# Patient Record
Sex: MALE | Race: WHITE | NOT HISPANIC OR LATINO | Employment: UNEMPLOYED | URBAN - METROPOLITAN AREA
[De-identification: names, ages, dates, MRNs, and addresses within clinical notes are randomized per-mention and may not be internally consistent; named-entity substitution may affect disease eponyms.]

---

## 2017-07-03 ENCOUNTER — OCCUPATIONAL MEDICINE (OUTPATIENT)
Dept: URGENT CARE | Facility: CLINIC | Age: 47
End: 2017-07-03
Payer: COMMERCIAL

## 2017-07-03 ENCOUNTER — APPOINTMENT (OUTPATIENT)
Dept: RADIOLOGY | Facility: IMAGING CENTER | Age: 47
End: 2017-07-03
Attending: NURSE PRACTITIONER
Payer: COMMERCIAL

## 2017-07-03 VITALS
SYSTOLIC BLOOD PRESSURE: 160 MMHG | OXYGEN SATURATION: 96 % | BODY MASS INDEX: 34.19 KG/M2 | HEIGHT: 73 IN | DIASTOLIC BLOOD PRESSURE: 100 MMHG | HEART RATE: 72 BPM | TEMPERATURE: 97.7 F | WEIGHT: 258 LBS | RESPIRATION RATE: 20 BRPM

## 2017-07-03 DIAGNOSIS — S63.602A SPRAIN OF LEFT THUMB, UNSPECIFIED SITE OF FINGER, INITIAL ENCOUNTER: ICD-10-CM

## 2017-07-03 PROCEDURE — 73140 X-RAY EXAM OF FINGER(S): CPT | Mod: TC,LT,29 | Performed by: NURSE PRACTITIONER

## 2017-07-03 PROCEDURE — 99202 OFFICE O/P NEW SF 15 MIN: CPT | Mod: 29 | Performed by: NURSE PRACTITIONER

## 2017-07-03 RX ORDER — LEVOTHYROXINE SODIUM 0.03 MG/1
25 TABLET ORAL
COMMUNITY
End: 2023-07-03

## 2017-07-03 RX ORDER — INSULIN GLARGINE 100 [IU]/ML
INJECTION, SOLUTION SUBCUTANEOUS NIGHTLY
Status: ON HOLD | COMMUNITY
End: 2023-03-29

## 2017-07-03 ASSESSMENT — ENCOUNTER SYMPTOMS
FEVER: 0
CHILLS: 0

## 2017-07-03 NOTE — MR AVS SNAPSHOT
"        Peña Guerrero   7/3/2017 12:00 PM   Occupational Medicine   MRN: [id number]    Department:  [facility]   Dept Phone:  [phone]    Description:  Male : 1970   Provider:  Cathey J Hamman, A.P.N.           Reason for Visit     Finger Injury 7 month ago whlie working hurt left thumb.      Allergies as of 7/3/2017     Allergen Noted Reactions    Pcn [Penicillins] 2017         You were diagnosed with     Sprain of left thumb, unspecified site of finger, initial encounter   [[id number]]         Vital Signs     Blood Pressure Pulse Temperature Respirations Height Weight    160/100 mmHg 72 36.5 °C (97.7 °F) 20 1.842 m (6' 0.5\") 117.028 kg (258 lb)    Body Mass Index Oxygen Saturation                34.49 kg/m2 96%          Basic Information     Date Of Birth Sex Race Ethnicity Preferred Language    1970 Male White Non- English      Your appointments     2017  8:00 AM   Workers Compensation with [facility]    [address] [place] [address] [address]  [place] [address]   [phone]              Problem List              ICD-10-CM Priority Class Noted - Resolved    Pain of left thumb M79.645   3/16/2015 - Present    Thumb laceration S61.019A   3/16/2015 - Present      Health Maintenance     Patient has no pending health maintenance at this time      Current Immunizations     No immunizations on file.      Below and/or attached are the medications your provider expects you to take. Review all of your home medications and newly ordered medications with your provider and/or pharmacist. Follow medication instructions as directed by your provider and/or pharmacist. Please keep your medication list with you and share with your provider. Update the information when medications are discontinued, doses are changed, or new medications (including over-the-counter products) are added; and carry medication information at all times in the event of " emergency situations     Allergies:  PCN - (reactions not documented)               Medications  Valid as of: July 03, 2017 -  1:09 PM    Generic Name Brand Name Tablet Size Instructions for use    Esomeprazole Magnesium (CAPSULE DELAYED RELEASE) NEXIUM 20 MG Take 20 mg by mouth every morning before breakfast.        Insulin Glargine (Solution) LANTUS 100 UNIT/ML Inject  as instructed every evening.        Levothyroxine Sodium (Tab) SYNTHROID 25 MCG Take 25 mcg by mouth Every morning on an empty stomach.        .                 Medicines prescribed today were sent to:     [facility] #[id number] - [place], [place] - [address] [address] [address] [facility] [address]    Phone: [phone] Fax: [phone]    Open 24 Hours?: No      Medication refill instructions:       If your prescription bottle indicates you have medication refills left, it is not necessary to call your provider’s office. Please contact your pharmacy and they will refill your medication.    If your prescription bottle indicates you do not have any refills left, you may request refills at any time through one of the following ways: The online [employer]idget[employer] system (except Urgent Care), by calling your provider’s office, or by asking your pharmacy to contact your provider’s office with a refill request. Medication refills are processed only during regular business hours and may not be available until the next business day. Your provider may request additional information or to have a follow-up visit with you prior to refilling your medication.   *Please Note: Medication refills are assigned a new Rx number when refilled electronically. Your pharmacy may indicate that no refills were authorized even though a new prescription for the same medication is available at the pharmacy. Please request the medicine by name with the pharmacy before contacting your provider for a refill.        Your To Do List     Future Labs/Procedures Complete By Expires     DX-FINGER(S) 2+ LEFT  As directed 7/3/2018      Referral     A referral request has been sent to our patient care coordination department. Please allow 3-5 business days for us to process this request and contact you either by phone or mail. If you do not hear from us by the 5th business day, please call us at [phone].           Yuyuto Access Code: [email or web address]  Expires: 8/2/2017  1:09 PM    Your email address is not on file at [employer].  Email Addresses are required for you to sign up for [employer], please contact [phone] to verify your personal information and to provide your email address prior to attempting to register for Yuyuto.    Peña Guerrero  [address] [place] [id number]    Yuyuto  A secure, online tool to manage your health information     [employer]’s Yuyuto® is a secure, online tool that connects you to your personalized health information from the privacy of your home -- day or night - making it very easy for you to manage your healthcare. Once the activation process is completed, you can even access your medical information using the Yuyuto fox, which is available for free in the Apple Fox store or Google Play store.     To learn more about Yuyuto, visit [email or web address]    There are two levels of access available (as shown below):   My Chart Features  [facility] Primary Care Doctor [facility]  Specialists [facility]  Urgent  Care Non-[facility] Primary Care Doctor   Email your healthcare team securely and privately 24/7 X X X    Manage appointments: schedule your next appointment; view details of past/upcoming appointments X      Request prescription refills. X      View recent personal medical records, including lab and immunizations X X X X   View health record, including health history, allergies, medications X X X X   Read reports about your outpatient visits, procedures, consult and ER notes X X X X   See your discharge summary, which is a recap of your  hospital and/or ER visit that includes your diagnosis, lab results, and care plan X X  X     How to register for [employer]:  Once your e-mail address has been verified, follow the following steps to sign up for [employer].     1. Go to  [email or web address]  2. Click on the Sign Up Now box, which takes you to the New Member Sign Up page. You will need to provide the following information:  a. Enter your [employer] Access Code exactly as it appears at the top of this page. (You will not need to use this code after you’ve completed the sign-up process. If you do not sign up before the expiration date, you must request a new code.)   b. Enter your date of birth.   c. Enter your home email address.   d. Click Submit, and follow the next screen’s instructions.  3. Create a [employer] ID. This will be your [employer] login ID and cannot be changed, so think of one that is secure and easy to remember.  4. Create a [employer] password. You can change your password at any time.  5. Enter your Password Reset Question and Answer. This can be used at a later time if you forget your password.   6. Enter your e-mail address. This allows you to receive e-mail notifications when new information is available in [employer].  7. Click Sign Up. You can now view your health information.    For assistance activating your [employer] account, call [phone]

## 2017-07-03 NOTE — PROGRESS NOTES
"Subjective:      Peña Guerrero is a 46 y.o. male who presents with Finger Injury      DOI: 1/12/17  patient states he injured his left thumb 6 months ago. Date of injury was January 12 of this year. He was helping someone else to move a snowblower out of the back of the pickup truck. He believes that the other person securely was holding the other and snowblower in the front end of the snowblower fell down while the patient was holding the handle area. He states that his left thumb became caught between 2 large bolts as the machine was falling and pulled his thumb forward and down to ground level. He has been having pain in the proximal joint of the thumb ever since. He states he was initially seen for this injury in [place] and that was found was a Workmen's Compensation at that visit. Patient has not had any follow-up since. Patient states lack of transportation and also coordination efforts with his third-party  have been barriers to further treatment.      Other  This is a chronic problem. Episode onset: 1/12/17. The problem occurs constantly. The problem has been unchanged. Pertinent negatives include no chills or fever. Nothing aggravates the symptoms. Treatments tried: ice, immobilization, rest. The treatment provided no relief.       Review of Systems   Constitutional: Negative for fever and chills.   Musculoskeletal:        Pain at the base of the left thumb, loss of strength in the left thumb.           Objective:     /100 mmHg  Pulse 72  Temp(Src) 36.5 °C (97.7 °F)  Resp 20  Ht 1.842 m (6' 0.5\")  Wt 117.028 kg (258 lb)  BMI 34.49 kg/m2  SpO2 96%     Physical Exam   Constitutional: He is oriented to person, place, and time. He appears well-developed and well-nourished. No distress.   Musculoskeletal:        Left hand: Decreased strength noted. He exhibits finger abduction and thumb/finger opposition.        Hands:  Point tenderness over the left thumb proximal joint. No " obvious STS.    Neurological: He is alert and oriented to person, place, and time.   Skin: Skin is warm and dry. He is not diaphoretic.   Psychiatric: He has a normal mood and affect. His behavior is normal.   Vitals reviewed.      There is slight soft tissue swelling noted to the proximal joint of the left thumb. Full ROM in the joint. Strength is 5/5 in the right thumb, 2/5 in the left. Unable to perform pincer grasp or fine motor tasks with the thumb of the left hand.       XR thumb:       7/3/2017 12:28 PM    HISTORY/REASON FOR EXAM:  Pain/Deformity Following Trauma.  LEFT thumb sprain 6 months ago, not improving.    TECHNIQUE/EXAM DESCRIPTION AND NUMBER OF VIEWS:  3 views of the LEFT fingers.    COMPARISON: None    FINDINGS:  No focal soft tissue swelling.  No radiopaque foreign body.  No fracture or dislocation.  Slight deformity of the distal aspect 1st metacarpal.         Impression        1.  Slight deformity of the distal aspect LEFT 1st metacarpal suggesting healed fracture.  2.  No acute fracture or dislocation.            Assessment/Plan:     1. Sprain of left thumb, unspecified site of finger, initial encounter    - DX-FINGER(S) 2+ LEFT; Future  -Thumb spica splint  -referral given to orthopedics  -See D39 for restrictions  -REturn in 2 weeks for follow up.

## 2017-07-03 NOTE — Clinical Note
[facility]   [address] - [place] [address]  Phone: [phone] - Fax: [phone]        [facility] Progress Report and Disability Certification  Date of Service: 7/3/2017   No Show:  No  Date / Time of Next Visit: 7/17/2017   Claim Information   Patient Name: Peña Guerrero  Claim Number:     Employer: [employer]  Date of Injury: 1/12/2017     Insurer / TPA: [employer]  ID / SSN:     Occupation: LAYBOR   Diagnosis: The encounter diagnosis was Sprain of left thumb, unspecified site of finger, initial encounter.    Medical Information   Related to Industrial Injury? Yes    Subjective Complaints:  DOI: 1/12/17  patient states he injured his left thumb 6 months ago. Date of injury was January 12 of this year. He was helping someone else to move a snowblower out of the back of the pickup truck. He believes that the other person securely was holding the other and snowblower in the front end of the snowblower fell down while the patient was holding the handle area. He states that his left thumb became caught between 2 large bolts as the machine was falling and pulled his thumb forward and down to ground level. He has been having pain in the proximal joint of the thumb ever since. He states he was initially seen for this injury in [place] and that was found was a Workmen's Compensation at that visit. Patient has not had any follow-up since. Patient states lack of transportation and also coordination efforts with his third-party  have been barriers to further treatment.    Objective Findings: There is slight soft tissue swelling noted to the proximal joint of the left thumb. Full ROM in the joint. Strength is 5/5 in the right thumb, 2/5 in the left. Unable to perform pincer grasp or fine motor tasks with the thumb of the left hand.    Pre-Existing Condition(s):     Assessment:   Condition Same       Status: Additional Care Required  Permanent Disability:No    Plan: ConsultationTransfer Care  Comments:patient referred to orthopedics in light of the  chronic nature of the problem    Diagnostics: X-ray    Comments:  1.  Slight deformity of the distal aspect LEFT 1st metacarpal suggesting healed fracture.  2.  No acute fracture or dislocation.    Disability Information   Status: Released to Restricted Duty    From:  7/3/2017  Through: 7/17/2017 Restrictions are: Temporary   Physical Restrictions   Sitting:    Standing:    Stooping:    Bending:      Squatting:    Walking:    Climbing:    Pushing:      Pulling:    Other:    Reaching Above Shoulder (L):   Reaching Above Shoulder (R):       Reaching Below Shoulder (L):    Reaching Below Shoulder (R):      Not to exceed Weight Limits   Carrying(hrs):   Weight Limit(lb): < or = to 10 pounds Lifting(hrs):   Weight  Limit(lb): < or = to 10 pounds   Comments: Restrictions specific to the left upper extremity. REferral given to orthopedics given the chronic nature of the problem.     Repetitive Actions   Hands: i.e. Fine Manipulations from Grasping: < or = to 1 hr/day   Feet: i.e. Operating Foot Controls:     Driving / Operate Machinery:     Physician Name: Cathey J Hamman, A.P.N. Physician Signature: e-SignHAMMAN, CATHEY J A.P.N. e-Signature: Dr. Chiki Lakhani, Medical Director   Clinic Name / Location: [address] [place] [address] [place] [address] Clinic Phone Number: Dept: [phone]   Appointment Time: 12:00 Pm Visit Start Time: 12:07 PM   Check-In Time:  12:01 Pm Visit Discharge Time:  1:06PM   Original-Treating Physician or Chiropractor    Page 2-Insurer/TPA    Page 3-Employer    Page 4-Employee

## 2017-07-03 NOTE — Clinical Note
"EMPLOYEE’S CLAIM FOR COMPENSATION/ REPORT OF INITIAL TREATMENT  FORM C-4    EMPLOYEE’S CLAIM - PROVIDE ALL INFORMATION REQUESTED   First Name  Peña Last Name  Cesar Birthdate                    1970                Sex  male Claim Number   Home Address  [address] Age  46 y.o. 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   What were you doing at the time of accident? (if applicable)  unloading a snowblower    How did this injury or occupational disease occur? (Be specific an answer in detail. Use additional sheet if necessary)  thumb got stuck in snowblower when unloading the snowblower   If you believe that you have an occupational disease, when did you first have knowledge of the disability and it relationship to your employment?  NA Witnesses to the Accident  One[place]       Nature of Injury or Occupational Disease  Sprain  Part(s) of Body Injured or Affected  Thumb (L), ,     I certify that the above is true and correct to the best of my knowledge and that I have provided this information in order to obtain the benefits of [place]’s " [place]  I hereby authorize any physician, chiropractor, surgeon, practitioner, or other person, any hospital, including [facility] or [facility], any medical service organization, any insurance company, or other institution or organization to release to each other, any medical or other information, including benefits paid or payable, pertinent to this injury or disease, except information relative to diagnosis, treatment and/or counseling for AIDS, psychological conditions, alcohol or controlled substances, for which I must give specific authorization.  A Photostat of this authorization shall be as valid as the original.     Date 7/3/17   [facility]    Employee’s Signature   THIS REPORT MUST BE COMPLETED AND MAILED WITHIN 3 WORKING DAYS OF TREATMENT   Place  [facility]  Name of Facility  [facility]   Date  7/3/2017 Diagnosis  (S63.602A) Sprain of left thumb, unspecified site of finger, initial encounter Is there evidence the injured employee was under the influence of alcohol and/or another controlled substance at the time of accident?   Hour  12:07 PM Description of Injury or Disease  The encounter diagnosis was Sprain of left thumb, unspecified site of finger, initial encounter. No   Treatment  Thumb spica splint, ibuprofen, referral to orthopedics.  Have you advised the patient to remain off work five days or more? No   X-Ray Findings    Comments:no acute fracture, possible old fracture of the distal 1st metacarpal   If Yes   From Date  To Date      From information given by the employee, together with medical evidence, can you directly connect this injury or occupational disease as job incurred?  Yes If No Full Duty  No Modified Duty  Yes   Is additional medical care by a physician indicated?  Yes If Modified Duty, Specify any Limitations / Restrictions  Limited use of the  "right hand.   Do you know of any previous injury or disease contributing to this condition or occupational disease?                            Yes  Comments:prior injury to the left thumb involving laceration.    Date  7/3/2017 Print Doctor’s Name Cathey J Hamman, A.P.N. I certify the employer’s copy of  this form was mailed on:   Address  [address] Insurer’s Use Only     [facility] Zip  [address]    Provider’s Tax ID Number  [id number]  Telephone  Dept: [phone]        steffi-SignHAMMAN, CATHEY J A.P.N.   e-Signature: Dr. Chiki Lakhani, Medical Director Degree  APN        ORIGINAL-TREATING PHYSICIAN OR CHIROPRACTOR    PAGE 2-INSURER/TPA    PAGE 3-EMPLOYER    PAGE 4-EMPLOYEE             Form C-4 (rev10/07)              BRIEF DESCRIPTION OF RIGHTS AND BENEFITS  (Pursuant to [place]RS 616C.050)    Notice of Injury or Occupational Disease (Incident Report Form C-1): If an injury or occupational disease (OD) arises out of and in the  course of employment, you must provide written notice to your employer as soon as practicable, but no later than 7 days after the accident or  OD. Your employer shall maintain a sufficient supply of the required forms.    Claim for Compensation (Form C-4): If medical treatment is sought, the form C-4 is available at the place of initial treatment. A completed  \"Claim for Compensation\" (Form C-4) must be filed within 90 days after an accident or OD. The treating physician or chiropractor must,  within 3 working days after treatment, complete and mail to the employer, the employer's insurer and third-party , the Claim for  Compensation.    Medical Treatment: If you require medical treatment for your on-the-job injury or OD, you may be required to select a physician or  chiropractor from a list provided by your workers’ compensation insurer, if it has contracted with an Organization for Managed Care (MCO) or  Preferred Provider Organization (PPO) or " providers of health care. If your employer has not entered into a contract with an MCO or PPO, you  may select a physician or chiropractor from the Panel of Physicians and Chiropractors. Any medical costs related to your industrial injury or  OD will be paid by your insurer.    Temporary Total Disability (TTD): If your doctor has certified that you are unable to work for a period of at least 5 consecutive days, or 5  cumulative days in a 20-day period, or places restrictions on you that your employer does not accommodate, you may be entitled to TTD  compensation.    Temporary Partial Disability (TPD): If the wage you receive upon reemployment is less than the compensation for TTD to which you are  entitled, the insurer may be required to pay you TPD compensation to make up the difference. TPD can only be paid for a maximum of 24  months.    Permanent Partial Disability (PPD): When your medical condition is stable and there is an indication of a PPD as a result of your injury or  OD, within 30 days, your insurer must arrange for an evaluation by a rating physician or chiropractor to determine the degree of your PPD. The  amount of your PPD award depends on the date of injury, the results of the PPD evaluation and your age and wage.    Permanent Total Disability (PTD): If you are medically certified by a treating physician or chiropractor as permanently and totally disabled  and have been granted a PTD status by your insurer, you are entitled to receive monthly benefits not to exceed 66 2/3% of your average  monthly wage. The amount of your PTD payments is subject to reduction if you previously received a PPD award.    Vocational Rehabilitation Services: You may be eligible for vocational rehabilitation services if you are unable to return to the job due to a  permanent physical impairment or permanent restrictions as a result of your injury or occupational disease.    Transportation and Per Mackenzie Reimbursement: You  may be eligible for travel expenses and per shakeel associated with medical treatment.    Reopening: You may be able to reopen your claim if your condition worsens after claim closure.    Appeal Process: If you disagree with a written determination issued by the insurer or the insurer does not respond to your request, you may  appeal to the Department of Administration, , by following the instructions contained in your determination letter. You must  appeal the determination within 70 days from the date of the determination letter at [address], [address], [place], [place]  [address], or [address], [place], [place] [address]. If you disagree with the  decision, you may appeal to the  Department of Administration, . You must file your appeal within 30 days from the date of the  decision  letter at [address], [address], [place], [place] [address], or [address], [place], [place] [address]. If you  disagree with a decision of an , you may file a petition for judicial review with the District Court. You must do so within 30  days of the Appeal Officer’s decision. You may be represented by an  at your own expense or you may contact the [facility] for possible  representation.    [place]  for Injured Workers (NAIW): If you disagree with a  decision, you may request that NAIW represent you  without charge at an  Hearing. For information regarding denial of benefits, you may contact the [facility] at: [address], [address], [place] [address], [phone], or [address], [place] [address], [phone]    To File a Complaint with the Division: If you wish to file a complaint with the  of the Division of Industrial Relations (DIR),  please contact the Workers’ Compensation Section, [address],  [address], [place], [place] [address], telephone [phone], or  [address], [address], [place], [place] [address], telephone [phone].    For assistance with Workers’ Compensation Issues: you may contact the Office of the Governor Consumer Health Assistance, [address] [place] [address], [address], [place], [place] [address], Toll Free 1-977.407.5105, Web site: http://govcha.[facility].[email or web address]., E-mail  [email or web address].                                                                                                                                                                                                                                   __________________________________________________________________                                                                   _________________                Employee Name / Signature                                                                                                                                                       Date                                                                                                                                                                                                     D-2 (rev. 10/07)

## 2020-02-17 ENCOUNTER — APPOINTMENT (OUTPATIENT)
Dept: RADIOLOGY | Facility: MEDICAL CENTER | Age: 50
DRG: 184 | End: 2020-02-17
Attending: EMERGENCY MEDICINE
Payer: MEDICAID

## 2020-02-17 ENCOUNTER — HOSPITAL ENCOUNTER (INPATIENT)
Facility: MEDICAL CENTER | Age: 50
LOS: 5 days | DRG: 184 | End: 2020-02-22
Attending: EMERGENCY MEDICINE | Admitting: SURGERY
Payer: MEDICAID

## 2020-02-17 DIAGNOSIS — S22.5XXA FLAIL CHEST, INITIAL ENCOUNTER FOR CLOSED FRACTURE: ICD-10-CM
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
## 2020-02-17 DIAGNOSIS — S43.101A AC SEPARATION, RIGHT, INITIAL ENCOUNTER: ICD-10-CM

## 2020-02-17 PROBLEM — F10.929 ACUTE ALCOHOL INTOXICATION (HCC): Status: ACTIVE | Noted: 2020-02-17

## 2020-02-17 PROBLEM — T14.90XA TRAUMA: Status: ACTIVE | Noted: 2020-02-17

## 2020-02-17 PROBLEM — S82.61XA CLOSED FRACTURE OF DISTAL LATERAL MALLEOLUS OF RIGHT FIBULA: Status: ACTIVE | Noted: 2020-02-17

## 2020-02-17 PROBLEM — S22.41XA: Status: ACTIVE | Noted: 2020-02-17

## 2020-02-17 PROBLEM — Z78.9 NO CONTRAINDICATION TO ANTICOAGULATION THERAPY: Status: ACTIVE | Noted: 2020-02-17

## 2020-02-17 PROBLEM — S82.91XA: Status: ACTIVE | Noted: 2020-02-17

## 2020-02-17 PROBLEM — S22.010A COMPRESSION FRACTURE OF T1 VERTEBRA (HCC): Status: ACTIVE | Noted: 2020-02-17

## 2020-02-17 LAB
ABO + RH BLD: NORMAL
ABO GROUP BLD: NORMAL
ALBUMIN SERPL BCP-MCNC: 4.1 G/DL (ref 3.2–4.9)
ALBUMIN/GLOB SERPL: 1.4 G/DL
ALP SERPL-CCNC: 91 U/L (ref 30–99)
ALT SERPL-CCNC: 44 U/L (ref 2–50)
ANION GAP SERPL CALC-SCNC: 14 MMOL/L (ref 0–11.9)
APTT PPP: 23.7 SEC (ref 24.7–36)
AST SERPL-CCNC: 113 U/L (ref 12–45)
BASOPHILS # BLD AUTO: 0.5 % (ref 0–1.8)
BASOPHILS # BLD: 0.05 K/UL (ref 0–0.12)
BILIRUB SERPL-MCNC: 0.5 MG/DL (ref 0.1–1.5)
BLD GP AB SCN SERPL QL: NORMAL
BUN SERPL-MCNC: 16 MG/DL (ref 8–22)
CALCIUM SERPL-MCNC: 9.4 MG/DL (ref 8.5–10.5)
CFT BLD TEG: 4.8 MIN (ref 5–10)
CHLORIDE SERPL-SCNC: 103 MMOL/L (ref 96–112)
CLOT ANGLE BLD TEG: 59 DEGREES (ref 53–72)
CLOT LYSIS 30M P MA LENFR BLD TEG: 0 % (ref 0–8)
CO2 SERPL-SCNC: 23 MMOL/L (ref 20–33)
CORTIS SERPL-MCNC: 21.9 UG/DL (ref 0–23)
CREAT SERPL-MCNC: 1.16 MG/DL (ref 0.5–1.4)
CT.EXTRINSIC BLD ROTEM: 2.6 MIN (ref 1–3)
EOSINOPHIL # BLD AUTO: 0.26 K/UL (ref 0–0.51)
EOSINOPHIL NFR BLD: 2.7 % (ref 0–6.9)
ERYTHROCYTE [DISTWIDTH] IN BLOOD BY AUTOMATED COUNT: 43.9 FL (ref 35.9–50)
ETHANOL BLD-MCNC: 0.1 G/DL
FIBRINOGEN PPP-MCNC: 251 MG/DL (ref 215–460)
GLOBULIN SER CALC-MCNC: 2.9 G/DL (ref 1.9–3.5)
GLUCOSE BLD-MCNC: 238 MG/DL (ref 65–99)
GLUCOSE SERPL-MCNC: 366 MG/DL (ref 65–99)
HCT VFR BLD AUTO: 46 % (ref 42–52)
HGB BLD-MCNC: 16.3 G/DL (ref 14–18)
IMM GRANULOCYTES # BLD AUTO: 0.14 K/UL (ref 0–0.11)
IMM GRANULOCYTES NFR BLD AUTO: 1.4 % (ref 0–0.9)
INR PPP: 0.96 (ref 0.87–1.13)
LACTATE BLD-SCNC: 4.8 MMOL/L (ref 0.5–2)
LYMPHOCYTES # BLD AUTO: 2.22 K/UL (ref 1–4.8)
LYMPHOCYTES NFR BLD: 22.7 % (ref 22–41)
MAGNESIUM SERPL-MCNC: 1.8 MG/DL (ref 1.5–2.5)
MCF BLD TEG: 57 MM (ref 50–70)
MCH RBC QN AUTO: 33.1 PG (ref 27–33)
MCHC RBC AUTO-ENTMCNC: 35.4 G/DL (ref 33.7–35.3)
MCV RBC AUTO: 93.5 FL (ref 81.4–97.8)
MONOCYTES # BLD AUTO: 0.54 K/UL (ref 0–0.85)
MONOCYTES NFR BLD AUTO: 5.5 % (ref 0–13.4)
NEUTROPHILS # BLD AUTO: 6.56 K/UL (ref 1.82–7.42)
NEUTROPHILS NFR BLD: 67.2 % (ref 44–72)
NRBC # BLD AUTO: 0 K/UL
NRBC BLD-RTO: 0 /100 WBC
PA AA BLD-ACNC: 0 %
PA ADP BLD-ACNC: 5.9 %
PHOSPHATE SERPL-MCNC: 2.3 MG/DL (ref 2.5–4.5)
PLATELET # BLD AUTO: 228 K/UL (ref 164–446)
PMV BLD AUTO: 9.1 FL (ref 9–12.9)
POTASSIUM SERPL-SCNC: 3.7 MMOL/L (ref 3.6–5.5)
PROT SERPL-MCNC: 7 G/DL (ref 6–8.2)
PROTHROMBIN TIME: 12.9 SEC (ref 12–14.6)
RBC # BLD AUTO: 4.92 M/UL (ref 4.7–6.1)
RH BLD: NORMAL
SODIUM SERPL-SCNC: 140 MMOL/L (ref 135–145)
T4 FREE SERPL-MCNC: 0.88 NG/DL (ref 0.53–1.43)
TEG ALGORITHM TGALG: ABNORMAL
TROPONIN T SERPL-MCNC: 15 NG/L (ref 6–19)
TSH SERPL DL<=0.005 MIU/L-ACNC: 5.38 UIU/ML (ref 0.38–5.33)
WBC # BLD AUTO: 9.8 K/UL (ref 4.8–10.8)

## 2020-02-17 PROCEDURE — 82962 GLUCOSE BLOOD TEST: CPT

## 2020-02-17 PROCEDURE — 84100 ASSAY OF PHOSPHORUS: CPT

## 2020-02-17 PROCEDURE — 80307 DRUG TEST PRSMV CHEM ANLYZR: CPT

## 2020-02-17 PROCEDURE — 86900 BLOOD TYPING SEROLOGIC ABO: CPT

## 2020-02-17 PROCEDURE — 85730 THROMBOPLASTIN TIME PARTIAL: CPT

## 2020-02-17 PROCEDURE — 700111 HCHG RX REV CODE 636 W/ 250 OVERRIDE (IP): Performed by: EMERGENCY MEDICINE

## 2020-02-17 PROCEDURE — 85576 BLOOD PLATELET AGGREGATION: CPT

## 2020-02-17 PROCEDURE — 74177 CT ABD & PELVIS W/CONTRAST: CPT

## 2020-02-17 PROCEDURE — A9270 NON-COVERED ITEM OR SERVICE: HCPCS | Performed by: SURGERY

## 2020-02-17 PROCEDURE — 700102 HCHG RX REV CODE 250 W/ 637 OVERRIDE(OP): Performed by: SURGERY

## 2020-02-17 PROCEDURE — 84443 ASSAY THYROID STIM HORMONE: CPT

## 2020-02-17 PROCEDURE — 770022 HCHG ROOM/CARE - ICU (200)

## 2020-02-17 PROCEDURE — 99291 CRITICAL CARE FIRST HOUR: CPT

## 2020-02-17 PROCEDURE — 700101 HCHG RX REV CODE 250: Performed by: SURGERY

## 2020-02-17 PROCEDURE — 73600 X-RAY EXAM OF ANKLE: CPT | Mod: RT

## 2020-02-17 PROCEDURE — 96374 THER/PROPH/DIAG INJ IV PUSH: CPT

## 2020-02-17 PROCEDURE — 80053 COMPREHEN METABOLIC PANEL: CPT

## 2020-02-17 PROCEDURE — 83735 ASSAY OF MAGNESIUM: CPT

## 2020-02-17 PROCEDURE — 84439 ASSAY OF FREE THYROXINE: CPT

## 2020-02-17 PROCEDURE — 71045 X-RAY EXAM CHEST 1 VIEW: CPT

## 2020-02-17 PROCEDURE — 73030 X-RAY EXAM OF SHOULDER: CPT | Mod: RT

## 2020-02-17 PROCEDURE — 700111 HCHG RX REV CODE 636 W/ 250 OVERRIDE (IP): Performed by: SURGERY

## 2020-02-17 PROCEDURE — 72128 CT CHEST SPINE W/O DYE: CPT

## 2020-02-17 PROCEDURE — 85384 FIBRINOGEN ACTIVITY: CPT

## 2020-02-17 PROCEDURE — 85347 COAGULATION TIME ACTIVATED: CPT

## 2020-02-17 PROCEDURE — 70450 CT HEAD/BRAIN W/O DYE: CPT

## 2020-02-17 PROCEDURE — 82533 TOTAL CORTISOL: CPT

## 2020-02-17 PROCEDURE — 84484 ASSAY OF TROPONIN QUANT: CPT

## 2020-02-17 PROCEDURE — 96375 TX/PRO/DX INJ NEW DRUG ADDON: CPT

## 2020-02-17 PROCEDURE — 73060 X-RAY EXAM OF HUMERUS: CPT | Mod: RT

## 2020-02-17 PROCEDURE — 700101 HCHG RX REV CODE 250: Performed by: EMERGENCY MEDICINE

## 2020-02-17 PROCEDURE — 72131 CT LUMBAR SPINE W/O DYE: CPT

## 2020-02-17 PROCEDURE — 700117 HCHG RX CONTRAST REV CODE 255: Performed by: EMERGENCY MEDICINE

## 2020-02-17 PROCEDURE — 85025 COMPLETE CBC W/AUTO DIFF WBC: CPT

## 2020-02-17 PROCEDURE — G0390 TRAUMA RESPONS W/HOSP CRITI: HCPCS

## 2020-02-17 PROCEDURE — 72125 CT NECK SPINE W/O DYE: CPT

## 2020-02-17 PROCEDURE — 86850 RBC ANTIBODY SCREEN: CPT

## 2020-02-17 PROCEDURE — 83605 ASSAY OF LACTIC ACID: CPT

## 2020-02-17 PROCEDURE — 700111 HCHG RX REV CODE 636 W/ 250 OVERRIDE (IP)

## 2020-02-17 PROCEDURE — 700112 HCHG RX REV CODE 229: Performed by: SURGERY

## 2020-02-17 PROCEDURE — 700105 HCHG RX REV CODE 258: Performed by: SURGERY

## 2020-02-17 PROCEDURE — 86901 BLOOD TYPING SEROLOGIC RH(D): CPT

## 2020-02-17 PROCEDURE — 72170 X-RAY EXAM OF PELVIS: CPT

## 2020-02-17 PROCEDURE — 700105 HCHG RX REV CODE 258: Performed by: EMERGENCY MEDICINE

## 2020-02-17 PROCEDURE — 85610 PROTHROMBIN TIME: CPT

## 2020-02-17 RX ORDER — SODIUM CHLORIDE 9 MG/ML
1000 INJECTION, SOLUTION INTRAVENOUS ONCE
Status: COMPLETED | OUTPATIENT
Start: 2020-02-17 | End: 2020-02-17

## 2020-02-17 RX ORDER — ENEMA 19; 7 G/133ML; G/133ML
1 ENEMA RECTAL
Status: DISCONTINUED | OUTPATIENT
Start: 2020-02-17 | End: 2020-02-22 | Stop reason: HOSPADM

## 2020-02-17 RX ORDER — OXYCODONE HYDROCHLORIDE 10 MG/1
10 TABLET ORAL
Status: DISCONTINUED | OUTPATIENT
Start: 2020-02-17 | End: 2020-02-19

## 2020-02-17 RX ORDER — LIDOCAINE 50 MG/G
1 PATCH TOPICAL EVERY 24 HOURS
Status: DISCONTINUED | OUTPATIENT
Start: 2020-02-17 | End: 2020-02-19

## 2020-02-17 RX ORDER — SODIUM CHLORIDE, SODIUM LACTATE, POTASSIUM CHLORIDE, CALCIUM CHLORIDE 600; 310; 30; 20 MG/100ML; MG/100ML; MG/100ML; MG/100ML
INJECTION, SOLUTION INTRAVENOUS CONTINUOUS
Status: DISCONTINUED | OUTPATIENT
Start: 2020-02-17 | End: 2020-02-22 | Stop reason: HOSPADM

## 2020-02-17 RX ORDER — AMOXICILLIN 250 MG
1 CAPSULE ORAL NIGHTLY
Status: DISCONTINUED | OUTPATIENT
Start: 2020-02-17 | End: 2020-02-22 | Stop reason: HOSPADM

## 2020-02-17 RX ORDER — MORPHINE SULFATE 4 MG/ML
4 INJECTION, SOLUTION INTRAMUSCULAR; INTRAVENOUS
Status: DISCONTINUED | OUTPATIENT
Start: 2020-02-17 | End: 2020-02-21

## 2020-02-17 RX ORDER — ONDANSETRON 2 MG/ML
4 INJECTION INTRAMUSCULAR; INTRAVENOUS EVERY 4 HOURS PRN
Status: DISCONTINUED | OUTPATIENT
Start: 2020-02-17 | End: 2020-02-22 | Stop reason: HOSPADM

## 2020-02-17 RX ORDER — ACETAMINOPHEN 500 MG
1000 TABLET ORAL EVERY 6 HOURS
Status: COMPLETED | OUTPATIENT
Start: 2020-02-17 | End: 2020-02-22

## 2020-02-17 RX ORDER — POLYETHYLENE GLYCOL 3350 17 G/17G
1 POWDER, FOR SOLUTION ORAL 2 TIMES DAILY
Status: DISCONTINUED | OUTPATIENT
Start: 2020-02-17 | End: 2020-02-22 | Stop reason: HOSPADM

## 2020-02-17 RX ORDER — AMOXICILLIN 250 MG
1 CAPSULE ORAL
Status: DISCONTINUED | OUTPATIENT
Start: 2020-02-17 | End: 2020-02-22 | Stop reason: HOSPADM

## 2020-02-17 RX ORDER — OXYCODONE HYDROCHLORIDE 5 MG/1
5 TABLET ORAL
Status: DISCONTINUED | OUTPATIENT
Start: 2020-02-17 | End: 2020-02-19

## 2020-02-17 RX ORDER — INSULIN GLARGINE 100 [IU]/ML
40 INJECTION, SOLUTION SUBCUTANEOUS EVERY MORNING
Status: ON HOLD | COMMUNITY
End: 2023-03-29

## 2020-02-17 RX ORDER — IBUPROFEN 800 MG/1
800 TABLET ORAL 3 TIMES DAILY
Status: DISCONTINUED | OUTPATIENT
Start: 2020-02-17 | End: 2020-02-21

## 2020-02-17 RX ORDER — FAMOTIDINE 20 MG/1
20 TABLET, FILM COATED ORAL 2 TIMES DAILY
Status: DISCONTINUED | OUTPATIENT
Start: 2020-02-17 | End: 2020-02-22 | Stop reason: HOSPADM

## 2020-02-17 RX ORDER — DOCUSATE SODIUM 100 MG/1
100 CAPSULE, LIQUID FILLED ORAL 2 TIMES DAILY
Status: DISCONTINUED | OUTPATIENT
Start: 2020-02-17 | End: 2020-02-22 | Stop reason: HOSPADM

## 2020-02-17 RX ORDER — BISACODYL 10 MG
10 SUPPOSITORY, RECTAL RECTAL
Status: DISCONTINUED | OUTPATIENT
Start: 2020-02-17 | End: 2020-02-22 | Stop reason: HOSPADM

## 2020-02-17 RX ADMIN — KETAMINE HYDROCHLORIDE 25 MG: 10 INJECTION, SOLUTION INTRAMUSCULAR; INTRAVENOUS at 18:15

## 2020-02-17 RX ADMIN — INSULIN HUMAN 2 UNITS: 100 INJECTION, SOLUTION PARENTERAL at 20:20

## 2020-02-17 RX ADMIN — ACETAMINOPHEN 1000 MG: 500 TABLET, FILM COATED ORAL at 20:11

## 2020-02-17 RX ADMIN — SODIUM CHLORIDE, POTASSIUM CHLORIDE, SODIUM LACTATE AND CALCIUM CHLORIDE: 600; 310; 30; 20 INJECTION, SOLUTION INTRAVENOUS at 20:06

## 2020-02-17 RX ADMIN — ACETAMINOPHEN 1000 MG: 500 TABLET, FILM COATED ORAL at 23:41

## 2020-02-17 RX ADMIN — IOHEXOL 80 ML: 350 INJECTION, SOLUTION INTRAVENOUS at 18:18

## 2020-02-17 RX ADMIN — INSULIN HUMAN 1 UNITS: 100 INJECTION, SOLUTION PARENTERAL at 23:43

## 2020-02-17 RX ADMIN — SODIUM CHLORIDE 1000 ML: 9 INJECTION, SOLUTION INTRAVENOUS at 18:57

## 2020-02-17 RX ADMIN — FAMOTIDINE 20 MG: 20 TABLET ORAL at 20:11

## 2020-02-17 RX ADMIN — IBUPROFEN 800 MG: 800 TABLET, FILM COATED ORAL at 20:11

## 2020-02-17 RX ADMIN — DOCUSATE SODIUM 100 MG: 100 CAPSULE, LIQUID FILLED ORAL at 20:03

## 2020-02-17 RX ADMIN — MORPHINE SULFATE 4 MG: 4 INJECTION INTRAVENOUS at 20:10

## 2020-02-17 RX ADMIN — LIDOCAINE 1 PATCH: 50 PATCH TOPICAL at 20:20

## 2020-02-17 RX ADMIN — MORPHINE SULFATE 4 MG: 4 INJECTION INTRAVENOUS at 23:41

## 2020-02-17 RX ADMIN — FENTANYL CITRATE 100 MCG: 50 INJECTION, SOLUTION INTRAMUSCULAR; INTRAVENOUS at 18:15

## 2020-02-17 RX ADMIN — ONDANSETRON 4 MG: 2 INJECTION INTRAMUSCULAR; INTRAVENOUS at 21:02

## 2020-02-17 ASSESSMENT — COGNITIVE AND FUNCTIONAL STATUS - GENERAL
STANDING UP FROM CHAIR USING ARMS: A LOT
SUGGESTED CMS G CODE MODIFIER MOBILITY: CJ
WALKING IN HOSPITAL ROOM: A LOT
HELP NEEDED FOR BATHING: A LITTLE
DAILY ACTIVITIY SCORE: 16
CLIMB 3 TO 5 STEPS WITH RAILING: A LITTLE
EATING MEALS: A LITTLE
MOBILITY SCORE: 14
SUGGESTED CMS G CODE MODIFIER MOBILITY: CL
HELP NEEDED FOR BATHING: A LITTLE
TURNING FROM BACK TO SIDE WHILE IN FLAT BAD: A LOT
DRESSING REGULAR LOWER BODY CLOTHING: A LITTLE
MOVING FROM LYING ON BACK TO SITTING ON SIDE OF FLAT BED: A LITTLE
SUGGESTED CMS G CODE MODIFIER DAILY ACTIVITY: CJ
DRESSING REGULAR UPPER BODY CLOTHING: A LITTLE
DRESSING REGULAR LOWER BODY CLOTHING: A LITTLE
DRESSING REGULAR UPPER BODY CLOTHING: A LOT
DAILY ACTIVITIY SCORE: 20
TOILETING: A LITTLE
MOVING TO AND FROM BED TO CHAIR: A LOT
TOILETING: A LITTLE
SUGGESTED CMS G CODE MODIFIER DAILY ACTIVITY: CK
CLIMB 3 TO 5 STEPS WITH RAILING: A LITTLE
PERSONAL GROOMING: A LOT
WALKING IN HOSPITAL ROOM: A LITTLE
MOBILITY SCORE: 22

## 2020-02-17 ASSESSMENT — LIFESTYLE VARIABLES
CONSUMPTION TOTAL: POSITIVE
EVER HAD A DRINK FIRST THING IN THE MORNING TO STEADY YOUR NERVES TO GET RID OF A HANGOVER: YES
AVERAGE NUMBER OF DAYS PER WEEK YOU HAVE A DRINK CONTAINING ALCOHOL: 7
ON A TYPICAL DAY WHEN YOU DRINK ALCOHOL HOW MANY DRINKS DO YOU HAVE: 5
ALCOHOL_USE: YES
TOTAL SCORE: 4
HAVE PEOPLE ANNOYED YOU BY CRITICIZING YOUR DRINKING: YES
HAVE YOU EVER FELT YOU SHOULD CUT DOWN ON YOUR DRINKING: YES
EVER_SMOKED: NEVER
TOTAL SCORE: 4
TOTAL SCORE: 4
DOES PATIENT WANT TO TALK TO SOMEONE ABOUT QUITTING: YES
EVER FELT BAD OR GUILTY ABOUT YOUR DRINKING: YES
HOW MANY TIMES IN THE PAST YEAR HAVE YOU HAD 5 OR MORE DRINKS IN A DAY: 150
DOES PATIENT WANT TO STOP DRINKING: YES

## 2020-02-17 ASSESSMENT — PATIENT HEALTH QUESTIONNAIRE - PHQ9
SUM OF ALL RESPONSES TO PHQ9 QUESTIONS 1 AND 2: 0
1. LITTLE INTEREST OR PLEASURE IN DOING THINGS: NOT AT ALL
2. FEELING DOWN, DEPRESSED, IRRITABLE, OR HOPELESS: NOT AT ALL

## 2020-02-18 ENCOUNTER — APPOINTMENT (OUTPATIENT)
Dept: RADIOLOGY | Facility: MEDICAL CENTER | Age: 50
DRG: 184 | End: 2020-02-18
Attending: SURGERY
Payer: MEDICAID

## 2020-02-18 LAB
ALBUMIN SERPL BCP-MCNC: 3.7 G/DL (ref 3.2–4.9)
ALBUMIN/GLOB SERPL: 1.7 G/DL
ALP SERPL-CCNC: 71 U/L (ref 30–99)
ALT SERPL-CCNC: 31 U/L (ref 2–50)
ANION GAP SERPL CALC-SCNC: 10 MMOL/L (ref 0–11.9)
AST SERPL-CCNC: 47 U/L (ref 12–45)
BASOPHILS # BLD AUTO: 0.1 % (ref 0–1.8)
BASOPHILS # BLD: 0.01 K/UL (ref 0–0.12)
BILIRUB SERPL-MCNC: 0.7 MG/DL (ref 0.1–1.5)
BUN SERPL-MCNC: 14 MG/DL (ref 8–22)
CALCIUM SERPL-MCNC: 8.3 MG/DL (ref 8.5–10.5)
CHLORIDE SERPL-SCNC: 107 MMOL/L (ref 96–112)
CO2 SERPL-SCNC: 23 MMOL/L (ref 20–33)
CREAT SERPL-MCNC: 0.86 MG/DL (ref 0.5–1.4)
EOSINOPHIL # BLD AUTO: 0.04 K/UL (ref 0–0.51)
EOSINOPHIL NFR BLD: 0.4 % (ref 0–6.9)
ERYTHROCYTE [DISTWIDTH] IN BLOOD BY AUTOMATED COUNT: 45.1 FL (ref 35.9–50)
EST. AVERAGE GLUCOSE BLD GHB EST-MCNC: 180 MG/DL
GLOBULIN SER CALC-MCNC: 2.2 G/DL (ref 1.9–3.5)
GLUCOSE BLD-MCNC: 111 MG/DL (ref 65–99)
GLUCOSE BLD-MCNC: 139 MG/DL (ref 65–99)
GLUCOSE BLD-MCNC: 177 MG/DL (ref 65–99)
GLUCOSE BLD-MCNC: 191 MG/DL (ref 65–99)
GLUCOSE BLD-MCNC: 211 MG/DL (ref 65–99)
GLUCOSE SERPL-MCNC: 242 MG/DL (ref 65–99)
HBA1C MFR BLD: 7.9 % (ref 0–5.6)
HCT VFR BLD AUTO: 41.6 % (ref 42–52)
HGB BLD-MCNC: 15.6 G/DL (ref 14–18)
IMM GRANULOCYTES # BLD AUTO: 0.05 K/UL (ref 0–0.11)
IMM GRANULOCYTES NFR BLD AUTO: 0.5 % (ref 0–0.9)
LYMPHOCYTES # BLD AUTO: 1.22 K/UL (ref 1–4.8)
LYMPHOCYTES NFR BLD: 12.1 % (ref 22–41)
MCH RBC QN AUTO: 35.3 PG (ref 27–33)
MCHC RBC AUTO-ENTMCNC: 37.5 G/DL (ref 33.7–35.3)
MCV RBC AUTO: 94.1 FL (ref 81.4–97.8)
MONOCYTES # BLD AUTO: 0.87 K/UL (ref 0–0.85)
MONOCYTES NFR BLD AUTO: 8.6 % (ref 0–13.4)
NEUTROPHILS # BLD AUTO: 7.88 K/UL (ref 1.82–7.42)
NEUTROPHILS NFR BLD: 78.3 % (ref 44–72)
NRBC # BLD AUTO: 0 K/UL
NRBC BLD-RTO: 0 /100 WBC
PLATELET # BLD AUTO: 171 K/UL (ref 164–446)
PMV BLD AUTO: 9.6 FL (ref 9–12.9)
POTASSIUM SERPL-SCNC: 4 MMOL/L (ref 3.6–5.5)
PROT SERPL-MCNC: 5.9 G/DL (ref 6–8.2)
RBC # BLD AUTO: 4.42 M/UL (ref 4.7–6.1)
SODIUM SERPL-SCNC: 140 MMOL/L (ref 135–145)
WBC # BLD AUTO: 10.1 K/UL (ref 4.8–10.8)

## 2020-02-18 PROCEDURE — A9270 NON-COVERED ITEM OR SERVICE: HCPCS | Performed by: SURGERY

## 2020-02-18 PROCEDURE — 97162 PT EVAL MOD COMPLEX 30 MIN: CPT

## 2020-02-18 PROCEDURE — 700112 HCHG RX REV CODE 229: Performed by: SURGERY

## 2020-02-18 PROCEDURE — 700102 HCHG RX REV CODE 250 W/ 637 OVERRIDE(OP): Performed by: SURGERY

## 2020-02-18 PROCEDURE — 83036 HEMOGLOBIN GLYCOSYLATED A1C: CPT

## 2020-02-18 PROCEDURE — 71045 X-RAY EXAM CHEST 1 VIEW: CPT

## 2020-02-18 PROCEDURE — 700105 HCHG RX REV CODE 258: Performed by: SURGERY

## 2020-02-18 PROCEDURE — 99291 CRITICAL CARE FIRST HOUR: CPT | Performed by: SURGERY

## 2020-02-18 PROCEDURE — 82962 GLUCOSE BLOOD TEST: CPT | Mod: 91

## 2020-02-18 PROCEDURE — 85025 COMPLETE CBC W/AUTO DIFF WBC: CPT

## 2020-02-18 PROCEDURE — 80053 COMPREHEN METABOLIC PANEL: CPT

## 2020-02-18 PROCEDURE — 700111 HCHG RX REV CODE 636 W/ 250 OVERRIDE (IP): Performed by: SURGERY

## 2020-02-18 PROCEDURE — 770022 HCHG ROOM/CARE - ICU (200)

## 2020-02-18 PROCEDURE — 97166 OT EVAL MOD COMPLEX 45 MIN: CPT

## 2020-02-18 PROCEDURE — 700101 HCHG RX REV CODE 250: Performed by: SURGERY

## 2020-02-18 RX ORDER — GABAPENTIN 100 MG/1
100 CAPSULE ORAL EVERY 8 HOURS
Status: DISCONTINUED | OUTPATIENT
Start: 2020-02-18 | End: 2020-02-22

## 2020-02-18 RX ORDER — BACITRACIN ZINC AND POLYMYXIN B SULFATE 500; 1000 [USP'U]/G; [USP'U]/G
OINTMENT TOPICAL 2 TIMES DAILY
Status: DISCONTINUED | OUTPATIENT
Start: 2020-02-18 | End: 2020-02-22 | Stop reason: HOSPADM

## 2020-02-18 RX ADMIN — DOCUSATE SODIUM 100 MG: 100 CAPSULE, LIQUID FILLED ORAL at 04:47

## 2020-02-18 RX ADMIN — OXYCODONE HYDROCHLORIDE 10 MG: 10 TABLET ORAL at 06:08

## 2020-02-18 RX ADMIN — SODIUM CHLORIDE, POTASSIUM CHLORIDE, SODIUM LACTATE AND CALCIUM CHLORIDE: 600; 310; 30; 20 INJECTION, SOLUTION INTRAVENOUS at 12:27

## 2020-02-18 RX ADMIN — FAMOTIDINE 20 MG: 20 TABLET ORAL at 18:11

## 2020-02-18 RX ADMIN — ACETAMINOPHEN 1000 MG: 500 TABLET, FILM COATED ORAL at 18:11

## 2020-02-18 RX ADMIN — ONDANSETRON 4 MG: 2 INJECTION INTRAMUSCULAR; INTRAVENOUS at 18:24

## 2020-02-18 RX ADMIN — ENOXAPARIN SODIUM 30 MG: 100 INJECTION SUBCUTANEOUS at 04:46

## 2020-02-18 RX ADMIN — GABAPENTIN 100 MG: 100 CAPSULE ORAL at 13:35

## 2020-02-18 RX ADMIN — IBUPROFEN 800 MG: 800 TABLET, FILM COATED ORAL at 04:47

## 2020-02-18 RX ADMIN — IBUPROFEN 800 MG: 800 TABLET, FILM COATED ORAL at 18:11

## 2020-02-18 RX ADMIN — INSULIN HUMAN 2 UNITS: 100 INJECTION, SOLUTION PARENTERAL at 04:52

## 2020-02-18 RX ADMIN — OXYCODONE HYDROCHLORIDE 10 MG: 10 TABLET ORAL at 18:12

## 2020-02-18 RX ADMIN — ACETAMINOPHEN 1000 MG: 500 TABLET, FILM COATED ORAL at 11:49

## 2020-02-18 RX ADMIN — MORPHINE SULFATE 4 MG: 4 INJECTION INTRAVENOUS at 14:55

## 2020-02-18 RX ADMIN — FAMOTIDINE 20 MG: 20 TABLET ORAL at 04:46

## 2020-02-18 RX ADMIN — ENOXAPARIN SODIUM 30 MG: 100 INJECTION SUBCUTANEOUS at 18:11

## 2020-02-18 RX ADMIN — OXYCODONE HYDROCHLORIDE 10 MG: 10 TABLET ORAL at 03:06

## 2020-02-18 RX ADMIN — ACETAMINOPHEN 1000 MG: 500 TABLET, FILM COATED ORAL at 22:55

## 2020-02-18 RX ADMIN — GABAPENTIN 100 MG: 100 CAPSULE ORAL at 22:55

## 2020-02-18 RX ADMIN — OXYCODONE HYDROCHLORIDE 10 MG: 10 TABLET ORAL at 12:28

## 2020-02-18 RX ADMIN — SENNOSIDES AND DOCUSATE SODIUM 1 TABLET: 8.6; 5 TABLET ORAL at 19:40

## 2020-02-18 RX ADMIN — DOCUSATE SODIUM 100 MG: 100 CAPSULE, LIQUID FILLED ORAL at 18:11

## 2020-02-18 RX ADMIN — OXYCODONE HYDROCHLORIDE 10 MG: 10 TABLET ORAL at 22:54

## 2020-02-18 RX ADMIN — ACETAMINOPHEN 1000 MG: 500 TABLET, FILM COATED ORAL at 04:46

## 2020-02-18 RX ADMIN — MORPHINE SULFATE 4 MG: 4 INJECTION INTRAVENOUS at 19:40

## 2020-02-18 RX ADMIN — LIDOCAINE 1 PATCH: 50 PATCH TOPICAL at 20:31

## 2020-02-18 RX ADMIN — MORPHINE SULFATE 4 MG: 4 INJECTION INTRAVENOUS at 08:31

## 2020-02-18 RX ADMIN — OXYCODONE HYDROCHLORIDE 10 MG: 10 TABLET ORAL at 09:24

## 2020-02-18 RX ADMIN — IBUPROFEN 800 MG: 800 TABLET, FILM COATED ORAL at 11:49

## 2020-02-18 RX ADMIN — POLYETHYLENE GLYCOL 3350 1 PACKET: 17 POWDER, FOR SOLUTION ORAL at 18:11

## 2020-02-18 ASSESSMENT — GAIT ASSESSMENTS
DEVIATION: ANTALGIC;STEP TO;DECREASED BASE OF SUPPORT
DISTANCE (FEET): 60
ASSISTIVE DEVICE: HAND HELD ASSIST
GAIT LEVEL OF ASSIST: MINIMAL ASSIST

## 2020-02-18 ASSESSMENT — ACTIVITIES OF DAILY LIVING (ADL): TOILETING: INDEPENDENT

## 2020-02-18 ASSESSMENT — COGNITIVE AND FUNCTIONAL STATUS - GENERAL
TOILETING: A LOT
MOBILITY SCORE: 18
DRESSING REGULAR UPPER BODY CLOTHING: A LOT
SUGGESTED CMS G CODE MODIFIER MOBILITY: CK
SUGGESTED CMS G CODE MODIFIER DAILY ACTIVITY: CK
STANDING UP FROM CHAIR USING ARMS: A LITTLE
DAILY ACTIVITIY SCORE: 14
WALKING IN HOSPITAL ROOM: A LITTLE
DRESSING REGULAR LOWER BODY CLOTHING: A LOT
TURNING FROM BACK TO SIDE WHILE IN FLAT BAD: A LITTLE
HELP NEEDED FOR BATHING: A LOT
EATING MEALS: A LITTLE
MOVING TO AND FROM BED TO CHAIR: A LITTLE
PERSONAL GROOMING: A LITTLE
MOVING FROM LYING ON BACK TO SITTING ON SIDE OF FLAT BED: A LITTLE
CLIMB 3 TO 5 STEPS WITH RAILING: A LITTLE

## 2020-02-18 NOTE — PROGRESS NOTES
"TRAUMA TERTIARY SURVEY     Mental status adequate for full examination?: Yes    Spine cleared (radiologically and/or clinically): Yes    PHYSICAL EXAMINATION:  Vitals: /83   Pulse 71   Temp 37.1 °C (98.7 °F) (Temporal)   Resp 18   Ht 1.829 m (6' 0.01\")   Wt 98.6 kg (217 lb 6 oz)   SpO2 95%   BMI 29.47 kg/m²   Constitutional:     General Appearance: appears stated age.  HEENT:    scalp lacerations present. . The pupils are equal, round, and reactive to light bilaterally. The extraocular muscles are intact bilaterally. The nares and oropharynx are clear. The midface and jaw are stable. No malocclusion is evident.  Neck:    Normal range of motion.  Respiratory:   Inspection: Unlabored respirations, no intercostal retractions, paradoxical motion, or accessory muscle use.   Palpation:  The chest is tender - right third, fourth, fifth, sixth, seventh rib.lleft old healed posterior rib fractures    Auscultation: clear to auscultation.  Cardiovascular:   Auscultation: normal.   Peripheral Pulses: Normal.   Abdomen:   Abdomen is soft, nontender, without organomegaly or masses.  Genitourinary:   (MALE):   Musculoskeletal:   The pelvis is stable.  No significant angulation, deformity, or soft tissue injury involving the upper and lower extremities. Normal range of motion.   Back:   The thoracolumbar spine was examined. Examination is remarkable for no significant tenderness, swelling, or deformity in the thoracolumbar region.  Skin:   The skin is warm and dry. Right sided abrasions noted on right arm, right flank and hip  Neurologic:    Hamilton Coma Scale (GCS) 15 E4V5M6. Neurologic examination revealed no focal deficits noted.  Psychiatric:   The patient does not appear depressed or anxious.    IMAGING:  DX-CHEST-PORTABLE (1 VIEW)   Final Result         1.  Pulmonary edema and/or infiltrates are identified, which are stable since the prior exam.   2.  Cardiomegaly      DX-ANKLE 2- VIEWS RIGHT   Final Result    "   Small calcific densities adjacent to the anterior distal tibia and lateral malleolus are of indeterminate age.      Lucency through the tip of the lateral malleolus may represent a nondisplaced fracture.      Small ankle joint effusion.         DX-SHOULDER 2+ RIGHT   Final Result      Widened right acromioclavicular joint can be seen in the setting of AC separation injury.      Multiple right-sided rib fractures.         DX-HUMERUS 2+ RIGHT   Final Result      Widened acromioclavicular joint can be seen in the setting of AC separation injury.      Right-sided rib fractures.         DX-CHEST-LIMITED (1 VIEW)   Final Result      No acute cardiopulmonary process is identified.      Mildly widened right acromioclavicular joint can be seen in the setting of separation injury.      Right-sided rib fracture as seen on prior CT.      DX-PELVIS-1 OR 2 VIEWS   Final Result      No fracture or dislocation is seen.         CT-CHEST,ABDOMEN,PELVIS WITH   Final Result      1.  Multiple RIGHT anterior and posterior rib fractures.   2.  No pneumothorax or significant pleural fluid.   3.  Bilateral dependent atelectasis.  Superimposed pulmonary contusion is difficult to exclude.   4.  Solid organs of the abdomen are intact.   5.  No pelvic fracture.      CT-LSPINE W/O PLUS RECONS   Final Result      No fracture or subluxation is seen.      Degenerative changes in the lower thoracic spine.         CT-TSPINE W/O PLUS RECONS   Final Result      Mild loss of height of the superior endplate of T1 may be subacute to chronic.      Multiple posterior right-sided rib fractures.         CT-CSPINE WITHOUT PLUS RECONS   Final Result      Mild loss of height of the superior endplate of T1 may be subacute to chronic.      Fractures of the posterior third and fourth ribs on the right.         CT-HEAD W/O   Final Result      1.  No acute intracranial abnormality.   2.  Possible debris within the RIGHT parietal scalp.        All current laboratory  studies/radiology exams reviewed: Yes    Completed Consultations:       Pending Consultations:  Dr. Doug Best    Newly Identified Diagnoses and Injuries:  No further findings    TOTAL RAP SCORE:  RAP Score Total: 9      ETOH Screening  CAGE Score: 4  Assessment complete date: 2/18/2020  Intervention: yes. Patient response to intervention: drinks daily.   Patient demonstrates understanding of intervention. Patient agrees to follow-up.   has been contacted. Follow up with: Clinic  Total ETOH intervention time: greater than 30 minutes

## 2020-02-18 NOTE — ED NOTES
Pt log rolled off of back board, no step offs per Dr. Augustin. Pt has pelvic binder and rain randolph.

## 2020-02-18 NOTE — ED NOTES
Lab called with critical result of lactic acid of 4.8 at 1816. Critical lab result read back to lab.   Dr. Augustin notified of critical lab result at 1818.  Critical lab result read back by Dr. Augustin.

## 2020-02-18 NOTE — ASSESSMENT & PLAN NOTE
Mild loss of height of the superior endplate of T1 may be subacute to chronic.  Additional work-up and imaging for focal symptoms.
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
## 2020-02-18 NOTE — PROGRESS NOTES
Large arm sling applied to pt's R UE. Any further questions or assistance please [place] [employer] [place] [phone].

## 2020-02-18 NOTE — ASSESSMENT & PLAN NOTE
Segmental fractures of the right posterior 3rd through 8th ribs and the right anterior 3rd through 7th ribs (5 segments).  Old healed left posterior rib fractures.  Aggressive multimodal pain management and pulmonary hygiene. Serial chest radiographs.  2/21:  Pain control improving, nausea a problem

## 2020-02-18 NOTE — PROGRESS NOTES
1205: Dr. Huitron at bedside, no plan for surgery at this time. Okay to remove sling when resting in bed.   Per Dr. Lucas, okay to advance to diabetic diet.

## 2020-02-18 NOTE — ASSESSMENT & PLAN NOTE
Admission blood alcohol level of 0.10.  Trauma alcohol withdrawal protocol initiated.  Alcohol withdrawal surveillance.

## 2020-02-18 NOTE — ED NOTES
Assumed care of pt. Pt c/o diffuse pain all over body. Pt reporting severe pain in R upper chest, r hip and r foot. Pt freely moving all extremeties. C-collar and abdominal binder remain in place. Pt alert and oriented to situation and event. Pt given mouth swabs for comfort, repositioned. Monitoring continues.
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
## 2020-02-18 NOTE — PROGRESS NOTES
0930: Rounds with with MDT- discussed pain management plan, gabapentin started. Waiting for ortho to see patient.
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
## 2020-02-18 NOTE — DISCHARGE PLANNING
Patient is eligible for Medicaid Meds to Beds at discharge if they have coverage with [place] [employer], Medicaid FFS, Medicaid HMO ([facility]), or [place]. This service is provided through the [facility] if orders are received by the pharmacy prior to 4pm Monday through Friday excluding holidays. Preferred pharmacy has been changed to [facility]. Please call [phone] prior to discharge.

## 2020-02-18 NOTE — ASSESSMENT & PLAN NOTE
Chronic condition treated with long acting insulin.  Hemoglobin A1c pending.  medication reconciliation.  2/21 Lantus increased.  Insulin sliding scale coverage.

## 2020-02-18 NOTE — PROGRESS NOTES
Pt arrived from ER via gurney on monitor w/ ACLS RN x2. VSS during transport. Pt awake, drowsy, AO3, disoriented to event. Pt complaining of pain 10/10 in R shoulder, R hip, and R foot. Pt moved over to ICU bed via slideboard and placed on ICU monitor.     VS on arrival:   HR: 70s  BP: 157/91  SpO2: 96% on 3L nasal cannula  RR: 18  Temp: 96.7F temporal, warm blankets applied  Wgt: 98.6Kg      2 RN skin check performed w/ Naren, RN  - abrasion noted to R outer elbow  - abrasion noted to R lateral/posterior head   - abrasions to R lateral flank  - pelvic binder remains in place, site LAYNE     Pictures taken and uploaded
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
## 2020-02-18 NOTE — CARE PLAN
Problem: Pain Management  Goal: Pain level will decrease to patient's comfort goal  Outcome: PROGRESSING SLOWER THAN EXPECTED  Added Gabapentin to help manage pain.      Problem: Mobility  Goal: Risk for activity intolerance will decrease  Outcome: PROGRESSING SLOWER THAN EXPECTED   Working to manage pain in order to increase mobility.
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
## 2020-02-18 NOTE — CARE PLAN
Problem: Pain Management  Goal: Pain level will decrease to patient's comfort goal  Outcome: PROGRESSING AS EXPECTED   Pt's pain will decrease to comfort goal through rest, repositioning, a quiet environment, and PRN pharmacologic analgesia.    Problem: Skin Integrity  Goal: Risk for impaired skin integrity will decrease  Outcome: PROGRESSING AS EXPECTED  Skin will be assessed frequently for breakdown and pt will remain clean, dry, and intact. All listed wounds will be assessed. Pressure ulcer prevention will be utilized when appropriate & based on Pt stability

## 2020-02-18 NOTE — ASSESSMENT & PLAN NOTE
Lucency through the tip of the lateral malleolus may represent a nondisplaced fracture. Small ankle joint effusion.  Non-operative management.  Weight bearing status - Weightbearing as tolerated RLE. Short cam boot for walking.   Aaron Huitron MD. Orthopedic Surgery.

## 2020-02-18 NOTE — ASSESSMENT & PLAN NOTE
1.2 cm widening of the right acromioclavicular joint.  Non-operative management. Sling for comfort.  Weight bearing status - Weightbearing as tolerated JAMILA Huitron MD. Orthopedic Surgery.

## 2020-02-18 NOTE — ED NOTES
Pt to CT with RN on cardiac monitor.  Pt to blue 20, on cardiac monitor, call light within reach, report to RN
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
## 2020-02-19 ENCOUNTER — APPOINTMENT (OUTPATIENT)
Dept: RADIOLOGY | Facility: MEDICAL CENTER | Age: 50
DRG: 184 | End: 2020-02-19
Attending: SURGERY
Payer: MEDICAID

## 2020-02-19 LAB
ALBUMIN SERPL BCP-MCNC: 3.4 G/DL (ref 3.2–4.9)
ALBUMIN/GLOB SERPL: 1.5 G/DL
ALP SERPL-CCNC: 69 U/L (ref 30–99)
ALT SERPL-CCNC: 22 U/L (ref 2–50)
ANION GAP SERPL CALC-SCNC: 11 MMOL/L (ref 0–11.9)
AST SERPL-CCNC: 22 U/L (ref 12–45)
BASOPHILS # BLD AUTO: 0.2 % (ref 0–1.8)
BASOPHILS # BLD: 0.02 K/UL (ref 0–0.12)
BILIRUB SERPL-MCNC: 1.1 MG/DL (ref 0.1–1.5)
BUN SERPL-MCNC: 16 MG/DL (ref 8–22)
CALCIUM SERPL-MCNC: 8.2 MG/DL (ref 8.5–10.5)
CHLORIDE SERPL-SCNC: 100 MMOL/L (ref 96–112)
CO2 SERPL-SCNC: 24 MMOL/L (ref 20–33)
CREAT SERPL-MCNC: 0.99 MG/DL (ref 0.5–1.4)
EOSINOPHIL # BLD AUTO: 0.12 K/UL (ref 0–0.51)
EOSINOPHIL NFR BLD: 1.4 % (ref 0–6.9)
ERYTHROCYTE [DISTWIDTH] IN BLOOD BY AUTOMATED COUNT: 44.5 FL (ref 35.9–50)
GLOBULIN SER CALC-MCNC: 2.2 G/DL (ref 1.9–3.5)
GLUCOSE BLD-MCNC: 229 MG/DL (ref 65–99)
GLUCOSE BLD-MCNC: 246 MG/DL (ref 65–99)
GLUCOSE BLD-MCNC: 282 MG/DL (ref 65–99)
GLUCOSE SERPL-MCNC: 275 MG/DL (ref 65–99)
HCT VFR BLD AUTO: 40.8 % (ref 42–52)
HGB BLD-MCNC: 14.5 G/DL (ref 14–18)
IMM GRANULOCYTES # BLD AUTO: 0.03 K/UL (ref 0–0.11)
IMM GRANULOCYTES NFR BLD AUTO: 0.4 % (ref 0–0.9)
LYMPHOCYTES # BLD AUTO: 1.58 K/UL (ref 1–4.8)
LYMPHOCYTES NFR BLD: 18.8 % (ref 22–41)
MCH RBC QN AUTO: 33.7 PG (ref 27–33)
MCHC RBC AUTO-ENTMCNC: 35.5 G/DL (ref 33.7–35.3)
MCV RBC AUTO: 94.9 FL (ref 81.4–97.8)
MONOCYTES # BLD AUTO: 0.62 K/UL (ref 0–0.85)
MONOCYTES NFR BLD AUTO: 7.4 % (ref 0–13.4)
NEUTROPHILS # BLD AUTO: 6.02 K/UL (ref 1.82–7.42)
NEUTROPHILS NFR BLD: 71.8 % (ref 44–72)
NRBC # BLD AUTO: 0 K/UL
NRBC BLD-RTO: 0 /100 WBC
PLATELET # BLD AUTO: 127 K/UL (ref 164–446)
PMV BLD AUTO: 9.5 FL (ref 9–12.9)
POTASSIUM SERPL-SCNC: 3.6 MMOL/L (ref 3.6–5.5)
PROT SERPL-MCNC: 5.6 G/DL (ref 6–8.2)
RBC # BLD AUTO: 4.3 M/UL (ref 4.7–6.1)
SODIUM SERPL-SCNC: 135 MMOL/L (ref 135–145)
WBC # BLD AUTO: 8.4 K/UL (ref 4.8–10.8)

## 2020-02-19 PROCEDURE — 700101 HCHG RX REV CODE 250: Performed by: SURGERY

## 2020-02-19 PROCEDURE — 700112 HCHG RX REV CODE 229: Performed by: SURGERY

## 2020-02-19 PROCEDURE — 82962 GLUCOSE BLOOD TEST: CPT

## 2020-02-19 PROCEDURE — 770022 HCHG ROOM/CARE - ICU (200)

## 2020-02-19 PROCEDURE — 80053 COMPREHEN METABOLIC PANEL: CPT

## 2020-02-19 PROCEDURE — 700102 HCHG RX REV CODE 250 W/ 637 OVERRIDE(OP): Performed by: SURGERY

## 2020-02-19 PROCEDURE — A9270 NON-COVERED ITEM OR SERVICE: HCPCS | Performed by: SURGERY

## 2020-02-19 PROCEDURE — 99291 CRITICAL CARE FIRST HOUR: CPT | Performed by: SURGERY

## 2020-02-19 PROCEDURE — 700111 HCHG RX REV CODE 636 W/ 250 OVERRIDE (IP): Performed by: SURGERY

## 2020-02-19 PROCEDURE — 85025 COMPLETE CBC W/AUTO DIFF WBC: CPT

## 2020-02-19 PROCEDURE — 700101 HCHG RX REV CODE 250: Performed by: NURSE PRACTITIONER

## 2020-02-19 PROCEDURE — 71045 X-RAY EXAM CHEST 1 VIEW: CPT

## 2020-02-19 RX ORDER — LIDOCAINE 50 MG/G
2 PATCH TOPICAL EVERY 24 HOURS
Status: DISCONTINUED | OUTPATIENT
Start: 2020-02-19 | End: 2020-02-22 | Stop reason: HOSPADM

## 2020-02-19 RX ORDER — OXYCODONE HYDROCHLORIDE 10 MG/1
10-20 TABLET ORAL
Status: DISCONTINUED | OUTPATIENT
Start: 2020-02-19 | End: 2020-02-20

## 2020-02-19 RX ADMIN — OXYCODONE HYDROCHLORIDE 10 MG: 10 TABLET ORAL at 07:59

## 2020-02-19 RX ADMIN — ENOXAPARIN SODIUM 30 MG: 100 INJECTION SUBCUTANEOUS at 05:01

## 2020-02-19 RX ADMIN — ACETAMINOPHEN 1000 MG: 500 TABLET, FILM COATED ORAL at 18:10

## 2020-02-19 RX ADMIN — ONDANSETRON 4 MG: 2 INJECTION INTRAMUSCULAR; INTRAVENOUS at 15:52

## 2020-02-19 RX ADMIN — Medication 1 EACH: at 18:11

## 2020-02-19 RX ADMIN — OXYCODONE HYDROCHLORIDE 10 MG: 10 TABLET ORAL at 02:16

## 2020-02-19 RX ADMIN — DOCUSATE SODIUM 100 MG: 100 CAPSULE, LIQUID FILLED ORAL at 18:09

## 2020-02-19 RX ADMIN — INSULIN HUMAN 4 UNITS: 100 INJECTION, SOLUTION PARENTERAL at 18:20

## 2020-02-19 RX ADMIN — MORPHINE SULFATE 4 MG: 4 INJECTION INTRAVENOUS at 05:02

## 2020-02-19 RX ADMIN — IBUPROFEN 800 MG: 800 TABLET, FILM COATED ORAL at 11:49

## 2020-02-19 RX ADMIN — OXYCODONE HYDROCHLORIDE 20 MG: 10 TABLET ORAL at 21:35

## 2020-02-19 RX ADMIN — INSULIN HUMAN 4 UNITS: 100 INJECTION, SOLUTION PARENTERAL at 19:54

## 2020-02-19 RX ADMIN — IBUPROFEN 800 MG: 800 TABLET, FILM COATED ORAL at 05:02

## 2020-02-19 RX ADMIN — ACETAMINOPHEN 1000 MG: 500 TABLET, FILM COATED ORAL at 11:49

## 2020-02-19 RX ADMIN — DOCUSATE SODIUM 100 MG: 100 CAPSULE, LIQUID FILLED ORAL at 05:02

## 2020-02-19 RX ADMIN — IBUPROFEN 800 MG: 800 TABLET, FILM COATED ORAL at 18:09

## 2020-02-19 RX ADMIN — Medication 1 EACH: at 05:02

## 2020-02-19 RX ADMIN — INSULIN HUMAN 7 UNITS: 100 INJECTION, SOLUTION PARENTERAL at 11:58

## 2020-02-19 RX ADMIN — LIDOCAINE 2 PATCH: 50 PATCH TOPICAL at 19:46

## 2020-02-19 RX ADMIN — OXYCODONE HYDROCHLORIDE 20 MG: 10 TABLET ORAL at 11:49

## 2020-02-19 RX ADMIN — POLYETHYLENE GLYCOL 3350 1 PACKET: 17 POWDER, FOR SOLUTION ORAL at 18:08

## 2020-02-19 RX ADMIN — ONDANSETRON 4 MG: 2 INJECTION INTRAMUSCULAR; INTRAVENOUS at 19:46

## 2020-02-19 RX ADMIN — GABAPENTIN 100 MG: 100 CAPSULE ORAL at 05:02

## 2020-02-19 RX ADMIN — POLYETHYLENE GLYCOL 3350 1 PACKET: 17 POWDER, FOR SOLUTION ORAL at 05:02

## 2020-02-19 RX ADMIN — GABAPENTIN 100 MG: 100 CAPSULE ORAL at 13:45

## 2020-02-19 RX ADMIN — FAMOTIDINE 20 MG: 20 TABLET ORAL at 18:10

## 2020-02-19 RX ADMIN — OXYCODONE HYDROCHLORIDE 20 MG: 10 TABLET ORAL at 18:11

## 2020-02-19 RX ADMIN — ENOXAPARIN SODIUM 30 MG: 100 INJECTION SUBCUTANEOUS at 18:11

## 2020-02-19 RX ADMIN — FAMOTIDINE 20 MG: 20 TABLET ORAL at 05:02

## 2020-02-19 RX ADMIN — OXYCODONE HYDROCHLORIDE 20 MG: 10 TABLET ORAL at 14:54

## 2020-02-19 RX ADMIN — LIDOCAINE 1 PATCH: 50 PATCH TOPICAL at 13:46

## 2020-02-19 RX ADMIN — MAGNESIUM HYDROXIDE 30 ML: 400 SUSPENSION ORAL at 05:01

## 2020-02-19 RX ADMIN — ACETAMINOPHEN 1000 MG: 500 TABLET, FILM COATED ORAL at 05:02

## 2020-02-19 RX ADMIN — GABAPENTIN 100 MG: 100 CAPSULE ORAL at 21:35

## 2020-02-19 RX ADMIN — ONDANSETRON 4 MG: 2 INJECTION INTRAMUSCULAR; INTRAVENOUS at 09:25

## 2020-02-19 RX ADMIN — MORPHINE SULFATE 4 MG: 4 INJECTION INTRAVENOUS at 10:05
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
## 2020-02-19 NOTE — CARE PLAN
Problem: Knowledge Deficit  Goal: Knowledge of disease process/condition, treatment plan, diagnostic tests, and medications will improve  Outcome: PROGRESSING AS EXPECTED  Note: Discussed continued plan of care with pt and family at bedside. Answered all questions.
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
## 2020-02-20 ENCOUNTER — APPOINTMENT (OUTPATIENT)
Dept: RADIOLOGY | Facility: MEDICAL CENTER | Age: 50
DRG: 184 | End: 2020-02-20
Attending: SURGERY
Payer: MEDICAID

## 2020-02-20 LAB
ALBUMIN SERPL BCP-MCNC: 3.8 G/DL (ref 3.2–4.9)
ALBUMIN/GLOB SERPL: 1.5 G/DL
ALP SERPL-CCNC: 78 U/L (ref 30–99)
ALT SERPL-CCNC: 19 U/L (ref 2–50)
ANION GAP SERPL CALC-SCNC: 15 MMOL/L (ref 0–11.9)
AST SERPL-CCNC: 22 U/L (ref 12–45)
BASOPHILS # BLD AUTO: 0.3 % (ref 0–1.8)
BASOPHILS # BLD: 0.02 K/UL (ref 0–0.12)
BILIRUB SERPL-MCNC: 1 MG/DL (ref 0.1–1.5)
BUN SERPL-MCNC: 13 MG/DL (ref 8–22)
CALCIUM SERPL-MCNC: 8.2 MG/DL (ref 8.5–10.5)
CHLORIDE SERPL-SCNC: 100 MMOL/L (ref 96–112)
CO2 SERPL-SCNC: 20 MMOL/L (ref 20–33)
CREAT SERPL-MCNC: 1.11 MG/DL (ref 0.5–1.4)
EOSINOPHIL # BLD AUTO: 0.14 K/UL (ref 0–0.51)
EOSINOPHIL NFR BLD: 1.8 % (ref 0–6.9)
ERYTHROCYTE [DISTWIDTH] IN BLOOD BY AUTOMATED COUNT: 44.3 FL (ref 35.9–50)
GLOBULIN SER CALC-MCNC: 2.5 G/DL (ref 1.9–3.5)
GLUCOSE BLD-MCNC: 151 MG/DL (ref 65–99)
GLUCOSE BLD-MCNC: 197 MG/DL (ref 65–99)
GLUCOSE BLD-MCNC: 243 MG/DL (ref 65–99)
GLUCOSE BLD-MCNC: 264 MG/DL (ref 65–99)
GLUCOSE BLD-MCNC: 266 MG/DL (ref 65–99)
GLUCOSE SERPL-MCNC: 269 MG/DL (ref 65–99)
HCT VFR BLD AUTO: 44 % (ref 42–52)
HGB BLD-MCNC: 15.4 G/DL (ref 14–18)
IMM GRANULOCYTES # BLD AUTO: 0.03 K/UL (ref 0–0.11)
IMM GRANULOCYTES NFR BLD AUTO: 0.4 % (ref 0–0.9)
LYMPHOCYTES # BLD AUTO: 1.29 K/UL (ref 1–4.8)
LYMPHOCYTES NFR BLD: 16.9 % (ref 22–41)
MAGNESIUM SERPL-MCNC: 1.8 MG/DL (ref 1.5–2.5)
MCH RBC QN AUTO: 33.1 PG (ref 27–33)
MCHC RBC AUTO-ENTMCNC: 35 G/DL (ref 33.7–35.3)
MCV RBC AUTO: 94.6 FL (ref 81.4–97.8)
MONOCYTES # BLD AUTO: 0.72 K/UL (ref 0–0.85)
MONOCYTES NFR BLD AUTO: 9.4 % (ref 0–13.4)
NEUTROPHILS # BLD AUTO: 5.43 K/UL (ref 1.82–7.42)
NEUTROPHILS NFR BLD: 71.2 % (ref 44–72)
NRBC # BLD AUTO: 0 K/UL
NRBC BLD-RTO: 0 /100 WBC
PHOSPHATE SERPL-MCNC: 2.7 MG/DL (ref 2.5–4.5)
PLATELET # BLD AUTO: 148 K/UL (ref 164–446)
PMV BLD AUTO: 9.3 FL (ref 9–12.9)
POTASSIUM SERPL-SCNC: 4 MMOL/L (ref 3.6–5.5)
PROT SERPL-MCNC: 6.3 G/DL (ref 6–8.2)
RBC # BLD AUTO: 4.65 M/UL (ref 4.7–6.1)
SODIUM SERPL-SCNC: 135 MMOL/L (ref 135–145)
TRIGL SERPL-MCNC: 209 MG/DL (ref 0–149)
WBC # BLD AUTO: 7.6 K/UL (ref 4.8–10.8)

## 2020-02-20 PROCEDURE — 700101 HCHG RX REV CODE 250: Performed by: SURGERY

## 2020-02-20 PROCEDURE — 84478 ASSAY OF TRIGLYCERIDES: CPT

## 2020-02-20 PROCEDURE — 770022 HCHG ROOM/CARE - ICU (200)

## 2020-02-20 PROCEDURE — 700102 HCHG RX REV CODE 250 W/ 637 OVERRIDE(OP): Performed by: SURGERY

## 2020-02-20 PROCEDURE — 71045 X-RAY EXAM CHEST 1 VIEW: CPT

## 2020-02-20 PROCEDURE — 80053 COMPREHEN METABOLIC PANEL: CPT

## 2020-02-20 PROCEDURE — A9270 NON-COVERED ITEM OR SERVICE: HCPCS | Performed by: SURGERY

## 2020-02-20 PROCEDURE — 82962 GLUCOSE BLOOD TEST: CPT | Mod: 91

## 2020-02-20 PROCEDURE — 700111 HCHG RX REV CODE 636 W/ 250 OVERRIDE (IP): Performed by: SURGERY

## 2020-02-20 PROCEDURE — 84100 ASSAY OF PHOSPHORUS: CPT

## 2020-02-20 PROCEDURE — 85025 COMPLETE CBC W/AUTO DIFF WBC: CPT

## 2020-02-20 PROCEDURE — 700112 HCHG RX REV CODE 229: Performed by: SURGERY

## 2020-02-20 PROCEDURE — 99233 SBSQ HOSP IP/OBS HIGH 50: CPT | Performed by: SURGERY

## 2020-02-20 PROCEDURE — 83735 ASSAY OF MAGNESIUM: CPT

## 2020-02-20 PROCEDURE — 700101 HCHG RX REV CODE 250: Performed by: NURSE PRACTITIONER

## 2020-02-20 RX ORDER — HYDROMORPHONE HYDROCHLORIDE 2 MG/1
1 TABLET ORAL
Status: DISCONTINUED | OUTPATIENT
Start: 2020-02-20 | End: 2020-02-20

## 2020-02-20 RX ORDER — HYDROMORPHONE HYDROCHLORIDE 2 MG/1
2 TABLET ORAL
Status: DISCONTINUED | OUTPATIENT
Start: 2020-02-20 | End: 2020-02-22 | Stop reason: HOSPADM

## 2020-02-20 RX ORDER — INSULIN GLARGINE 100 [IU]/ML
10 INJECTION, SOLUTION SUBCUTANEOUS
Status: DISCONTINUED | OUTPATIENT
Start: 2020-02-20 | End: 2020-02-21

## 2020-02-20 RX ORDER — HYDROMORPHONE HYDROCHLORIDE 2 MG/1
4 TABLET ORAL
Status: DISCONTINUED | OUTPATIENT
Start: 2020-02-20 | End: 2020-02-22 | Stop reason: HOSPADM

## 2020-02-20 RX ORDER — SCOLOPAMINE TRANSDERMAL SYSTEM 1 MG/1
1 PATCH, EXTENDED RELEASE TRANSDERMAL
Status: DISCONTINUED | OUTPATIENT
Start: 2020-02-20 | End: 2020-02-22 | Stop reason: HOSPADM

## 2020-02-20 RX ORDER — HYDROMORPHONE HYDROCHLORIDE 2 MG/1
2 TABLET ORAL
Status: DISCONTINUED | OUTPATIENT
Start: 2020-02-20 | End: 2020-02-20

## 2020-02-20 RX ADMIN — FAMOTIDINE 20 MG: 20 TABLET ORAL at 05:17

## 2020-02-20 RX ADMIN — IBUPROFEN 800 MG: 800 TABLET, FILM COATED ORAL at 05:17

## 2020-02-20 RX ADMIN — OXYCODONE HYDROCHLORIDE 20 MG: 10 TABLET ORAL at 00:35

## 2020-02-20 RX ADMIN — INSULIN HUMAN 3 UNITS: 100 INJECTION, SOLUTION PARENTERAL at 18:30

## 2020-02-20 RX ADMIN — ENOXAPARIN SODIUM 30 MG: 100 INJECTION SUBCUTANEOUS at 17:31

## 2020-02-20 RX ADMIN — ACETAMINOPHEN 1000 MG: 500 TABLET, FILM COATED ORAL at 17:31

## 2020-02-20 RX ADMIN — FAMOTIDINE 20 MG: 20 TABLET ORAL at 17:31

## 2020-02-20 RX ADMIN — POLYETHYLENE GLYCOL 3350 1 PACKET: 17 POWDER, FOR SOLUTION ORAL at 05:17

## 2020-02-20 RX ADMIN — INSULIN HUMAN 3 UNITS: 100 INJECTION, SOLUTION PARENTERAL at 21:00

## 2020-02-20 RX ADMIN — GABAPENTIN 100 MG: 100 CAPSULE ORAL at 05:17

## 2020-02-20 RX ADMIN — MORPHINE SULFATE 4 MG: 4 INJECTION INTRAVENOUS at 16:18

## 2020-02-20 RX ADMIN — HYDROMORPHONE HYDROCHLORIDE 2 MG: 2 TABLET ORAL at 14:12

## 2020-02-20 RX ADMIN — LIDOCAINE 2 PATCH: 50 PATCH TOPICAL at 19:48

## 2020-02-20 RX ADMIN — GABAPENTIN 100 MG: 100 CAPSULE ORAL at 20:53

## 2020-02-20 RX ADMIN — HYDROMORPHONE HYDROCHLORIDE 4 MG: 2 TABLET ORAL at 17:30

## 2020-02-20 RX ADMIN — INSULIN HUMAN 7 UNITS: 100 INJECTION, SOLUTION PARENTERAL at 12:22

## 2020-02-20 RX ADMIN — IBUPROFEN 800 MG: 800 TABLET, FILM COATED ORAL at 17:31

## 2020-02-20 RX ADMIN — ACETAMINOPHEN 1000 MG: 500 TABLET, FILM COATED ORAL at 12:21

## 2020-02-20 RX ADMIN — OXYCODONE HYDROCHLORIDE 20 MG: 10 TABLET ORAL at 04:02

## 2020-02-20 RX ADMIN — DOCUSATE SODIUM 100 MG: 100 CAPSULE, LIQUID FILLED ORAL at 17:31

## 2020-02-20 RX ADMIN — DOCUSATE SODIUM 100 MG: 100 CAPSULE, LIQUID FILLED ORAL at 05:17

## 2020-02-20 RX ADMIN — ONDANSETRON 4 MG: 2 INJECTION INTRAMUSCULAR; INTRAVENOUS at 05:41

## 2020-02-20 RX ADMIN — MORPHINE SULFATE 4 MG: 4 INJECTION INTRAVENOUS at 07:45

## 2020-02-20 RX ADMIN — IBUPROFEN 800 MG: 800 TABLET, FILM COATED ORAL at 12:21

## 2020-02-20 RX ADMIN — ACETAMINOPHEN 1000 MG: 500 TABLET, FILM COATED ORAL at 23:53

## 2020-02-20 RX ADMIN — HYDROMORPHONE HYDROCHLORIDE 4 MG: 2 TABLET ORAL at 20:53

## 2020-02-20 RX ADMIN — HYDROMORPHONE HYDROCHLORIDE 4 MG: 2 TABLET ORAL at 23:53

## 2020-02-20 RX ADMIN — INSULIN GLARGINE 10 UNITS: 100 INJECTION, SOLUTION SUBCUTANEOUS at 12:22

## 2020-02-20 RX ADMIN — MORPHINE SULFATE 4 MG: 4 INJECTION INTRAVENOUS at 22:52

## 2020-02-20 RX ADMIN — MORPHINE SULFATE 4 MG: 4 INJECTION INTRAVENOUS at 19:48

## 2020-02-20 RX ADMIN — ACETAMINOPHEN 1000 MG: 500 TABLET, FILM COATED ORAL at 05:17

## 2020-02-20 RX ADMIN — MAGNESIUM HYDROXIDE 30 ML: 400 SUSPENSION ORAL at 05:16

## 2020-02-20 RX ADMIN — INSULIN HUMAN 7 UNITS: 100 INJECTION, SOLUTION PARENTERAL at 05:23

## 2020-02-20 RX ADMIN — MORPHINE SULFATE 4 MG: 4 INJECTION INTRAVENOUS at 10:47

## 2020-02-20 RX ADMIN — SCOPALAMINE 1 PATCH: 1 PATCH, EXTENDED RELEASE TRANSDERMAL at 20:54

## 2020-02-20 RX ADMIN — ENOXAPARIN SODIUM 30 MG: 100 INJECTION SUBCUTANEOUS at 05:16

## 2020-02-20 RX ADMIN — SENNOSIDES AND DOCUSATE SODIUM 1 TABLET: 8.6; 5 TABLET ORAL at 21:10

## 2020-02-20 RX ADMIN — Medication 1 EACH: at 17:31

## 2020-02-20 RX ADMIN — BISACODYL 10 MG: 10 SUPPOSITORY RECTAL at 12:21

## 2020-02-20 RX ADMIN — Medication 1 EACH: at 05:16

## 2020-02-20 RX ADMIN — GABAPENTIN 100 MG: 100 CAPSULE ORAL at 14:12

## 2020-02-20 RX ADMIN — POLYETHYLENE GLYCOL 3350 1 PACKET: 17 POWDER, FOR SOLUTION ORAL at 17:31

## 2020-02-20 RX ADMIN — ACETAMINOPHEN 1000 MG: 500 TABLET, FILM COATED ORAL at 00:35
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
## 2020-02-20 NOTE — CARE PLAN
Problem: Safety  Goal: Will remain free from injury  Outcome: PROGRESSING AS EXPECTED  Note: Bed locked and in low position. Safety alarms on. Call light and belongings within reach. Treaded socks on and safety education provided to the patient and family.      Problem: Pain Management  Goal: Pain level will decrease to patient's comfort goal  Outcome: PROGRESSING AS EXPECTED  Note: Pain assessed q2h and PRN. Pt medicated per MAR. Non-pharmacologic measures utilized.
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
## 2020-02-20 NOTE — CARE PLAN
Problem: Hyperinflation:  Goal: Prevent or improve atelectasis  Outcome: PROGRESSING SLOWER THAN EXPECTED   IS QID

## 2020-02-20 NOTE — CARE PLAN
Problem: Communication  Goal: The ability to communicate needs accurately and effectively will improve  Outcome: PROGRESSING AS EXPECTED  Note: Pt able to communicate needs effectively with RN. Pt educated on plan of care, treatment goals for todays shift, medication, pain regimen, etc. All questions answered and needs met at this time.      Problem: Pain Management  Goal: Pain level will decrease to patient's comfort goal  Outcome: PROGRESSING SLOWER THAN EXPECTED  Note: Pain assessed using appropriate pain scale, non pharmacologic measures implemented, medicated PRN per MAR. Pt complains of pain in his ribs/R shoulder/arm. States pain ranges from 6.5-10/10. Pt vomits anytime receiving oxy. Pt states that the morphine works better to ease pain. Discussed in rounds with MD and pharmacists transitioning to another pain medication. All questions answered and needs met at this time.

## 2020-02-21 ENCOUNTER — APPOINTMENT (OUTPATIENT)
Dept: RADIOLOGY | Facility: MEDICAL CENTER | Age: 50
DRG: 184 | End: 2020-02-21
Attending: SURGERY
Payer: MEDICAID

## 2020-02-21 PROBLEM — K56.7 ILEUS (HCC): Status: ACTIVE | Noted: 2020-02-21

## 2020-02-21 LAB
ANION GAP SERPL CALC-SCNC: 15 MMOL/L (ref 0–11.9)
BASOPHILS # BLD AUTO: 0.3 % (ref 0–1.8)
BASOPHILS # BLD: 0.02 K/UL (ref 0–0.12)
BUN SERPL-MCNC: 10 MG/DL (ref 8–22)
CALCIUM SERPL-MCNC: 8.8 MG/DL (ref 8.5–10.5)
CHLORIDE SERPL-SCNC: 99 MMOL/L (ref 96–112)
CO2 SERPL-SCNC: 19 MMOL/L (ref 20–33)
CREAT SERPL-MCNC: 0.88 MG/DL (ref 0.5–1.4)
EOSINOPHIL # BLD AUTO: 0.08 K/UL (ref 0–0.51)
EOSINOPHIL NFR BLD: 1.3 % (ref 0–6.9)
ERYTHROCYTE [DISTWIDTH] IN BLOOD BY AUTOMATED COUNT: 43.5 FL (ref 35.9–50)
GLUCOSE BLD-MCNC: 181 MG/DL (ref 65–99)
GLUCOSE BLD-MCNC: 186 MG/DL (ref 65–99)
GLUCOSE BLD-MCNC: 225 MG/DL (ref 65–99)
GLUCOSE BLD-MCNC: 227 MG/DL (ref 65–99)
GLUCOSE BLD-MCNC: 282 MG/DL (ref 65–99)
GLUCOSE SERPL-MCNC: 264 MG/DL (ref 65–99)
HCT VFR BLD AUTO: 40.4 % (ref 42–52)
HGB BLD-MCNC: 14.4 G/DL (ref 14–18)
IMM GRANULOCYTES # BLD AUTO: 0.02 K/UL (ref 0–0.11)
IMM GRANULOCYTES NFR BLD AUTO: 0.3 % (ref 0–0.9)
LYMPHOCYTES # BLD AUTO: 0.8 K/UL (ref 1–4.8)
LYMPHOCYTES NFR BLD: 12.9 % (ref 22–41)
MCH RBC QN AUTO: 33.5 PG (ref 27–33)
MCHC RBC AUTO-ENTMCNC: 35.6 G/DL (ref 33.7–35.3)
MCV RBC AUTO: 94 FL (ref 81.4–97.8)
MONOCYTES # BLD AUTO: 0.69 K/UL (ref 0–0.85)
MONOCYTES NFR BLD AUTO: 11.1 % (ref 0–13.4)
NEUTROPHILS # BLD AUTO: 4.6 K/UL (ref 1.82–7.42)
NEUTROPHILS NFR BLD: 74.1 % (ref 44–72)
NRBC # BLD AUTO: 0 K/UL
NRBC BLD-RTO: 0 /100 WBC
PLATELET # BLD AUTO: 181 K/UL (ref 164–446)
PMV BLD AUTO: 9 FL (ref 9–12.9)
POTASSIUM SERPL-SCNC: 3.6 MMOL/L (ref 3.6–5.5)
RBC # BLD AUTO: 4.3 M/UL (ref 4.7–6.1)
SODIUM SERPL-SCNC: 133 MMOL/L (ref 135–145)
WBC # BLD AUTO: 6.2 K/UL (ref 4.8–10.8)

## 2020-02-21 PROCEDURE — A9270 NON-COVERED ITEM OR SERVICE: HCPCS | Performed by: SURGERY

## 2020-02-21 PROCEDURE — 700101 HCHG RX REV CODE 250: Performed by: SURGERY

## 2020-02-21 PROCEDURE — 700102 HCHG RX REV CODE 250 W/ 637 OVERRIDE(OP): Performed by: SURGERY

## 2020-02-21 PROCEDURE — 94760 N-INVAS EAR/PLS OXIMETRY 1: CPT

## 2020-02-21 PROCEDURE — 700101 HCHG RX REV CODE 250: Performed by: NURSE PRACTITIONER

## 2020-02-21 PROCEDURE — 700111 HCHG RX REV CODE 636 W/ 250 OVERRIDE (IP): Performed by: SURGERY

## 2020-02-21 PROCEDURE — 71045 X-RAY EXAM CHEST 1 VIEW: CPT

## 2020-02-21 PROCEDURE — 85025 COMPLETE CBC W/AUTO DIFF WBC: CPT

## 2020-02-21 PROCEDURE — 82962 GLUCOSE BLOOD TEST: CPT | Mod: 91

## 2020-02-21 PROCEDURE — 97530 THERAPEUTIC ACTIVITIES: CPT

## 2020-02-21 PROCEDURE — 99233 SBSQ HOSP IP/OBS HIGH 50: CPT | Performed by: SURGERY

## 2020-02-21 PROCEDURE — 74018 RADEX ABDOMEN 1 VIEW: CPT

## 2020-02-21 PROCEDURE — 770022 HCHG ROOM/CARE - ICU (200)

## 2020-02-21 PROCEDURE — 700112 HCHG RX REV CODE 229: Performed by: SURGERY

## 2020-02-21 PROCEDURE — 80048 BASIC METABOLIC PNL TOTAL CA: CPT

## 2020-02-21 RX ORDER — HYDRALAZINE HYDROCHLORIDE 20 MG/ML
10 INJECTION INTRAMUSCULAR; INTRAVENOUS EVERY 4 HOURS PRN
Status: DISCONTINUED | OUTPATIENT
Start: 2020-02-21 | End: 2020-02-22 | Stop reason: HOSPADM

## 2020-02-21 RX ORDER — MORPHINE SULFATE 4 MG/ML
4 INJECTION, SOLUTION INTRAMUSCULAR; INTRAVENOUS
Status: DISCONTINUED | OUTPATIENT
Start: 2020-02-21 | End: 2020-02-22 | Stop reason: HOSPADM

## 2020-02-21 RX ORDER — DEXAMETHASONE SODIUM PHOSPHATE 4 MG/ML
4 INJECTION, SOLUTION INTRA-ARTICULAR; INTRALESIONAL; INTRAMUSCULAR; INTRAVENOUS; SOFT TISSUE
Status: DISCONTINUED | OUTPATIENT
Start: 2020-02-21 | End: 2020-02-22 | Stop reason: HOSPADM

## 2020-02-21 RX ORDER — CELECOXIB 200 MG/1
400 CAPSULE ORAL 2 TIMES DAILY
Status: DISCONTINUED | OUTPATIENT
Start: 2020-02-21 | End: 2020-02-22

## 2020-02-21 RX ORDER — INSULIN GLARGINE 100 [IU]/ML
10 INJECTION, SOLUTION SUBCUTANEOUS ONCE
Status: COMPLETED | OUTPATIENT
Start: 2020-02-21 | End: 2020-02-21

## 2020-02-21 RX ORDER — INSULIN GLARGINE 100 [IU]/ML
20 INJECTION, SOLUTION SUBCUTANEOUS
Status: DISCONTINUED | OUTPATIENT
Start: 2020-02-22 | End: 2020-02-22 | Stop reason: HOSPADM

## 2020-02-21 RX ORDER — ENEMA 19; 7 G/133ML; G/133ML
1 ENEMA RECTAL ONCE
Status: COMPLETED | OUTPATIENT
Start: 2020-02-21 | End: 2020-02-21

## 2020-02-21 RX ADMIN — MORPHINE SULFATE 4 MG: 4 INJECTION INTRAVENOUS at 09:30

## 2020-02-21 RX ADMIN — FAMOTIDINE 20 MG: 20 TABLET ORAL at 06:12

## 2020-02-21 RX ADMIN — INSULIN GLARGINE 10 UNITS: 100 INJECTION, SOLUTION SUBCUTANEOUS at 11:51

## 2020-02-21 RX ADMIN — GABAPENTIN 100 MG: 100 CAPSULE ORAL at 06:12

## 2020-02-21 RX ADMIN — HYDROMORPHONE HYDROCHLORIDE 4 MG: 2 TABLET ORAL at 14:00

## 2020-02-21 RX ADMIN — ENOXAPARIN SODIUM 30 MG: 100 INJECTION SUBCUTANEOUS at 06:12

## 2020-02-21 RX ADMIN — MORPHINE SULFATE 4 MG: 4 INJECTION INTRAVENOUS at 11:50

## 2020-02-21 RX ADMIN — HYDROMORPHONE HYDROCHLORIDE 4 MG: 2 TABLET ORAL at 03:36

## 2020-02-21 RX ADMIN — MAGNESIUM HYDROXIDE 30 ML: 400 SUSPENSION ORAL at 06:12

## 2020-02-21 RX ADMIN — MORPHINE SULFATE 4 MG: 4 INJECTION INTRAVENOUS at 23:12

## 2020-02-21 RX ADMIN — INSULIN HUMAN 7 UNITS: 100 INJECTION, SOLUTION PARENTERAL at 06:28

## 2020-02-21 RX ADMIN — INSULIN HUMAN 3 UNITS: 100 INJECTION, SOLUTION PARENTERAL at 18:47

## 2020-02-21 RX ADMIN — GABAPENTIN 100 MG: 100 CAPSULE ORAL at 20:24

## 2020-02-21 RX ADMIN — DOCUSATE SODIUM 100 MG: 100 CAPSULE, LIQUID FILLED ORAL at 17:43

## 2020-02-21 RX ADMIN — ACETAMINOPHEN 1000 MG: 500 TABLET, FILM COATED ORAL at 23:12

## 2020-02-21 RX ADMIN — MORPHINE SULFATE 4 MG: 4 INJECTION INTRAVENOUS at 02:04

## 2020-02-21 RX ADMIN — INSULIN GLARGINE 10 UNITS: 100 INJECTION, SOLUTION SUBCUTANEOUS at 06:30

## 2020-02-21 RX ADMIN — SENNOSIDES AND DOCUSATE SODIUM 1 TABLET: 8.6; 5 TABLET ORAL at 20:24

## 2020-02-21 RX ADMIN — Medication 1 EACH: at 17:44

## 2020-02-21 RX ADMIN — FAMOTIDINE 20 MG: 20 TABLET ORAL at 17:43

## 2020-02-21 RX ADMIN — ACETAMINOPHEN 1000 MG: 500 TABLET, FILM COATED ORAL at 11:49

## 2020-02-21 RX ADMIN — IBUPROFEN 800 MG: 800 TABLET, FILM COATED ORAL at 06:12

## 2020-02-21 RX ADMIN — SODIUM PHOSPHATE 133 ML: 7; 19 ENEMA RECTAL at 18:00

## 2020-02-21 RX ADMIN — CELECOXIB 400 MG: 200 CAPSULE ORAL at 17:43

## 2020-02-21 RX ADMIN — CELECOXIB 400 MG: 200 CAPSULE ORAL at 11:49

## 2020-02-21 RX ADMIN — HYDROMORPHONE HYDROCHLORIDE 4 MG: 2 TABLET ORAL at 07:30

## 2020-02-21 RX ADMIN — GABAPENTIN 100 MG: 100 CAPSULE ORAL at 14:51

## 2020-02-21 RX ADMIN — DOCUSATE SODIUM 100 MG: 100 CAPSULE, LIQUID FILLED ORAL at 06:12

## 2020-02-21 RX ADMIN — LIDOCAINE 2 PATCH: 50 PATCH TOPICAL at 20:24

## 2020-02-21 RX ADMIN — ACETAMINOPHEN 1000 MG: 500 TABLET, FILM COATED ORAL at 17:43

## 2020-02-21 RX ADMIN — Medication 1 EACH: at 06:12

## 2020-02-21 RX ADMIN — ENOXAPARIN SODIUM 30 MG: 100 INJECTION SUBCUTANEOUS at 17:43

## 2020-02-21 RX ADMIN — MORPHINE SULFATE 4 MG: 4 INJECTION INTRAVENOUS at 14:00

## 2020-02-21 RX ADMIN — INSULIN HUMAN 4 UNITS: 100 INJECTION, SOLUTION PARENTERAL at 14:50

## 2020-02-21 RX ADMIN — MORPHINE SULFATE 4 MG: 4 INJECTION INTRAVENOUS at 18:00

## 2020-02-21 RX ADMIN — MORPHINE SULFATE 4 MG: 4 INJECTION INTRAVENOUS at 06:12

## 2020-02-21 RX ADMIN — INSULIN HUMAN 4 UNITS: 100 INJECTION, SOLUTION PARENTERAL at 20:35

## 2020-02-21 RX ADMIN — MORPHINE SULFATE 4 MG: 4 INJECTION INTRAVENOUS at 20:23

## 2020-02-21 RX ADMIN — POLYETHYLENE GLYCOL 3350 1 PACKET: 17 POWDER, FOR SOLUTION ORAL at 17:43

## 2020-02-21 RX ADMIN — ACETAMINOPHEN 1000 MG: 500 TABLET, FILM COATED ORAL at 06:12

## 2020-02-21 ASSESSMENT — COGNITIVE AND FUNCTIONAL STATUS - GENERAL
WALKING IN HOSPITAL ROOM: A LITTLE
SUGGESTED CMS G CODE MODIFIER MOBILITY: CK
STANDING UP FROM CHAIR USING ARMS: A LITTLE
MOBILITY SCORE: 18
CLIMB 3 TO 5 STEPS WITH RAILING: A LITTLE
MOVING FROM LYING ON BACK TO SITTING ON SIDE OF FLAT BED: A LITTLE
TURNING FROM BACK TO SIDE WHILE IN FLAT BAD: A LITTLE
MOVING TO AND FROM BED TO CHAIR: A LITTLE

## 2020-02-21 ASSESSMENT — GAIT ASSESSMENTS
DISTANCE (FEET): 150
DEVIATION: ANTALGIC
GAIT LEVEL OF ASSIST: SUPERVISED

## 2020-02-21 NOTE — CARE PLAN
Problem: Safety  Goal: Will remain free from injury  Outcome: PROGRESSING AS EXPECTED  Note: Pt is able to move around limitedly in the room while remaining connected to monitor, no assistance required. Pt understands and educated on the need to place non skid sock on prior to getting out of bed. X2 upper bed rails up, non skid socks replaced and put on patient, call light within reach, all appropriate safety interventions in place.      Problem: Pain Management  Goal: Pain level will decrease to patient's comfort goal  Outcome: PROGRESSING SLOWER THAN EXPECTED  Note: Pain assessed using appropriate pain scale, non pharmacologic measures implemented, medicated PRN per MAR. Discussed in rounds pain regimen and made adjustments. Patient educated and understands plan of care in attempt to control pain. Patient states that pain does not get below a 6.5/10 since this admit. Medications adjusted and will attempt today to control pain/vomiting.

## 2020-02-21 NOTE — CARE PLAN
Problem: Safety  Goal: Will remain free from injury  Note: Bed in low locked position, room near nurses station, call light and personal belongings within reach .      Problem: Pain Management  Goal: Pain level will decrease to patient's comfort goal  Note: Pt medicated per MAR and active MD order
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
## 2020-02-21 NOTE — PROGRESS NOTES
2 RN Skin assessment completed    Abrasions and bruising to right flank site cleansed and ointment applied  Abrasion noted to right scalp  Abrasion to R elbow.

## 2020-02-22 ENCOUNTER — APPOINTMENT (OUTPATIENT)
Dept: RADIOLOGY | Facility: MEDICAL CENTER | Age: 50
DRG: 184 | End: 2020-02-22
Attending: SURGERY
Payer: MEDICAID

## 2020-02-22 VITALS
OXYGEN SATURATION: 95 % | HEART RATE: 73 BPM | SYSTOLIC BLOOD PRESSURE: 173 MMHG | RESPIRATION RATE: 18 BRPM | HEIGHT: 72 IN | TEMPERATURE: 98.4 F | BODY MASS INDEX: 28.67 KG/M2 | WEIGHT: 211.64 LBS | DIASTOLIC BLOOD PRESSURE: 80 MMHG

## 2020-02-22 PROBLEM — Z78.9 NO CONTRAINDICATION TO ANTICOAGULATION THERAPY: Status: RESOLVED | Noted: 2020-02-17 | Resolved: 2020-02-22

## 2020-02-22 LAB
ANION GAP SERPL CALC-SCNC: 11 MMOL/L (ref 0–11.9)
BASOPHILS # BLD AUTO: 0.5 % (ref 0–1.8)
BASOPHILS # BLD: 0.03 K/UL (ref 0–0.12)
BUN SERPL-MCNC: 7 MG/DL (ref 8–22)
CALCIUM SERPL-MCNC: 8.4 MG/DL (ref 8.5–10.5)
CHLORIDE SERPL-SCNC: 100 MMOL/L (ref 96–112)
CO2 SERPL-SCNC: 25 MMOL/L (ref 20–33)
CREAT SERPL-MCNC: 0.72 MG/DL (ref 0.5–1.4)
EOSINOPHIL # BLD AUTO: 0.2 K/UL (ref 0–0.51)
EOSINOPHIL NFR BLD: 3.4 % (ref 0–6.9)
ERYTHROCYTE [DISTWIDTH] IN BLOOD BY AUTOMATED COUNT: 41.7 FL (ref 35.9–50)
GLUCOSE BLD-MCNC: 132 MG/DL (ref 65–99)
GLUCOSE BLD-MCNC: 136 MG/DL (ref 65–99)
GLUCOSE BLD-MCNC: 147 MG/DL (ref 65–99)
GLUCOSE SERPL-MCNC: 141 MG/DL (ref 65–99)
HCT VFR BLD AUTO: 43.2 % (ref 42–52)
HGB BLD-MCNC: 15.5 G/DL (ref 14–18)
IMM GRANULOCYTES # BLD AUTO: 0.04 K/UL (ref 0–0.11)
IMM GRANULOCYTES NFR BLD AUTO: 0.7 % (ref 0–0.9)
LYMPHOCYTES # BLD AUTO: 1.1 K/UL (ref 1–4.8)
LYMPHOCYTES NFR BLD: 18.9 % (ref 22–41)
MCH RBC QN AUTO: 33.3 PG (ref 27–33)
MCHC RBC AUTO-ENTMCNC: 35.9 G/DL (ref 33.7–35.3)
MCV RBC AUTO: 92.9 FL (ref 81.4–97.8)
MONOCYTES # BLD AUTO: 0.71 K/UL (ref 0–0.85)
MONOCYTES NFR BLD AUTO: 12.2 % (ref 0–13.4)
NEUTROPHILS # BLD AUTO: 3.73 K/UL (ref 1.82–7.42)
NEUTROPHILS NFR BLD: 64.3 % (ref 44–72)
NRBC # BLD AUTO: 0 K/UL
NRBC BLD-RTO: 0 /100 WBC
PLATELET # BLD AUTO: 165 K/UL (ref 164–446)
PMV BLD AUTO: 8.6 FL (ref 9–12.9)
POTASSIUM SERPL-SCNC: 3.2 MMOL/L (ref 3.6–5.5)
RBC # BLD AUTO: 4.65 M/UL (ref 4.7–6.1)
SODIUM SERPL-SCNC: 136 MMOL/L (ref 135–145)
WBC # BLD AUTO: 5.8 K/UL (ref 4.8–10.8)

## 2020-02-22 PROCEDURE — 700101 HCHG RX REV CODE 250: Performed by: NURSE PRACTITIONER

## 2020-02-22 PROCEDURE — 80048 BASIC METABOLIC PNL TOTAL CA: CPT

## 2020-02-22 PROCEDURE — A9270 NON-COVERED ITEM OR SERVICE: HCPCS | Performed by: SURGERY

## 2020-02-22 PROCEDURE — 700112 HCHG RX REV CODE 229: Performed by: SURGERY

## 2020-02-22 PROCEDURE — 85025 COMPLETE CBC W/AUTO DIFF WBC: CPT

## 2020-02-22 PROCEDURE — 700111 HCHG RX REV CODE 636 W/ 250 OVERRIDE (IP): Performed by: SURGERY

## 2020-02-22 PROCEDURE — 700102 HCHG RX REV CODE 250 W/ 637 OVERRIDE(OP): Performed by: SURGERY

## 2020-02-22 PROCEDURE — 71045 X-RAY EXAM CHEST 1 VIEW: CPT

## 2020-02-22 PROCEDURE — 99232 SBSQ HOSP IP/OBS MODERATE 35: CPT | Performed by: SURGERY

## 2020-02-22 PROCEDURE — 700102 HCHG RX REV CODE 250 W/ 637 OVERRIDE(OP): Performed by: NURSE PRACTITIONER

## 2020-02-22 PROCEDURE — A9270 NON-COVERED ITEM OR SERVICE: HCPCS | Performed by: NURSE PRACTITIONER

## 2020-02-22 PROCEDURE — 82962 GLUCOSE BLOOD TEST: CPT | Mod: 91

## 2020-02-22 RX ORDER — POTASSIUM CHLORIDE 20 MEQ/1
40 TABLET, EXTENDED RELEASE ORAL ONCE
Status: COMPLETED | OUTPATIENT
Start: 2020-02-22 | End: 2020-02-22

## 2020-02-22 RX ORDER — METAXALONE 800 MG/1
800 TABLET ORAL 3 TIMES DAILY
Status: DISCONTINUED | OUTPATIENT
Start: 2020-02-22 | End: 2020-02-22 | Stop reason: HOSPADM

## 2020-02-22 RX ORDER — GABAPENTIN 300 MG/1
300 CAPSULE ORAL 3 TIMES DAILY
Qty: 63 CAP | Refills: 0 | Status: SHIPPED | OUTPATIENT
Start: 2020-02-22 | End: 2020-03-14

## 2020-02-22 RX ORDER — LIDOCAINE 50 MG/G
2 PATCH TOPICAL EVERY 24 HOURS
Qty: 10 PATCH | Refills: 0 | Status: SHIPPED | OUTPATIENT
Start: 2020-02-22 | End: 2023-07-24

## 2020-02-22 RX ORDER — CELECOXIB 200 MG/1
200 CAPSULE ORAL 2 TIMES DAILY
Status: DISCONTINUED | OUTPATIENT
Start: 2020-02-22 | End: 2020-02-22 | Stop reason: HOSPADM

## 2020-02-22 RX ORDER — CELECOXIB 200 MG/1
200 CAPSULE ORAL 2 TIMES DAILY
Qty: 60 CAP | Refills: 0 | Status: SHIPPED | OUTPATIENT
Start: 2020-02-22 | End: 2020-03-23

## 2020-02-22 RX ORDER — METAXALONE 800 MG/1
800 TABLET ORAL 3 TIMES DAILY
Qty: 90 TAB | Refills: 0 | Status: SHIPPED | OUTPATIENT
Start: 2020-02-22 | End: 2020-03-14

## 2020-02-22 RX ORDER — GABAPENTIN 300 MG/1
300 CAPSULE ORAL EVERY 8 HOURS
Status: DISCONTINUED | OUTPATIENT
Start: 2020-02-22 | End: 2020-02-22 | Stop reason: HOSPADM

## 2020-02-22 RX ORDER — HYDROMORPHONE HYDROCHLORIDE 2 MG/1
2 TABLET ORAL
Qty: 30 TAB | Refills: 0 | Status: SHIPPED | OUTPATIENT
Start: 2020-02-22 | End: 2020-02-29

## 2020-02-22 RX ADMIN — POLYETHYLENE GLYCOL 3350 1 PACKET: 17 POWDER, FOR SOLUTION ORAL at 05:15

## 2020-02-22 RX ADMIN — METAXALONE 800 MG: 800 TABLET ORAL at 12:19

## 2020-02-22 RX ADMIN — CELECOXIB 400 MG: 200 CAPSULE ORAL at 05:15

## 2020-02-22 RX ADMIN — INSULIN GLARGINE 20 UNITS: 100 INJECTION, SOLUTION SUBCUTANEOUS at 05:30

## 2020-02-22 RX ADMIN — ENOXAPARIN SODIUM 30 MG: 100 INJECTION SUBCUTANEOUS at 05:19

## 2020-02-22 RX ADMIN — DOCUSATE SODIUM 100 MG: 100 CAPSULE, LIQUID FILLED ORAL at 05:15

## 2020-02-22 RX ADMIN — FAMOTIDINE 20 MG: 20 TABLET ORAL at 05:15

## 2020-02-22 RX ADMIN — Medication 1 EACH: at 05:15

## 2020-02-22 RX ADMIN — GABAPENTIN 100 MG: 100 CAPSULE ORAL at 05:15

## 2020-02-22 RX ADMIN — HYDROMORPHONE HYDROCHLORIDE 2 MG: 2 TABLET ORAL at 11:35

## 2020-02-22 RX ADMIN — MORPHINE SULFATE 4 MG: 4 INJECTION INTRAVENOUS at 08:37

## 2020-02-22 RX ADMIN — POTASSIUM CHLORIDE 40 MEQ: 1500 TABLET, EXTENDED RELEASE ORAL at 10:39

## 2020-02-22 RX ADMIN — MORPHINE SULFATE 4 MG: 4 INJECTION INTRAVENOUS at 05:15

## 2020-02-22 RX ADMIN — MAGNESIUM HYDROXIDE 30 ML: 400 SUSPENSION ORAL at 05:17

## 2020-02-22 RX ADMIN — ACETAMINOPHEN 1000 MG: 500 TABLET, FILM COATED ORAL at 11:34

## 2020-02-22 RX ADMIN — MORPHINE SULFATE 4 MG: 4 INJECTION INTRAVENOUS at 02:06

## 2020-02-22 RX ADMIN — HYDROMORPHONE HYDROCHLORIDE 2 MG: 2 TABLET ORAL at 10:40

## 2020-02-22 RX ADMIN — ACETAMINOPHEN 1000 MG: 500 TABLET, FILM COATED ORAL at 05:17

## 2020-02-22 ASSESSMENT — ENCOUNTER SYMPTOMS
HEADACHES: 0
FEVER: 0
DIZZINESS: 0
SHORTNESS OF BREATH: 0
COUGH: 0
MYALGIAS: 1
BACK PAIN: 1
ROS GI COMMENTS: LAST BOWEL MOVEMENT 2/22
CARDIOVASCULAR NEGATIVE: 1

## 2020-02-22 NOTE — CARE PLAN
Problem: Hyperinflation:  Goal: Prevent or improve atelectasis  Outcome: PROGRESSING AS EXPECTED  Note:     Respiratory Update    Treatment modality: IS-1500  Frequency:BID    Pt tolerating current treatments well with no adverse reactions.
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
## 2020-02-22 NOTE — CARE PLAN
Problem: Infection  Goal: Will remain free from infection  Intervention: Implement standard precautions and perform hand washing before and after patient contact  Note: Standard precautions in place to prevent infection. Hand hygiene performed before and after patient contact.      Problem: Pain Management  Goal: Pain level will decrease to patient's comfort goal  Intervention: Follow pain managment plan developed in collaboration with patient and Interdisciplinary Team  Note: Pain assessed q2h using 0-10 pain scale. Following pain management plan developed by interdisciplinary care team. Patient medicated per MAR.
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
## 2020-02-22 NOTE — CARE PLAN
Problem: Safety  Goal: Will remain free from falls  Outcome: PROGRESSING AS EXPECTED  Intervention: Implement fall precautions  Note: Patient and family educated on saftey precautions. Bed in low and locked position. Call light within reach. Room near nursing station. Patient encouraged to call staff for help before ambulating.      Problem: Infection  Goal: Will remain free from infection  Outcome: PROGRESSING AS EXPECTED  Intervention: Implement standard precautions and perform hand washing before and after patient contact  Note: Hand hygiene performed before and after assessing patient. Daily CHG bath completed. Scrub the hub prior to accessing IVs. Linens changed daily and PRN when soiled.

## 2020-02-22 NOTE — THERAPY
"Pt seen for PT tx session. Reports nausea with oral meds throughout the day but overall feeling better. Pt ambulated 1 lap around unit w/o AD or need for physical assist. Demonstrated antalgic gait due to leg length discrepency with CAM boot but overall safe. Pt reports no steps to enter home. Pt has met most acute PT goals, no further acute PT needs. Continue to ambulate with nursing staff. Recommend home with outpatient PT follow up when medically cleared by Ortho MD.    Physical Therapy Treatment completed.   Bed Mobility:  Supine to Sit: Supervised  Transfers: Sit to Stand: Supervised  Gait: Level Of Assist: Supervised with No Equipment Needed  x150 ft     Plan of Care: Patient with no further skilled PT needs in the acute care setting at this time and Patient demonstrates safety with mobility in this environment at this time.   Discharge Recommendations: Equipment: No Equipment Needed. Post-acute therapy: Recommend outpatient physical therapy services to address higher level deficits.       See \"Rehab Therapy-Acute\" Patient Summary Report for complete documentation.       "

## 2020-02-22 NOTE — PROGRESS NOTES
Dr. Shaffer notified of patient's -180s. Orders received for PRN hydralazine if SBP remains greater than 180 after pain meds administered.
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
## 2020-02-22 NOTE — PROGRESS NOTES
Discharge instructions reviewed with patient and wife. Patient sent home with prescriptions and all personal belongings.

## 2020-02-22 NOTE — DISCHARGE INSTRUCTIONS
Discharge Instructions    - Call or seek medical attention for questions or concerns  - Follow up with Dr. Huitron  in 1 weeks time. Call his office to schedule  - Follow up with primary care provider within one weeks time.  - Resume regular diet  - May take over the counter acetaminophen or ibuprofen as needed for pain if you are not taking Celebrex or are unable to fill your Celebrex.   - Continue daily over the counter stool softener while on narcotics  - No operation of machinery or motorized vehicles while under the influence of narcotics  - Any medication refill will need to be directed toward your primary care doctor or at your follow up appointments as we do not authorize refills once you are discharged.   - No alcohol, marijuana or illicit drug use while under the influence of narcotics  - No swimming, hot tubs, baths or wound submersion until cleared by outpatient provider. May shower  - No contact sports, strenuous activities, or heavy lifting until cleared by outpatient provider  - If respiratory decompensation, fever, or signs or symptoms of infection occur seek emergency medical attention    Discharged to home by car with relative. Discharged via walking, hospital escort: Refused.  Special equipment needed: Walking boot    Be sure to schedule a follow-up appointment with your primary care doctor or any specialists as instructed.     Discharge Plan:   Diet Plan: Discussed  Activity Level: Discussed  Confirmed Follow up Appointment: Patient to Call and Schedule Appointment  Confirmed Symptoms Management: Discussed  Medication Reconciliation Updated: Yes  Influenza Vaccine Indication: Patient Refuses    I understand that a diet low in cholesterol, fat, and sodium is recommended for good health. Unless I have been given specific instructions below for another diet, I accept this instruction as my diet prescription.   Other diet: Regular Diabetic diet    Special Instructions: None    · Is patient discharged  on Warfarin / Coumadin?   No     Depression / Suicide Risk    As you are discharged from this [facility] facility, it is important to learn how to keep safe from harming yourself.    Recognize the warning signs:  · Abrupt changes in personality, positive or negative- including increase in energy   · Giving away possessions  · Change in eating patterns- significant weight changes-  positive or negative  · Change in sleeping patterns- unable to sleep or sleeping all the time   · Unwillingness or inability to communicate  · Depression  · Unusual sadness, discouragement and loneliness  · Talk of wanting to die  · Neglect of personal appearance   · Rebelliousness- reckless behavior  · Withdrawal from people/activities they love  · Confusion- inability to concentrate     If you or a loved one observes any of these behaviors or has concerns about self-harm, here's what you can do:  · Talk about it- your feelings and reasons for harming yourself  · Remove any means that you might use to hurt yourself (examples: pills, rope, extension cords, firearm)  · Get professional help from the community (Mental Health, Substance Abuse, psychological counseling)  · Do not be alone:Call your Safe Contact- someone whom you trust who will be there for you.  · Call your local CRISIS HOTLINE 661-0897 or 688-102-5314  · Call your local Children's Mobile Crisis Response Team [place] (319) 802-1442 or www.Artwardly  · Call the toll free National Suicide Prevention Hotlines   · National Suicide Prevention Lifeline 317-962-JMLV (4424)  · National Hope Line Network 800-SUICIDE (587-4492)

## 2020-09-03 ENCOUNTER — APPOINTMENT (OUTPATIENT)
Dept: RADIOLOGY | Facility: MEDICAL CENTER | Age: 50
DRG: 481 | End: 2020-09-03
Attending: ORTHOPAEDIC SURGERY
Payer: MEDICAID

## 2020-09-03 ENCOUNTER — ANESTHESIA (OUTPATIENT)
Dept: SURGERY | Facility: MEDICAL CENTER | Age: 50
DRG: 481 | End: 2020-09-03
Payer: MEDICAID

## 2020-09-03 ENCOUNTER — APPOINTMENT (OUTPATIENT)
Dept: RADIOLOGY | Facility: MEDICAL CENTER | Age: 50
DRG: 481 | End: 2020-09-03
Attending: EMERGENCY MEDICINE
Payer: MEDICAID

## 2020-09-03 ENCOUNTER — APPOINTMENT (OUTPATIENT)
Dept: RADIOLOGY | Facility: MEDICAL CENTER | Age: 50
DRG: 481 | End: 2020-09-03
Payer: MEDICAID

## 2020-09-03 ENCOUNTER — HOSPITAL ENCOUNTER (INPATIENT)
Facility: MEDICAL CENTER | Age: 50
LOS: 6 days | DRG: 481 | End: 2020-09-09
Attending: EMERGENCY MEDICINE | Admitting: SURGERY
Payer: MEDICAID

## 2020-09-03 ENCOUNTER — ANESTHESIA EVENT (OUTPATIENT)
Dept: SURGERY | Facility: MEDICAL CENTER | Age: 50
DRG: 481 | End: 2020-09-03
Payer: MEDICAID

## 2020-09-03 DIAGNOSIS — Z86.39 HISTORY OF DIABETES MELLITUS: ICD-10-CM

## 2020-09-03 DIAGNOSIS — S22.43XA CLOSED FRACTURE OF MULTIPLE RIBS OF BOTH SIDES, INITIAL ENCOUNTER: ICD-10-CM

## 2020-09-03 DIAGNOSIS — S72.001A CLOSED FRACTURE OF RIGHT HIP, INITIAL ENCOUNTER (HCC): ICD-10-CM
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
## 2020-09-03 DIAGNOSIS — S72.141A INTERTROCHANTERIC FRACTURE OF RIGHT FEMUR, CLOSED, INITIAL ENCOUNTER (HCC): ICD-10-CM

## 2020-09-03 DIAGNOSIS — I10 ESSENTIAL HYPERTENSION: Chronic | ICD-10-CM

## 2020-09-03 PROBLEM — S72.142A INTERTROCHANTERIC FRACTURE OF LEFT FEMUR, CLOSED, INITIAL ENCOUNTER (HCC): Status: ACTIVE | Noted: 2020-09-03

## 2020-09-03 PROBLEM — Z53.09 CONTRAINDICATION TO DEEP VEIN THROMBOSIS (DVT) PROPHYLAXIS: Status: ACTIVE | Noted: 2020-09-03

## 2020-09-03 LAB
ABO GROUP BLD: NORMAL
ALBUMIN SERPL BCP-MCNC: 3.8 G/DL (ref 3.2–4.9)
ALBUMIN/GLOB SERPL: 1.5 G/DL
ALP SERPL-CCNC: 117 U/L (ref 30–99)
ALT SERPL-CCNC: 201 U/L (ref 2–50)
ANION GAP SERPL CALC-SCNC: 12 MMOL/L (ref 7–16)
APTT PPP: 25.7 SEC (ref 24.7–36)
AST SERPL-CCNC: 279 U/L (ref 12–45)
BILIRUB SERPL-MCNC: 0.3 MG/DL (ref 0.1–1.5)
BLD GP AB SCN SERPL QL: NORMAL
BUN SERPL-MCNC: 22 MG/DL (ref 8–22)
CALCIUM SERPL-MCNC: 9.1 MG/DL (ref 8.5–10.5)
CHLORIDE SERPL-SCNC: 103 MMOL/L (ref 96–112)
CO2 SERPL-SCNC: 22 MMOL/L (ref 20–33)
COVID ORDER STATUS COVID19: NORMAL
CREAT SERPL-MCNC: 0.83 MG/DL (ref 0.5–1.4)
ERYTHROCYTE [DISTWIDTH] IN BLOOD BY AUTOMATED COUNT: 39.7 FL (ref 35.9–50)
ETHANOL BLD-MCNC: <10.1 MG/DL (ref 0–10.1)
GLOBULIN SER CALC-MCNC: 2.6 G/DL (ref 1.9–3.5)
GLUCOSE BLD-MCNC: 227 MG/DL (ref 65–99)
GLUCOSE SERPL-MCNC: 322 MG/DL (ref 65–99)
HCT VFR BLD AUTO: 39.9 % (ref 42–52)
HGB BLD-MCNC: 13.8 G/DL (ref 14–18)
INR PPP: 0.91 (ref 0.87–1.13)
MCH RBC QN AUTO: 31.6 PG (ref 27–33)
MCHC RBC AUTO-ENTMCNC: 34.6 G/DL (ref 33.7–35.3)
MCV RBC AUTO: 91.3 FL (ref 81.4–97.8)
PLATELET # BLD AUTO: 295 K/UL (ref 164–446)
PMV BLD AUTO: 8.8 FL (ref 9–12.9)
POTASSIUM SERPL-SCNC: 4.3 MMOL/L (ref 3.6–5.5)
PROT SERPL-MCNC: 6.4 G/DL (ref 6–8.2)
PROTHROMBIN TIME: 12.6 SEC (ref 12–14.6)
RBC # BLD AUTO: 4.37 M/UL (ref 4.7–6.1)
RH BLD: NORMAL
SARS-COV-2 RDRP RESP QL NAA+PROBE: NOTDETECTED
SODIUM SERPL-SCNC: 137 MMOL/L (ref 135–145)
SPECIMEN SOURCE: NORMAL
WBC # BLD AUTO: 10.3 K/UL (ref 4.8–10.8)

## 2020-09-03 PROCEDURE — 86901 BLOOD TYPING SEROLOGIC RH(D): CPT

## 2020-09-03 PROCEDURE — 770022 HCHG ROOM/CARE - ICU (200)

## 2020-09-03 PROCEDURE — 82962 GLUCOSE BLOOD TEST: CPT

## 2020-09-03 PROCEDURE — 96365 THER/PROPH/DIAG IV INF INIT: CPT

## 2020-09-03 PROCEDURE — 160036 HCHG PACU - EA ADDL 30 MINS PHASE I: Performed by: ORTHOPAEDIC SURGERY

## 2020-09-03 PROCEDURE — C9803 HOPD COVID-19 SPEC COLLECT: HCPCS | Performed by: PHYSICIAN ASSISTANT

## 2020-09-03 PROCEDURE — 160041 HCHG SURGERY MINUTES - EA ADDL 1 MIN LEVEL 4: Performed by: ORTHOPAEDIC SURGERY

## 2020-09-03 PROCEDURE — 80307 DRUG TEST PRSMV CHEM ANLYZR: CPT

## 2020-09-03 PROCEDURE — 86900 BLOOD TYPING SEROLOGIC ABO: CPT

## 2020-09-03 PROCEDURE — 72125 CT NECK SPINE W/O DYE: CPT

## 2020-09-03 PROCEDURE — 71260 CT THORAX DX C+: CPT

## 2020-09-03 PROCEDURE — 80053 COMPREHEN METABOLIC PANEL: CPT

## 2020-09-03 PROCEDURE — 502240 HCHG MISC OR SUPPLY RC 0272: Performed by: ORTHOPAEDIC SURGERY

## 2020-09-03 PROCEDURE — 160029 HCHG SURGERY MINUTES - 1ST 30 MINS LEVEL 4: Performed by: ORTHOPAEDIC SURGERY

## 2020-09-03 PROCEDURE — 71045 X-RAY EXAM CHEST 1 VIEW: CPT

## 2020-09-03 PROCEDURE — 700105 HCHG RX REV CODE 258: Performed by: ANESTHESIOLOGY

## 2020-09-03 PROCEDURE — 85730 THROMBOPLASTIN TIME PARTIAL: CPT

## 2020-09-03 PROCEDURE — U0004 COV-19 TEST NON-CDC HGH THRU: HCPCS

## 2020-09-03 PROCEDURE — 85027 COMPLETE CBC AUTOMATED: CPT

## 2020-09-03 PROCEDURE — 700111 HCHG RX REV CODE 636 W/ 250 OVERRIDE (IP): Performed by: ANESTHESIOLOGY

## 2020-09-03 PROCEDURE — 70450 CT HEAD/BRAIN W/O DYE: CPT

## 2020-09-03 PROCEDURE — 160035 HCHG PACU - 1ST 60 MINS PHASE I: Performed by: ORTHOPAEDIC SURGERY

## 2020-09-03 PROCEDURE — 96375 TX/PRO/DX INJ NEW DRUG ADDON: CPT

## 2020-09-03 PROCEDURE — A9270 NON-COVERED ITEM OR SERVICE: HCPCS | Performed by: ANESTHESIOLOGY

## 2020-09-03 PROCEDURE — 85610 PROTHROMBIN TIME: CPT

## 2020-09-03 PROCEDURE — 72131 CT LUMBAR SPINE W/O DYE: CPT

## 2020-09-03 PROCEDURE — 99291 CRITICAL CARE FIRST HOUR: CPT

## 2020-09-03 PROCEDURE — 27250 TREAT HIP DISLOCATION: CPT

## 2020-09-03 PROCEDURE — 73552 X-RAY EXAM OF FEMUR 2/>: CPT | Mod: RT

## 2020-09-03 PROCEDURE — 160002 HCHG RECOVERY MINUTES (STAT): Performed by: ORTHOPAEDIC SURGERY

## 2020-09-03 PROCEDURE — G0390 TRAUMA RESPONS W/HOSP CRITI: HCPCS

## 2020-09-03 PROCEDURE — C1713 ANCHOR/SCREW BN/BN,TIS/BN: HCPCS | Performed by: ORTHOPAEDIC SURGERY

## 2020-09-03 PROCEDURE — 700102 HCHG RX REV CODE 250 W/ 637 OVERRIDE(OP): Performed by: ANESTHESIOLOGY

## 2020-09-03 PROCEDURE — 73551 X-RAY EXAM OF FEMUR 1: CPT | Mod: RT

## 2020-09-03 PROCEDURE — 160009 HCHG ANES TIME/MIN: Performed by: ORTHOPAEDIC SURGERY

## 2020-09-03 PROCEDURE — 73590 X-RAY EXAM OF LOWER LEG: CPT | Mod: RT

## 2020-09-03 PROCEDURE — 501838 HCHG SUTURE GENERAL: Performed by: ORTHOPAEDIC SURGERY

## 2020-09-03 PROCEDURE — 700101 HCHG RX REV CODE 250: Performed by: ANESTHESIOLOGY

## 2020-09-03 PROCEDURE — 0QS636Z REPOSITION RIGHT UPPER FEMUR WITH INTRAMEDULLARY INTERNAL FIXATION DEVICE, PERCUTANEOUS APPROACH: ICD-10-PCS | Performed by: ORTHOPAEDIC SURGERY

## 2020-09-03 PROCEDURE — 500891 HCHG PACK, ORTHO MAJOR: Performed by: ORTHOPAEDIC SURGERY

## 2020-09-03 PROCEDURE — 700117 HCHG RX CONTRAST REV CODE 255: Performed by: EMERGENCY MEDICINE

## 2020-09-03 PROCEDURE — 86850 RBC ANTIBODY SCREEN: CPT

## 2020-09-03 PROCEDURE — 72128 CT CHEST SPINE W/O DYE: CPT

## 2020-09-03 PROCEDURE — 501445 HCHG STAPLER, SKIN DISP: Performed by: ORTHOPAEDIC SURGERY

## 2020-09-03 PROCEDURE — 700111 HCHG RX REV CODE 636 W/ 250 OVERRIDE (IP): Performed by: EMERGENCY MEDICINE

## 2020-09-03 PROCEDURE — 160048 HCHG OR STATISTICAL LEVEL 1-5: Performed by: ORTHOPAEDIC SURGERY

## 2020-09-03 DEVICE — IMPLANTABLE DEVICE: Type: IMPLANTABLE DEVICE | Status: FUNCTIONAL

## 2020-09-03 DEVICE — SCREW FOR IM NAILS TI LOCKING WITH T25 STARDRIVE 5.0MM 44MM (2TX2=4): Type: IMPLANTABLE DEVICE | Status: FUNCTIONAL

## 2020-09-03 RX ORDER — INSULIN GLARGINE 100 [IU]/ML
60 INJECTION, SOLUTION SUBCUTANEOUS EVERY MORNING
COMMUNITY
End: 2023-05-05 | Stop reason: SDUPTHER

## 2020-09-03 RX ORDER — CEFAZOLIN SODIUM 1 G/3ML
INJECTION, POWDER, FOR SOLUTION INTRAMUSCULAR; INTRAVENOUS PRN
Status: DISCONTINUED | OUTPATIENT
Start: 2020-09-03 | End: 2020-09-03 | Stop reason: SURG

## 2020-09-03 RX ORDER — ONDANSETRON 2 MG/ML
4 INJECTION INTRAMUSCULAR; INTRAVENOUS
Status: DISCONTINUED | OUTPATIENT
Start: 2020-09-03 | End: 2020-09-04 | Stop reason: HOSPADM

## 2020-09-03 RX ORDER — HALOPERIDOL 5 MG/ML
1 INJECTION INTRAMUSCULAR
Status: DISCONTINUED | OUTPATIENT
Start: 2020-09-03 | End: 2020-09-04 | Stop reason: HOSPADM

## 2020-09-03 RX ORDER — ONDANSETRON 2 MG/ML
INJECTION INTRAMUSCULAR; INTRAVENOUS PRN
Status: DISCONTINUED | OUTPATIENT
Start: 2020-09-03 | End: 2020-09-03 | Stop reason: SURG

## 2020-09-03 RX ORDER — ROCURONIUM BROMIDE 10 MG/ML
INJECTION, SOLUTION INTRAVENOUS PRN
Status: DISCONTINUED | OUTPATIENT
Start: 2020-09-03 | End: 2020-09-03 | Stop reason: SURG

## 2020-09-03 RX ORDER — SODIUM CHLORIDE, SODIUM LACTATE, POTASSIUM CHLORIDE, CALCIUM CHLORIDE 600; 310; 30; 20 MG/100ML; MG/100ML; MG/100ML; MG/100ML
INJECTION, SOLUTION INTRAVENOUS CONTINUOUS
Status: DISCONTINUED | OUTPATIENT
Start: 2020-09-03 | End: 2020-09-04 | Stop reason: HOSPADM

## 2020-09-03 RX ORDER — LIDOCAINE HYDROCHLORIDE 40 MG/ML
SOLUTION TOPICAL PRN
Status: DISCONTINUED | OUTPATIENT
Start: 2020-09-03 | End: 2020-09-03 | Stop reason: SURG

## 2020-09-03 RX ORDER — PHENYLEPHRINE HCL IN 0.9% NACL 0.5 MG/5ML
SYRINGE (ML) INTRAVENOUS PRN
Status: DISCONTINUED | OUTPATIENT
Start: 2020-09-03 | End: 2020-09-03 | Stop reason: SURG

## 2020-09-03 RX ORDER — SODIUM CHLORIDE, SODIUM LACTATE, POTASSIUM CHLORIDE, CALCIUM CHLORIDE 600; 310; 30; 20 MG/100ML; MG/100ML; MG/100ML; MG/100ML
INJECTION, SOLUTION INTRAVENOUS
Status: DISCONTINUED | OUTPATIENT
Start: 2020-09-03 | End: 2020-09-03 | Stop reason: SURG

## 2020-09-03 RX ORDER — OXYCODONE HCL 5 MG/5 ML
10 SOLUTION, ORAL ORAL
Status: COMPLETED | OUTPATIENT
Start: 2020-09-03 | End: 2020-09-03

## 2020-09-03 RX ORDER — HYDROMORPHONE HYDROCHLORIDE 2 MG/ML
INJECTION, SOLUTION INTRAMUSCULAR; INTRAVENOUS; SUBCUTANEOUS PRN
Status: DISCONTINUED | OUTPATIENT
Start: 2020-09-03 | End: 2020-09-03 | Stop reason: SURG

## 2020-09-03 RX ORDER — CEFAZOLIN SODIUM 2 G/100ML
INJECTION, SOLUTION INTRAVENOUS
Status: COMPLETED | OUTPATIENT
Start: 2020-09-03 | End: 2020-09-03

## 2020-09-03 RX ORDER — OXYCODONE HCL 5 MG/5 ML
5 SOLUTION, ORAL ORAL
Status: COMPLETED | OUTPATIENT
Start: 2020-09-03 | End: 2020-09-03

## 2020-09-03 RX ORDER — DIPHENHYDRAMINE HYDROCHLORIDE 50 MG/ML
12.5 INJECTION INTRAMUSCULAR; INTRAVENOUS
Status: DISCONTINUED | OUTPATIENT
Start: 2020-09-03 | End: 2020-09-04 | Stop reason: HOSPADM

## 2020-09-03 RX ADMIN — Medication 200 MCG: at 21:40

## 2020-09-03 RX ADMIN — FENTANYL CITRATE 50 MCG: 50 INJECTION INTRAMUSCULAR; INTRAVENOUS at 23:28

## 2020-09-03 RX ADMIN — PROPOFOL 150 MG: 10 INJECTION, EMULSION INTRAVENOUS at 21:17

## 2020-09-03 RX ADMIN — CEFAZOLIN SODIUM 2 G: 2 INJECTION, SOLUTION INTRAVENOUS at 18:14

## 2020-09-03 RX ADMIN — IOHEXOL 100 ML: 350 INJECTION, SOLUTION INTRAVENOUS at 18:52

## 2020-09-03 RX ADMIN — OXYCODONE HYDROCHLORIDE 10 MG: 5 SOLUTION ORAL at 23:46

## 2020-09-03 RX ADMIN — CEFAZOLIN 2 G: 330 INJECTION, POWDER, FOR SOLUTION INTRAMUSCULAR; INTRAVENOUS at 21:18

## 2020-09-03 RX ADMIN — HYDROMORPHONE HYDROCHLORIDE 1 MG: 2 INJECTION, SOLUTION INTRAMUSCULAR; INTRAVENOUS; SUBCUTANEOUS at 22:33

## 2020-09-03 RX ADMIN — SODIUM CHLORIDE, POTASSIUM CHLORIDE, SODIUM LACTATE AND CALCIUM CHLORIDE: 600; 310; 30; 20 INJECTION, SOLUTION INTRAVENOUS at 21:00

## 2020-09-03 RX ADMIN — LIDOCAINE HYDROCHLORIDE 160 MG: 40 SOLUTION TOPICAL at 21:18

## 2020-09-03 RX ADMIN — FENTANYL CITRATE 50 MCG: 50 INJECTION INTRAMUSCULAR; INTRAVENOUS at 23:12

## 2020-09-03 RX ADMIN — MIDAZOLAM 2 MG: 1 INJECTION INTRAMUSCULAR; INTRAVENOUS at 21:16

## 2020-09-03 RX ADMIN — SUFENTANIL CITRATE 50 MCG: 50 INJECTION EPIDURAL; INTRAVENOUS at 21:17

## 2020-09-03 RX ADMIN — ROCURONIUM BROMIDE 30 MG: 10 INJECTION, SOLUTION INTRAVENOUS at 21:18

## 2020-09-03 RX ADMIN — DIPHENHYDRAMINE HYDROCHLORIDE 12.5 MG: 50 INJECTION INTRAMUSCULAR; INTRAVENOUS at 23:44

## 2020-09-03 RX ADMIN — ONDANSETRON 4 MG: 2 INJECTION INTRAMUSCULAR; INTRAVENOUS at 21:16

## 2020-09-03 RX ADMIN — HYDROMORPHONE HYDROCHLORIDE 1 MG: 1 INJECTION, SOLUTION INTRAMUSCULAR; INTRAVENOUS; SUBCUTANEOUS at 18:14

## 2020-09-03 SDOH — HEALTH STABILITY: MENTAL HEALTH: HOW OFTEN DO YOU HAVE A DRINK CONTAINING ALCOHOL?: MONTHLY OR LESS

## 2020-09-03 ASSESSMENT — PAIN DESCRIPTION - PAIN TYPE
TYPE: ACUTE PAIN

## 2020-09-03 ASSESSMENT — PAIN SCALES - GENERAL: PAIN_LEVEL: 0

## 2020-09-04 ENCOUNTER — APPOINTMENT (OUTPATIENT)
Dept: RADIOLOGY | Facility: MEDICAL CENTER | Age: 50
DRG: 481 | End: 2020-09-04
Attending: NURSE PRACTITIONER
Payer: MEDICAID

## 2020-09-04 PROBLEM — T14.90XA TRAUMA: Status: ACTIVE | Noted: 2020-09-04

## 2020-09-04 LAB
ABO + RH BLD: NORMAL
ANION GAP SERPL CALC-SCNC: 9 MMOL/L (ref 7–16)
BASOPHILS # BLD AUTO: 0.4 % (ref 0–1.8)
BASOPHILS # BLD: 0.03 K/UL (ref 0–0.12)
BUN SERPL-MCNC: 17 MG/DL (ref 8–22)
CALCIUM SERPL-MCNC: 7.6 MG/DL (ref 8.5–10.5)
CHLORIDE SERPL-SCNC: 103 MMOL/L (ref 96–112)
CO2 SERPL-SCNC: 22 MMOL/L (ref 20–33)
CREAT SERPL-MCNC: 0.71 MG/DL (ref 0.5–1.4)
EOSINOPHIL # BLD AUTO: 0.05 K/UL (ref 0–0.51)
EOSINOPHIL NFR BLD: 0.6 % (ref 0–6.9)
ERYTHROCYTE [DISTWIDTH] IN BLOOD BY AUTOMATED COUNT: 41.3 FL (ref 35.9–50)
GLUCOSE BLD-MCNC: 216 MG/DL (ref 65–99)
GLUCOSE BLD-MCNC: 258 MG/DL (ref 65–99)
GLUCOSE BLD-MCNC: 275 MG/DL (ref 65–99)
GLUCOSE BLD-MCNC: 275 MG/DL (ref 65–99)
GLUCOSE BLD-MCNC: 310 MG/DL (ref 65–99)
GLUCOSE SERPL-MCNC: 225 MG/DL (ref 65–99)
HCT VFR BLD AUTO: 32.8 % (ref 42–52)
HGB BLD-MCNC: 11.1 G/DL (ref 14–18)
IMM GRANULOCYTES # BLD AUTO: 0.04 K/UL (ref 0–0.11)
IMM GRANULOCYTES NFR BLD AUTO: 0.5 % (ref 0–0.9)
LYMPHOCYTES # BLD AUTO: 1.1 K/UL (ref 1–4.8)
LYMPHOCYTES NFR BLD: 13 % (ref 22–41)
MCH RBC QN AUTO: 32.2 PG (ref 27–33)
MCHC RBC AUTO-ENTMCNC: 33.8 G/DL (ref 33.7–35.3)
MCV RBC AUTO: 95.1 FL (ref 81.4–97.8)
MONOCYTES # BLD AUTO: 0.56 K/UL (ref 0–0.85)
MONOCYTES NFR BLD AUTO: 6.6 % (ref 0–13.4)
NEUTROPHILS # BLD AUTO: 6.71 K/UL (ref 1.82–7.42)
NEUTROPHILS NFR BLD: 78.9 % (ref 44–72)
NRBC # BLD AUTO: 0 K/UL
NRBC BLD-RTO: 0 /100 WBC
PLATELET # BLD AUTO: 190 K/UL (ref 164–446)
PMV BLD AUTO: 9 FL (ref 9–12.9)
POTASSIUM SERPL-SCNC: 4 MMOL/L (ref 3.6–5.5)
RBC # BLD AUTO: 3.45 M/UL (ref 4.7–6.1)
SODIUM SERPL-SCNC: 134 MMOL/L (ref 135–145)
WBC # BLD AUTO: 8.5 K/UL (ref 4.8–10.8)

## 2020-09-04 PROCEDURE — 700111 HCHG RX REV CODE 636 W/ 250 OVERRIDE (IP): Performed by: ANESTHESIOLOGY

## 2020-09-04 PROCEDURE — 71045 X-RAY EXAM CHEST 1 VIEW: CPT

## 2020-09-04 PROCEDURE — A9270 NON-COVERED ITEM OR SERVICE: HCPCS | Performed by: SURGERY

## 2020-09-04 PROCEDURE — A9270 NON-COVERED ITEM OR SERVICE: HCPCS | Performed by: NURSE PRACTITIONER

## 2020-09-04 PROCEDURE — 700105 HCHG RX REV CODE 258: Performed by: NURSE PRACTITIONER

## 2020-09-04 PROCEDURE — 82962 GLUCOSE BLOOD TEST: CPT | Mod: 91

## 2020-09-04 PROCEDURE — 700102 HCHG RX REV CODE 250 W/ 637 OVERRIDE(OP): Performed by: NURSE PRACTITIONER

## 2020-09-04 PROCEDURE — 700111 HCHG RX REV CODE 636 W/ 250 OVERRIDE (IP): Performed by: SURGERY

## 2020-09-04 PROCEDURE — 700111 HCHG RX REV CODE 636 W/ 250 OVERRIDE (IP): Performed by: NURSE PRACTITIONER

## 2020-09-04 PROCEDURE — 80048 BASIC METABOLIC PNL TOTAL CA: CPT

## 2020-09-04 PROCEDURE — 700111 HCHG RX REV CODE 636 W/ 250 OVERRIDE (IP): Performed by: ORTHOPAEDIC SURGERY

## 2020-09-04 PROCEDURE — 700102 HCHG RX REV CODE 250 W/ 637 OVERRIDE(OP): Performed by: SURGERY

## 2020-09-04 PROCEDURE — 85025 COMPLETE CBC W/AUTO DIFF WBC: CPT

## 2020-09-04 PROCEDURE — 99233 SBSQ HOSP IP/OBS HIGH 50: CPT | Performed by: SURGERY

## 2020-09-04 PROCEDURE — 51798 US URINE CAPACITY MEASURE: CPT

## 2020-09-04 PROCEDURE — 770006 HCHG ROOM/CARE - MED/SURG/GYN SEMI*

## 2020-09-04 PROCEDURE — 97165 OT EVAL LOW COMPLEX 30 MIN: CPT

## 2020-09-04 PROCEDURE — 97162 PT EVAL MOD COMPLEX 30 MIN: CPT

## 2020-09-04 RX ORDER — BISACODYL 10 MG
10 SUPPOSITORY, RECTAL RECTAL
Status: DISCONTINUED | OUTPATIENT
Start: 2020-09-04 | End: 2020-09-09 | Stop reason: HOSPADM

## 2020-09-04 RX ORDER — DEXTROSE MONOHYDRATE 25 G/50ML
50 INJECTION, SOLUTION INTRAVENOUS
Status: DISCONTINUED | OUTPATIENT
Start: 2020-09-04 | End: 2020-09-04

## 2020-09-04 RX ORDER — DEXTROSE MONOHYDRATE 25 G/50ML
50 INJECTION, SOLUTION INTRAVENOUS
Status: DISCONTINUED | OUTPATIENT
Start: 2020-09-04 | End: 2020-09-05

## 2020-09-04 RX ORDER — TAMSULOSIN HYDROCHLORIDE 0.4 MG/1
0.4 CAPSULE ORAL
Status: DISCONTINUED | OUTPATIENT
Start: 2020-09-04 | End: 2020-09-09 | Stop reason: HOSPADM

## 2020-09-04 RX ORDER — OXYCODONE HYDROCHLORIDE 5 MG/1
5 TABLET ORAL
Status: DISCONTINUED | OUTPATIENT
Start: 2020-09-04 | End: 2020-09-09 | Stop reason: HOSPADM

## 2020-09-04 RX ORDER — CEFAZOLIN SODIUM 2 G/100ML
2 INJECTION, SOLUTION INTRAVENOUS EVERY 8 HOURS
Status: COMPLETED | OUTPATIENT
Start: 2020-09-04 | End: 2020-09-04

## 2020-09-04 RX ORDER — ONDANSETRON 2 MG/ML
4 INJECTION INTRAMUSCULAR; INTRAVENOUS EVERY 4 HOURS PRN
Status: DISCONTINUED | OUTPATIENT
Start: 2020-09-04 | End: 2020-09-09 | Stop reason: HOSPADM

## 2020-09-04 RX ORDER — SODIUM CHLORIDE, SODIUM LACTATE, POTASSIUM CHLORIDE, CALCIUM CHLORIDE 600; 310; 30; 20 MG/100ML; MG/100ML; MG/100ML; MG/100ML
INJECTION, SOLUTION INTRAVENOUS CONTINUOUS
Status: DISCONTINUED | OUTPATIENT
Start: 2020-09-04 | End: 2020-09-07

## 2020-09-04 RX ORDER — AMOXICILLIN 250 MG
1 CAPSULE ORAL
Status: DISCONTINUED | OUTPATIENT
Start: 2020-09-04 | End: 2020-09-09 | Stop reason: HOSPADM

## 2020-09-04 RX ORDER — ENEMA 19; 7 G/133ML; G/133ML
1 ENEMA RECTAL
Status: DISCONTINUED | OUTPATIENT
Start: 2020-09-04 | End: 2020-09-09 | Stop reason: HOSPADM

## 2020-09-04 RX ORDER — DOCUSATE SODIUM 100 MG/1
100 CAPSULE, LIQUID FILLED ORAL 2 TIMES DAILY
Status: DISCONTINUED | OUTPATIENT
Start: 2020-09-04 | End: 2020-09-09 | Stop reason: HOSPADM

## 2020-09-04 RX ORDER — MORPHINE SULFATE 4 MG/ML
4 INJECTION, SOLUTION INTRAMUSCULAR; INTRAVENOUS
Status: DISCONTINUED | OUTPATIENT
Start: 2020-09-04 | End: 2020-09-06

## 2020-09-04 RX ORDER — POLYETHYLENE GLYCOL 3350 17 G/17G
1 POWDER, FOR SOLUTION ORAL 2 TIMES DAILY
Status: DISCONTINUED | OUTPATIENT
Start: 2020-09-04 | End: 2020-09-09 | Stop reason: HOSPADM

## 2020-09-04 RX ORDER — AMOXICILLIN 250 MG
1 CAPSULE ORAL NIGHTLY
Status: DISCONTINUED | OUTPATIENT
Start: 2020-09-04 | End: 2020-09-09 | Stop reason: HOSPADM

## 2020-09-04 RX ORDER — OXYCODONE HYDROCHLORIDE 10 MG/1
10 TABLET ORAL
Status: DISCONTINUED | OUTPATIENT
Start: 2020-09-04 | End: 2020-09-09 | Stop reason: HOSPADM

## 2020-09-04 RX ADMIN — MORPHINE SULFATE 4 MG: 4 INJECTION INTRAVENOUS at 03:41

## 2020-09-04 RX ADMIN — OXYCODONE HYDROCHLORIDE 10 MG: 10 TABLET ORAL at 06:10

## 2020-09-04 RX ADMIN — FENTANYL CITRATE 50 MCG: 50 INJECTION INTRAMUSCULAR; INTRAVENOUS at 00:07

## 2020-09-04 RX ADMIN — SODIUM CHLORIDE, POTASSIUM CHLORIDE, SODIUM LACTATE AND CALCIUM CHLORIDE: 600; 310; 30; 20 INJECTION, SOLUTION INTRAVENOUS at 00:43

## 2020-09-04 RX ADMIN — OXYCODONE HYDROCHLORIDE 10 MG: 10 TABLET ORAL at 19:01

## 2020-09-04 RX ADMIN — INSULIN HUMAN 4 UNITS: 100 INJECTION, SOLUTION PARENTERAL at 23:10

## 2020-09-04 RX ADMIN — MORPHINE SULFATE 4 MG: 4 INJECTION INTRAVENOUS at 13:26

## 2020-09-04 RX ADMIN — CEFAZOLIN SODIUM 2 G: 2 INJECTION, SOLUTION INTRAVENOUS at 05:13

## 2020-09-04 RX ADMIN — OXYCODONE HYDROCHLORIDE 10 MG: 10 TABLET ORAL at 03:22

## 2020-09-04 RX ADMIN — INSULIN HUMAN 7 UNITS: 100 INJECTION, SOLUTION PARENTERAL at 18:05

## 2020-09-04 RX ADMIN — MORPHINE SULFATE 4 MG: 4 INJECTION INTRAVENOUS at 05:24

## 2020-09-04 RX ADMIN — INSULIN HUMAN 3 UNITS: 100 INJECTION, SOLUTION PARENTERAL at 05:14

## 2020-09-04 RX ADMIN — INSULIN HUMAN 4 UNITS: 100 INJECTION, SOLUTION PARENTERAL at 01:44

## 2020-09-04 RX ADMIN — MORPHINE SULFATE 4 MG: 4 INJECTION INTRAVENOUS at 00:52

## 2020-09-04 RX ADMIN — CEFAZOLIN SODIUM 2 G: 2 INJECTION, SOLUTION INTRAVENOUS at 15:25

## 2020-09-04 RX ADMIN — ENOXAPARIN SODIUM 30 MG: 30 INJECTION SUBCUTANEOUS at 18:37

## 2020-09-04 RX ADMIN — TAMSULOSIN HYDROCHLORIDE 0.4 MG: 0.4 CAPSULE ORAL at 13:26

## 2020-09-04 RX ADMIN — MORPHINE SULFATE 4 MG: 4 INJECTION INTRAVENOUS at 09:03

## 2020-09-04 RX ADMIN — OXYCODONE HYDROCHLORIDE 10 MG: 10 TABLET ORAL at 22:28

## 2020-09-04 RX ADMIN — DOCUSATE SODIUM 50 MG AND SENNOSIDES 8.6 MG 1 TABLET: 8.6; 5 TABLET, FILM COATED ORAL at 22:28

## 2020-09-04 RX ADMIN — OXYCODONE HYDROCHLORIDE 10 MG: 10 TABLET ORAL at 13:26

## 2020-09-04 RX ADMIN — INSULIN HUMAN 7 UNITS: 100 INJECTION, SOLUTION PARENTERAL at 13:39

## 2020-09-04 ASSESSMENT — PAIN DESCRIPTION - PAIN TYPE
TYPE: ACUTE PAIN

## 2020-09-04 ASSESSMENT — COGNITIVE AND FUNCTIONAL STATUS - GENERAL
TURNING FROM BACK TO SIDE WHILE IN FLAT BAD: UNABLE
MOVING FROM LYING ON BACK TO SITTING ON SIDE OF FLAT BED: UNABLE
HELP NEEDED FOR BATHING: A LOT
DAILY ACTIVITIY SCORE: 16
CLIMB 3 TO 5 STEPS WITH RAILING: TOTAL
SUGGESTED CMS G CODE MODIFIER MOBILITY: CM
SUGGESTED CMS G CODE MODIFIER DAILY ACTIVITY: CK
MOVING TO AND FROM BED TO CHAIR: UNABLE
MOBILITY SCORE: 8
DRESSING REGULAR UPPER BODY CLOTHING: A LITTLE
STANDING UP FROM CHAIR USING ARMS: A LOT
PERSONAL GROOMING: A LITTLE
WALKING IN HOSPITAL ROOM: A LOT
TOILETING: A LOT
DRESSING REGULAR LOWER BODY CLOTHING: A LOT

## 2020-09-04 ASSESSMENT — LIFESTYLE VARIABLES
TOTAL SCORE: 0
DOES PATIENT WANT TO STOP DRINKING: NO
EVER FELT BAD OR GUILTY ABOUT YOUR DRINKING: NO
HOW MANY TIMES IN THE PAST YEAR HAVE YOU HAD 5 OR MORE DRINKS IN A DAY: 0
TOTAL SCORE: 0
TOTAL SCORE: 0
HAVE YOU EVER FELT YOU SHOULD CUT DOWN ON YOUR DRINKING: NO
CONSUMPTION TOTAL: NEGATIVE
AVERAGE NUMBER OF DAYS PER WEEK YOU HAVE A DRINK CONTAINING ALCOHOL: 0
ALCOHOL_USE: NO
HAVE PEOPLE ANNOYED YOU BY CRITICIZING YOUR DRINKING: NO
ON A TYPICAL DAY WHEN YOU DRINK ALCOHOL HOW MANY DRINKS DO YOU HAVE: 0
EVER HAD A DRINK FIRST THING IN THE MORNING TO STEADY YOUR NERVES TO GET RID OF A HANGOVER: NO

## 2020-09-04 ASSESSMENT — ACTIVITIES OF DAILY LIVING (ADL): TOILETING: INDEPENDENT

## 2020-09-04 ASSESSMENT — FIBROSIS 4 INDEX
FIB4 SCORE: 3.2
FIB4 SCORE: 4.97

## 2020-09-04 ASSESSMENT — COPD QUESTIONNAIRES
HAVE YOU SMOKED AT LEAST 100 CIGARETTES IN YOUR ENTIRE LIFE: NO/DON'T KNOW
DURING THE PAST 4 WEEKS HOW MUCH DID YOU FEEL SHORT OF BREATH: NONE/LITTLE OF THE TIME
DO YOU EVER COUGH UP ANY MUCUS OR PHLEGM?: YES, A FEW DAYS A WEEK OR MONTH
COPD SCREENING SCORE: 2

## 2020-09-04 ASSESSMENT — PATIENT HEALTH QUESTIONNAIRE - PHQ9
1. LITTLE INTEREST OR PLEASURE IN DOING THINGS: NOT AT ALL
SUM OF ALL RESPONSES TO PHQ9 QUESTIONS 1 AND 2: 0
2. FEELING DOWN, DEPRESSED, IRRITABLE, OR HOPELESS: NOT AT ALL

## 2020-09-04 ASSESSMENT — GAIT ASSESSMENTS: GAIT LEVEL OF ASSIST: UNABLE TO PARTICIPATE

## 2020-09-04 NOTE — CARE PLAN
Problem: Pain Management  Goal: Pain level will decrease to patient's comfort goal  Note: Assess pts pain level and treat as needed.      Problem: Respiratory:  Goal: Respiratory status will improve  Note: Encourage IS use and TCDB.

## 2020-09-04 NOTE — ANESTHESIA POSTPROCEDURE EVALUATION
Patient: Mayra [age]y-N[age]    Procedure Summary     Date: 09/03/20 Room / Location: [place] [address] / SURGERY [facility]    Anesthesia Start: 2111 Anesthesia Stop: 2248    Procedure: INSERTION, INTRAMEDULLARY LLOYD, FEMUR, PROXIMAL (Right Leg Upper) Diagnosis: (RIGHT PROXIMAL FEMUR FRACTURE)    Surgeon: Shaka George M.D. Responsible Provider: Benito Gallo M.D.    Anesthesia Type: general ASA Status: 3 - Emergent          Final Anesthesia Type: general  Last vitals  BP   Blood Pressure: 128/79    Temp   36.4 °C (97.5 °F)    Pulse   Pulse: 77   Resp   16    SpO2   95 %      Anesthesia Post Evaluation    Patient location during evaluation: PACU  Patient participation: complete - patient participated  Level of consciousness: awake and alert  Pain score: 0    Airway patency: patent  Anesthetic complications: no  Cardiovascular status: hemodynamically stable  Respiratory status: acceptable  Hydration status: euvolemic    PONV: none           Nurse Pain Score: 9 (NPRS)

## 2020-09-04 NOTE — PROGRESS NOTES
"TRAUMA TERTIARY SURVEY     Mental status adequate for full examination?: Yes    Spine cleared (radiologically and/or clinically): Yes    PHYSICAL EXAMINATION:  Vitals: /61   Pulse 79   Temp 36.7 °C (98 °F) (Temporal)   Resp (!) 22   Ht 1.93 m (6' 4\")   Wt 78.8 kg (173 lb 11.6 oz)   SpO2 95%   BMI 21.15 kg/m²   Constitutional:     General Appearance: appears stated age, is in no apparent distress, is well developed and well nourished.  HEENT:     No significant external craniofacial trauma. The pupils are equal, round, and reactive to light bilaterally. The extraocular muscles are intact bilaterally.. The nares and oropharynx are clear. The midface and jaw are stable. No malocclusion is evident.  Neck:    The cervical spine is supple and non tender. Normal range of motion. No posterior midline cervical-spine tenderness, no evidence of intoxication, normal level of alertness (Lillian Coma Scale 15), no focal neurologic deficit, and no painful distracting injuries. The trachea is midline. No significant abrasions, lacerations, contusions, punctures, or swelling. No crepitance. No jugulovenous distension.  Respiratory:   Inspection: Unlabored respirations, no intercostal retractions, paradoxical motion, or accessory muscle use.   Palpation:  The chest is tender bilaterally. The clavicles are non deformed bilaterally..   Auscultation: clear to auscultation.  Cardiovascular:   Auscultation: regular rate and rhythm.   Peripheral Pulses: Normal.   Abdomen:   Abdomen is soft, nontender, without organomegaly or masses.  Genitourinary:   (MALE): Not visualized.  Musculoskeletal:   The pelvis is stable. RLE surgical dressings c/d/i  Back:   The thoracolumbar spine was examined. Examination is remarkable for no significant tenderness, swelling, or deformity in the thoracolumbar region. Right flank ecchymosis and tenderness.  Skin:   The skin is warm and dry.  Neurologic:    Lillian Coma Scale (GCS) 15 [id number]. " Neurologic examination revealed no focal deficits noted, mental status intact.  Psychiatric:   The patient does not appear depressed or anxious.    IMAGING:  DX-CHEST-PORTABLE (1 VIEW)   Final Result         1.  Pulmonary edema and/or infiltrates are identified, which appear somewhat increased since the prior exam.      DX-FEMUR-2+ RIGHT   Final Result         1.  Intraoperative changes of femoral nailing with dynamic hip screw in progress      DX-PORTABLE FLUORO > 1 HOUR   Preliminary Result      Portable fluoroscopy utilized for 2 minutes.         INTERPRETING LOCATION: [address] [place] [address], [facility], [id number]      CT-CHEST,ABDOMEN,PELVIS WITH   Final Result      1.  No evidence of abdominal or pelvic organ injury.      2.  Comminuted and displaced right proximal femoral intertrochanteric fracture.      3.  Acute fractures of the right posterior seventh, eighth, ninth and 10th ribs.      4.  Acute fractures of the left anterior fourth, fifth, sixth, seventh and eighth ribs.      5.  Multiple bilateral chronic rib fractures.      6.  Fatty liver.      7.  Mild splenomegaly.      CT-LSPINE W/O PLUS RECONS   Final Result      No evidence of fracture of the lumbar spine.      CT-TSPINE W/O PLUS RECONS   Final Result      1.  Negative for acute thoracic spine fracture      2.  Old T1 fracture      3.  Multiple old right rib fracture      CT-CSPINE WITHOUT PLUS RECONS   Final Result      1.  Negative for cervical spine fracture      2.  Anterior/superior T1 wedging which is likely old      CT-HEAD W/O   Final Result      No evidence of acute intracranial process.      DX-TIBIA AND FIBULA RIGHT   Final Result      No evidence of fracture or dislocation.      DX-FEMUR-1 VIEW RIGHT   Final Result      Comminuted, impacted and angulated right proximal femoral intertrochanteric fracture.      DX-CHEST-LIMITED (1 VIEW)   Final Result      Right rib fractures.        All current laboratory studies/radiology exams reviewed:  Yes    Completed Consultations:  Dr. George, orthopedic surgery     Pending Consultations:  NA    Newly Identified Diagnoses and Injuries:  None    TOTAL RAP SCORE:  RAP Score Total: 8      ETOH Screening     Assessment complete date: 9/4/2020 (BA negative, CAGE not completed)
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
## 2020-09-04 NOTE — PROGRESS NOTES
[place]  50[place] [facility]  Hx HTN, IDDM, Grief   Allergic to PCN  Covid pending  Formal consult pending   Has been NPO > 8hrs  Please keep NPO  To OR p Trauma Clearance for IMN

## 2020-09-04 NOTE — PROGRESS NOTES
"0030: Pt from PACU to S110 accompanied by ACLS RN. Pt calling/crying out on arrival, endorsing pain \"everywhere\". Attached to overhead monitor. VS:  HR: 81  BP: 119/78 (cuff)  RR: 25  SpO2: 91% (RA, placed on 2L)  Temp: 98.3F (temporal)  Weight: 78.8kg    2-RN skin check performed by ALEXANDER Yuen and ALEXANDER Wang. Noted:  -Skin tears to R flank. DANG.  -Abrasions to R buttocks and knee. DANG.  -Surgical island dressing to R hip, gauze and tegaderm dressing to R lateral knee. Both CDI, no shadowing.  -R lower lip abrasion. DANG, nondraining.     Heels, sacrum, elbows and posterior head intact.  "
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
## 2020-09-04 NOTE — OR NURSING
Pt itching, given 12.5 mg of benadryl IV.  Pt states pain of 8/10 but then falls immediately back to sleep.

## 2020-09-04 NOTE — ASSESSMENT & PLAN NOTE
Comminuted and displaced right proximal femoral intertrochanteric fracture.  9/3 IM nailing.  Weight bearing status - Weightbearing as tolerated RLE.  Shaka George MD. Orthopedic Surgeon. [facility].
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
## 2020-09-04 NOTE — PROGRESS NOTES
50yoM with right subtrochanteric femur fracture s/p IMN 9/3.  Bilateral rib fxs admitted to SICU.    S: still complaining of pain, per RN worked with PT this am    O:  Vitals:    09/04/20 1000   BP: 109/65   Pulse: 81   Resp: (!) 30   Temp: 36.8 °C (98.3 °F)   SpO2: 95%     Exam:  General-NAD, alert and following commands  RLE-thigh dressings c/d/i, +EHL/FHL/TA/GS motor, SILT diffusely in foot    A: 50yoM with right subtrochanteric femur fracture s/p IMN 9/3.  Bilateral rib fxs admitted to SICU.    Recs:  --WBAT RLE  --ancef x 2 doses postop  --okay to start lovenox or equivalent today if otherwise clinically appropriate, continue SCDs  --PT/OT for mobilization   --fu 2 weeks postop

## 2020-09-04 NOTE — ED NOTES
Pharmacy Medication Reconciliation      Medication reconciliation updated and complete per pt at bedside & pt home pharmacy  Allergies have been verified and updated  No oral ABX within the last 14 days  Pt home pharmacy:[facility]
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
## 2020-09-04 NOTE — ASSESSMENT & PLAN NOTE
Systemic anticoagulation contraindicated secondary to elevated bleeding risk.  RAP score 8.  9/4 Chemical DVT prophylaxis (Lovenox) initiated.  Ambulate TID.
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
## 2020-09-04 NOTE — OR SURGEON
Immediate Post OP Note    PreOp Diagnosis: Right proximal femur fracture     PostOp Diagnosis: same    Procedure(s):  INSERTION, INTRAMEDULLARY LLOYD, FEMUR, PROXIMAL - Wound Class: Clean    Surgeon(s):  Shaka George M.D.    Anesthesiologist/Type of Anesthesia:  Anesthesiologist: Benito Gallo M.D./General    Surgical Staff:  Circulator: Jorge L Hickey R.N.  Scrub Person: [facility]  Radiology Technologist: Jeb Browne    Specimens removed if any:  * No specimens in log *    Estimated Blood Loss: 200cc    Findings: see dictation    Complications: none known    PLAN:  --readmit trauma surgery postop  --WBAT RLE  --ancef x 2 doses postop  --okay to start lovenox or equivalent tomorrow, continue SCDs  --PT/OT for mobilization ASAP        9/3/2020 10:46 PM Shaka George M.D.
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
## 2020-09-04 NOTE — CARE PLAN
Problem: Pain Management  Goal: Pain level will decrease to patient's comfort goal  Outcome: PROGRESSING AS EXPECTED  Note: Pain assessed regularly, repositioned for pain alleviation, medicated per MAR     Problem: Skin Integrity  Goal: Risk for impaired skin integrity will decrease  Outcome: PROGRESSING AS EXPECTED  Note: 2 RN skin assessment, q2 hour turns, pillows and draw sheet used for repositioning,
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
## 2020-09-04 NOTE — ANESTHESIA TIME REPORT
Anesthesia Start and Stop Event Times     Date Time Event    9/3/2020 2107 Ready for Procedure     2111 Anesthesia Start     2248 Anesthesia Stop        Responsible Staff  09/03/20    Name Role Begin End    Benito Gallo M.D. Anesth 2111 2248        Preop Diagnosis (Free Text):  Pre-op Diagnosis     RIGHT PROXIMAL FEMUR FRACTURE        Preop Diagnosis (Codes):    Post op Diagnosis  Fracture, proximal femur, right, closed, initial encounter (HCC)      Premium Reason  A. 3PM - 7AM    Comments:

## 2020-09-04 NOTE — ASSESSMENT & PLAN NOTE
Acute fractures of the right posterior seventh, eighth, ninth and 10th ribs. Acute fractures of the left anterior fourth, fifth, sixth, seventh and eighth ribs. Multiple old and healing rib fractures.  Aggressive pulmonary hygiene and multimodal pain management and serial chest radiography.
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
## 2020-09-04 NOTE — ANESTHESIA PROCEDURE NOTES
Airway    Date/Time: 9/3/2020 9:19 PM  Performed by: Benito Gallo M.D.  Authorized by: Benito Gallo M.D.     Location:  OR  Urgency:  Elective  Difficult Airway: No    Indications for Airway Management:  Anesthesia      Spontaneous Ventilation: absent    Sedation Level:  Deep  Preoxygenated: Yes    Patient Position:  Sniffing  Mask Difficulty Assessment:  1 - vent by mask  Final Airway Type:  Endotracheal airway  Final Endotracheal Airway:  ETT  Cuffed: Yes    Technique Used for Successful ETT Placement:  Direct laryngoscopy    Insertion Site:  Oral  Blade Type:  Adrienne  Laryngoscope Blade/Videolaryngoscope Blade Size:  4  ETT Size (mm):  8.0  Measured from:  Lips  ETT to Lips (cm):  22  Placement Verified by: auscultation and capnometry    Cormack-Lehane Classification:  Grade IIa - partial view of glottis  Number of Attempts at Approach:  1  Number of Other Approaches Attempted:  0
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
## 2020-09-04 NOTE — ASSESSMENT & PLAN NOTE
Chronic condition treated with Lantus.  Insulin sliding scale coverage during acute hospitalization.   9/5 Lantus resumed.  9/7 Lantus switched to 20u BID, Low ISS

## 2020-09-05 ENCOUNTER — APPOINTMENT (OUTPATIENT)
Dept: RADIOLOGY | Facility: MEDICAL CENTER | Age: 50
DRG: 481 | End: 2020-09-05
Attending: NURSE PRACTITIONER
Payer: MEDICAID

## 2020-09-05 PROBLEM — Z78.9 NO CONTRAINDICATION TO DEEP VEIN THROMBOSIS (DVT) PROPHYLAXIS: Status: ACTIVE | Noted: 2020-09-03

## 2020-09-05 PROBLEM — R33.9 URINARY RETENTION: Status: ACTIVE | Noted: 2020-09-05

## 2020-09-05 LAB
ALBUMIN SERPL BCP-MCNC: 2.7 G/DL (ref 3.2–4.9)
ALBUMIN/GLOB SERPL: 1 G/DL
ALP SERPL-CCNC: 93 U/L (ref 30–99)
ALT SERPL-CCNC: 69 U/L (ref 2–50)
ANION GAP SERPL CALC-SCNC: 10 MMOL/L (ref 7–16)
AST SERPL-CCNC: 27 U/L (ref 12–45)
BASOPHILS # BLD AUTO: 0.4 % (ref 0–1.8)
BASOPHILS # BLD: 0.03 K/UL (ref 0–0.12)
BILIRUB SERPL-MCNC: 0.6 MG/DL (ref 0.1–1.5)
BUN SERPL-MCNC: 15 MG/DL (ref 8–22)
CALCIUM SERPL-MCNC: 8.3 MG/DL (ref 8.5–10.5)
CHLORIDE SERPL-SCNC: 95 MMOL/L (ref 96–112)
CO2 SERPL-SCNC: 25 MMOL/L (ref 20–33)
CREAT SERPL-MCNC: 0.7 MG/DL (ref 0.5–1.4)
EOSINOPHIL # BLD AUTO: 0.03 K/UL (ref 0–0.51)
EOSINOPHIL NFR BLD: 0.4 % (ref 0–6.9)
ERYTHROCYTE [DISTWIDTH] IN BLOOD BY AUTOMATED COUNT: 39.3 FL (ref 35.9–50)
GLOBULIN SER CALC-MCNC: 2.7 G/DL (ref 1.9–3.5)
GLUCOSE BLD-MCNC: 246 MG/DL (ref 65–99)
GLUCOSE BLD-MCNC: 258 MG/DL (ref 65–99)
GLUCOSE BLD-MCNC: 260 MG/DL (ref 65–99)
GLUCOSE BLD-MCNC: 275 MG/DL (ref 65–99)
GLUCOSE BLD-MCNC: 307 MG/DL (ref 65–99)
GLUCOSE SERPL-MCNC: 310 MG/DL (ref 65–99)
HCT VFR BLD AUTO: 30.2 % (ref 42–52)
HGB BLD-MCNC: 10.5 G/DL (ref 14–18)
IMM GRANULOCYTES # BLD AUTO: 0.04 K/UL (ref 0–0.11)
IMM GRANULOCYTES NFR BLD AUTO: 0.5 % (ref 0–0.9)
LYMPHOCYTES # BLD AUTO: 0.66 K/UL (ref 1–4.8)
LYMPHOCYTES NFR BLD: 8.7 % (ref 22–41)
MCH RBC QN AUTO: 31.8 PG (ref 27–33)
MCHC RBC AUTO-ENTMCNC: 34.8 G/DL (ref 33.7–35.3)
MCV RBC AUTO: 91.5 FL (ref 81.4–97.8)
MONOCYTES # BLD AUTO: 0.6 K/UL (ref 0–0.85)
MONOCYTES NFR BLD AUTO: 7.9 % (ref 0–13.4)
NEUTROPHILS # BLD AUTO: 6.22 K/UL (ref 1.82–7.42)
NEUTROPHILS NFR BLD: 82.1 % (ref 44–72)
NRBC # BLD AUTO: 0 K/UL
NRBC BLD-RTO: 0 /100 WBC
PLATELET # BLD AUTO: 179 K/UL (ref 164–446)
PMV BLD AUTO: 8.9 FL (ref 9–12.9)
POTASSIUM SERPL-SCNC: 4 MMOL/L (ref 3.6–5.5)
PROT SERPL-MCNC: 5.4 G/DL (ref 6–8.2)
RBC # BLD AUTO: 3.3 M/UL (ref 4.7–6.1)
SODIUM SERPL-SCNC: 130 MMOL/L (ref 135–145)
WBC # BLD AUTO: 7.6 K/UL (ref 4.8–10.8)

## 2020-09-05 PROCEDURE — 700105 HCHG RX REV CODE 258: Performed by: NURSE PRACTITIONER

## 2020-09-05 PROCEDURE — A9270 NON-COVERED ITEM OR SERVICE: HCPCS | Performed by: NURSE PRACTITIONER

## 2020-09-05 PROCEDURE — 700102 HCHG RX REV CODE 250 W/ 637 OVERRIDE(OP): Performed by: SURGERY

## 2020-09-05 PROCEDURE — 80053 COMPREHEN METABOLIC PANEL: CPT

## 2020-09-05 PROCEDURE — A9270 NON-COVERED ITEM OR SERVICE: HCPCS | Performed by: SURGERY

## 2020-09-05 PROCEDURE — 700102 HCHG RX REV CODE 250 W/ 637 OVERRIDE(OP): Performed by: NURSE PRACTITIONER

## 2020-09-05 PROCEDURE — 71045 X-RAY EXAM CHEST 1 VIEW: CPT

## 2020-09-05 PROCEDURE — 700101 HCHG RX REV CODE 250: Performed by: NURSE PRACTITIONER

## 2020-09-05 PROCEDURE — 700111 HCHG RX REV CODE 636 W/ 250 OVERRIDE (IP): Performed by: SURGERY

## 2020-09-05 PROCEDURE — 36415 COLL VENOUS BLD VENIPUNCTURE: CPT

## 2020-09-05 PROCEDURE — 85025 COMPLETE CBC W/AUTO DIFF WBC: CPT

## 2020-09-05 PROCEDURE — 770006 HCHG ROOM/CARE - MED/SURG/GYN SEMI*

## 2020-09-05 PROCEDURE — 82962 GLUCOSE BLOOD TEST: CPT | Mod: 91

## 2020-09-05 RX ORDER — DEXTROSE MONOHYDRATE 25 G/50ML
50 INJECTION, SOLUTION INTRAVENOUS
Status: DISCONTINUED | OUTPATIENT
Start: 2020-09-05 | End: 2020-09-06

## 2020-09-05 RX ORDER — CELECOXIB 200 MG/1
200 CAPSULE ORAL 2 TIMES DAILY
Status: DISCONTINUED | OUTPATIENT
Start: 2020-09-05 | End: 2020-09-09 | Stop reason: HOSPADM

## 2020-09-05 RX ORDER — ACETAMINOPHEN 500 MG
1000 TABLET ORAL EVERY 6 HOURS
Status: DISPENSED | OUTPATIENT
Start: 2020-09-05 | End: 2020-09-06

## 2020-09-05 RX ORDER — LIDOCAINE 50 MG/G
1 PATCH TOPICAL EVERY 24 HOURS
Status: DISCONTINUED | OUTPATIENT
Start: 2020-09-05 | End: 2020-09-07

## 2020-09-05 RX ORDER — GABAPENTIN 100 MG/1
100 CAPSULE ORAL 3 TIMES DAILY
Status: DISCONTINUED | OUTPATIENT
Start: 2020-09-05 | End: 2020-09-06

## 2020-09-05 RX ORDER — INSULIN GLARGINE 100 [IU]/ML
50 INJECTION, SOLUTION SUBCUTANEOUS EVERY EVENING
Status: DISCONTINUED | OUTPATIENT
Start: 2020-09-05 | End: 2020-09-07

## 2020-09-05 RX ADMIN — OXYCODONE HYDROCHLORIDE 5 MG: 5 TABLET ORAL at 22:15

## 2020-09-05 RX ADMIN — CELECOXIB 200 MG: 200 CAPSULE ORAL at 12:46

## 2020-09-05 RX ADMIN — ENOXAPARIN SODIUM 30 MG: 30 INJECTION SUBCUTANEOUS at 05:31

## 2020-09-05 RX ADMIN — POLYETHYLENE GLYCOL 3350 1 PACKET: 17 POWDER, FOR SOLUTION ORAL at 05:31

## 2020-09-05 RX ADMIN — GABAPENTIN 100 MG: 100 CAPSULE ORAL at 12:47

## 2020-09-05 RX ADMIN — ACETAMINOPHEN 1000 MG: 500 TABLET ORAL at 12:46

## 2020-09-05 RX ADMIN — LIDOCAINE 1 PATCH: 50 PATCH TOPICAL at 12:47

## 2020-09-05 RX ADMIN — OXYCODONE HYDROCHLORIDE 10 MG: 10 TABLET ORAL at 05:31

## 2020-09-05 RX ADMIN — INSULIN GLARGINE 50 UNITS: 100 INJECTION, SOLUTION SUBCUTANEOUS at 18:14

## 2020-09-05 RX ADMIN — SODIUM CHLORIDE, POTASSIUM CHLORIDE, SODIUM LACTATE AND CALCIUM CHLORIDE: 600; 310; 30; 20 INJECTION, SOLUTION INTRAVENOUS at 07:54

## 2020-09-05 RX ADMIN — ACETAMINOPHEN 1000 MG: 500 TABLET ORAL at 18:14

## 2020-09-05 RX ADMIN — DOCUSATE SODIUM 100 MG: 100 CAPSULE, LIQUID FILLED ORAL at 05:31

## 2020-09-05 RX ADMIN — CELECOXIB 200 MG: 200 CAPSULE ORAL at 18:15

## 2020-09-05 RX ADMIN — MAGNESIUM HYDROXIDE 30 ML: 400 SUSPENSION ORAL at 05:31

## 2020-09-05 RX ADMIN — DOCUSATE SODIUM 100 MG: 100 CAPSULE, LIQUID FILLED ORAL at 18:15

## 2020-09-05 RX ADMIN — INSULIN HUMAN 7 UNITS: 100 INJECTION, SOLUTION PARENTERAL at 06:40

## 2020-09-05 RX ADMIN — GABAPENTIN 100 MG: 100 CAPSULE ORAL at 18:15

## 2020-09-05 RX ADMIN — ENOXAPARIN SODIUM 30 MG: 30 INJECTION SUBCUTANEOUS at 18:14

## 2020-09-05 RX ADMIN — TAMSULOSIN HYDROCHLORIDE 0.4 MG: 0.4 CAPSULE ORAL at 10:30

## 2020-09-05 RX ADMIN — SODIUM CHLORIDE, POTASSIUM CHLORIDE, SODIUM LACTATE AND CALCIUM CHLORIDE: 600; 310; 30; 20 INJECTION, SOLUTION INTRAVENOUS at 22:03

## 2020-09-05 ASSESSMENT — PAIN DESCRIPTION - PAIN TYPE
TYPE: ACUTE PAIN;SURGICAL PAIN
TYPE: SURGICAL PAIN
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
## 2020-09-05 NOTE — CARE PLAN
Problem: Communication  Goal: The ability to communicate needs accurately and effectively will improve  Outcome: PROGRESSING AS EXPECTED  Pt will call with questions or concerns     Problem: Safety  Goal: Will remain free from injury  Outcome: PROGRESSING AS EXPECTED  Call light and personal items within reach   Goal: Will remain free from falls  Outcome: PROGRESSING AS EXPECTED  Pt will call with any needs, bed in low position

## 2020-09-05 NOTE — PROGRESS NOTES
Bedside report received.  Assessment complete.  A&O x 4. Patient calls appropriately.  Patient up with 1 assist Bed alarm n/a.   Patient has 8/10 pain. Medication provided per MAR  Denies N&V. Tolerating Diabetic diet.  Surgical    - bilateral rib fractures   - Right femur fracture w/ island dressing in place   +. anderson void, + flatus, PTA BM.  Patient denies SOB.  SCD's yes.  2 RN skin check completed   Review plan with of care with patient. Call light and personal belongings with in reach. Hourly rounding in place. All needs met at this time.
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
## 2020-09-05 NOTE — ASSESSMENT & PLAN NOTE
9/4 Anderson replaced for retention. Flomax initiated.   9/6 Trial anderson removal.   9/7 Required straight cath overnight. Anderson replaced  9/9 Trial anderson removal.

## 2020-09-05 NOTE — CARE PLAN
Problem: Communication  Goal: The ability to communicate needs accurately and effectively will improve  Note: Pt updated on POC     Problem: Pain Management  Goal: Pain level will decrease to patient's comfort goal  Note: Assess pts pain level and treat as needed.

## 2020-09-06 ENCOUNTER — APPOINTMENT (OUTPATIENT)
Dept: RADIOLOGY | Facility: MEDICAL CENTER | Age: 50
DRG: 481 | End: 2020-09-06
Attending: NURSE PRACTITIONER
Payer: MEDICAID

## 2020-09-06 LAB
ANION GAP SERPL CALC-SCNC: 10 MMOL/L (ref 7–16)
BASOPHILS # BLD AUTO: 0.4 % (ref 0–1.8)
BASOPHILS # BLD: 0.03 K/UL (ref 0–0.12)
BUN SERPL-MCNC: 15 MG/DL (ref 8–22)
CALCIUM SERPL-MCNC: 8.4 MG/DL (ref 8.5–10.5)
CHLORIDE SERPL-SCNC: 101 MMOL/L (ref 96–112)
CO2 SERPL-SCNC: 28 MMOL/L (ref 20–33)
CREAT SERPL-MCNC: 0.73 MG/DL (ref 0.5–1.4)
EOSINOPHIL # BLD AUTO: 0.07 K/UL (ref 0–0.51)
EOSINOPHIL NFR BLD: 1 % (ref 0–6.9)
ERYTHROCYTE [DISTWIDTH] IN BLOOD BY AUTOMATED COUNT: 39.3 FL (ref 35.9–50)
GLUCOSE BLD-MCNC: 326 MG/DL (ref 65–99)
GLUCOSE BLD-MCNC: 327 MG/DL (ref 65–99)
GLUCOSE BLD-MCNC: 97 MG/DL (ref 65–99)
GLUCOSE SERPL-MCNC: 83 MG/DL (ref 65–99)
HCT VFR BLD AUTO: 28.2 % (ref 42–52)
HGB BLD-MCNC: 9.7 G/DL (ref 14–18)
IMM GRANULOCYTES # BLD AUTO: 0.04 K/UL (ref 0–0.11)
IMM GRANULOCYTES NFR BLD AUTO: 0.6 % (ref 0–0.9)
LYMPHOCYTES # BLD AUTO: 1.14 K/UL (ref 1–4.8)
LYMPHOCYTES NFR BLD: 16 % (ref 22–41)
MCH RBC QN AUTO: 31.7 PG (ref 27–33)
MCHC RBC AUTO-ENTMCNC: 34.4 G/DL (ref 33.7–35.3)
MCV RBC AUTO: 92.2 FL (ref 81.4–97.8)
MONOCYTES # BLD AUTO: 0.64 K/UL (ref 0–0.85)
MONOCYTES NFR BLD AUTO: 9 % (ref 0–13.4)
NEUTROPHILS # BLD AUTO: 5.19 K/UL (ref 1.82–7.42)
NEUTROPHILS NFR BLD: 73 % (ref 44–72)
NRBC # BLD AUTO: 0 K/UL
NRBC BLD-RTO: 0 /100 WBC
PLATELET # BLD AUTO: 198 K/UL (ref 164–446)
PMV BLD AUTO: 8.9 FL (ref 9–12.9)
POTASSIUM SERPL-SCNC: 3.7 MMOL/L (ref 3.6–5.5)
RBC # BLD AUTO: 3.06 M/UL (ref 4.7–6.1)
SODIUM SERPL-SCNC: 139 MMOL/L (ref 135–145)
WBC # BLD AUTO: 7.1 K/UL (ref 4.8–10.8)

## 2020-09-06 PROCEDURE — 36415 COLL VENOUS BLD VENIPUNCTURE: CPT

## 2020-09-06 PROCEDURE — 700105 HCHG RX REV CODE 258: Performed by: NURSE PRACTITIONER

## 2020-09-06 PROCEDURE — 700102 HCHG RX REV CODE 250 W/ 637 OVERRIDE(OP): Performed by: NURSE PRACTITIONER

## 2020-09-06 PROCEDURE — 700102 HCHG RX REV CODE 250 W/ 637 OVERRIDE(OP): Performed by: PHYSICIAN ASSISTANT

## 2020-09-06 PROCEDURE — 700101 HCHG RX REV CODE 250: Performed by: NURSE PRACTITIONER

## 2020-09-06 PROCEDURE — 82962 GLUCOSE BLOOD TEST: CPT | Mod: 91

## 2020-09-06 PROCEDURE — 700111 HCHG RX REV CODE 636 W/ 250 OVERRIDE (IP): Performed by: SURGERY

## 2020-09-06 PROCEDURE — 700102 HCHG RX REV CODE 250 W/ 637 OVERRIDE(OP): Performed by: SURGERY

## 2020-09-06 PROCEDURE — 85025 COMPLETE CBC W/AUTO DIFF WBC: CPT

## 2020-09-06 PROCEDURE — 71045 X-RAY EXAM CHEST 1 VIEW: CPT

## 2020-09-06 PROCEDURE — 770001 HCHG ROOM/CARE - MED/SURG/GYN PRIV*

## 2020-09-06 PROCEDURE — A9270 NON-COVERED ITEM OR SERVICE: HCPCS | Performed by: PHYSICIAN ASSISTANT

## 2020-09-06 PROCEDURE — 80048 BASIC METABOLIC PNL TOTAL CA: CPT

## 2020-09-06 PROCEDURE — A9270 NON-COVERED ITEM OR SERVICE: HCPCS | Performed by: NURSE PRACTITIONER

## 2020-09-06 PROCEDURE — 700111 HCHG RX REV CODE 636 W/ 250 OVERRIDE (IP): Performed by: PHYSICIAN ASSISTANT

## 2020-09-06 PROCEDURE — 700111 HCHG RX REV CODE 636 W/ 250 OVERRIDE (IP): Performed by: NURSE PRACTITIONER

## 2020-09-06 PROCEDURE — A9270 NON-COVERED ITEM OR SERVICE: HCPCS | Performed by: SURGERY

## 2020-09-06 RX ORDER — DEXTROSE MONOHYDRATE 25 G/50ML
50 INJECTION, SOLUTION INTRAVENOUS
Status: DISCONTINUED | OUTPATIENT
Start: 2020-09-06 | End: 2020-09-09 | Stop reason: HOSPADM

## 2020-09-06 RX ORDER — MORPHINE SULFATE 4 MG/ML
1-4 INJECTION, SOLUTION INTRAMUSCULAR; INTRAVENOUS
Status: DISCONTINUED | OUTPATIENT
Start: 2020-09-06 | End: 2020-09-09 | Stop reason: HOSPADM

## 2020-09-06 RX ORDER — PREGABALIN 75 MG/1
75 CAPSULE ORAL 3 TIMES DAILY
Status: DISCONTINUED | OUTPATIENT
Start: 2020-09-06 | End: 2020-09-09 | Stop reason: HOSPADM

## 2020-09-06 RX ORDER — DIPHENHYDRAMINE HCL 25 MG
25 TABLET ORAL EVERY 8 HOURS PRN
Status: DISCONTINUED | OUTPATIENT
Start: 2020-09-06 | End: 2020-09-08

## 2020-09-06 RX ORDER — KETOROLAC TROMETHAMINE 30 MG/ML
15 INJECTION, SOLUTION INTRAMUSCULAR; INTRAVENOUS EVERY 6 HOURS PRN
Status: COMPLETED | OUTPATIENT
Start: 2020-09-06 | End: 2020-09-07

## 2020-09-06 RX ADMIN — ACETAMINOPHEN 1000 MG: 500 TABLET ORAL at 00:51

## 2020-09-06 RX ADMIN — KETOROLAC TROMETHAMINE 15 MG: 30 INJECTION, SOLUTION INTRAMUSCULAR at 11:34

## 2020-09-06 RX ADMIN — DOCUSATE SODIUM 100 MG: 100 CAPSULE, LIQUID FILLED ORAL at 17:34

## 2020-09-06 RX ADMIN — DOCUSATE SODIUM 50 MG AND SENNOSIDES 8.6 MG 1 TABLET: 8.6; 5 TABLET, FILM COATED ORAL at 20:19

## 2020-09-06 RX ADMIN — MORPHINE SULFATE 4 MG: 4 INJECTION INTRAVENOUS at 13:30

## 2020-09-06 RX ADMIN — PREGABALIN 75 MG: 75 CAPSULE ORAL at 11:34

## 2020-09-06 RX ADMIN — CELECOXIB 200 MG: 200 CAPSULE ORAL at 06:06

## 2020-09-06 RX ADMIN — OXYCODONE HYDROCHLORIDE 10 MG: 10 TABLET ORAL at 06:05

## 2020-09-06 RX ADMIN — OXYCODONE HYDROCHLORIDE 10 MG: 10 TABLET ORAL at 21:46

## 2020-09-06 RX ADMIN — PREGABALIN 75 MG: 75 CAPSULE ORAL at 17:34

## 2020-09-06 RX ADMIN — LIDOCAINE 1 PATCH: 50 PATCH TOPICAL at 11:34

## 2020-09-06 RX ADMIN — OXYCODONE HYDROCHLORIDE 10 MG: 10 TABLET ORAL at 17:34

## 2020-09-06 RX ADMIN — OXYCODONE HYDROCHLORIDE 10 MG: 10 TABLET ORAL at 11:34

## 2020-09-06 RX ADMIN — ENOXAPARIN SODIUM 30 MG: 30 INJECTION SUBCUTANEOUS at 17:34

## 2020-09-06 RX ADMIN — INSULIN GLARGINE 50 UNITS: 100 INJECTION, SOLUTION SUBCUTANEOUS at 17:41

## 2020-09-06 RX ADMIN — ENOXAPARIN SODIUM 30 MG: 30 INJECTION SUBCUTANEOUS at 06:06

## 2020-09-06 RX ADMIN — POLYETHYLENE GLYCOL 3350 1 PACKET: 17 POWDER, FOR SOLUTION ORAL at 17:33

## 2020-09-06 RX ADMIN — GABAPENTIN 100 MG: 100 CAPSULE ORAL at 06:06

## 2020-09-06 RX ADMIN — CELECOXIB 200 MG: 200 CAPSULE ORAL at 17:34

## 2020-09-06 RX ADMIN — SODIUM CHLORIDE, POTASSIUM CHLORIDE, SODIUM LACTATE AND CALCIUM CHLORIDE: 600; 310; 30; 20 INJECTION, SOLUTION INTRAVENOUS at 11:10

## 2020-09-06 RX ADMIN — DIPHENHYDRAMINE HYDROCHLORIDE 25 MG: 25 TABLET ORAL at 20:15

## 2020-09-06 RX ADMIN — MORPHINE SULFATE 4 MG: 4 INJECTION INTRAVENOUS at 22:45

## 2020-09-06 RX ADMIN — KETOROLAC TROMETHAMINE 15 MG: 30 INJECTION, SOLUTION INTRAMUSCULAR at 20:14

## 2020-09-06 RX ADMIN — ACETAMINOPHEN 1000 MG: 500 TABLET ORAL at 06:06

## 2020-09-06 RX ADMIN — TAMSULOSIN HYDROCHLORIDE 0.4 MG: 0.4 CAPSULE ORAL at 11:34

## 2020-09-06 ASSESSMENT — ENCOUNTER SYMPTOMS
COUGH: 0
SENSORY CHANGE: 0
ABDOMINAL PAIN: 0
SHORTNESS OF BREATH: 0
VOMITING: 0
CONSTIPATION: 0
NAUSEA: 0
DOUBLE VISION: 0
FEVER: 0
HEADACHES: 0
WEAKNESS: 0

## 2020-09-06 ASSESSMENT — PAIN DESCRIPTION - PAIN TYPE
TYPE: SURGICAL PAIN

## 2020-09-06 NOTE — CARE PLAN
Problem: Communication  Goal: The ability to communicate needs accurately and effectively will improve  Outcome: PROGRESSING AS EXPECTED  Pt will call with questions or concerns     Problem: Safety  Goal: Will remain free from injury  Outcome: PROGRESSING AS EXPECTED  Bed in low position, call light and personal items within reach     Problem: Pain Management  Goal: Pain level will decrease to patient's comfort goal  Outcome: PROGRESSING AS EXPECTED  Side effects of pain medications discussed, pt verbalized understanding      Problem: Respiratory:  Goal: Respiratory status will improve  Outcome: PROGRESSING AS EXPECTED  IS, deep breathing and cough discussed, pt verbalized understanding.

## 2020-09-06 NOTE — PROGRESS NOTES
End of shift note  [id number]    Pt very drowsy and slept through early afternoon. States he is not getting sleep overnight due to discomfort/pain. Pt started on scheduled non-narcotic pain regimen. No PRNs given this shift. Pt stood up EOB with FWW as SBA and did very well. Waffle overlay placed on bed, pt states he is much more comfortable. Explained benefits of ROM/activty/ambulation r/t healing and pain mgmt. Ice applied to R hip. Surgical dressings x2 to RLE C/D/I. Girlfriend at bedside this afternoon. Bed locked and in low position, side rails up x2, call light within reach.
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
## 2020-09-06 NOTE — DISCHARGE PLANNING
[facility] Transitional Care Coordination     Referral from:  Jeffrey HENSLEY   Facesheet indicates: Medicaid FFS   Potential Rehab Diagnosis: Multiple fractures hip/ribs    Chart review indicates patient has limited on going medical management and  therapy needs to possibly meet inpatient rehab facility criteria with the goal of returning to community.    D/C support: Per chart home alone No contact on face sheet.      Physiatry consultation pending  per protocol.        Base on current documentation requiring Max assistance for self care and mobility. Limited assistance to return to the community. Anticipate skilled nursing for post acute services. Will continue to follow for tolerance to and participation with therapy intervention. Pain may be a barrier to advancing level of independence with therapy. Please have therapy up date as medically appropriate.       Thank you for the referral.
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
## 2020-09-06 NOTE — CARE PLAN
Problem: Infection  Goal: Will remain free from infection  Outcome: PROGRESSING AS EXPECTED     Problem: Venous Thromboembolism (VTW)/Deep Vein Thrombosis (DVT) Prevention:  Goal: Patient will participate in Venous Thrombosis (VTE)/Deep Vein Thrombosis (DVT)Prevention Measures  Outcome: PROGRESSING AS EXPECTED     Problem: Knowledge Deficit  Goal: Knowledge of disease process/condition, treatment plan, diagnostic tests, and medications will improve  Outcome: PROGRESSING AS EXPECTED     Problem: Pain Management  Goal: Pain level will decrease to patient's comfort goal  Outcome: PROGRESSING AS EXPECTED     Problem: Fluid Volume:  Goal: Will maintain balanced intake and output  Outcome: PROGRESSING AS EXPECTED     Problem: Psychosocial Needs:  Goal: Level of anxiety will decrease  Outcome: PROGRESSING AS EXPECTED

## 2020-09-07 ENCOUNTER — APPOINTMENT (OUTPATIENT)
Dept: RADIOLOGY | Facility: MEDICAL CENTER | Age: 50
DRG: 481 | End: 2020-09-07
Attending: NURSE PRACTITIONER
Payer: MEDICAID

## 2020-09-07 LAB
ANION GAP SERPL CALC-SCNC: 11 MMOL/L (ref 7–16)
BASOPHILS # BLD AUTO: 0.4 % (ref 0–1.8)
BASOPHILS # BLD: 0.03 K/UL (ref 0–0.12)
BUN SERPL-MCNC: 18 MG/DL (ref 8–22)
CALCIUM SERPL-MCNC: 8.5 MG/DL (ref 8.5–10.5)
CHLORIDE SERPL-SCNC: 99 MMOL/L (ref 96–112)
CO2 SERPL-SCNC: 28 MMOL/L (ref 20–33)
CREAT SERPL-MCNC: 0.88 MG/DL (ref 0.5–1.4)
EOSINOPHIL # BLD AUTO: 0.12 K/UL (ref 0–0.51)
EOSINOPHIL NFR BLD: 1.7 % (ref 0–6.9)
ERYTHROCYTE [DISTWIDTH] IN BLOOD BY AUTOMATED COUNT: 39.3 FL (ref 35.9–50)
GLUCOSE BLD-MCNC: 108 MG/DL (ref 65–99)
GLUCOSE BLD-MCNC: 145 MG/DL (ref 65–99)
GLUCOSE BLD-MCNC: 152 MG/DL (ref 65–99)
GLUCOSE BLD-MCNC: 188 MG/DL (ref 65–99)
GLUCOSE BLD-MCNC: 208 MG/DL (ref 65–99)
GLUCOSE BLD-MCNC: 69 MG/DL (ref 65–99)
GLUCOSE BLD-MCNC: 99 MG/DL (ref 65–99)
GLUCOSE SERPL-MCNC: 54 MG/DL (ref 65–99)
HCT VFR BLD AUTO: 31.4 % (ref 42–52)
HGB BLD-MCNC: 10.7 G/DL (ref 14–18)
IMM GRANULOCYTES # BLD AUTO: 0.03 K/UL (ref 0–0.11)
IMM GRANULOCYTES NFR BLD AUTO: 0.4 % (ref 0–0.9)
LYMPHOCYTES # BLD AUTO: 1.75 K/UL (ref 1–4.8)
LYMPHOCYTES NFR BLD: 24.5 % (ref 22–41)
MAGNESIUM SERPL-MCNC: 2.1 MG/DL (ref 1.5–2.5)
MCH RBC QN AUTO: 31.5 PG (ref 27–33)
MCHC RBC AUTO-ENTMCNC: 34.1 G/DL (ref 33.7–35.3)
MCV RBC AUTO: 92.4 FL (ref 81.4–97.8)
MONOCYTES # BLD AUTO: 0.55 K/UL (ref 0–0.85)
MONOCYTES NFR BLD AUTO: 7.7 % (ref 0–13.4)
NEUTROPHILS # BLD AUTO: 4.67 K/UL (ref 1.82–7.42)
NEUTROPHILS NFR BLD: 65.3 % (ref 44–72)
NRBC # BLD AUTO: 0 K/UL
NRBC BLD-RTO: 0 /100 WBC
PHOSPHATE SERPL-MCNC: 3.5 MG/DL (ref 2.5–4.5)
PLATELET # BLD AUTO: 210 K/UL (ref 164–446)
PMV BLD AUTO: 9 FL (ref 9–12.9)
POTASSIUM SERPL-SCNC: 3 MMOL/L (ref 3.6–5.5)
RBC # BLD AUTO: 3.4 M/UL (ref 4.7–6.1)
SODIUM SERPL-SCNC: 138 MMOL/L (ref 135–145)
WBC # BLD AUTO: 7.2 K/UL (ref 4.8–10.8)

## 2020-09-07 PROCEDURE — 700102 HCHG RX REV CODE 250 W/ 637 OVERRIDE(OP): Performed by: NURSE PRACTITIONER

## 2020-09-07 PROCEDURE — 700111 HCHG RX REV CODE 636 W/ 250 OVERRIDE (IP): Performed by: SURGERY

## 2020-09-07 PROCEDURE — 700101 HCHG RX REV CODE 250: Performed by: NURSE PRACTITIONER

## 2020-09-07 PROCEDURE — 83735 ASSAY OF MAGNESIUM: CPT

## 2020-09-07 PROCEDURE — 700111 HCHG RX REV CODE 636 W/ 250 OVERRIDE (IP): Performed by: NURSE PRACTITIONER

## 2020-09-07 PROCEDURE — A9270 NON-COVERED ITEM OR SERVICE: HCPCS | Performed by: PHYSICIAN ASSISTANT

## 2020-09-07 PROCEDURE — 700102 HCHG RX REV CODE 250 W/ 637 OVERRIDE(OP): Performed by: SURGERY

## 2020-09-07 PROCEDURE — 71045 X-RAY EXAM CHEST 1 VIEW: CPT

## 2020-09-07 PROCEDURE — 700105 HCHG RX REV CODE 258: Performed by: NURSE PRACTITIONER

## 2020-09-07 PROCEDURE — A9270 NON-COVERED ITEM OR SERVICE: HCPCS | Performed by: SURGERY

## 2020-09-07 PROCEDURE — 36415 COLL VENOUS BLD VENIPUNCTURE: CPT

## 2020-09-07 PROCEDURE — 770001 HCHG ROOM/CARE - MED/SURG/GYN PRIV*

## 2020-09-07 PROCEDURE — 85025 COMPLETE CBC W/AUTO DIFF WBC: CPT

## 2020-09-07 PROCEDURE — 82962 GLUCOSE BLOOD TEST: CPT

## 2020-09-07 PROCEDURE — A9270 NON-COVERED ITEM OR SERVICE: HCPCS | Performed by: NURSE PRACTITIONER

## 2020-09-07 PROCEDURE — 84100 ASSAY OF PHOSPHORUS: CPT

## 2020-09-07 PROCEDURE — 80048 BASIC METABOLIC PNL TOTAL CA: CPT

## 2020-09-07 PROCEDURE — 700102 HCHG RX REV CODE 250 W/ 637 OVERRIDE(OP): Performed by: PHYSICIAN ASSISTANT

## 2020-09-07 PROCEDURE — 97530 THERAPEUTIC ACTIVITIES: CPT

## 2020-09-07 PROCEDURE — 700111 HCHG RX REV CODE 636 W/ 250 OVERRIDE (IP): Performed by: PHYSICIAN ASSISTANT

## 2020-09-07 RX ORDER — MORPHINE SULFATE 15 MG/1
15 TABLET, FILM COATED, EXTENDED RELEASE ORAL EVERY 12 HOURS
Status: DISCONTINUED | OUTPATIENT
Start: 2020-09-07 | End: 2020-09-09 | Stop reason: HOSPADM

## 2020-09-07 RX ORDER — POTASSIUM CHLORIDE 20 MEQ/1
20 TABLET, EXTENDED RELEASE ORAL ONCE
Status: COMPLETED | OUTPATIENT
Start: 2020-09-07 | End: 2020-09-07

## 2020-09-07 RX ORDER — LIDOCAINE 50 MG/G
3 PATCH TOPICAL EVERY 24 HOURS
Status: DISCONTINUED | OUTPATIENT
Start: 2020-09-07 | End: 2020-09-09 | Stop reason: HOSPADM

## 2020-09-07 RX ORDER — INSULIN GLARGINE 100 [IU]/ML
20 INJECTION, SOLUTION SUBCUTANEOUS 2 TIMES DAILY
Status: DISCONTINUED | OUTPATIENT
Start: 2020-09-07 | End: 2020-09-09 | Stop reason: HOSPADM

## 2020-09-07 RX ADMIN — MORPHINE SULFATE 15 MG: 15 TABLET, FILM COATED, EXTENDED RELEASE ORAL at 12:03

## 2020-09-07 RX ADMIN — CELECOXIB 200 MG: 200 CAPSULE ORAL at 18:08

## 2020-09-07 RX ADMIN — POLYETHYLENE GLYCOL 3350 1 PACKET: 17 POWDER, FOR SOLUTION ORAL at 18:08

## 2020-09-07 RX ADMIN — PREGABALIN 75 MG: 75 CAPSULE ORAL at 12:03

## 2020-09-07 RX ADMIN — ENOXAPARIN SODIUM 30 MG: 30 INJECTION SUBCUTANEOUS at 18:09

## 2020-09-07 RX ADMIN — MORPHINE SULFATE 15 MG: 15 TABLET, FILM COATED, EXTENDED RELEASE ORAL at 21:24

## 2020-09-07 RX ADMIN — ENOXAPARIN SODIUM 30 MG: 30 INJECTION SUBCUTANEOUS at 05:23

## 2020-09-07 RX ADMIN — PREGABALIN 75 MG: 75 CAPSULE ORAL at 05:23

## 2020-09-07 RX ADMIN — DOCUSATE SODIUM 100 MG: 100 CAPSULE, LIQUID FILLED ORAL at 18:08

## 2020-09-07 RX ADMIN — OXYCODONE HYDROCHLORIDE 10 MG: 10 TABLET ORAL at 01:06

## 2020-09-07 RX ADMIN — KETOROLAC TROMETHAMINE 15 MG: 30 INJECTION, SOLUTION INTRAMUSCULAR at 04:19

## 2020-09-07 RX ADMIN — SODIUM CHLORIDE, POTASSIUM CHLORIDE, SODIUM LACTATE AND CALCIUM CHLORIDE: 600; 310; 30; 20 INJECTION, SOLUTION INTRAVENOUS at 01:09

## 2020-09-07 RX ADMIN — POLYETHYLENE GLYCOL 3350 1 PACKET: 17 POWDER, FOR SOLUTION ORAL at 05:23

## 2020-09-07 RX ADMIN — DOCUSATE SODIUM 50 MG AND SENNOSIDES 8.6 MG 1 TABLET: 8.6; 5 TABLET, FILM COATED ORAL at 21:25

## 2020-09-07 RX ADMIN — PREGABALIN 75 MG: 75 CAPSULE ORAL at 18:08

## 2020-09-07 RX ADMIN — OXYCODONE HYDROCHLORIDE 10 MG: 10 TABLET ORAL at 12:03

## 2020-09-07 RX ADMIN — KETOROLAC TROMETHAMINE 15 MG: 30 INJECTION, SOLUTION INTRAMUSCULAR at 12:02

## 2020-09-07 RX ADMIN — POTASSIUM CHLORIDE 20 MEQ: 1500 TABLET, EXTENDED RELEASE ORAL at 08:15

## 2020-09-07 RX ADMIN — OXYCODONE HYDROCHLORIDE 10 MG: 10 TABLET ORAL at 05:22

## 2020-09-07 RX ADMIN — CELECOXIB 200 MG: 200 CAPSULE ORAL at 05:23

## 2020-09-07 RX ADMIN — LIDOCAINE 3 PATCH: 50 PATCH TOPICAL at 16:20

## 2020-09-07 RX ADMIN — MORPHINE SULFATE 4 MG: 4 INJECTION INTRAVENOUS at 03:33

## 2020-09-07 RX ADMIN — DIPHENHYDRAMINE HYDROCHLORIDE 25 MG: 25 TABLET ORAL at 04:18

## 2020-09-07 RX ADMIN — DOCUSATE SODIUM 100 MG: 100 CAPSULE, LIQUID FILLED ORAL at 05:23

## 2020-09-07 RX ADMIN — MAGNESIUM HYDROXIDE 30 ML: 400 SUSPENSION ORAL at 05:23

## 2020-09-07 RX ADMIN — OXYCODONE HYDROCHLORIDE 10 MG: 10 TABLET ORAL at 16:27

## 2020-09-07 RX ADMIN — OXYCODONE HYDROCHLORIDE 10 MG: 10 TABLET ORAL at 22:53

## 2020-09-07 RX ADMIN — INSULIN GLARGINE 20 UNITS: 100 INJECTION, SOLUTION SUBCUTANEOUS at 18:10

## 2020-09-07 RX ADMIN — MORPHINE SULFATE 4 MG: 4 INJECTION INTRAVENOUS at 08:15

## 2020-09-07 RX ADMIN — TAMSULOSIN HYDROCHLORIDE 0.4 MG: 0.4 CAPSULE ORAL at 08:15

## 2020-09-07 RX ADMIN — DIPHENHYDRAMINE HYDROCHLORIDE 25 MG: 25 TABLET ORAL at 18:08

## 2020-09-07 ASSESSMENT — PAIN DESCRIPTION - PAIN TYPE
TYPE: SURGICAL PAIN

## 2020-09-07 ASSESSMENT — COGNITIVE AND FUNCTIONAL STATUS - GENERAL
WALKING IN HOSPITAL ROOM: A LITTLE
CLIMB 3 TO 5 STEPS WITH RAILING: A LITTLE
SUGGESTED CMS G CODE MODIFIER MOBILITY: CK
TURNING FROM BACK TO SIDE WHILE IN FLAT BAD: A LITTLE
STANDING UP FROM CHAIR USING ARMS: A LITTLE
MOVING FROM LYING ON BACK TO SITTING ON SIDE OF FLAT BED: A LITTLE
MOVING TO AND FROM BED TO CHAIR: A LITTLE
MOBILITY SCORE: 18

## 2020-09-07 ASSESSMENT — GAIT ASSESSMENTS
GAIT LEVEL OF ASSIST: MINIMAL ASSIST
DEVIATION: STEP TO
ASSISTIVE DEVICE: FRONT WHEEL WALKER
DISTANCE (FEET): 200

## 2020-09-07 ASSESSMENT — ENCOUNTER SYMPTOMS
DOUBLE VISION: 0
FEVER: 0
CONSTIPATION: 0
HEADACHES: 0
ABDOMINAL PAIN: 0
NAUSEA: 0
SHORTNESS OF BREATH: 0
WEAKNESS: 0
VOMITING: 0
COUGH: 0
SENSORY CHANGE: 0

## 2020-09-07 NOTE — PROGRESS NOTES
"End of shift note  [id number]    Patient reports pain is staying consistent at 7-8 out of 10 throughout shift. Oxy 10, Morphine, and Toradol all given to pt PRN this morning on top of scheduled meds. RN instructed patient that his pain would improve with ambulation and getting OOB. Pt agreeable but requesting \"a few more hours of sleep because I barely slept at all last night\". Patient did ambulate with  ft in lopez this afternoon. Pt then requested to go back to bed.    MS Contin ordered per trauma.    Jeffrey reinserted due to retention.     Benadryl given PRN for itching r/t narcotics.  "

## 2020-09-07 NOTE — PROGRESS NOTES
Trauma / Surgical Daily Progress Note    Date of Service  9/7/2020    Chief Complaint  50 y.o. male admitted 9/3/2020 with Trauma    Interval Events  Inadequate pain control  Potassium trend down  Glucose low on AM labs    - Lantus switched to BID 20 units, continue low ISS  - Potassium replaced  - DC MIVF  - MS contin added  - Murphy if retention persists  - Disposition pending mobilization tolerance once pain control improved    Review of Systems  Review of Systems   Constitutional: Negative for fever and malaise/fatigue.   HENT: Negative for hearing loss.    Eyes: Negative for double vision.   Respiratory: Negative for cough and shortness of breath.    Cardiovascular: Positive for chest pain (bilateral chest wall). Negative for leg swelling.   Gastrointestinal: Negative for abdominal pain, constipation (BM 9/2), nausea and vomiting.   Musculoskeletal: Positive for joint pain (RLE).   Neurological: Negative for sensory change, weakness and headaches.        Vital Signs  Temp:  [36.1 °C (97 °F)-37.2 °C (99 °F)] 37.2 °C (99 °F)  Pulse:  [70-87] 70  Resp:  [16-18] 16  BP: (103-146)/(60-79) 120/60  SpO2:  [93 %-97 %] 95 %    Physical Exam  Physical Exam  Vitals signs and nursing note reviewed.   Constitutional:       General: He is not in acute distress.     Appearance: He is not ill-appearing.   HENT:      Head: Normocephalic.      Nose: Nose normal.      Mouth/Throat:      Mouth: Mucous membranes are moist.   Eyes:      Pupils: Pupils are equal, round, and reactive to light.   Neck:      Musculoskeletal: Normal range of motion. No muscular tenderness.   Cardiovascular:      Rate and Rhythm: Normal rate and regular rhythm.      Pulses: Normal pulses.   Pulmonary:      Effort: Pulmonary effort is normal. No respiratory distress.      Comments: Room air  Chest:      Chest wall: Tenderness (bilateral) present.   Abdominal:      General: Abdomen is flat. Bowel sounds are normal.      Palpations: Abdomen is soft.       Tenderness: There is no abdominal tenderness.   Genitourinary:     Comments: Murphy to gravity - trail removal  Musculoskeletal: Normal range of motion.         General: Tenderness (RLE, surgical sites with dressing in place) present.   Skin:     Coloration: Skin is not jaundiced.   Neurological:      General: No focal deficit present.      Mental Status: He is alert and oriented to person, place, and time.   Psychiatric:         Mood and Affect: Mood normal.         Behavior: Behavior normal.         Laboratory  Recent Results (from the past 24 hour(s))   ACCU-CHEK GLUCOSE    Collection Time: 09/06/20 11:39 AM   Result Value Ref Range    Glucose - Accu-Ck 97 65 - 99 mg/dL   ACCU-CHEK GLUCOSE    Collection Time: 09/06/20  5:38 PM   Result Value Ref Range    Glucose - Accu-Ck 327 (H) 65 - 99 mg/dL   ACCU-CHEK GLUCOSE    Collection Time: 09/06/20  9:49 PM   Result Value Ref Range    Glucose - Accu-Ck 326 (H) 65 - 99 mg/dL   Magnesium: Every Monday and Thursday AM    Collection Time: 09/07/20  4:42 AM   Result Value Ref Range    Magnesium 2.1 1.5 - 2.5 mg/dL   Phosphorus: Every Monday and Thursday AM    Collection Time: 09/07/20  4:42 AM   Result Value Ref Range    Phosphorus 3.5 2.5 - 4.5 mg/dL   CBC WITH DIFFERENTIAL    Collection Time: 09/07/20  4:42 AM   Result Value Ref Range    WBC 7.2 4.8 - 10.8 K/uL    RBC 3.40 (L) 4.70 - 6.10 M/uL    Hemoglobin 10.7 (L) 14.0 - 18.0 g/dL    Hematocrit 31.4 (L) 42.0 - 52.0 %    MCV 92.4 81.4 - 97.8 fL    MCH 31.5 27.0 - 33.0 pg    MCHC 34.1 33.7 - 35.3 g/dL    RDW 39.3 35.9 - 50.0 fL    Platelet Count 210 164 - 446 K/uL    MPV 9.0 9.0 - 12.9 fL    Neutrophils-Polys 65.30 44.00 - 72.00 %    Lymphocytes 24.50 22.00 - 41.00 %    Monocytes 7.70 0.00 - 13.40 %    Eosinophils 1.70 0.00 - 6.90 %    Basophils 0.40 0.00 - 1.80 %    Immature Granulocytes 0.40 0.00 - 0.90 %    Nucleated RBC 0.00 /100 WBC    Neutrophils (Absolute) 4.67 1.82 - 7.42 K/uL    Lymphs (Absolute) 1.75 1.00 - 4.80  K/uL    Monos (Absolute) 0.55 0.00 - 0.85 K/uL    Eos (Absolute) 0.12 0.00 - 0.51 K/uL    Baso (Absolute) 0.03 0.00 - 0.12 K/uL    Immature Granulocytes (abs) 0.03 0.00 - 0.11 K/uL    NRBC (Absolute) 0.00 K/uL   Basic Metabolic Panel    Collection Time: 09/07/20  4:42 AM   Result Value Ref Range    Sodium 138 135 - 145 mmol/L    Potassium 3.0 (L) 3.6 - 5.5 mmol/L    Chloride 99 96 - 112 mmol/L    Co2 28 20 - 33 mmol/L    Glucose 54 (L) 65 - 99 mg/dL    Bun 18 8 - 22 mg/dL    Creatinine 0.88 0.50 - 1.40 mg/dL    Calcium 8.5 8.5 - 10.5 mg/dL    Anion Gap 11.0 7.0 - 16.0   ESTIMATED GFR    Collection Time: 09/07/20  4:42 AM   Result Value Ref Range    GFR If African American >60 >60 mL/min/1.73 m 2    GFR If Non African American >60 >60 mL/min/1.73 m 2       Fluids    Intake/Output Summary (Last 24 hours) at 9/7/2020 1130  Last data filed at 9/7/2020 0850  Gross per 24 hour   Intake 1080 ml   Output 1925 ml   Net -845 ml       Core Measures & Quality Metrics  Labs reviewed, Medications reviewed and Radiology images reviewed  Murphy Catheter: Trial removal.      DVT Prophylaxis: Enoxaparin (Lovenox)  DVT prophylaxis - mechanical: SCDs  Ulcer prophylaxis: Not indicated    Assessed for rehab: Patient was assess for and/or received rehabilitation services during this hospitalization    RAP Score Total: 8    ETOH Screening  CAGE Score: 0  Assessment complete date: 9/4/2020 (BA negative, CAGE not completed)        Assessment/Plan  Intertrochanteric fracture of right femur, closed, initial encounter (HCC)- (present on admission)  Assessment & Plan  Comminuted and displaced right proximal femoral intertrochanteric fracture.  9/3 IM nailing.  Weight bearing status - Weightbearing as tolerated RLE.  Shaka George MD. Orthopedic Surgeon. [facility].    Closed fracture of multiple ribs of both sides- (present on admission)  Assessment & Plan  Acute fractures of the right posterior seventh, eighth, ninth and 10th ribs.  Acute fractures of the left anterior fourth, fifth, sixth, seventh and eighth ribs. Multiple old and healing rib fractures.  Aggressive pulmonary hygiene and multimodal pain management and serial chest radiography.    Urinary retention- (present on admission)  Assessment & Plan  9/4 Anderson replaced for retention. Flomax initiated.   9/6 Trial anderson removal.     Screening examination for infectious disease- (present on admission)  Assessment & Plan  9/3 COVID-19 specimen sent. Negative.    Hypertension- (present on admission)  Assessment & Plan  Chronic condition treated with unknown medication that he hasn't taken in some time  Monitor BP    Diabetes (HCC)- (present on admission)  Assessment & Plan  Chronic condition treated with Lantus.  Insulin sliding scale coverage during acute hospitalization.   9/5 Lantus resumed.    Trauma- (present on admission)  Assessment & Plan  longterm, helmeted.  Trauma Green Activation.  Bobby Mc MD. Trauma Surgery.    No contraindication to deep vein thrombosis (DVT) prophylaxis- (present on admission)  Assessment & Plan  Systemic anticoagulation contraindicated secondary to elevated bleeding risk.  RAP score 8.  9/4 Chemical DVT prophylaxis (Lovenox) initiated.  Ambulate TID.        Discussed patient condition with RN, Patient and trauma surgery. Dr. Pate

## 2020-09-07 NOTE — PROGRESS NOTES
Bedside Report received   Assumed care of patient at 1940.    Pt is A&O x4  RA, IS 1500, denies chest pain, SOB or dizziness.  kian Rib fx  Pain reported at 8/10. Medicated per MAR  Nausea denies  Tolerating Diabetic Diet   Surgical incision to right hip and RLE C/D/I    +Urine output  Last BM PTA   +Flatus  Up x1 assist with walker  SCD's on  Bed in lowest position and locked.  Bed alarm refused  Pt resting comfortably now.  Review plan of care with patient  Call light within reach  Hourly rounds in place  All needs met at this time

## 2020-09-07 NOTE — PROGRESS NOTES
50yoM with right subtrochanteric femur fracture s/p IMN 9/3.  Bilateral rib fxs admitted to SICU. Now on floor.    S: still complaining of pain, denies substernal CP, SOB or fever. Slow to mobilize    O:  Vitals:    09/07/20 0850   BP: 120/60   Pulse: 70   Resp: 16   Temp: 37.2 °C (99 °F)   SpO2: 95%       Exam:  General-NAD, alert and following commands  RLE-thigh dressings c/d/i, +EHL/FHL/TA/GS motor, SILT diffusely in foot  TTP distal right thigh    Recent Labs     09/05/20  0803 09/06/20  0609 09/07/20  0442   WBC 7.6 7.1 7.2   RBC 3.30* 3.06* 3.40*   HEMOGLOBIN 10.5* 9.7* 10.7*   HEMATOCRIT 30.2* 28.2* 31.4*   MCV 91.5 92.2 92.4   MCH 31.8 31.7 31.5   RDW 39.3 39.3 39.3   PLATELETCT 179 198 210   MPV 8.9* 8.9* 9.0   NEUTSPOLYS 82.10* 73.00* 65.30   LYMPHOCYTES 8.70* 16.00* 24.50   MONOCYTES 7.90 9.00 7.70   EOSINOPHILS 0.40 1.00 1.70   BASOPHILS 0.40 0.40 0.40     Recent Labs     09/05/20  0803 09/06/20  0609 09/07/20  0442   SODIUM 130* 139 138   POTASSIUM 4.0 3.7 3.0*   CHLORIDE 95* 101 99   CO2 25 28 28   GLUCOSE 310* 83 54*   BUN 15 15 18     Recent Labs     09/05/20  0803 09/06/20  0609 09/07/20  0442   ALBUMIN 2.7*  --   --    TBILIRUBIN 0.6  --   --    ALKPHOSPHAT 93  --   --    TOTPROTEIN 5.4*  --   --    ALTSGPT 69*  --   --    ASTSGOT 27  --   --    CREATININE 0.70 0.73 0.88       A: 50yoM with right subtrochanteric femur fracture s/p IMN 9/3.  Bilateral rib fxs admitted to SICU.    Recs:  --WBAT RLE  --ancef x 2 doses postop  --lovenox 30 mg BID commenced 9/4, continue SCDs  --PT/OT for mobilization   --fu Dr. George 2 weeks postop  Case D/W Trauma team,

## 2020-09-07 NOTE — CARE PLAN
Problem: Knowledge Deficit  Goal: Knowledge of the prescribed therapeutic regimen will improve  Outcome: PROGRESSING AS EXPECTED     Problem: Skin Integrity  Goal: Risk for impaired skin integrity will decrease  Outcome: PROGRESSING AS EXPECTED     Problem: Bowel/Gastric:  Goal: Normal bowel function is maintained or improved  Outcome: PROGRESSING SLOWER THAN EXPECTED     Problem: Psychosocial Needs:  Goal: Level of anxiety will decrease  Outcome: PROGRESSING SLOWER THAN EXPECTED     Problem: Mobility  Goal: Risk for activity intolerance will decrease  Outcome: PROGRESSING SLOWER THAN EXPECTED

## 2020-09-07 NOTE — CARE PLAN
Problem: Communication  Goal: The ability to communicate needs accurately and effectively will improve  Outcome: PROGRESSING AS EXPECTED   Updated on POC  Problem: Safety  Goal: Will remain free from injury  Outcome: PROGRESSING AS EXPECTED   Pt uses call light appropriately, refused bed alarm   Problem: Pain Management  Goal: Pain level will decrease to patient's comfort goal  Outcome: PROGRESSING AS EXPECTED   Pain managed with PRN oxycodone, toradol and morphine  Problem: Respiratory:  Goal: Respiratory status will improve  Outcome: PROGRESSING AS EXPECTED   IS 1500. Continuous pulse ox applied to finger

## 2020-09-08 ENCOUNTER — APPOINTMENT (OUTPATIENT)
Dept: RADIOLOGY | Facility: MEDICAL CENTER | Age: 50
DRG: 481 | End: 2020-09-08
Attending: NURSE PRACTITIONER
Payer: MEDICAID

## 2020-09-08 LAB
ANION GAP SERPL CALC-SCNC: 8 MMOL/L (ref 7–16)
BASOPHILS # BLD AUTO: 0.3 % (ref 0–1.8)
BASOPHILS # BLD: 0.02 K/UL (ref 0–0.12)
BUN SERPL-MCNC: 15 MG/DL (ref 8–22)
CALCIUM SERPL-MCNC: 8.1 MG/DL (ref 8.5–10.5)
CHLORIDE SERPL-SCNC: 99 MMOL/L (ref 96–112)
CO2 SERPL-SCNC: 29 MMOL/L (ref 20–33)
CREAT SERPL-MCNC: 0.67 MG/DL (ref 0.5–1.4)
EOSINOPHIL # BLD AUTO: 0.15 K/UL (ref 0–0.51)
EOSINOPHIL NFR BLD: 2.4 % (ref 0–6.9)
ERYTHROCYTE [DISTWIDTH] IN BLOOD BY AUTOMATED COUNT: 39.8 FL (ref 35.9–50)
GLUCOSE BLD-MCNC: 125 MG/DL (ref 65–99)
GLUCOSE BLD-MCNC: 134 MG/DL (ref 65–99)
GLUCOSE BLD-MCNC: 151 MG/DL (ref 65–99)
GLUCOSE BLD-MCNC: 163 MG/DL (ref 65–99)
GLUCOSE BLD-MCNC: 246 MG/DL (ref 65–99)
GLUCOSE SERPL-MCNC: 180 MG/DL (ref 65–99)
HCT VFR BLD AUTO: 28.6 % (ref 42–52)
HGB BLD-MCNC: 9.7 G/DL (ref 14–18)
IMM GRANULOCYTES # BLD AUTO: 0.03 K/UL (ref 0–0.11)
IMM GRANULOCYTES NFR BLD AUTO: 0.5 % (ref 0–0.9)
LYMPHOCYTES # BLD AUTO: 1.43 K/UL (ref 1–4.8)
LYMPHOCYTES NFR BLD: 22.8 % (ref 22–41)
MCH RBC QN AUTO: 31.6 PG (ref 27–33)
MCHC RBC AUTO-ENTMCNC: 33.9 G/DL (ref 33.7–35.3)
MCV RBC AUTO: 93.2 FL (ref 81.4–97.8)
MONOCYTES # BLD AUTO: 0.46 K/UL (ref 0–0.85)
MONOCYTES NFR BLD AUTO: 7.3 % (ref 0–13.4)
NEUTROPHILS # BLD AUTO: 4.18 K/UL (ref 1.82–7.42)
NEUTROPHILS NFR BLD: 66.7 % (ref 44–72)
NRBC # BLD AUTO: 0 K/UL
NRBC BLD-RTO: 0 /100 WBC
PLATELET # BLD AUTO: 218 K/UL (ref 164–446)
PMV BLD AUTO: 9 FL (ref 9–12.9)
POTASSIUM SERPL-SCNC: 4 MMOL/L (ref 3.6–5.5)
RBC # BLD AUTO: 3.07 M/UL (ref 4.7–6.1)
SODIUM SERPL-SCNC: 136 MMOL/L (ref 135–145)
WBC # BLD AUTO: 6.3 K/UL (ref 4.8–10.8)

## 2020-09-08 PROCEDURE — 36415 COLL VENOUS BLD VENIPUNCTURE: CPT

## 2020-09-08 PROCEDURE — 97530 THERAPEUTIC ACTIVITIES: CPT

## 2020-09-08 PROCEDURE — 700102 HCHG RX REV CODE 250 W/ 637 OVERRIDE(OP): Performed by: NURSE PRACTITIONER

## 2020-09-08 PROCEDURE — 700101 HCHG RX REV CODE 250: Performed by: NURSE PRACTITIONER

## 2020-09-08 PROCEDURE — 71045 X-RAY EXAM CHEST 1 VIEW: CPT

## 2020-09-08 PROCEDURE — A9270 NON-COVERED ITEM OR SERVICE: HCPCS | Performed by: SURGERY

## 2020-09-08 PROCEDURE — 82962 GLUCOSE BLOOD TEST: CPT | Mod: 91

## 2020-09-08 PROCEDURE — 770001 HCHG ROOM/CARE - MED/SURG/GYN PRIV*

## 2020-09-08 PROCEDURE — A9270 NON-COVERED ITEM OR SERVICE: HCPCS | Performed by: NURSE PRACTITIONER

## 2020-09-08 PROCEDURE — 85025 COMPLETE CBC W/AUTO DIFF WBC: CPT

## 2020-09-08 PROCEDURE — 700102 HCHG RX REV CODE 250 W/ 637 OVERRIDE(OP): Performed by: SURGERY

## 2020-09-08 PROCEDURE — A9270 NON-COVERED ITEM OR SERVICE: HCPCS | Performed by: PHYSICIAN ASSISTANT

## 2020-09-08 PROCEDURE — 97535 SELF CARE MNGMENT TRAINING: CPT

## 2020-09-08 PROCEDURE — 700111 HCHG RX REV CODE 636 W/ 250 OVERRIDE (IP): Performed by: SURGERY

## 2020-09-08 PROCEDURE — 700102 HCHG RX REV CODE 250 W/ 637 OVERRIDE(OP): Performed by: PHYSICIAN ASSISTANT

## 2020-09-08 PROCEDURE — 80048 BASIC METABOLIC PNL TOTAL CA: CPT

## 2020-09-08 RX ORDER — DIPHENHYDRAMINE HCL 25 MG
25 TABLET ORAL EVERY 6 HOURS PRN
Status: DISCONTINUED | OUTPATIENT
Start: 2020-09-08 | End: 2020-09-09 | Stop reason: HOSPADM

## 2020-09-08 RX ADMIN — CELECOXIB 200 MG: 200 CAPSULE ORAL at 17:30

## 2020-09-08 RX ADMIN — MORPHINE SULFATE 15 MG: 15 TABLET, FILM COATED, EXTENDED RELEASE ORAL at 17:30

## 2020-09-08 RX ADMIN — ENOXAPARIN SODIUM 30 MG: 30 INJECTION SUBCUTANEOUS at 05:16

## 2020-09-08 RX ADMIN — TAMSULOSIN HYDROCHLORIDE 0.4 MG: 0.4 CAPSULE ORAL at 08:49

## 2020-09-08 RX ADMIN — CELECOXIB 200 MG: 200 CAPSULE ORAL at 05:16

## 2020-09-08 RX ADMIN — INSULIN GLARGINE 20 UNITS: 100 INJECTION, SOLUTION SUBCUTANEOUS at 17:27

## 2020-09-08 RX ADMIN — DOCUSATE SODIUM 100 MG: 100 CAPSULE, LIQUID FILLED ORAL at 05:16

## 2020-09-08 RX ADMIN — PREGABALIN 75 MG: 75 CAPSULE ORAL at 17:30

## 2020-09-08 RX ADMIN — DOCUSATE SODIUM 100 MG: 100 CAPSULE, LIQUID FILLED ORAL at 17:30

## 2020-09-08 RX ADMIN — OXYCODONE HYDROCHLORIDE 10 MG: 10 TABLET ORAL at 03:05

## 2020-09-08 RX ADMIN — DIPHENHYDRAMINE HYDROCHLORIDE 25 MG: 25 TABLET ORAL at 03:05

## 2020-09-08 RX ADMIN — MAGNESIUM CITRATE 296 ML: 1.75 LIQUID ORAL at 15:32

## 2020-09-08 RX ADMIN — PREGABALIN 75 MG: 75 CAPSULE ORAL at 05:16

## 2020-09-08 RX ADMIN — OXYCODONE HYDROCHLORIDE 10 MG: 10 TABLET ORAL at 15:35

## 2020-09-08 RX ADMIN — LIDOCAINE 3 PATCH: 50 PATCH TOPICAL at 11:50

## 2020-09-08 RX ADMIN — OXYCODONE HYDROCHLORIDE 10 MG: 10 TABLET ORAL at 21:53

## 2020-09-08 RX ADMIN — INSULIN GLARGINE 20 UNITS: 100 INJECTION, SOLUTION SUBCUTANEOUS at 06:07

## 2020-09-08 RX ADMIN — OXYCODONE HYDROCHLORIDE 10 MG: 10 TABLET ORAL at 08:48

## 2020-09-08 RX ADMIN — MORPHINE SULFATE 15 MG: 15 TABLET, FILM COATED, EXTENDED RELEASE ORAL at 05:16

## 2020-09-08 RX ADMIN — PREGABALIN 75 MG: 75 CAPSULE ORAL at 11:49

## 2020-09-08 RX ADMIN — MAGNESIUM HYDROXIDE 30 ML: 400 SUSPENSION ORAL at 05:16

## 2020-09-08 RX ADMIN — OXYCODONE HYDROCHLORIDE 10 MG: 10 TABLET ORAL at 11:52

## 2020-09-08 RX ADMIN — POLYETHYLENE GLYCOL 3350 1 PACKET: 17 POWDER, FOR SOLUTION ORAL at 05:16

## 2020-09-08 RX ADMIN — POLYETHYLENE GLYCOL 3350 1 PACKET: 17 POWDER, FOR SOLUTION ORAL at 17:30

## 2020-09-08 RX ADMIN — ENOXAPARIN SODIUM 30 MG: 30 INJECTION SUBCUTANEOUS at 17:29

## 2020-09-08 RX ADMIN — OXYCODONE HYDROCHLORIDE 10 MG: 10 TABLET ORAL at 18:41

## 2020-09-08 ASSESSMENT — COGNITIVE AND FUNCTIONAL STATUS - GENERAL
HELP NEEDED FOR BATHING: A LITTLE
SUGGESTED CMS G CODE MODIFIER DAILY ACTIVITY: CJ
DAILY ACTIVITIY SCORE: 21
DRESSING REGULAR LOWER BODY CLOTHING: A LITTLE
TOILETING: A LITTLE

## 2020-09-08 ASSESSMENT — ENCOUNTER SYMPTOMS
COUGH: 0
DOUBLE VISION: 0
FEVER: 0
ABDOMINAL PAIN: 0
CONSTIPATION: 0
WEAKNESS: 0
NAUSEA: 0
SHORTNESS OF BREATH: 0
SENSORY CHANGE: 0
HEADACHES: 0
VOMITING: 0

## 2020-09-08 ASSESSMENT — PAIN DESCRIPTION - PAIN TYPE
TYPE: SURGICAL PAIN;ACUTE PAIN
TYPE: SURGICAL PAIN
TYPE: SURGICAL PAIN
TYPE: SURGICAL PAIN;ACUTE PAIN
TYPE: SURGICAL PAIN
TYPE: SURGICAL PAIN

## 2020-09-08 NOTE — PROGRESS NOTES
Bedside report received.  Assessment complete.  A&O x 4. Patient calls appropriately.  Patient ambulates with stand by assist. Bed alarm off.   Patient has 8/10 pain. Pain managed with prescribed medications.  Denies N&V. Tolerating diabetic diet.  Surgical dressings CDI.  + void, - flatus, - BM.  Patient denies SOB.  SCD's on.  Review plan with of care with patient. Call light and personal belongings with in reach. Hourly rounding in place. All needs met at this time.

## 2020-09-08 NOTE — THERAPY
Physical Therapy   Daily Treatment     Patient Name: Peña Guerrero  Age:  50 y.o., Sex:  male  Medical Record #: [id number]  Today's Date: 9/7/2020     Precautions: Weight Bearing As Tolerated Right Lower Extremity    Assessment    Today, pt cooperative, though with reports of pain issues despite pain medication given. Pt needed min A for OOB, but once up, pt set a goal of a long walk, was able to ambulate x 200 feet using FWW with min A (contact guard for safety but no LOB). Per chart, pt lives alone, need to confirm if no help available at home. Pt will need to be able to ascend/descend 3 stairs if he is to d/c home.    Plan    Continue current treatment plan.    DC Equipment Recommendations: Front-Wheel Walker, Crutches(depending on progress.)  Discharge Recommendations: Recommend post-acute placement for additional physical therapy services prior to discharge home(depending on progress, with pain control, maybe home.)           Objective       09/07/20 1401   Gait Analysis   Gait Level Of Assist Minimal Assist   Assistive Device Front Wheel Walker   Distance (Feet) 200   # of Times Distance was Traveled 1   Deviation Step To  (cues for stationary standing with B heels on ground, pt able)   Weight Bearing Status WBAT R LE   Bed Mobility    Supine to Sit Minimal Assist  (to move R LE across mattress.)   Sit to Supine Minimal Assist  (to lift R LE back to mattress)   Scooting Supervised   Functional Mobility   Sit to Stand Minimal Assist   Short Term Goals    Short Term Goal # 1 Patient will move supine<>sitting EOB with supervision within 6tx in order to get in/out of bed   Goal Outcome # 1 goal not met   Short Term Goal # 2 Patient will move sitting<>standing with supervision within 6tx in order to initiate gait and transfers   Goal Outcome # 2 Goal not met   Short Term Goal # 3 Patient will ambulate 150ft with supervision within 6tx in order to access environment   Goal Outcome # 3 Goal not met   Short Term  Goal # 4 Pt will go up/down 3 stairs with supervision in 6 visits.    Goal Outcome # 4 Goal not met   Anticipated Discharge Equipment and Recommendations   DC Equipment Recommendations Front-Wheel Walker;Crutches  (depending on progress.)   Discharge Recommendations Recommend post-acute placement for additional physical therapy services prior to discharge home  (depending on progress, with pain control, maybe home.)

## 2020-09-08 NOTE — CARE PLAN
Problem: Safety  Goal: Will remain free from falls  Outcome: PROGRESSING AS EXPECTED     Problem: Bowel/Gastric:  Goal: Will not experience complications related to bowel motility  Outcome: PROGRESSING SLOWER THAN EXPECTED     Problem: Mobility  Goal: Risk for activity intolerance will decrease  Outcome: PROGRESSING SLOWER THAN EXPECTED

## 2020-09-08 NOTE — DISCHARGE PLANNING
Care Transition Team Discharge Planning         Discharge Plan:  TBD    This RN CM met with Pt at bedside and explained to him that his Demographics now show that Pt 's Primary inusurance is Misc Accident Liability Insurance and Medicaid FFS is only secondary.   This RN CM confirmed this with Angelique of [facility]. Explained this to Pt and his Girlfriend.     Will follow and assist Pt with discharge as needed.

## 2020-09-08 NOTE — DISCHARGE PLANNING
Care Transition Team Discharge Planning                   Discharge Plan:  [facility]     This RN CM is following the case. [facility], awaiting acceptance. Will follow and assist Pt with discharge as needed.

## 2020-09-08 NOTE — THERAPY
"Occupational Therapy  Daily Treatment     Patient Name: Peña Guerrero  Age:  50 y.o., Sex:  male  Medical Record #: [id number]  Today's Date: 9/8/2020     Precautions: Fall Risk, Weight Bearing As Tolerated Right Lower Extremity  Comments: B rib fxs    Assessment    Pt seen for OT tx. Pt received in bed sleeping heavily. Pt initially quite annoyed with being waken, expressed frustration with constant sleep disturbances and refused to get up to chair. Pt quickly apologized for behavior and responded well to active listening, empathy for current circumstance. Pt used reacher to don shorts past RLE, but required min A to complete task. Pt demos improved functional mobility but relies heavily on bed rails and grab bars. Pt requested to mobilize in hallway for activity tolerance. Required frequent rest breaks to \"stretch out that leg\". Pt reports he just lost his place of residence and is unsure where he will go. States he may look for disabled access motel. Denies any source of support. Pt is limited by: pain, RLE ROM impairment (for LB ADL), balance impairment, activity intolerance. Will continue to benefit from acute OT to maximize functional independence and safety.     Plan    Continue current treatment plan.    DC Equipment Recommendations: Unable to determine at this time  Discharge Recommendations: Recommend post-acute placement for additional occupational therapy services prior to discharge home    Subjective    \"Maybe I'll get a motel room\" (when asked where he will discharge to)     Objective     09/08/20 1148   Bed Mobility    Supine to Sit Minimal Assist  (to unweight RLE)   Sit to Supine Minimal Assist  (for BLE)   Comments heavy use of bed rails    Activities of Daily Living   Grooming Supervision;Standing  (oral care at sink )   Lower Body Dressing Minimal Assist  (shorts only, bed level )   Toileting   (NT; Murphy, no BM )   Functional Mobility   Sit to Stand Supervised   Toilet Transfers " Supervised  (heavy use of R grab bar)   Transfer Method Stand Step  (FWW)   Short Term Goals   Short Term Goal # 1 pt will complete grooming standing at sink w/spv    Goal Outcome # 1 Goal met, new goal added   Short Term Goal # 1 B  Pt will complete seated shower with supv    Goal Outcome  # 1 B Goal not met   Short Term Goal # 2 pt will complete LB dressing w/spv    Goal Outcome # 2 Goal not met   Short Term Goal # 3 pt will complete toilet txf w/spv    Goal Outcome # 3 Progressing as expected  (relies on grab bar; goal to decrease dependency on bar )
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
## 2020-09-08 NOTE — PROGRESS NOTES
Bedside Report received   Assumed care of patient   Pt is A&O x4  RA denies SOB, dizziness, nausea or chest pain.  Pain reported at 8/10. Medicated per MAR  Tolerating Diabetic Diet   Surgical incision to right hip and leg C/D/I  + Urine output, anderson catheter patent and draining.   Last BM, PTA   + Flatus  Up x1 assist with walker  SCD's on  Bed in lowest position and locked.  Pt resting comfortably now.  Review plan of care with patient  Call light within reach  Hourly rounds in place  All needs met at this time

## 2020-09-09 ENCOUNTER — HOSPITAL ENCOUNTER (INPATIENT)
Dept: RADIOLOGY | Facility: MEDICAL CENTER | Age: 50
DRG: 481 | End: 2020-09-09
Attending: NURSE PRACTITIONER | Admitting: SURGERY
Payer: MEDICAID

## 2020-09-09 ENCOUNTER — APPOINTMENT (OUTPATIENT)
Dept: RADIOLOGY | Facility: MEDICAL CENTER | Age: 50
DRG: 481 | End: 2020-09-09
Attending: NURSE PRACTITIONER
Payer: MEDICAID

## 2020-09-09 VITALS
WEIGHT: 185.19 LBS | HEIGHT: 76 IN | OXYGEN SATURATION: 95 % | SYSTOLIC BLOOD PRESSURE: 158 MMHG | RESPIRATION RATE: 18 BRPM | BODY MASS INDEX: 22.55 KG/M2 | DIASTOLIC BLOOD PRESSURE: 67 MMHG | HEART RATE: 60 BPM | TEMPERATURE: 99 F

## 2020-09-09 LAB
GLUCOSE BLD-MCNC: 180 MG/DL (ref 65–99)
GLUCOSE BLD-MCNC: 205 MG/DL (ref 65–99)
GLUCOSE BLD-MCNC: 226 MG/DL (ref 65–99)

## 2020-09-09 PROCEDURE — 71045 X-RAY EXAM CHEST 1 VIEW: CPT

## 2020-09-09 PROCEDURE — 700102 HCHG RX REV CODE 250 W/ 637 OVERRIDE(OP): Performed by: NURSE PRACTITIONER

## 2020-09-09 PROCEDURE — A9270 NON-COVERED ITEM OR SERVICE: HCPCS | Performed by: NURSE PRACTITIONER

## 2020-09-09 PROCEDURE — 700101 HCHG RX REV CODE 250: Performed by: NURSE PRACTITIONER

## 2020-09-09 PROCEDURE — A9270 NON-COVERED ITEM OR SERVICE: HCPCS | Performed by: SURGERY

## 2020-09-09 PROCEDURE — 700111 HCHG RX REV CODE 636 W/ 250 OVERRIDE (IP): Performed by: SURGERY

## 2020-09-09 PROCEDURE — 700102 HCHG RX REV CODE 250 W/ 637 OVERRIDE(OP): Performed by: PHYSICIAN ASSISTANT

## 2020-09-09 PROCEDURE — 97116 GAIT TRAINING THERAPY: CPT

## 2020-09-09 PROCEDURE — A9270 NON-COVERED ITEM OR SERVICE: HCPCS | Performed by: PHYSICIAN ASSISTANT

## 2020-09-09 PROCEDURE — 700102 HCHG RX REV CODE 250 W/ 637 OVERRIDE(OP): Performed by: SURGERY

## 2020-09-09 PROCEDURE — 82962 GLUCOSE BLOOD TEST: CPT

## 2020-09-09 PROCEDURE — 97530 THERAPEUTIC ACTIVITIES: CPT

## 2020-09-09 RX ORDER — LIDOCAINE 50 MG/G
3 PATCH TOPICAL EVERY 24 HOURS
Qty: 21 PATCH | Refills: 0 | Status: SHIPPED | OUTPATIENT
Start: 2020-09-10 | End: 2020-09-17

## 2020-09-09 RX ORDER — OXYCODONE HYDROCHLORIDE 5 MG/1
5 TABLET ORAL
Qty: 30 TAB | Refills: 0 | Status: SHIPPED | OUTPATIENT
Start: 2020-09-09 | End: 2020-09-16

## 2020-09-09 RX ORDER — MORPHINE SULFATE 15 MG/1
15 TABLET, FILM COATED, EXTENDED RELEASE ORAL EVERY 12 HOURS
Qty: 14 TAB | Refills: 0 | Status: SHIPPED | OUTPATIENT
Start: 2020-09-09 | End: 2020-09-16

## 2020-09-09 RX ORDER — CELECOXIB 200 MG/1
200 CAPSULE ORAL 2 TIMES DAILY
Qty: 14 CAP | Refills: 0 | Status: SHIPPED | OUTPATIENT
Start: 2020-09-09 | End: 2020-09-16

## 2020-09-09 RX ORDER — TAMSULOSIN HYDROCHLORIDE 0.4 MG/1
0.4 CAPSULE ORAL
Qty: 7 CAP | Refills: 0 | Status: SHIPPED | OUTPATIENT
Start: 2020-09-10 | End: 2020-09-17

## 2020-09-09 RX ADMIN — CELECOXIB 200 MG: 200 CAPSULE ORAL at 16:43

## 2020-09-09 RX ADMIN — MORPHINE SULFATE 15 MG: 15 TABLET, FILM COATED, EXTENDED RELEASE ORAL at 16:43

## 2020-09-09 RX ADMIN — ENOXAPARIN SODIUM 30 MG: 30 INJECTION SUBCUTANEOUS at 05:29

## 2020-09-09 RX ADMIN — OXYCODONE HYDROCHLORIDE 10 MG: 10 TABLET ORAL at 02:02

## 2020-09-09 RX ADMIN — INSULIN GLARGINE 20 UNITS: 100 INJECTION, SOLUTION SUBCUTANEOUS at 16:40

## 2020-09-09 RX ADMIN — LIDOCAINE 3 PATCH: 50 PATCH TOPICAL at 11:33

## 2020-09-09 RX ADMIN — INSULIN GLARGINE 20 UNITS: 100 INJECTION, SOLUTION SUBCUTANEOUS at 05:35

## 2020-09-09 RX ADMIN — PREGABALIN 75 MG: 75 CAPSULE ORAL at 16:43

## 2020-09-09 RX ADMIN — CELECOXIB 200 MG: 200 CAPSULE ORAL at 05:28

## 2020-09-09 RX ADMIN — OXYCODONE HYDROCHLORIDE 10 MG: 10 TABLET ORAL at 11:31

## 2020-09-09 RX ADMIN — PREGABALIN 75 MG: 75 CAPSULE ORAL at 11:26

## 2020-09-09 RX ADMIN — TAMSULOSIN HYDROCHLORIDE 0.4 MG: 0.4 CAPSULE ORAL at 08:31

## 2020-09-09 RX ADMIN — OXYCODONE HYDROCHLORIDE 10 MG: 10 TABLET ORAL at 08:31

## 2020-09-09 RX ADMIN — PREGABALIN 75 MG: 75 CAPSULE ORAL at 05:29

## 2020-09-09 RX ADMIN — MORPHINE SULFATE 15 MG: 15 TABLET, FILM COATED, EXTENDED RELEASE ORAL at 05:29

## 2020-09-09 RX ADMIN — ENOXAPARIN SODIUM 30 MG: 30 INJECTION SUBCUTANEOUS at 16:43

## 2020-09-09 ASSESSMENT — COGNITIVE AND FUNCTIONAL STATUS - GENERAL
WALKING IN HOSPITAL ROOM: A LITTLE
TURNING FROM BACK TO SIDE WHILE IN FLAT BAD: A LITTLE
MOBILITY SCORE: 18
MOVING TO AND FROM BED TO CHAIR: A LITTLE
MOVING FROM LYING ON BACK TO SITTING ON SIDE OF FLAT BED: A LITTLE
CLIMB 3 TO 5 STEPS WITH RAILING: A LITTLE
STANDING UP FROM CHAIR USING ARMS: A LITTLE
SUGGESTED CMS G CODE MODIFIER MOBILITY: CK

## 2020-09-09 ASSESSMENT — GAIT ASSESSMENTS
ASSISTIVE DEVICE: FRONT WHEEL WALKER
DISTANCE (FEET): 150
GAIT LEVEL OF ASSIST: SUPERVISED
DEVIATION: BRADYKINETIC;DECREASED HEEL STRIKE;DECREASED TOE OFF

## 2020-09-09 ASSESSMENT — ENCOUNTER SYMPTOMS
COUGH: 0
FEVER: 0
WEAKNESS: 0
SHORTNESS OF BREATH: 0
NAUSEA: 0
HEADACHES: 0
CONSTIPATION: 0
SENSORY CHANGE: 0
ABDOMINAL PAIN: 0
DOUBLE VISION: 0
VOMITING: 0

## 2020-09-09 ASSESSMENT — PAIN DESCRIPTION - PAIN TYPE
TYPE: SURGICAL PAIN
TYPE: SURGICAL PAIN

## 2020-09-09 NOTE — DISCHARGE PLANNING
Care Transition Team Discharge Planning    Anticipated Discharge Information  Discharge Disposition: Discharged to home/self care ()  Discharge Address:  [address] [place] [address]  Discharge Contact Phone Number: [phone]          Discharge Plan: Home    This RN CM met with Pt at bedside again. Per Pt his wife  in  this year and he sold the house which belonged to him and his wife. Pt's belongings are in the backyard of his address because the house was sold and he has to vacate the house.    This RN CM spoke with hilda Davis at tel [phone] who claims that she is Pt's Girlfriend and that she will provide care and support to Pt. Per Hilda, she and Pt will stay together . When I asked Hilda, she said they are planning to stay in a Motel after Pt's discharge.    Pt has a FWW at bedside. Hilda will  Pt at [facility] when Pt is medically clear.   This RN CM will follow and assist Pt with discharge as needed.
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
## 2020-09-09 NOTE — DISCHARGE PLANNING
Care Transition Team Assessment    This RN CM met with Pt at bedside for this assessment . Pt verified accuracy of the Face Sheet Demographics. Pt lives with his Girlfriend [address] [place] [address]. Pt has no PCP and has no AD. Pt has [facility] Insurance and has Medicaid FFS for Secondary Insurance. Pt prefers to go Home with his Girlfriend and Pets. Pt refuses to discharge to an in Pt Rehab Facility.     Information Source  Orientation : Oriented x 4  Information Given By: Patient  Who is responsible for making decisions for patient? : Patient    Readmission Evaluation  Is this a readmission?: No    Elopement Risk  Legal Hold: No  Ambulatory or Self Mobile in Wheelchair: Yes  Disoriented: No  Psychiatric Symptoms: None  History of Wandering: No  Elopement this Admit: No  Vocalizing Wanting to Leave: No  Displays Behaviors, Body Language Wanting to Leave: No-Not at Risk for Elopement  Elopement Risk: Not at Risk for Elopement    Interdisciplinary Discharge Planning  Lives with - Patient's Self Care Capacity: Significant Other  Patient or legal guardian wants to designate a caregiver: No  Support Systems: Spouse / Significant Other  Housing / Facility: [address]  Do You Take your Prescribed Medications Regularly: Yes  Able to Return to Previous ADL's: Future Time w/Therapy  Mobility Issues: No  Prior Services: None  Patient Prefers to be Discharged to: Home with Help  Assistance Needed: Unknown at this Time  Durable Medical Equipment: Not Applicable    Discharge Preparedness  What is your plan after discharge?: Home with help  What are your discharge supports?: Other (Significant Other)  Prior Functional Level: Ambulatory, Independent with Activities of Daily Living, Independent with Medication Management  Difficulity with ADLs: None  Difficulity with IADLs: None    Functional Assesment  Prior Functional Level: Ambulatory, Independent with Activities of Daily Living,  Independent with Medication Management    Finances  Financial Barriers to Discharge: No  Prescription Coverage: Yes    Vision / Hearing Impairment  Vision Impairment : No  Hearing Impairment : No     Advance Directive  Advance Directive?: None    Domestic Abuse  Have you ever been the victim of abuse or violence?: No  Physical Abuse or Sexual Abuse: No  Verbal Abuse or Emotional Abuse: No  Possible Abuse/Neglect Reported to: Not Applicable    Psychological Assessment  History of Substance Abuse: None  History of Psychiatric Problems: No  Non-compliant with Treatment: No  Newly Diagnosed Illness: Yes    Discharge Risks or Barriers  Discharge risks or barriers?: No PCP, Pending Medical Clearance    Anticipated Discharge Information  Discharge Disposition: Discharged to home/self care (01)  Discharge Address: [address] [place] [address]  Discharge Contact Phone Number: [phone]

## 2020-09-09 NOTE — DISCHARGE PLANNING
Care Transition Team Discharge Planning          Discharge Plan:  Home    This RN DE met with Pt at bedside today and informed him that Jeniffer Nicole of [facility] is looking at his case for possible acceptance to IRF.    Per Peña, he does not want to go anywhere but Home. Pt wants to go home and enjoy his Pets . Per Pt he is tired of being in the hospital bed and would like to be able to go out and be able to smoke again. Per Pt he has no problem with ambulation.     Pt has a Girlfriend, Hilda Davis ;they live together at [address] [place] [address].     Informed Jeniffer Nicole of this update. Informed HOWARD Hernandez.    Per ALEXANDER Wilson , Pt needs a FWW.

## 2020-09-10 NOTE — DISCHARGE INSTRUCTIONS
Discharge Instructions    Discharged to home by car with friend. Discharged via wheelchair, hospital escort: Yes.  Special equipment needed: Walker    Be sure to schedule a follow-up appointment with your primary care doctor or any specialists as instructed.     Discharge Plan:   Diet Plan: Discussed  Activity Level: Discussed  Confirmed Follow up Appointment: Patient to Call and Schedule Appointment  Confirmed Symptoms Management: Discussed    I understand that a diet low in cholesterol, fat, and sodium is recommended for good health. Unless I have been given specific instructions below for another diet, I accept this instruction as my diet prescription.   Other diet: Resume regular diet.    Special Instructions: None    · Is patient discharged on Warfarin / Coumadin?   No     Depression / Suicide Risk    As you are discharged from this [facility] facility, it is important to learn how to keep safe from harming yourself.    Recognize the warning signs:  · Abrupt changes in personality, positive or negative- including increase in energy   · Giving away possessions  · Change in eating patterns- significant weight changes-  positive or negative  · Change in sleeping patterns- unable to sleep or sleeping all the time   · Unwillingness or inability to communicate  · Depression  · Unusual sadness, discouragement and loneliness  · Talk of wanting to die  · Neglect of personal appearance   · Rebelliousness- reckless behavior  · Withdrawal from people/activities they love  · Confusion- inability to concentrate     If you or a loved one observes any of these behaviors or has concerns about self-harm, here's what you can do:  · Talk about it- your feelings and reasons for harming yourself  · Remove any means that you might use to hurt yourself (examples: pills, rope, extension cords, firearm)  · Get professional help from the community (Mental Health, Substance Abuse, psychological counseling)  · Do not be alone:Call your  Safe Contact- someone whom you trust who will be there for you.  · Call your local CRISIS HOTLINE 867-8354 or 265-182-3542  · Call your local Children's Mobile Crisis Response Team [place] (290) 406-2306 or www.[employer]([place])  · Call the toll free National Suicide Prevention Hotlines   · National Suicide Prevention Lifeline 533-268-SRRN (5836)  · [place] [facility] Line Network 800-SUICIDE (353-4014)    - Call or seek medical attention for questions or concerns  - Follow up with Dr. Mc in 1-2 weeks time  - Follow up with Dr. George in 1-2 weeks time  - Follow up with primary care provider within one weeks time  - Resume regular diet  - May take over the counter acetaminophen or ibuprofen as needed for pain  - Continue daily over the counter stool softener while on narcotics  - No operation of machinery or motorized vehicles while under the influence of narcotics  - No alcohol, marijuana or illicit drug use while under the influence of narcotics  - No swimming, hot tubs, baths or wound submersion until cleared by outpatient provider. May shower  - No contact sports, strenuous activities, or heavy lifting until cleared by outpatient provider  - If respiratory decompensation, uncontrolled pain, or signs or symptoms of infection occur seek medical attention

## 2020-09-10 NOTE — PROGRESS NOTES
Patient discharged to home with friend and staff escort. IV discontinued. Prescriptions given to patient, verbalized understanding, consent signed and in chart. Discharge instructions given regarding activity, medications, and follow-up appointments.  Education provided, patient asked questions and verbalized understanding. Discharge paperwork signed and in chart. Patient is tolerating diet, stable when ambulating, and pain is well controlled. All belongings collected. No further questions or concerns at this time.

## 2020-09-22 ENCOUNTER — PATIENT OUTREACH (OUTPATIENT)
Dept: HEALTH INFORMATION MANAGEMENT | Facility: OTHER | Age: 50
End: 2020-09-22

## 2020-10-05 NOTE — PROGRESS NOTES
Unable to reach patient after 3 outreach attempts for discharge follow up. Will dc from CCM services at this time.

## 2023-03-28 ENCOUNTER — HOSPITAL ENCOUNTER (INPATIENT)
Facility: MEDICAL CENTER | Age: 53
LOS: 3 days | DRG: 637 | End: 2023-03-31
Attending: EMERGENCY MEDICINE | Admitting: INTERNAL MEDICINE
Payer: OTHER GOVERNMENT

## 2023-03-28 ENCOUNTER — APPOINTMENT (OUTPATIENT)
Dept: RADIOLOGY | Facility: MEDICAL CENTER | Age: 53
DRG: 637 | End: 2023-03-28
Attending: EMERGENCY MEDICINE
Payer: OTHER GOVERNMENT

## 2023-03-28 DIAGNOSIS — E10.11 DIABETIC KETOACIDOSIS WITH COMA ASSOCIATED WITH TYPE 1 DIABETES MELLITUS (HCC): ICD-10-CM

## 2023-03-28 PROBLEM — E87.5 HYPERKALEMIA: Status: ACTIVE | Noted: 2023-03-28

## 2023-03-28 PROBLEM — E03.9 HYPOTHYROID: Status: ACTIVE | Noted: 2023-03-28

## 2023-03-28 PROBLEM — N17.9 AKI (ACUTE KIDNEY INJURY) (HCC): Status: ACTIVE | Noted: 2023-03-28

## 2023-03-28 PROBLEM — D72.829 LEUKOCYTOSIS: Status: ACTIVE | Noted: 2023-03-28

## 2023-03-28 PROBLEM — E10.10 DKA, TYPE 1, NOT AT GOAL (HCC): Status: ACTIVE | Noted: 2023-03-28

## 2023-03-28 PROBLEM — K76.0 HEPATIC STEATOSIS: Status: ACTIVE | Noted: 2023-03-28

## 2023-03-28 PROBLEM — R79.89 ELEVATED LACTIC ACID LEVEL: Status: ACTIVE | Noted: 2023-03-28

## 2023-03-28 LAB
ALBUMIN SERPL BCP-MCNC: 4.3 G/DL (ref 3.2–4.9)
ALBUMIN/GLOB SERPL: 1.8 G/DL
ALP SERPL-CCNC: 105 U/L (ref 30–99)
ALT SERPL-CCNC: 35 U/L (ref 2–50)
ANION GAP SERPL CALC-SCNC: 48 MMOL/L (ref 7–16)
AST SERPL-CCNC: 18 U/L (ref 12–45)
B-OH-BUTYR SERPL-MCNC: >8 MMOL/L (ref 0.02–0.27)
BASE EXCESS BLDV CALC-SCNC: -23 MMOL/L
BASOPHILS # BLD AUTO: 0.4 % (ref 0–1.8)
BASOPHILS # BLD: 0.12 K/UL (ref 0–0.12)
BILIRUB SERPL-MCNC: 1.1 MG/DL (ref 0.1–1.5)
BODY TEMPERATURE: 36.4 CENTIGRADE
BUN SERPL-MCNC: 58 MG/DL (ref 8–22)
CALCIUM ALBUM COR SERPL-MCNC: 9 MG/DL (ref 8.5–10.5)
CALCIUM SERPL-MCNC: 9.2 MG/DL (ref 8.5–10.5)
CHLORIDE SERPL-SCNC: 69 MMOL/L (ref 96–112)
CO2 SERPL-SCNC: 4 MMOL/L (ref 20–33)
CREAT SERPL-MCNC: 2.78 MG/DL (ref 0.5–1.4)
EOSINOPHIL # BLD AUTO: 0.01 K/UL (ref 0–0.51)
EOSINOPHIL NFR BLD: 0 % (ref 0–6.9)
ERYTHROCYTE [DISTWIDTH] IN BLOOD BY AUTOMATED COUNT: 40.4 FL (ref 35.9–50)
GFR SERPLBLD CREATININE-BSD FMLA CKD-EPI: 27 ML/MIN/1.73 M 2
GLOBULIN SER CALC-MCNC: 2.4 G/DL (ref 1.9–3.5)
GLUCOSE BLD STRIP.AUTO-MCNC: >600 MG/DL (ref 65–99)
GLUCOSE SERPL-MCNC: 1030 MG/DL (ref 65–99)
HCO3 BLDV-SCNC: 6 MMOL/L (ref 24–28)
HCT VFR BLD AUTO: 53.5 % (ref 42–52)
HGB BLD-MCNC: 19 G/DL (ref 14–18)
IMM GRANULOCYTES # BLD AUTO: 0.52 K/UL (ref 0–0.11)
IMM GRANULOCYTES NFR BLD AUTO: 1.5 % (ref 0–0.9)
LACTATE SERPL-SCNC: 7.4 MMOL/L (ref 0.5–2)
LIPASE SERPL-CCNC: 21 U/L (ref 11–82)
LYMPHOCYTES # BLD AUTO: 0.81 K/UL (ref 1–4.8)
LYMPHOCYTES NFR BLD: 2.4 % (ref 22–41)
MAGNESIUM SERPL-MCNC: 2.6 MG/DL (ref 1.5–2.5)
MCH RBC QN AUTO: 30.9 PG (ref 27–33)
MCHC RBC AUTO-ENTMCNC: 35.5 G/DL (ref 33.7–35.3)
MCV RBC AUTO: 87.1 FL (ref 81.4–97.8)
MONOCYTES # BLD AUTO: 1.43 K/UL (ref 0–0.85)
MONOCYTES NFR BLD AUTO: 4.3 % (ref 0–13.4)
NEUTROPHILS # BLD AUTO: 30.72 K/UL (ref 1.82–7.42)
NEUTROPHILS NFR BLD: 91.4 % (ref 44–72)
NRBC # BLD AUTO: 0 K/UL
NRBC BLD-RTO: 0 /100 WBC
PCO2 BLDV: 19.9 MMHG (ref 41–51)
PCO2 TEMP ADJ BLDV: 19.4 MMHG (ref 41–51)
PH BLDV: 7.06 [PH] (ref 7.31–7.45)
PH TEMP ADJ BLDV: 7.07 [PH] (ref 7.31–7.45)
PLATELET # BLD AUTO: 461 K/UL (ref 164–446)
PMV BLD AUTO: 10.2 FL (ref 9–12.9)
PO2 BLDV: 49.2 MMHG (ref 25–40)
PO2 TEMP ADJ BLDV: 47.2 MMHG (ref 25–40)
POTASSIUM SERPL-SCNC: 6.8 MMOL/L (ref 3.6–5.5)
PROT SERPL-MCNC: 6.7 G/DL (ref 6–8.2)
RBC # BLD AUTO: 6.14 M/UL (ref 4.7–6.1)
SAO2 % BLDV: 74.3 %
SODIUM SERPL-SCNC: 121 MMOL/L (ref 135–145)
WBC # BLD AUTO: 33.6 K/UL (ref 4.8–10.8)

## 2023-03-28 PROCEDURE — 36415 COLL VENOUS BLD VENIPUNCTURE: CPT

## 2023-03-28 PROCEDURE — 85025 COMPLETE CBC W/AUTO DIFF WBC: CPT

## 2023-03-28 PROCEDURE — 83735 ASSAY OF MAGNESIUM: CPT

## 2023-03-28 PROCEDURE — 82803 BLOOD GASES ANY COMBINATION: CPT

## 2023-03-28 PROCEDURE — 700111 HCHG RX REV CODE 636 W/ 250 OVERRIDE (IP): Performed by: EMERGENCY MEDICINE

## 2023-03-28 PROCEDURE — 87040 BLOOD CULTURE FOR BACTERIA: CPT

## 2023-03-28 PROCEDURE — 82962 GLUCOSE BLOOD TEST: CPT

## 2023-03-28 PROCEDURE — 82010 KETONE BODYS QUAN: CPT

## 2023-03-28 PROCEDURE — 83605 ASSAY OF LACTIC ACID: CPT

## 2023-03-28 PROCEDURE — 74176 CT ABD & PELVIS W/O CONTRAST: CPT

## 2023-03-28 PROCEDURE — 700105 HCHG RX REV CODE 258: Performed by: EMERGENCY MEDICINE

## 2023-03-28 PROCEDURE — 96374 THER/PROPH/DIAG INJ IV PUSH: CPT

## 2023-03-28 PROCEDURE — 99291 CRITICAL CARE FIRST HOUR: CPT | Performed by: INTERNAL MEDICINE

## 2023-03-28 PROCEDURE — 700102 HCHG RX REV CODE 250 W/ 637 OVERRIDE(OP): Performed by: EMERGENCY MEDICINE

## 2023-03-28 PROCEDURE — 83690 ASSAY OF LIPASE: CPT

## 2023-03-28 PROCEDURE — 84100 ASSAY OF PHOSPHORUS: CPT

## 2023-03-28 PROCEDURE — 80053 COMPREHEN METABOLIC PANEL: CPT

## 2023-03-28 PROCEDURE — 71045 X-RAY EXAM CHEST 1 VIEW: CPT

## 2023-03-28 PROCEDURE — 770022 HCHG ROOM/CARE - ICU (200)

## 2023-03-28 PROCEDURE — 96375 TX/PRO/DX INJ NEW DRUG ADDON: CPT

## 2023-03-28 PROCEDURE — 99291 CRITICAL CARE FIRST HOUR: CPT

## 2023-03-28 RX ORDER — LABETALOL HYDROCHLORIDE 5 MG/ML
10 INJECTION, SOLUTION INTRAVENOUS EVERY 4 HOURS PRN
Status: DISCONTINUED | OUTPATIENT
Start: 2023-03-28 | End: 2023-03-31 | Stop reason: HOSPADM

## 2023-03-28 RX ORDER — PROMETHAZINE HYDROCHLORIDE 25 MG/1
12.5-25 SUPPOSITORY RECTAL EVERY 4 HOURS PRN
Status: DISCONTINUED | OUTPATIENT
Start: 2023-03-28 | End: 2023-03-30

## 2023-03-28 RX ORDER — SODIUM CHLORIDE, SODIUM LACTATE, POTASSIUM CHLORIDE, CALCIUM CHLORIDE 600; 310; 30; 20 MG/100ML; MG/100ML; MG/100ML; MG/100ML
INJECTION, SOLUTION INTRAVENOUS CONTINUOUS
Status: DISCONTINUED | OUTPATIENT
Start: 2023-03-29 | End: 2023-03-29

## 2023-03-28 RX ORDER — SODIUM CHLORIDE, SODIUM LACTATE, POTASSIUM CHLORIDE, CALCIUM CHLORIDE 600; 310; 30; 20 MG/100ML; MG/100ML; MG/100ML; MG/100ML
1000 INJECTION, SOLUTION INTRAVENOUS ONCE
Status: COMPLETED | OUTPATIENT
Start: 2023-03-28 | End: 2023-03-28

## 2023-03-28 RX ORDER — DEXTROSE, SODIUM CHLORIDE, SODIUM LACTATE, POTASSIUM CHLORIDE, AND CALCIUM CHLORIDE 5; .6; .31; .03; .02 G/100ML; G/100ML; G/100ML; G/100ML; G/100ML
INJECTION, SOLUTION INTRAVENOUS CONTINUOUS
Status: DISCONTINUED | OUTPATIENT
Start: 2023-03-29 | End: 2023-03-29

## 2023-03-28 RX ORDER — ONDANSETRON 2 MG/ML
4 INJECTION INTRAMUSCULAR; INTRAVENOUS EVERY 4 HOURS PRN
Status: DISCONTINUED | OUTPATIENT
Start: 2023-03-28 | End: 2023-03-31 | Stop reason: HOSPADM

## 2023-03-28 RX ORDER — ONDANSETRON 4 MG/1
4 TABLET, ORALLY DISINTEGRATING ORAL EVERY 4 HOURS PRN
Status: DISCONTINUED | OUTPATIENT
Start: 2023-03-28 | End: 2023-03-31 | Stop reason: HOSPADM

## 2023-03-28 RX ORDER — BISACODYL 10 MG
10 SUPPOSITORY, RECTAL RECTAL
Status: DISCONTINUED | OUTPATIENT
Start: 2023-03-28 | End: 2023-03-31 | Stop reason: HOSPADM

## 2023-03-28 RX ORDER — SODIUM CHLORIDE 9 MG/ML
1000 INJECTION, SOLUTION INTRAVENOUS ONCE
Status: COMPLETED | OUTPATIENT
Start: 2023-03-28 | End: 2023-03-28

## 2023-03-28 RX ORDER — SODIUM CHLORIDE 9 MG/ML
2000 INJECTION, SOLUTION INTRAVENOUS ONCE
Status: DISCONTINUED | OUTPATIENT
Start: 2023-03-29 | End: 2023-03-29

## 2023-03-28 RX ORDER — LEVOTHYROXINE SODIUM 0.03 MG/1
25 TABLET ORAL
Status: DISCONTINUED | OUTPATIENT
Start: 2023-03-29 | End: 2023-03-31 | Stop reason: HOSPADM

## 2023-03-28 RX ORDER — MAGNESIUM SULFATE HEPTAHYDRATE 40 MG/ML
4 INJECTION, SOLUTION INTRAVENOUS
Status: DISCONTINUED | OUTPATIENT
Start: 2023-03-28 | End: 2023-03-29

## 2023-03-28 RX ORDER — AMOXICILLIN 250 MG
2 CAPSULE ORAL 2 TIMES DAILY
Status: DISCONTINUED | OUTPATIENT
Start: 2023-03-29 | End: 2023-03-31 | Stop reason: HOSPADM

## 2023-03-28 RX ORDER — CEFTRIAXONE 2 G/1
2000 INJECTION, POWDER, FOR SOLUTION INTRAMUSCULAR; INTRAVENOUS ONCE
Status: COMPLETED | OUTPATIENT
Start: 2023-03-28 | End: 2023-03-28

## 2023-03-28 RX ORDER — POLYETHYLENE GLYCOL 3350 17 G/17G
1 POWDER, FOR SOLUTION ORAL
Status: DISCONTINUED | OUTPATIENT
Start: 2023-03-28 | End: 2023-03-31 | Stop reason: HOSPADM

## 2023-03-28 RX ORDER — DEXTROSE AND SODIUM CHLORIDE 10; .45 G/100ML; G/100ML
INJECTION, SOLUTION INTRAVENOUS CONTINUOUS
Status: ACTIVE | OUTPATIENT
Start: 2023-03-29 | End: 2023-03-29

## 2023-03-28 RX ORDER — SODIUM CHLORIDE, SODIUM LACTATE, POTASSIUM CHLORIDE, AND CALCIUM CHLORIDE .6; .31; .03; .02 G/100ML; G/100ML; G/100ML; G/100ML
2000 INJECTION, SOLUTION INTRAVENOUS ONCE
Status: DISCONTINUED | OUTPATIENT
Start: 2023-03-29 | End: 2023-03-28

## 2023-03-28 RX ORDER — PROCHLORPERAZINE EDISYLATE 5 MG/ML
5-10 INJECTION INTRAMUSCULAR; INTRAVENOUS EVERY 4 HOURS PRN
Status: DISCONTINUED | OUTPATIENT
Start: 2023-03-28 | End: 2023-03-30

## 2023-03-28 RX ORDER — ACETAMINOPHEN 325 MG/1
650 TABLET ORAL EVERY 6 HOURS PRN
Status: DISCONTINUED | OUTPATIENT
Start: 2023-03-28 | End: 2023-03-31 | Stop reason: HOSPADM

## 2023-03-28 RX ORDER — PROMETHAZINE HYDROCHLORIDE 25 MG/1
12.5-25 TABLET ORAL EVERY 4 HOURS PRN
Status: DISCONTINUED | OUTPATIENT
Start: 2023-03-28 | End: 2023-03-30

## 2023-03-28 RX ORDER — HEPARIN SODIUM 5000 [USP'U]/ML
5000 INJECTION, SOLUTION INTRAVENOUS; SUBCUTANEOUS EVERY 8 HOURS
Status: DISCONTINUED | OUTPATIENT
Start: 2023-03-29 | End: 2023-03-30

## 2023-03-28 RX ORDER — MAGNESIUM SULFATE HEPTAHYDRATE 40 MG/ML
2 INJECTION, SOLUTION INTRAVENOUS
Status: DISCONTINUED | OUTPATIENT
Start: 2023-03-28 | End: 2023-03-29

## 2023-03-28 RX ADMIN — SODIUM CHLORIDE 1000 ML: 9 INJECTION, SOLUTION INTRAVENOUS at 21:11

## 2023-03-28 RX ADMIN — SODIUM CHLORIDE, POTASSIUM CHLORIDE, SODIUM LACTATE AND CALCIUM CHLORIDE 1000 ML: 600; 310; 30; 20 INJECTION, SOLUTION INTRAVENOUS at 22:57

## 2023-03-28 RX ADMIN — SODIUM BICARBONATE 50 MEQ: 84 INJECTION, SOLUTION INTRAVENOUS at 23:07

## 2023-03-28 RX ADMIN — SODIUM CHLORIDE 8 UNITS/HR: 9 INJECTION, SOLUTION INTRAVENOUS at 23:37

## 2023-03-28 RX ADMIN — CEFTRIAXONE SODIUM 2000 MG: 2 INJECTION, POWDER, FOR SOLUTION INTRAMUSCULAR; INTRAVENOUS at 22:16

## 2023-03-28 RX ADMIN — INSULIN HUMAN 10 UNITS: 100 INJECTION, SOLUTION PARENTERAL at 23:06

## 2023-03-29 ENCOUNTER — APPOINTMENT (OUTPATIENT)
Dept: RADIOLOGY | Facility: MEDICAL CENTER | Age: 53
DRG: 637 | End: 2023-03-29
Attending: NURSE PRACTITIONER
Payer: OTHER GOVERNMENT

## 2023-03-29 PROBLEM — E87.20 LACTIC ACIDOSIS: Status: RESOLVED | Noted: 2023-03-29 | Resolved: 2023-03-29

## 2023-03-29 PROBLEM — G93.40 ENCEPHALOPATHY: Status: ACTIVE | Noted: 2023-03-29

## 2023-03-29 PROBLEM — E87.20 LACTIC ACIDOSIS: Status: ACTIVE | Noted: 2023-03-29

## 2023-03-29 LAB
ALBUMIN SERPL BCP-MCNC: 3.7 G/DL (ref 3.2–4.9)
ALBUMIN/GLOB SERPL: 1.4 G/DL
ALP SERPL-CCNC: 87 U/L (ref 30–99)
ALT SERPL-CCNC: 29 U/L (ref 2–50)
ANION GAP SERPL CALC-SCNC: 11 MMOL/L (ref 7–16)
ANION GAP SERPL CALC-SCNC: 13 MMOL/L (ref 7–16)
ANION GAP SERPL CALC-SCNC: 16 MMOL/L (ref 7–16)
ANION GAP SERPL CALC-SCNC: 17 MMOL/L (ref 7–16)
ANION GAP SERPL CALC-SCNC: 22 MMOL/L (ref 7–16)
ANION GAP SERPL CALC-SCNC: 33 MMOL/L (ref 7–16)
ANION GAP SERPL CALC-SCNC: 46 MMOL/L (ref 7–16)
AST SERPL-CCNC: 18 U/L (ref 12–45)
BASE EXCESS BLDV CALC-SCNC: -16 MMOL/L (ref -4–3)
BASOPHILS # BLD AUTO: 0.2 % (ref 0–1.8)
BASOPHILS # BLD: 0.05 K/UL (ref 0–0.12)
BILIRUB SERPL-MCNC: 0.7 MG/DL (ref 0.1–1.5)
BLOOD CULTURE HOLD CXBCH: NORMAL
BODY TEMPERATURE: ABNORMAL DEGREES
BUN SERPL-MCNC: 46 MG/DL (ref 8–22)
BUN SERPL-MCNC: 52 MG/DL (ref 8–22)
BUN SERPL-MCNC: 55 MG/DL (ref 8–22)
BUN SERPL-MCNC: 61 MG/DL (ref 8–22)
BUN SERPL-MCNC: 62 MG/DL (ref 8–22)
BUN SERPL-MCNC: 63 MG/DL (ref 8–22)
BUN SERPL-MCNC: 68 MG/DL (ref 8–22)
CALCIUM ALBUM COR SERPL-MCNC: 8.9 MG/DL (ref 8.5–10.5)
CALCIUM SERPL-MCNC: 7.7 MG/DL (ref 8.5–10.5)
CALCIUM SERPL-MCNC: 7.9 MG/DL (ref 8.5–10.5)
CALCIUM SERPL-MCNC: 8.6 MG/DL (ref 8.5–10.5)
CALCIUM SERPL-MCNC: 8.7 MG/DL (ref 8.5–10.5)
CALCIUM SERPL-MCNC: 8.7 MG/DL (ref 8.5–10.5)
CALCIUM SERPL-MCNC: 8.9 MG/DL (ref 8.5–10.5)
CALCIUM SERPL-MCNC: 9.1 MG/DL (ref 8.5–10.5)
CHLORIDE SERPL-SCNC: 100 MMOL/L (ref 96–112)
CHLORIDE SERPL-SCNC: 72 MMOL/L (ref 96–112)
CHLORIDE SERPL-SCNC: 80 MMOL/L (ref 96–112)
CHLORIDE SERPL-SCNC: 93 MMOL/L (ref 96–112)
CHLORIDE SERPL-SCNC: 94 MMOL/L (ref 96–112)
CHLORIDE SERPL-SCNC: 98 MMOL/L (ref 96–112)
CHLORIDE SERPL-SCNC: 98 MMOL/L (ref 96–112)
CO2 BLDV-SCNC: 9 MMOL/L (ref 20–33)
CO2 SERPL-SCNC: 12 MMOL/L (ref 20–33)
CO2 SERPL-SCNC: 15 MMOL/L (ref 20–33)
CO2 SERPL-SCNC: 24 MMOL/L (ref 20–33)
CO2 SERPL-SCNC: 24 MMOL/L (ref 20–33)
CO2 SERPL-SCNC: 25 MMOL/L (ref 20–33)
CO2 SERPL-SCNC: 26 MMOL/L (ref 20–33)
CO2 SERPL-SCNC: 6 MMOL/L (ref 20–33)
CREAT SERPL-MCNC: 1.68 MG/DL (ref 0.5–1.4)
CREAT SERPL-MCNC: 1.83 MG/DL (ref 0.5–1.4)
CREAT SERPL-MCNC: 1.87 MG/DL (ref 0.5–1.4)
CREAT SERPL-MCNC: 2.08 MG/DL (ref 0.5–1.4)
CREAT SERPL-MCNC: 2.15 MG/DL (ref 0.5–1.4)
CREAT SERPL-MCNC: 2.32 MG/DL (ref 0.5–1.4)
CREAT SERPL-MCNC: 2.91 MG/DL (ref 0.5–1.4)
DELSYS IDSYS: ABNORMAL
EOSINOPHIL # BLD AUTO: 0 K/UL (ref 0–0.51)
EOSINOPHIL NFR BLD: 0 % (ref 0–6.9)
ERYTHROCYTE [DISTWIDTH] IN BLOOD BY AUTOMATED COUNT: 38.1 FL (ref 35.9–50)
EST. AVERAGE GLUCOSE BLD GHB EST-MCNC: 157 MG/DL
GFR SERPLBLD CREATININE-BSD FMLA CKD-EPI: 25 ML/MIN/1.73 M 2
GFR SERPLBLD CREATININE-BSD FMLA CKD-EPI: 33 ML/MIN/1.73 M 2
GFR SERPLBLD CREATININE-BSD FMLA CKD-EPI: 36 ML/MIN/1.73 M 2
GFR SERPLBLD CREATININE-BSD FMLA CKD-EPI: 38 ML/MIN/1.73 M 2
GFR SERPLBLD CREATININE-BSD FMLA CKD-EPI: 43 ML/MIN/1.73 M 2
GFR SERPLBLD CREATININE-BSD FMLA CKD-EPI: 44 ML/MIN/1.73 M 2
GFR SERPLBLD CREATININE-BSD FMLA CKD-EPI: 49 ML/MIN/1.73 M 2
GLOBULIN SER CALC-MCNC: 2.7 G/DL (ref 1.9–3.5)
GLUCOSE BLD STRIP.AUTO-MCNC: 100 MG/DL (ref 65–99)
GLUCOSE BLD STRIP.AUTO-MCNC: 100 MG/DL (ref 65–99)
GLUCOSE BLD STRIP.AUTO-MCNC: 104 MG/DL (ref 65–99)
GLUCOSE BLD STRIP.AUTO-MCNC: 109 MG/DL (ref 65–99)
GLUCOSE BLD STRIP.AUTO-MCNC: 112 MG/DL (ref 65–99)
GLUCOSE BLD STRIP.AUTO-MCNC: 123 MG/DL (ref 65–99)
GLUCOSE BLD STRIP.AUTO-MCNC: 154 MG/DL (ref 65–99)
GLUCOSE BLD STRIP.AUTO-MCNC: 162 MG/DL (ref 65–99)
GLUCOSE BLD STRIP.AUTO-MCNC: 174 MG/DL (ref 65–99)
GLUCOSE BLD STRIP.AUTO-MCNC: 218 MG/DL (ref 65–99)
GLUCOSE BLD STRIP.AUTO-MCNC: 285 MG/DL (ref 65–99)
GLUCOSE BLD STRIP.AUTO-MCNC: 379 MG/DL (ref 65–99)
GLUCOSE BLD STRIP.AUTO-MCNC: 403 MG/DL (ref 65–99)
GLUCOSE BLD STRIP.AUTO-MCNC: 461 MG/DL (ref 65–99)
GLUCOSE BLD STRIP.AUTO-MCNC: 499 MG/DL (ref 65–99)
GLUCOSE BLD STRIP.AUTO-MCNC: 90 MG/DL (ref 65–99)
GLUCOSE BLD STRIP.AUTO-MCNC: 99 MG/DL (ref 65–99)
GLUCOSE BLD STRIP.AUTO-MCNC: >600 MG/DL (ref 65–99)
GLUCOSE SERPL-MCNC: 159 MG/DL (ref 65–99)
GLUCOSE SERPL-MCNC: 384 MG/DL (ref 65–99)
GLUCOSE SERPL-MCNC: 505 MG/DL (ref 65–99)
GLUCOSE SERPL-MCNC: 673 MG/DL (ref 65–99)
GLUCOSE SERPL-MCNC: 721 MG/DL (ref 65–99)
GLUCOSE SERPL-MCNC: 864 MG/DL (ref 65–99)
GLUCOSE SERPL-MCNC: 896 MG/DL (ref 65–99)
GLUCOSE SERPL-MCNC: 93 MG/DL (ref 65–99)
GLUCOSE SERPL-MCNC: 97 MG/DL (ref 65–99)
GLUCOSE SERPL-MCNC: 998 MG/DL (ref 65–99)
HBA1C MFR BLD: 7.1 % (ref 4–5.6)
HCO3 BLDV-SCNC: 8.8 MMOL/L (ref 24–28)
HCT VFR BLD AUTO: 47.1 % (ref 42–52)
HGB BLD-MCNC: 16.8 G/DL (ref 14–18)
HOROWITZ INDEX BLDV+IHG-RTO: 152 MM[HG]
IMM GRANULOCYTES # BLD AUTO: 0.3 K/UL (ref 0–0.11)
IMM GRANULOCYTES NFR BLD AUTO: 1.1 % (ref 0–0.9)
LACTATE SERPL-SCNC: 2.8 MMOL/L (ref 0.5–2)
LACTATE SERPL-SCNC: 6.8 MMOL/L (ref 0.5–2)
LPM ILPM: 0 LPM
LYMPHOCYTES # BLD AUTO: 1.28 K/UL (ref 1–4.8)
LYMPHOCYTES NFR BLD: 4.7 % (ref 22–41)
MAGNESIUM SERPL-MCNC: 1.7 MG/DL (ref 1.5–2.5)
MAGNESIUM SERPL-MCNC: 2.6 MG/DL (ref 1.5–2.5)
MAGNESIUM SERPL-MCNC: 2.9 MG/DL (ref 1.5–2.5)
MCH RBC QN AUTO: 30.1 PG (ref 27–33)
MCHC RBC AUTO-ENTMCNC: 35.7 G/DL (ref 33.7–35.3)
MCV RBC AUTO: 84.4 FL (ref 81.4–97.8)
MONOCYTES # BLD AUTO: 1.25 K/UL (ref 0–0.85)
MONOCYTES NFR BLD AUTO: 4.6 % (ref 0–13.4)
NEUTROPHILS # BLD AUTO: 24.28 K/UL (ref 1.82–7.42)
NEUTROPHILS NFR BLD: 89.4 % (ref 44–72)
NRBC # BLD AUTO: 0 K/UL
NRBC BLD-RTO: 0 /100 WBC
O2/TOTAL GAS SETTING VFR VENT: 21 %
PCO2 BLDV: 21.9 MMHG (ref 41–51)
PCO2 TEMP ADJ BLDV: 21.8 MMHG (ref 41–51)
PH BLDV: 7.21 [PH] (ref 7.31–7.45)
PH TEMP ADJ BLDV: 7.21 [PH] (ref 7.31–7.45)
PHOSPHATE SERPL-MCNC: 2 MG/DL (ref 2.5–4.5)
PHOSPHATE SERPL-MCNC: 2.7 MG/DL (ref 2.5–4.5)
PHOSPHATE SERPL-MCNC: 3.4 MG/DL (ref 2.5–4.5)
PHOSPHATE SERPL-MCNC: 4.7 MG/DL (ref 2.5–4.5)
PHOSPHATE SERPL-MCNC: 8.2 MG/DL (ref 2.5–4.5)
PHOSPHATE SERPL-MCNC: 9.6 MG/DL (ref 2.5–4.5)
PLATELET # BLD AUTO: 334 K/UL (ref 164–446)
PMV BLD AUTO: 9.5 FL (ref 9–12.9)
PO2 BLDV: 32 MMHG (ref 25–40)
PO2 TEMP ADJ BLDV: 32 MMHG (ref 25–40)
POTASSIUM SERPL-SCNC: 3.3 MMOL/L (ref 3.6–5.5)
POTASSIUM SERPL-SCNC: 3.6 MMOL/L (ref 3.6–5.5)
POTASSIUM SERPL-SCNC: 4.2 MMOL/L (ref 3.6–5.5)
POTASSIUM SERPL-SCNC: 4.5 MMOL/L (ref 3.6–5.5)
POTASSIUM SERPL-SCNC: 4.7 MMOL/L (ref 3.6–5.5)
POTASSIUM SERPL-SCNC: 5.2 MMOL/L (ref 3.6–5.5)
POTASSIUM SERPL-SCNC: 6 MMOL/L (ref 3.6–5.5)
PROT SERPL-MCNC: 6.4 G/DL (ref 6–8.2)
RBC # BLD AUTO: 5.58 M/UL (ref 4.7–6.1)
SAO2 % BLDV: 51 %
SODIUM SERPL-SCNC: 124 MMOL/L (ref 135–145)
SODIUM SERPL-SCNC: 125 MMOL/L (ref 135–145)
SODIUM SERPL-SCNC: 130 MMOL/L (ref 135–145)
SODIUM SERPL-SCNC: 133 MMOL/L (ref 135–145)
SODIUM SERPL-SCNC: 134 MMOL/L (ref 135–145)
SODIUM SERPL-SCNC: 139 MMOL/L (ref 135–145)
SODIUM SERPL-SCNC: 140 MMOL/L (ref 135–145)
SPECIMEN DRAWN FROM PATIENT: ABNORMAL
WBC # BLD AUTO: 27.2 K/UL (ref 4.8–10.8)

## 2023-03-29 PROCEDURE — 85025 COMPLETE CBC W/AUTO DIFF WBC: CPT

## 2023-03-29 PROCEDURE — 99291 CRITICAL CARE FIRST HOUR: CPT | Performed by: STUDENT IN AN ORGANIZED HEALTH CARE EDUCATION/TRAINING PROGRAM

## 2023-03-29 PROCEDURE — 82803 BLOOD GASES ANY COMBINATION: CPT

## 2023-03-29 PROCEDURE — 770022 HCHG ROOM/CARE - ICU (200)

## 2023-03-29 PROCEDURE — 82962 GLUCOSE BLOOD TEST: CPT | Mod: 91

## 2023-03-29 PROCEDURE — 82947 ASSAY GLUCOSE BLOOD QUANT: CPT

## 2023-03-29 PROCEDURE — 96372 THER/PROPH/DIAG INJ SC/IM: CPT

## 2023-03-29 PROCEDURE — 700102 HCHG RX REV CODE 250 W/ 637 OVERRIDE(OP): Performed by: INTERNAL MEDICINE

## 2023-03-29 PROCEDURE — 36415 COLL VENOUS BLD VENIPUNCTURE: CPT

## 2023-03-29 PROCEDURE — 80053 COMPREHEN METABOLIC PANEL: CPT

## 2023-03-29 PROCEDURE — 700102 HCHG RX REV CODE 250 W/ 637 OVERRIDE(OP): Performed by: EMERGENCY MEDICINE

## 2023-03-29 PROCEDURE — 700111 HCHG RX REV CODE 636 W/ 250 OVERRIDE (IP): Performed by: INTERNAL MEDICINE

## 2023-03-29 PROCEDURE — 80048 BASIC METABOLIC PNL TOTAL CA: CPT | Mod: 91

## 2023-03-29 PROCEDURE — 83735 ASSAY OF MAGNESIUM: CPT | Mod: 91

## 2023-03-29 PROCEDURE — 700105 HCHG RX REV CODE 258: Performed by: INTERNAL MEDICINE

## 2023-03-29 PROCEDURE — 83036 HEMOGLOBIN GLYCOSYLATED A1C: CPT

## 2023-03-29 PROCEDURE — 700111 HCHG RX REV CODE 636 W/ 250 OVERRIDE (IP): Performed by: STUDENT IN AN ORGANIZED HEALTH CARE EDUCATION/TRAINING PROGRAM

## 2023-03-29 PROCEDURE — 84100 ASSAY OF PHOSPHORUS: CPT | Mod: 91

## 2023-03-29 PROCEDURE — 83605 ASSAY OF LACTIC ACID: CPT

## 2023-03-29 PROCEDURE — 700105 HCHG RX REV CODE 258: Performed by: NURSE PRACTITIONER

## 2023-03-29 RX ORDER — POTASSIUM CHLORIDE 7.45 MG/ML
10 INJECTION INTRAVENOUS
Status: COMPLETED | OUTPATIENT
Start: 2023-03-29 | End: 2023-03-29

## 2023-03-29 RX ORDER — MAGNESIUM SULFATE HEPTAHYDRATE 40 MG/ML
2 INJECTION, SOLUTION INTRAVENOUS ONCE
Status: COMPLETED | OUTPATIENT
Start: 2023-03-29 | End: 2023-03-29

## 2023-03-29 RX ORDER — SODIUM CHLORIDE, SODIUM LACTATE, POTASSIUM CHLORIDE, AND CALCIUM CHLORIDE .6; .31; .03; .02 G/100ML; G/100ML; G/100ML; G/100ML
1000 INJECTION, SOLUTION INTRAVENOUS ONCE
Status: COMPLETED | OUTPATIENT
Start: 2023-03-29 | End: 2023-03-29

## 2023-03-29 RX ORDER — POTASSIUM CHLORIDE 7.45 MG/ML
10 INJECTION INTRAVENOUS ONCE
Status: COMPLETED | OUTPATIENT
Start: 2023-03-29 | End: 2023-03-29

## 2023-03-29 RX ORDER — POTASSIUM CHLORIDE 7.45 MG/ML
10 INJECTION INTRAVENOUS
Status: ACTIVE | OUTPATIENT
Start: 2023-03-29 | End: 2023-03-29

## 2023-03-29 RX ADMIN — POTASSIUM CHLORIDE 10 MEQ: 7.46 INJECTION, SOLUTION INTRAVENOUS at 17:03

## 2023-03-29 RX ADMIN — HEPARIN SODIUM 5000 UNITS: 5000 INJECTION, SOLUTION INTRAVENOUS; SUBCUTANEOUS at 06:45

## 2023-03-29 RX ADMIN — HEPARIN SODIUM 5000 UNITS: 5000 INJECTION, SOLUTION INTRAVENOUS; SUBCUTANEOUS at 14:37

## 2023-03-29 RX ADMIN — SODIUM CHLORIDE, POTASSIUM CHLORIDE, SODIUM LACTATE AND CALCIUM CHLORIDE: 600; 310; 30; 20 INJECTION, SOLUTION INTRAVENOUS at 06:00

## 2023-03-29 RX ADMIN — SODIUM CHLORIDE 12 UNITS/HR: 9 INJECTION, SOLUTION INTRAVENOUS at 11:04

## 2023-03-29 RX ADMIN — POTASSIUM CHLORIDE 10 MEQ: 7.46 INJECTION, SOLUTION INTRAVENOUS at 12:24

## 2023-03-29 RX ADMIN — MAGNESIUM SULFATE HEPTAHYDRATE 2 G: 40 INJECTION, SOLUTION INTRAVENOUS at 15:12

## 2023-03-29 RX ADMIN — POTASSIUM CHLORIDE 10 MEQ: 7.46 INJECTION, SOLUTION INTRAVENOUS at 18:16

## 2023-03-29 RX ADMIN — HEPARIN SODIUM 5000 UNITS: 5000 INJECTION, SOLUTION INTRAVENOUS; SUBCUTANEOUS at 00:15

## 2023-03-29 RX ADMIN — POTASSIUM CHLORIDE 10 MEQ: 7.46 INJECTION, SOLUTION INTRAVENOUS at 21:25

## 2023-03-29 RX ADMIN — SODIUM CHLORIDE 12 UNITS/HR: 9 INJECTION, SOLUTION INTRAVENOUS at 20:01

## 2023-03-29 RX ADMIN — POTASSIUM CHLORIDE 10 MEQ: 7.46 INJECTION, SOLUTION INTRAVENOUS at 19:30

## 2023-03-29 RX ADMIN — SODIUM CHLORIDE, POTASSIUM CHLORIDE, SODIUM LACTATE AND CALCIUM CHLORIDE: 600; 310; 30; 20 INJECTION, SOLUTION INTRAVENOUS at 00:15

## 2023-03-29 RX ADMIN — POTASSIUM CHLORIDE 10 MEQ: 7.46 INJECTION, SOLUTION INTRAVENOUS at 07:05

## 2023-03-29 RX ADMIN — ONDANSETRON 4 MG: 2 INJECTION INTRAMUSCULAR; INTRAVENOUS at 00:33

## 2023-03-29 RX ADMIN — HEPARIN SODIUM 5000 UNITS: 5000 INJECTION, SOLUTION INTRAVENOUS; SUBCUTANEOUS at 21:38

## 2023-03-29 RX ADMIN — THIAMINE HYDROCHLORIDE 100 MG: 100 INJECTION, SOLUTION INTRAMUSCULAR; INTRAVENOUS at 07:11

## 2023-03-29 RX ADMIN — SODIUM CHLORIDE 4 UNITS/HR: 9 INJECTION, SOLUTION INTRAVENOUS at 00:38

## 2023-03-29 RX ADMIN — Medication 1 APPLICATOR: at 18:14

## 2023-03-29 RX ADMIN — INSULIN GLARGINE-YFGN 60 UNITS: 100 INJECTION, SOLUTION SUBCUTANEOUS at 21:38

## 2023-03-29 RX ADMIN — PROCHLORPERAZINE EDISYLATE 10 MG: 5 INJECTION INTRAMUSCULAR; INTRAVENOUS at 02:22

## 2023-03-29 RX ADMIN — SODIUM CHLORIDE, POTASSIUM CHLORIDE, SODIUM LACTATE AND CALCIUM CHLORIDE 1000 ML: 600; 310; 30; 20 INJECTION, SOLUTION INTRAVENOUS at 01:30

## 2023-03-29 RX ADMIN — DEXTROSE AND SODIUM CHLORIDE: 10; .45 INJECTION, SOLUTION INTRAVENOUS at 13:56

## 2023-03-29 ASSESSMENT — ENCOUNTER SYMPTOMS
FOCAL WEAKNESS: 0
SHORTNESS OF BREATH: 0
ABDOMINAL PAIN: 1
COUGH: 0
CHILLS: 0
HEADACHES: 1
NAUSEA: 1
FEVER: 0
MUSCULOSKELETAL NEGATIVE: 1
EYES NEGATIVE: 1
VOMITING: 1

## 2023-03-29 ASSESSMENT — FIBROSIS 4 INDEX
FIB4 SCORE: 0.5
FIB4 SCORE: 0.5

## 2023-03-29 ASSESSMENT — PAIN DESCRIPTION - PAIN TYPE: TYPE: ACUTE PAIN

## 2023-03-29 NOTE — ED NOTES
Pt reports elevated BG, unable to read on home meter monitor. Noted increased resp. Reports n/v today. Hx of DM. Pt is aaox4.

## 2023-03-29 NOTE — HOSPITAL COURSE
3/28 - Admitted to ICU on DKA protocol  3/29 - transitioned to his home glargine around 9pm  3/30 - doing well enough for transfer to floor

## 2023-03-29 NOTE — CARE PLAN
The patient is Watcher - Medium risk of patient condition declining or worsening    Shift Goals  Clinical Goals: Electrolyte replacement, titrate insulin infusion per protocol, transition to subcutaneous insulin  Patient Goals: Rest  Family Goals: No family present    Progress made toward(s) clinical / shift goals:    Problem: Skin Integrity  Goal: Skin integrity is maintained or improved  Outcome: Progressing     Problem: Fall Risk  Goal: Patient will remain free from falls  Outcome: Progressing     Problem: Diabetes Management  Goal: Patient will achieve and maintain glucose in satisfactory range  Outcome: Progressing   DKA protocol in place. Patient showing improvement in blood sugars with appropriate interventions.

## 2023-03-29 NOTE — PROGRESS NOTES
Pt to SICU 120 from ER on monitor with MP RN and CCT at 0028.  Partner escorted to waiting room.     T 97.8f temp  W 93.2kg    4 Eyes Skin Assessment Completed by ALEXANDER Baugh and ALEXANDER Chen.    Head WDL  Ears WDL  Nose WDL  Mouth WDL- chipped tooth with sores in mouth  Neck abrasions  Breast/Chest WDL  Shoulder Blades WDL  Spine WDL  (R) Arm/Elbow/Hand Scab and Scar  (L) Arm/Elbow/Hand Scab and Scar  Abdomen WDL  Groin WDL  Scrotum/Coccyx/Buttocks WDL  (R) Leg Scar and Scab  (L) Leg Scar and Scab  (R) Heel/Foot/Toe WDL  (L) Heel/Foot/Toe WDL          Devices In Places ECG, Blood Pressure Cuff, Pulse Ox, Murphy, SCD's, and Nasal Cannula      Interventions In Place NC W/Ear Foams, Sacral Mepilex, TAP System, Low Air Loss Mattress, Dri-[place] Pads, and Heels Loaded W/Pillows    Possible Skin Injury No    Pictures Uploaded Into Epic N/A  Wound Consult Placed N/A  RN Wound Prevention Protocol Ordered No     Partner to take home all belongings.

## 2023-03-29 NOTE — PROGRESS NOTES
"ED Progress Note    Date of Service: 03/28/23    Interval History and Interventions  52-year-old male transferred to my care at time of shift change pending CT scan of the abdomen pelvis.  High suspicion of DKA comprehensive metabolic panel was pending at time shift change.  Patient did have leukocytosis of 33,000 and was having abdominal pain therefore CT scan of the abdomen pelvis had also been ordered.  Patient does have some evidence of renal compromise on his comprehensive metabolic panel therefore the CAT scan was completed without contrast and showed no acute intra-abdominal abnormality.  His comprehensive metabolic panel was also concerning for glucose over thousand and anion gap of 48 and CO2 of 4.  Venous blood gas showed a pH of 7.06 and beta hydroxybutyric acid was over 8.  Patient is given a liter of NS prior to my arrival.  I ordered a liter of LR prior to results of elevated potassium at 6.8.  Patient had been given 10 units of insulin.  Due to his hyperkalemia and ongoing acidosis he is given 1 amp of bicarb he is initiated on insulin drip at 0.1 unit/kg/h.  Patient prior been given Rocephin 2000 mg for concerns of possible sepsis and possible intra-abdominal source.  There is no other sign of source of infection at this time.  Patient is reporting improvement of symptoms.  I discussed case up to this point with intensivist Dr. Dave and patient's admitted to the ICU    Physical Exam  BP (!) 171/77   Pulse 90   Temp 35.9 °C (96.7 °F) (Temporal)   Resp 16   Ht 1.905 m (6' 3\")   Wt 83.9 kg (185 lb)   SpO2 97%   BMI 23.12 kg/m² .    Constitutional: Awake and alert.  Moderate distress and abdominal discomfort, smells of ketones  HENT:  Grossly normal  Eyes: Grossly normal  Neck: Normal range of motion  Cardiovascular: Tachycardic  Thorax & Lungs: No respiratory distress  Abdomen: Diffusely tender without localization  Skin:  No pathologic rash.   Extremities: Well perfused  Psychiatric: Affect " normal    Labs  Results for orders placed or performed during the hospital encounter of 03/28/23   CBC with Differential   Result Value Ref Range    WBC 33.6 (H) 4.8 - 10.8 K/uL    RBC 6.14 (H) 4.70 - 6.10 M/uL    Hemoglobin 19.0 (H) 14.0 - 18.0 g/dL    Hematocrit 53.5 (H) 42.0 - 52.0 %    MCV 87.1 81.4 - 97.8 fL    MCH 30.9 27.0 - 33.0 pg    MCHC 35.5 (H) 33.7 - 35.3 g/dL    RDW 40.4 35.9 - 50.0 fL    Platelet Count 461 (H) 164 - 446 K/uL    MPV 10.2 9.0 - 12.9 fL    Neutrophils-Polys 91.40 (H) 44.00 - 72.00 %    Lymphocytes 2.40 (L) 22.00 - 41.00 %    Monocytes 4.30 0.00 - 13.40 %    Eosinophils 0.00 0.00 - 6.90 %    Basophils 0.40 0.00 - 1.80 %    Immature Granulocytes 1.50 (H) 0.00 - 0.90 %    Nucleated RBC 0.00 /100 WBC    Neutrophils (Absolute) 30.72 (H) 1.82 - 7.42 K/uL    Lymphs (Absolute) 0.81 (L) 1.00 - 4.80 K/uL    Monos (Absolute) 1.43 (H) 0.00 - 0.85 K/uL    Eos (Absolute) 0.01 0.00 - 0.51 K/uL    Baso (Absolute) 0.12 0.00 - 0.12 K/uL    Immature Granulocytes (abs) 0.52 (H) 0.00 - 0.11 K/uL    NRBC (Absolute) 0.00 K/uL   BETA-HYDROXYBUTYRIC ACID   Result Value Ref Range    beta-Hydroxybutyric Acid >8.00 (H) 0.02 - 0.27 mmol/L   VENOUS BLOOD GAS   Result Value Ref Range    Venous Bg Ph 7.06 (LL) 7.31 - 7.45    Venous Bg Ph Temp Corrected 7.07 (LL) 7.31 - 7.45    Venous Bg Pco2 19.9 (L) 41.0 - 51.0 mmHg    Venous Bg Pco2 Temp Corrected 19.4 (L) 41.0 - 51.0 mmHg    Venous Bg Po2 49.2 (H) 25.0 - 40.0 mmHg    Venous Bg Po2 Temp Corrected 47.2 (H) 25.0 - 40.0 mmHg    Venous Bg O2 Saturation 74.3 %    Venous Bg Hco3 6 (L) 24 - 28 mmol/L    Venous Bg Base Excess -23 mmol/L    Body Temp 36.4 Centigrade   Comp Metabolic Panel   Result Value Ref Range    Sodium 121 (L) 135 - 145 mmol/L    Potassium 6.8 (HH) 3.6 - 5.5 mmol/L    Chloride 69 (L) 96 - 112 mmol/L    Co2 4 (LL) 20 - 33 mmol/L    Anion Gap 48.0 (H) 7.0 - 16.0    Glucose 1030 (HH) 65 - 99 mg/dL    Bun 58 (H) 8 - 22 mg/dL    Creatinine 2.78 (H) 0.50 - 1.40  mg/dL    Calcium 9.2 8.5 - 10.5 mg/dL    AST(SGOT) 18 12 - 45 U/L    ALT(SGPT) 35 2 - 50 U/L    Alkaline Phosphatase 105 (H) 30 - 99 U/L    Total Bilirubin 1.1 0.1 - 1.5 mg/dL    Albumin 4.3 3.2 - 4.9 g/dL    Total Protein 6.7 6.0 - 8.2 g/dL    Globulin 2.4 1.9 - 3.5 g/dL    A-G Ratio 1.8 g/dL   MAGNESIUM   Result Value Ref Range    Magnesium 2.6 (H) 1.5 - 2.5 mg/dL   PHOSPHORUS   Result Value Ref Range    Phosphorus 9.6 (H) 2.5 - 4.5 mg/dL   LIPASE   Result Value Ref Range    Lipase 21 11 - 82 U/L   Lactic Acid   Result Value Ref Range    Lactic Acid 7.4 (HH) 0.5 - 2.0 mmol/L   Lactic Acid   Result Value Ref Range    Lactic Acid 6.8 (HH) 0.5 - 2.0 mmol/L   CORRECTED CALCIUM   Result Value Ref Range    Correct Calcium 9.0 8.5 - 10.5 mg/dL   ESTIMATED GFR   Result Value Ref Range    GFR (CKD-EPI) 27 (A) >60 mL/min/1.73 m 2   Basic Metabolic Panel   Result Value Ref Range    Sodium 124 (L) 135 - 145 mmol/L    Potassium 6.0 (H) 3.6 - 5.5 mmol/L    Chloride 72 (L) 96 - 112 mmol/L    Co2 6 (LL) 20 - 33 mmol/L    Glucose 998 (HH) 65 - 99 mg/dL    Bun 62 (H) 8 - 22 mg/dL    Creatinine 2.91 (H) 0.50 - 1.40 mg/dL    Calcium 8.9 8.5 - 10.5 mg/dL    Anion Gap 46.0 (H) 7.0 - 16.0       Radiology      CT-ABDOMEN-PELVIS W/O   Final Result      1.  Hepatic steatosis.   2.  Fluid distended stomach and distal esophagus. Small hiatal hernia.      DX-CHEST-PORTABLE (1 VIEW)   Final Result      No acute cardiopulmonary abnormality.      IR-MIDLINE CATHETER INSERTION WO GUIDANCE > AGE 3    (Results Pending)         Problem List  1.  Diabetic ketoacidosis  2.  Leukocytosis  3.  Acute renal insufficiency  4.  Dehydration  5.  Hypochloremia  6.  Pseudohyponatremia        Electronically signed by: Kem Curtis M.D.,

## 2023-03-29 NOTE — ED NOTES
New IV in place. LR in process. SODIUM BICARB administered. Will initiate INSULIN drip upon return from CT

## 2023-03-29 NOTE — ED PROVIDER NOTES
ED Provider Note    CHIEF COMPLAINT  Chief Complaint   Patient presents with    High Blood Sugar     Patient lethargic in triage and N/V since this morning. Blood sugar check in triage reads high.         HPI/ROS  LIMITATION TO HISTORY   Select: Altered mental status / Confusion  OUTSIDE HISTORIAN(S):  Family significant other at the bedside    Peña Guerrero is a 52 y.o. male who presents for evaluation of altered mentation, vomiting and hyperglycemia.  He is diabetic, insulin-dependent.  Normal last night.  Around 2 AM he began vomiting.  He has had multiple episodes of vomiting throughout the day today and his significant other reports he is becoming increasingly confused all day and increasingly sleepy.  He does not have abdominal pain.  No fever.  He lost his glucometer.  No other acute complaints although history is otherwise unobtainable as the patient is clearly altered    PAST MEDICAL HISTORY   has a past medical history of Allergy, Diabetes (HCC), Hypertension, and Injury.    SURGICAL HISTORY   has a past surgical history that includes open fix inter/subtroch fx,implnt (Right, 9/3/2020).    FAMILY HISTORY  History reviewed. No pertinent family history.    SOCIAL HISTORY  Social History     Tobacco Use    Smoking status: Never    Smokeless tobacco: Never   Vaping Use    Vaping Use: Never used   Substance and Sexual Activity    Alcohol use: Not Currently     Comment: Rarely    Drug use: Not Currently     Types: Inhaled     Comment: THC    Sexual activity: Not on file       CURRENT MEDICATIONS  Home Medications       Reviewed by Seema Melendez R.N. (Registered Nurse) on 03/28/23 at 2016  Med List Status: Partial     Medication Last Dose Status   esomeprazole (NEXIUM) 20 MG capsule  Active   insulin glargine (LANTUS) 100 UNIT/ML Solution  Active   insulin glargine (LANTUS) 100 UNIT/ML Solution  Active   insulin glargine (LANTUS) 100 UNIT/ML Solution  Active   levothyroxine (SYNTHROID) 25 MCG Tab  Active  "  lidocaine (LIDODERM) 5 % Patch  Active                    ALLERGIES  Allergies   Allergen Reactions    Pcn [Penicillins] Anaphylaxis    Pcn [Penicillins]     Penicillins      Rxn as a kid       PHYSICAL EXAM  VITAL SIGNS: BP (!) 144/79   Pulse (!) 104   Temp 35.9 °C (96.7 °F) (Temporal)   Resp 16   Ht 1.905 m (6' 3\")   Wt 83.9 kg (185 lb)   SpO2 100%   BMI 23.12 kg/m²    Constitutional: Somnolent but arousable.  Tachypneic, ill-appearing.  HENT: Normocephalic, no obvious evidence of acute trauma.  Eyes: No scleral icterus. Normal conjunctiva   Thorax & Lungs: Tachypnea with clear breath sounds bilaterally.  Upon cardiac ascultation I appreciate a regular heart rhythm and a tachycardic rate  Abdomen: The abdomen is not visibly distended. Upon palpation, I find minimal generalized tenderness with no focality, no rebound or guarding  Skin: The exposed portions of skin reveal no obvious rash or other abnormalities.  Extremities/Musculoskeletal: No obvious sign of acute trauma. No asymmetric calf tenderness or edema.   Neurologic: Drowsy but arousable, once he is awake he stays alert, normal and symmetric motor and sensory function in the upper and lower extremities bilaterally.    DIAGNOSTIC STUDIES / PROCEDURES    LABS  Results for orders placed or performed during the hospital encounter of 03/28/23   CBC with Differential   Result Value Ref Range    WBC 33.6 (H) 4.8 - 10.8 K/uL    RBC 6.14 (H) 4.70 - 6.10 M/uL    Hemoglobin 19.0 (H) 14.0 - 18.0 g/dL    Hematocrit 53.5 (H) 42.0 - 52.0 %    MCV 87.1 81.4 - 97.8 fL    MCH 30.9 27.0 - 33.0 pg    MCHC 35.5 (H) 33.7 - 35.3 g/dL    RDW 40.4 35.9 - 50.0 fL    Platelet Count 461 (H) 164 - 446 K/uL    MPV 10.2 9.0 - 12.9 fL    Neutrophils-Polys 91.40 (H) 44.00 - 72.00 %    Lymphocytes 2.40 (L) 22.00 - 41.00 %    Monocytes 4.30 0.00 - 13.40 %    Eosinophils 0.00 0.00 - 6.90 %    Basophils 0.40 0.00 - 1.80 %    Immature Granulocytes 1.50 (H) 0.00 - 0.90 %    Nucleated " RBC 0.00 /100 WBC    Neutrophils (Absolute) 30.72 (H) 1.82 - 7.42 K/uL    Lymphs (Absolute) 0.81 (L) 1.00 - 4.80 K/uL    Monos (Absolute) 1.43 (H) 0.00 - 0.85 K/uL    Eos (Absolute) 0.01 0.00 - 0.51 K/uL    Baso (Absolute) 0.12 0.00 - 0.12 K/uL    Immature Granulocytes (abs) 0.52 (H) 0.00 - 0.11 K/uL    NRBC (Absolute) 0.00 K/uL   BETA-HYDROXYBUTYRIC ACID   Result Value Ref Range    beta-Hydroxybutyric Acid >8.00 (H) 0.02 - 0.27 mmol/L   VENOUS BLOOD GAS   Result Value Ref Range    Venous Bg Ph 7.06 (LL) 7.31 - 7.45    Venous Bg Ph Temp Corrected 7.07 (LL) 7.31 - 7.45    Venous Bg Pco2 19.9 (L) 41.0 - 51.0 mmHg    Venous Bg Pco2 Temp Corrected 19.4 (L) 41.0 - 51.0 mmHg    Venous Bg Po2 49.2 (H) 25.0 - 40.0 mmHg    Venous Bg Po2 Temp Corrected 47.2 (H) 25.0 - 40.0 mmHg    Venous Bg O2 Saturation 74.3 %    Venous Bg Hco3 6 (L) 24 - 28 mmol/L    Venous Bg Base Excess -23 mmol/L    Body Temp 36.4 Centigrade   POCT glucose device results   Result Value Ref Range    POC Glucose, Blood >600 (HH) 65 - 99 mg/dL        RADIOLOGY  I have independently interpreted the diagnostic imaging associated with this visit and am waiting the final reading from the radiologist.   My preliminary interpretation is as follows: No pneumonia  Radiologist interpretation:   DX-CHEST-PORTABLE (1 VIEW)   Final Result      No acute cardiopulmonary abnormality.      CT-ABDOMEN-PELVIS WITH    (Results Pending)         COURSE & MEDICAL DECISION MAKING    ED Observation Status? No; Patient does not meet criteria for ED Observation.     INITIAL ASSESSMENT, COURSE AND PLAN  Care Narrative: This is a critically ill 52-year-old male diabetic who is clearly in diabetic ketoacidosis.  He presents with nearly 24 hours of vomiting and increasing drowsiness, on exam he is tachypneic but has clear lungs.  Glucose is greater than 600 based on the initial fingerstick.  The regular diabetic work-up will be initiated.  IV fluids.  He will require insulin.  He will  require admission to the hospital for further evaluation and care    His WBC just resulted greater than 30,000, given his symptoms a CT of the abdomen pelvis will be obtained    There was a delay in the patient's blood work resulting secondary to hemolysis.  His venous blood gas does reveal significant acidosis, his beta hydroxybutyric acid is undetectably high.  At this point we will treat him with a 10 unit insulin push.  His chemistry panel is not back yet I still have a potassium.  Additionally, his CT scan has not been obtained.  The patient will be signed out to Dr. Curtis pending the rest of the blood work, CT scan and disposition.      CRITICAL CARE  The very real possibilty of a deterioration of this patient's condition required the highest level of my preparedness for sudden, emergent intervention.  I provided critical care services, which included medication orders, frequent reevaluations of the patient's condition and response to treatment, ordering and reviewing test results, and discussing the case with various consultants.  The critical care time associated with the care of the patient was 39 minutes. Review chart for interventions. This time is exclusive of any other billable procedures.      FINAL DIAGNOSIS  1. Diabetic ketoacidosis with coma associated with type 1 diabetes mellitus (HCC)           Electronically signed by: Jake Neville M.D., 3/28/2023 9:01 PM

## 2023-03-29 NOTE — PROGRESS NOTES
"Critical Care Progress Note    Date of admission  3/28/2023    Chief Complaint  \"52 y.o. male with a history of DM and hypothyroidism who presented 3/28/2023 with somnolence, nausea, vomiting, and hyperglycemia. Apparently today he has had significant N/V throughout the day then became increasingly somnolent so was brought to he ER. In the ER his labs were consistent with DKA; CTAP reassuring; CXR without infiltrates. He was admitted to the ICU on an insulin infusion.      Following IV fluids and insulin he was more awake, stating his symptoms felt much better. He denies preceding infectious s/s, CP, headache. \" - [facility] Course  3/28 - Admitted to ICU on DKA protocol    Interval Problem Update  Reviewed last 24 hour events:  Tmax: Afebrile  Diet: Advance once off DKA protocol  Vasopressors: None    Antibx: None      Intake / Output  Urine Output past 24 hours: None recorded      Lab Trends  WBC 33.6--> 27.2  Platelet 461--> 334  Hemoglobin 19--> 17    Glucose 1030 --> 673  Chloride 69 --> 80  CO2 4 --> 12  Anion gap 48 --> 33  Creatinine is 2.8 --> 2.3      Review of Systems  Review of Systems   Constitutional:  Positive for malaise/fatigue. Negative for chills and fever.   HENT: Negative.     Eyes: Negative.    Respiratory:  Negative for cough and shortness of breath.    Cardiovascular:  Negative for chest pain and leg swelling.   Gastrointestinal:  Positive for abdominal pain, nausea and vomiting.   Genitourinary:  Positive for frequency. Negative for dysuria.   Musculoskeletal: Negative.    Skin: Negative.    Neurological:  Positive for headaches. Negative for focal weakness.   All other systems reviewed and are negative.     Vital Signs for last 24 hours   Temp:  [35.9 °C (96.7 °F)-36.8 °C (98.3 °F)] 36.8 °C (98.3 °F)  Pulse:  [] 94  Resp:  [16-40] 20  BP: (130-171)/(63-80) 142/73  SpO2:  [90 %-100 %] 94 %    Hemodynamic parameters for last 24 hours       Respiratory Information for the last 24 " hours       Physical Exam   Physical Exam  Vitals and nursing note reviewed. Exam conducted with a chaperone present.   Constitutional:       General: He is sleeping. He is not in acute distress.  HENT:      Head: Normocephalic.      Mouth/Throat:      Mouth: Mucous membranes are moist.   Eyes:      Extraocular Movements: Extraocular movements intact.   Cardiovascular:      Rate and Rhythm: Normal rate and regular rhythm.      Pulses: Normal pulses.   Pulmonary:      Effort: Pulmonary effort is normal. Tachypnea present.      Breath sounds: Normal breath sounds.   Abdominal:      General: There is no distension.      Palpations: Abdomen is soft.      Tenderness: There is abdominal tenderness (non-focal). There is no guarding or rebound.   Musculoskeletal:         General: Normal range of motion.      Cervical back: Normal range of motion and neck supple.   Skin:     General: Skin is warm and dry.      Capillary Refill: Capillary refill takes less than 2 seconds.   Neurological:      General: No focal deficit present.      Mental Status: He is easily aroused.       Medications  Current Facility-Administered Medications   Medication Dose Route Frequency Provider Last Rate Last Admin    D10%-0.45% NaCl infusion   Intravenous Continuous Chad Dave M.D.   Dose not Required at 03/29/23 0015    MD ALERT-PHARMACY TO CONSULT FOR DKA MONITORING 1 Each  1 Each Other PRN Chad Dave M.D.        magnesium sulfate IVPB premix 2 g  2 g Intravenous Once PRN Chad Dave M.D.        Or    magnesium sulfate IVPB premix 4 g  4 g Intravenous Once PRN Chad Dave M.D.        potassium phosphate 30 mmol in  mL ivpb  30 mmol Intravenous Once PRN Chad Dave M.D.        Or    sodium phosphate 30 mmol in 1/2  mL ivpb  30 mmol Intravenous Once PRN Chad Dave M.D.        Adult DKA potassium(K+) replacement scale  1 Each Intravenous Q4HRS Chad Dave M.D.   1 Each at 03/29/23 0600     lactated ringers infusion   Intravenous Continuous Chad Dave M.D. 100 mL/hr at 03/29/23 0600 New Bag at 03/29/23 0600    D5LR infusion   Intravenous Continuous Chad Dave M.D.   Dose not Required at 03/29/23 0015    MD ALERT-INSULIN DRIP INITIAL RATE BY PHARMACY AT 0.05 UNITS/KG/HR 1 Each  1 Each Other PRN Chad Dave M.D.        thiamine (B-1) IVPB 100 mg in 100 mL D5W (premix)  100 mg Intravenous DAILY Chad Dave M.D.   Stopped at 03/29/23 0741    acetaminophen (Tylenol) tablet 650 mg  650 mg Oral Q6HRS PRN Chad Dave M.D.        senna-docusate (PERICOLACE or SENOKOT S) 8.6-50 MG per tablet 2 Tablet  2 Tablet Oral BID Chad Dave M.D.        And    polyethylene glycol/lytes (MIRALAX) PACKET 1 Packet  1 Packet Oral QDAY PRN Chad Dave M.D.        And    magnesium hydroxide (MILK OF MAGNESIA) suspension 30 mL  30 mL Oral QDAY PRN Chad Dave M.D.        And    bisacodyl (DULCOLAX) suppository 10 mg  10 mg Rectal QDAY PRN Chad Dave M.D.        heparin injection 5,000 Units  5,000 Units Subcutaneous Q8HRS Chad Dave M.D.   5,000 Units at 03/29/23 0645    labetalol (NORMODYNE/TRANDATE) injection 10 mg  10 mg Intravenous Q4HRS PRN Chad Dave M.D.        ondansetron (ZOFRAN) syringe/vial injection 4 mg  4 mg Intravenous Q4HRS PRN Chad Dave M.D.   4 mg at 03/29/23 0033    ondansetron (ZOFRAN ODT) dispertab 4 mg  4 mg Oral Q4HRS PRN Chad Dave M.D.        promethazine (PHENERGAN) tablet 12.5-25 mg  12.5-25 mg Oral Q4HRS PRN Chad Dave M.D.        promethazine (PHENERGAN) suppository 12.5-25 mg  12.5-25 mg Rectal Q4HRS PRN Chad D Dave, M.D.        prochlorperazine (COMPAZINE) injection 5-10 mg  5-10 mg Intravenous Q4HRS PRN Chad Dave M.D.   10 mg at 03/29/23 0222    levothyroxine (SYNTHROID) tablet 25 mcg  25 mcg Oral AM ES Chad Dave M.D.        insulin regular (HumuLIN R) 100 Units in  mL Infusion  for DKA  4 Units/hr Intravenous Continuous Chad Dave M.D. 10 mL/hr at 03/29/23 0852 10 Units/hr at 03/29/23 0852       Fluids    Intake/Output Summary (Last 24 hours) at 3/29/2023 0932  Last data filed at 3/29/2023 0800  Gross per 24 hour   Intake 3870.65 ml   Output 2000 ml   Net 1870.65 ml       Laboratory  Recent Labs     03/29/23  0118   ISTATTEMP 98.3 F   ISTATFIO2 21   ISTATSPEC Venous         Recent Labs     03/28/23 2145 03/29/23 0011 03/29/23 0345 03/29/23  0505 03/29/23  0630   SODIUM 121* 124* 125*  --  130*   POTASSIUM 6.8* 6.0* 5.2  --  4.7   CHLORIDE 69* 72* 80*  --  93*   CO2 4* 6* 12*  --  15*   BUN 58* 62* 68*  --  52*   CREATININE 2.78* 2.91* 2.32*  --  1.83*   MAGNESIUM 2.6* 2.9*  --   --  1.7   PHOSPHORUS 9.6* 8.2* 4.7* 3.4 2.0*   CALCIUM 9.2 8.9 8.7  --  7.7*     Recent Labs     03/28/23 2145 03/29/23 0011 03/29/23 0345 03/29/23  0505 03/29/23  0630   ALTSGPT 35  --  29  --   --    ASTSGOT 18  --  18  --   --    ALKPHOSPHAT 105*  --  87  --   --    TBILIRUBIN 1.1  --  0.7  --   --    LIPASE 21  --   --   --   --    GLUCOSE 1030*   < > 721* 673* 505*    < > = values in this interval not displayed.     Recent Labs     03/28/23 2044 03/28/23 2145 03/29/23 0345   WBC 33.6*  --  27.2*   NEUTSPOLYS 91.40*  --  89.40*   LYMPHOCYTES 2.40*  --  4.70*   MONOCYTES 4.30  --  4.60   EOSINOPHILS 0.00  --  0.00   BASOPHILS 0.40  --  0.20   ASTSGOT  --  18 18   ALTSGPT  --  35 29   ALKPHOSPHAT  --  105* 87   TBILIRUBIN  --  1.1 0.7     Recent Labs     03/28/23  2044 03/29/23  0345   RBC 6.14* 5.58   HEMOGLOBIN 19.0* 16.8   HEMATOCRIT 53.5* 47.1   PLATELETCT 461* 334       Imaging  No imaging this morning    Assessment/Plan  * DKA, type 1, not at goal (HCC)- (present on admission)  Assessment & Plan  DKA protocol insulin infusion  Q1 hour blood glucose and q4 hour BMP  Crystalloid resuscitation   Electrolyte replacement  Transition to subcutaneous insulin when bicarb > 17 and AG < 12     Will  verify home glargine, as it stands he takes 90 units daily (40/50), will verify this before I give large doses of insulin to him    Encephalopathy  Assessment & Plan  Toxic metabolic  Non-focal neurologic exam    improving    Hypothyroid  Assessment & Plan  Continue home synthroid    Leukocytosis  Assessment & Plan  Reactive from DKA  Monitor off antibiotics absent shock    CLARA (acute kidney injury) (HCC)  Assessment & Plan  Dehydration + Ischemic ATN  Improving    Strict I/Os  Renally dosed medications  Avoid nephrotoxic agents as able  Trend     Hyperkalemia  Assessment & Plan  Acidosis + Ischemic ATN    Improving    Hepatic steatosis  Assessment & Plan  03/29/23 on CTAP   EtOH cessation when appropriate     Elevated lactic acid level  Assessment & Plan  Resolved         VTE:  Heparin  Ulcer: Not Indicated  Lines: None    I have performed a physical exam and reviewed and updated ROS and Plan today (3/29/2023). In review of yesterday's note (3/28/2023), there are no changes except as documented above.     Discussed patient condition and risk of morbidity and/or mortality with RN, RT, Pharmacy, Charge nurse / hot rounds, and Patient    The patient remains critically ill.  Critical care time = 39 minutes in directly providing and coordinating critical care and extensive data review.  No time overlap and excludes procedures.

## 2023-03-29 NOTE — ED TRIAGE NOTES
Chief Complaint   Patient presents with    High Blood Sugar     Patient lethargic in triage and N/V since this morning. Blood sugar check in triage reads high.

## 2023-03-29 NOTE — DISCHARGE PLANNING
Attempted to meet with pt at bedside to complete assessment, however pt unable to stay awake to answer questions at this time.

## 2023-03-29 NOTE — CONSULTS
Critical Care Consultation    Date of consult: 3/28/2023    Referring Physician  Kem Curtis M.D.    Reason for Consultation  DKA    History of Presenting Illness  52 y.o. male with a history of DM and hypothyroidism who presented 3/28/2023 with somnolence, nausea, vomiting, and hyperglycemia. Apparently today he has had significant N/V throughout the day then became increasingly somnolent so was brought to he ER. In the ER his labs were consistent with DKA; CTAP reassuring; CXR without infiltrates. He was admitted to the ICU on an insulin infusion.     Following IV fluids and insulin he was more awake, stating his symptoms felt much better. He denies preceding infectious s/s, CP, headache.     Code Status  Full Code    Review of Systems  Review of Systems   Unable to perform ROS: Critical illness     Past Medical History   has a past medical history of Allergy, Diabetes (HCC), Hypertension, and Injury.    Surgical History   has a past surgical history that includes pr open fix inter/subtroch fx,implnt (Right, 9/3/2020).    Family History  family history is not on file.    Social History   reports that he has never smoked. He has never used smokeless tobacco. He reports that he does not currently use alcohol. He reports that he does not currently use drugs after having used the following drugs: Inhaled.    Medications  Home Medications       Reviewed by Seema Melendez R.N. (Registered Nurse) on 03/28/23 at 2016  Med List Status: Partial     Medication Last Dose Status   esomeprazole (NEXIUM) 20 MG capsule  Active   insulin glargine (LANTUS) 100 UNIT/ML Solution  Active   insulin glargine (LANTUS) 100 UNIT/ML Solution  Active   insulin glargine (LANTUS) 100 UNIT/ML Solution  Active   levothyroxine (SYNTHROID) 25 MCG Tab  Active   lidocaine (LIDODERM) 5 % Patch  Active                  Current Facility-Administered Medications   Medication Dose Route Frequency Provider Last Rate Last Admin    D10%-0.45% NaCl  infusion   Intravenous Continuous Chad Dave M.D.   Dose not Required at 03/29/23 0015    MD ALERT-PHARMACY TO CONSULT FOR DKA MONITORING 1 Each  1 Each Other PRN Chad Dave M.D.        magnesium sulfate IVPB premix 2 g  2 g Intravenous Once PRN Chad Dave M.D.        Or    magnesium sulfate IVPB premix 4 g  4 g Intravenous Once PRN Chad Dave M.D.        potassium phosphate 30 mmol in  mL ivpb  30 mmol Intravenous Once PRN Chad Dave M.D.        Or    sodium phosphate 30 mmol in 1/2  mL ivpb  30 mmol Intravenous Once PRN Chad Dave M.D.        Adult DKA potassium(K+) replacement scale  1 Each Intravenous Q4HRS Chad Dave M.D.        lactated ringers infusion   Intravenous Continuous Chad Dave M.D. 100 mL/hr at 03/29/23 0015 New Bag at 03/29/23 0015    D5LR infusion   Intravenous Continuous Chad Dave M.D.   Dose not Required at 03/29/23 0015    MD ALERT-INSULIN DRIP INITIAL RATE BY PHARMACY AT 0.05 UNITS/KG/HR 1 Each  1 Each Other PRN Chad Dave M.D.        thiamine (B-1) IVPB 100 mg in 100 mL D5W (premix)  100 mg Intravenous DAILY Chad Dave M.D.        acetaminophen (Tylenol) tablet 650 mg  650 mg Oral Q6HRS PRN Chad Dave M.D.        senna-docusate (PERICOLACE or SENOKOT S) 8.6-50 MG per tablet 2 Tablet  2 Tablet Oral BID Chad aDve M.D.        And    polyethylene glycol/lytes (MIRALAX) PACKET 1 Packet  1 Packet Oral QDAY PRN Chad Dave M.D.        And    magnesium hydroxide (MILK OF MAGNESIA) suspension 30 mL  30 mL Oral QDAY PRN Chad Dave M.D.        And    bisacodyl (DULCOLAX) suppository 10 mg  10 mg Rectal QDAY PRN Chad Dave M.D.        heparin injection 5,000 Units  5,000 Units Subcutaneous Q8HRS Chad Dave M.D.   5,000 Units at 03/29/23 0015    labetalol (NORMODYNE/TRANDATE) injection 10 mg  10 mg Intravenous Q4HRS PRN CHAUNCEY LeungD.        ondansetron (ZOFRAN)  syringe/vial injection 4 mg  4 mg Intravenous Q4HRS PRN Chad Dave M.D.   4 mg at 03/29/23 0033    ondansetron (ZOFRAN ODT) dispertab 4 mg  4 mg Oral Q4HRS PRN Chad Dave M.D.        promethazine (PHENERGAN) tablet 12.5-25 mg  12.5-25 mg Oral Q4HRS PRN Chad Dave M.D.        promethazine (PHENERGAN) suppository 12.5-25 mg  12.5-25 mg Rectal Q4HRS PRN Chad Dave M.D.        prochlorperazine (COMPAZINE) injection 5-10 mg  5-10 mg Intravenous Q4HRS PRN Chad Dave M.D.   10 mg at 03/29/23 0222    levothyroxine (SYNTHROID) tablet 25 mcg  25 mcg Oral AM ES Chad Dave M.D.        insulin regular (HumuLIN R) 100 Units in  mL Infusion for DKA  4 Units/hr Intravenous Continuous Chad Dave M.D. 8 mL/hr at 03/29/23 0333 8 Units/hr at 03/29/23 0333       Allergies  Allergies   Allergen Reactions    Pcn [Penicillins] Anaphylaxis    Pcn [Penicillins]     Penicillins      Rxn as a kid       Vital Signs last 24 hours  Temp:  [35.9 °C (96.7 °F)] 35.9 °C (96.7 °F)  Pulse:  [] 90  Resp:  [16] 16  BP: (144-171)/(68-79) 163/72  SpO2:  [97 %-100 %] 98 %    Physical Exam  Physical Exam  Vitals and nursing note reviewed.   Constitutional:       Appearance: He is ill-appearing.   HENT:      Head: Normocephalic and atraumatic.      Right Ear: External ear normal.      Left Ear: External ear normal.      Nose: Nose normal.      Mouth/Throat:      Mouth: Mucous membranes are dry.   Eyes:      Pupils: Pupils are equal, round, and reactive to light.   Cardiovascular:      Rate and Rhythm: Regular rhythm. Tachycardia present.   Pulmonary:      Comments: Kussmaul breathing, mild distress  Abdominal:      General: Abdomen is flat. There is no distension.      Palpations: Abdomen is soft.      Tenderness: There is no abdominal tenderness. There is no guarding or rebound.   Musculoskeletal:      Right lower leg: No edema.      Left lower leg: No edema.   Skin:     Capillary Refill: Capillary  refill takes less than 2 seconds.   Neurological:      Comments: Somnolent but awakes to voice, moves all 4 extremities normally       Fluids  No intake or output data in the 24 hours ending 03/29/23 0437    Laboratory  Recent Results (from the past 48 hour(s))   POCT glucose device results    Collection Time: 03/28/23  8:13 PM   Result Value Ref Range    POC Glucose, Blood >600 (HH) 65 - 99 mg/dL   CBC with Differential    Collection Time: 03/28/23  8:44 PM   Result Value Ref Range    WBC 33.6 (H) 4.8 - 10.8 K/uL    RBC 6.14 (H) 4.70 - 6.10 M/uL    Hemoglobin 19.0 (H) 14.0 - 18.0 g/dL    Hematocrit 53.5 (H) 42.0 - 52.0 %    MCV 87.1 81.4 - 97.8 fL    MCH 30.9 27.0 - 33.0 pg    MCHC 35.5 (H) 33.7 - 35.3 g/dL    RDW 40.4 35.9 - 50.0 fL    Platelet Count 461 (H) 164 - 446 K/uL    MPV 10.2 9.0 - 12.9 fL    Neutrophils-Polys 91.40 (H) 44.00 - 72.00 %    Lymphocytes 2.40 (L) 22.00 - 41.00 %    Monocytes 4.30 0.00 - 13.40 %    Eosinophils 0.00 0.00 - 6.90 %    Basophils 0.40 0.00 - 1.80 %    Immature Granulocytes 1.50 (H) 0.00 - 0.90 %    Nucleated RBC 0.00 /100 WBC    Neutrophils (Absolute) 30.72 (H) 1.82 - 7.42 K/uL    Lymphs (Absolute) 0.81 (L) 1.00 - 4.80 K/uL    Monos (Absolute) 1.43 (H) 0.00 - 0.85 K/uL    Eos (Absolute) 0.01 0.00 - 0.51 K/uL    Baso (Absolute) 0.12 0.00 - 0.12 K/uL    Immature Granulocytes (abs) 0.52 (H) 0.00 - 0.11 K/uL    NRBC (Absolute) 0.00 K/uL   BETA-HYDROXYBUTYRIC ACID    Collection Time: 03/28/23  8:44 PM   Result Value Ref Range    beta-Hydroxybutyric Acid >8.00 (H) 0.02 - 0.27 mmol/L   VENOUS BLOOD GAS    Collection Time: 03/28/23  9:45 PM   Result Value Ref Range    Venous Bg Ph 7.06 (LL) 7.31 - 7.45    Venous Bg Ph Temp Corrected 7.07 (LL) 7.31 - 7.45    Venous Bg Pco2 19.9 (L) 41.0 - 51.0 mmHg    Venous Bg Pco2 Temp Corrected 19.4 (L) 41.0 - 51.0 mmHg    Venous Bg Po2 49.2 (H) 25.0 - 40.0 mmHg    Venous Bg Po2 Temp Corrected 47.2 (H) 25.0 - 40.0 mmHg    Venous Bg O2 Saturation 74.3 %     Venous Bg Hco3 6 (L) 24 - 28 mmol/L    Venous Bg Base Excess -23 mmol/L    Body Temp 36.4 Centigrade   Comp Metabolic Panel    Collection Time: 03/28/23  9:45 PM   Result Value Ref Range    Sodium 121 (L) 135 - 145 mmol/L    Potassium 6.8 (HH) 3.6 - 5.5 mmol/L    Chloride 69 (L) 96 - 112 mmol/L    Co2 4 (LL) 20 - 33 mmol/L    Anion Gap 48.0 (H) 7.0 - 16.0    Glucose 1030 (HH) 65 - 99 mg/dL    Bun 58 (H) 8 - 22 mg/dL    Creatinine 2.78 (H) 0.50 - 1.40 mg/dL    Calcium 9.2 8.5 - 10.5 mg/dL    AST(SGOT) 18 12 - 45 U/L    ALT(SGPT) 35 2 - 50 U/L    Alkaline Phosphatase 105 (H) 30 - 99 U/L    Total Bilirubin 1.1 0.1 - 1.5 mg/dL    Albumin 4.3 3.2 - 4.9 g/dL    Total Protein 6.7 6.0 - 8.2 g/dL    Globulin 2.4 1.9 - 3.5 g/dL    A-G Ratio 1.8 g/dL   MAGNESIUM    Collection Time: 03/28/23  9:45 PM   Result Value Ref Range    Magnesium 2.6 (H) 1.5 - 2.5 mg/dL   PHOSPHORUS    Collection Time: 03/28/23  9:45 PM   Result Value Ref Range    Phosphorus 9.6 (H) 2.5 - 4.5 mg/dL   LIPASE    Collection Time: 03/28/23  9:45 PM   Result Value Ref Range    Lipase 21 11 - 82 U/L   CORRECTED CALCIUM    Collection Time: 03/28/23  9:45 PM   Result Value Ref Range    Correct Calcium 9.0 8.5 - 10.5 mg/dL   ESTIMATED GFR    Collection Time: 03/28/23  9:45 PM   Result Value Ref Range    GFR (CKD-EPI) 27 (A) >60 mL/min/1.73 m 2   Lactic Acid    Collection Time: 03/28/23 10:00 PM   Result Value Ref Range    Lactic Acid 7.4 (HH) 0.5 - 2.0 mmol/L   Basic Metabolic Panel    Collection Time: 03/29/23 12:11 AM   Result Value Ref Range    Sodium 124 (L) 135 - 145 mmol/L    Potassium 6.0 (H) 3.6 - 5.5 mmol/L    Chloride 72 (L) 96 - 112 mmol/L    Co2 6 (LL) 20 - 33 mmol/L    Glucose 998 (HH) 65 - 99 mg/dL    Bun 62 (H) 8 - 22 mg/dL    Creatinine 2.91 (H) 0.50 - 1.40 mg/dL    Calcium 8.9 8.5 - 10.5 mg/dL    Anion Gap 46.0 (H) 7.0 - 16.0   Phosphorus    Collection Time: 03/29/23 12:11 AM   Result Value Ref Range    Phosphorus 8.2 (H) 2.5 - 4.5 mg/dL    Magnesium    Collection Time: 03/29/23 12:11 AM   Result Value Ref Range    Magnesium 2.9 (H) 1.5 - 2.5 mg/dL   HEMOGLOBIN A1C    Collection Time: 03/29/23 12:11 AM   Result Value Ref Range    Glycohemoglobin 7.1 (H) 4.0 - 5.6 %    Est Avg Glucose 157 mg/dL   ESTIMATED GFR    Collection Time: 03/29/23 12:11 AM   Result Value Ref Range    GFR (CKD-EPI) 25 (A) >60 mL/min/1.73 m 2   Lactic Acid    Collection Time: 03/29/23 12:18 AM   Result Value Ref Range    Lactic Acid 6.8 (HH) 0.5 - 2.0 mmol/L   Blood Culture,Hold    Collection Time: 03/29/23 12:18 AM   Result Value Ref Range    Blood Culture Hold Collected    POCT glucose device results    Collection Time: 03/29/23 12:40 AM   Result Value Ref Range    POC Glucose, Blood >600 (HH) 65 - 99 mg/dL   Blood Glucose    Collection Time: 03/29/23  1:05 AM   Result Value Ref Range    Glucose 896 (HH) 65 - 99 mg/dL   POCT venous blood gas device results    Collection Time: 03/29/23  1:18 AM   Result Value Ref Range    Ph 7.212 (L) 7.310 - 7.450    Pco2 21.9 (L) 41.0 - 51.0 mmHg    Po2 32 25 - 40 mmHg    Tco2 9 (LL) 20 - 33 mmol/L    SO2 51 %    Hco3 8.8 (L) 24.0 - 28.0 mmol/L    BE -16 (L) -4 - 3 mmol/L    Body Temp 98.3 F degrees    O2 Therapy 21 %    iPF Ratio 152     Ph Temp Correc 7.215 (L) 7.310 - 7.450    Pco2 Temp Sayda 21.8 (L) 41.0 - 51.0 mmHg    Po2 Temp Corre 32 25 - 40 mmHg    Specimen Venous     DelSys Other     LPM 0 lpm   Blood Glucose    Collection Time: 03/29/23  2:25 AM   Result Value Ref Range    Glucose 864 (HH) 65 - 99 mg/dL   CBC with Differential    Collection Time: 03/29/23  3:45 AM   Result Value Ref Range    WBC 27.2 (H) 4.8 - 10.8 K/uL    RBC 5.58 4.70 - 6.10 M/uL    Hemoglobin 16.8 14.0 - 18.0 g/dL    Hematocrit 47.1 42.0 - 52.0 %    MCV 84.4 81.4 - 97.8 fL    MCH 30.1 27.0 - 33.0 pg    MCHC 35.7 (H) 33.7 - 35.3 g/dL    RDW 38.1 35.9 - 50.0 fL    Platelet Count 334 164 - 446 K/uL    MPV 9.5 9.0 - 12.9 fL    Neutrophils-Polys 89.40 (H) 44.00 -  72.00 %    Lymphocytes 4.70 (L) 22.00 - 41.00 %    Monocytes 4.60 0.00 - 13.40 %    Eosinophils 0.00 0.00 - 6.90 %    Basophils 0.20 0.00 - 1.80 %    Immature Granulocytes 1.10 (H) 0.00 - 0.90 %    Nucleated RBC 0.00 /100 WBC    Neutrophils (Absolute) 24.28 (H) 1.82 - 7.42 K/uL    Lymphs (Absolute) 1.28 1.00 - 4.80 K/uL    Monos (Absolute) 1.25 (H) 0.00 - 0.85 K/uL    Eos (Absolute) 0.00 0.00 - 0.51 K/uL    Baso (Absolute) 0.05 0.00 - 0.12 K/uL    Immature Granulocytes (abs) 0.30 (H) 0.00 - 0.11 K/uL    NRBC (Absolute) 0.00 K/uL       Imaging  CT-ABDOMEN-PELVIS W/O   Final Result      1.  Hepatic steatosis.   2.  Fluid distended stomach and distal esophagus. Small hiatal hernia.      DX-CHEST-PORTABLE (1 VIEW)   Final Result      No acute cardiopulmonary abnormality.          Assessment/Plan  * DKA, type 1, not at goal (HCC)- (present on admission)  Assessment & Plan  DKA protocol insulin infusion  Q1 hour blood glucose and q4 hour BMP  Crystalloid resuscitation   Electrolyte replacement  Transition to subcutaneous insulin when bicarb > 17 and AG < 12     Encephalopathy  Assessment & Plan  Toxic metabolic  Non-focal neurologic exam  Consider imaging if no resolution after treating DKA    Hypothyroid  Assessment & Plan  Continue home synthroid    Leukocytosis  Assessment & Plan  Reactive from DKA  Monitor off antibiotics absent shock    CLARA (acute kidney injury) (HCC)  Assessment & Plan  Dehydration + Ischemic ATN    Strict I/Os  Renally dosed medications  Avoid nephrotoxic agents as able  Trend     Hyperkalemia  Assessment & Plan  Acidosis + Ischemic ATN    Trend q 4 hours  Murphy catheter  Resuscitation  Expect this to improve with treatment for DKA    Hepatic steatosis  Assessment & Plan  03/29/23 on CTAP   EtOH cessation when appropriate     Elevated lactic acid level  Assessment & Plan  Dehydration, DKA  Resuscitation   Thiamine   Trend        Discussed patient condition and risk of morbidity and/or  mortality with RN, RT, Pharmacy, Charge nurse / hot rounds, and Patient.    The patient remains critically ill.  Critical care time = 63 minutes in directly providing and coordinating critical care and extensive data review.  No time overlap and excludes procedures.

## 2023-03-30 LAB
ANION GAP SERPL CALC-SCNC: 12 MMOL/L (ref 7–16)
ANION GAP SERPL CALC-SCNC: 12 MMOL/L (ref 7–16)
ANION GAP SERPL CALC-SCNC: 13 MMOL/L (ref 7–16)
BUN SERPL-MCNC: 35 MG/DL (ref 8–22)
BUN SERPL-MCNC: 36 MG/DL (ref 8–22)
BUN SERPL-MCNC: 40 MG/DL (ref 8–22)
CALCIUM SERPL-MCNC: 7.7 MG/DL (ref 8.5–10.5)
CALCIUM SERPL-MCNC: 7.8 MG/DL (ref 8.5–10.5)
CALCIUM SERPL-MCNC: 7.9 MG/DL (ref 8.5–10.5)
CHLORIDE SERPL-SCNC: 97 MMOL/L (ref 96–112)
CHLORIDE SERPL-SCNC: 98 MMOL/L (ref 96–112)
CHLORIDE SERPL-SCNC: 98 MMOL/L (ref 96–112)
CO2 SERPL-SCNC: 23 MMOL/L (ref 20–33)
CREAT SERPL-MCNC: 1.25 MG/DL (ref 0.5–1.4)
CREAT SERPL-MCNC: 1.39 MG/DL (ref 0.5–1.4)
CREAT SERPL-MCNC: 1.5 MG/DL (ref 0.5–1.4)
ERYTHROCYTE [DISTWIDTH] IN BLOOD BY AUTOMATED COUNT: 38.5 FL (ref 35.9–50)
GFR SERPLBLD CREATININE-BSD FMLA CKD-EPI: 56 ML/MIN/1.73 M 2
GFR SERPLBLD CREATININE-BSD FMLA CKD-EPI: 62 ML/MIN/1.73 M 2
GFR SERPLBLD CREATININE-BSD FMLA CKD-EPI: 70 ML/MIN/1.73 M 2
GLUCOSE BLD STRIP.AUTO-MCNC: 100 MG/DL (ref 65–99)
GLUCOSE BLD STRIP.AUTO-MCNC: 109 MG/DL (ref 65–99)
GLUCOSE BLD STRIP.AUTO-MCNC: 119 MG/DL (ref 65–99)
GLUCOSE BLD STRIP.AUTO-MCNC: 140 MG/DL (ref 65–99)
GLUCOSE BLD STRIP.AUTO-MCNC: 182 MG/DL (ref 65–99)
GLUCOSE BLD STRIP.AUTO-MCNC: 190 MG/DL (ref 65–99)
GLUCOSE BLD STRIP.AUTO-MCNC: 236 MG/DL (ref 65–99)
GLUCOSE BLD STRIP.AUTO-MCNC: 283 MG/DL (ref 65–99)
GLUCOSE SERPL-MCNC: 114 MG/DL (ref 65–99)
GLUCOSE SERPL-MCNC: 192 MG/DL (ref 65–99)
GLUCOSE SERPL-MCNC: 266 MG/DL (ref 65–99)
HCT VFR BLD AUTO: 43.1 % (ref 42–52)
HGB BLD-MCNC: 15.6 G/DL (ref 14–18)
MCH RBC QN AUTO: 30.8 PG (ref 27–33)
MCHC RBC AUTO-ENTMCNC: 36.2 G/DL (ref 33.7–35.3)
MCV RBC AUTO: 85 FL (ref 81.4–97.8)
PLATELET # BLD AUTO: 202 K/UL (ref 164–446)
PMV BLD AUTO: 9.3 FL (ref 9–12.9)
POTASSIUM SERPL-SCNC: 3.8 MMOL/L (ref 3.6–5.5)
POTASSIUM SERPL-SCNC: 4.1 MMOL/L (ref 3.6–5.5)
POTASSIUM SERPL-SCNC: 4.1 MMOL/L (ref 3.6–5.5)
RBC # BLD AUTO: 5.07 M/UL (ref 4.7–6.1)
SODIUM SERPL-SCNC: 132 MMOL/L (ref 135–145)
SODIUM SERPL-SCNC: 133 MMOL/L (ref 135–145)
SODIUM SERPL-SCNC: 134 MMOL/L (ref 135–145)
WBC # BLD AUTO: 12.4 K/UL (ref 4.8–10.8)

## 2023-03-30 PROCEDURE — 700105 HCHG RX REV CODE 258: Performed by: EMERGENCY MEDICINE

## 2023-03-30 PROCEDURE — A9270 NON-COVERED ITEM OR SERVICE: HCPCS | Performed by: INTERNAL MEDICINE

## 2023-03-30 PROCEDURE — 700105 HCHG RX REV CODE 258: Performed by: INTERNAL MEDICINE

## 2023-03-30 PROCEDURE — 700111 HCHG RX REV CODE 636 W/ 250 OVERRIDE (IP): Performed by: INTERNAL MEDICINE

## 2023-03-30 PROCEDURE — 82962 GLUCOSE BLOOD TEST: CPT | Mod: 91

## 2023-03-30 PROCEDURE — 700102 HCHG RX REV CODE 250 W/ 637 OVERRIDE(OP): Performed by: EMERGENCY MEDICINE

## 2023-03-30 PROCEDURE — 85027 COMPLETE CBC AUTOMATED: CPT

## 2023-03-30 PROCEDURE — 99233 SBSQ HOSP IP/OBS HIGH 50: CPT | Performed by: STUDENT IN AN ORGANIZED HEALTH CARE EDUCATION/TRAINING PROGRAM

## 2023-03-30 PROCEDURE — 700105 HCHG RX REV CODE 258: Performed by: HOSPITALIST

## 2023-03-30 PROCEDURE — 700105 HCHG RX REV CODE 258: Performed by: STUDENT IN AN ORGANIZED HEALTH CARE EDUCATION/TRAINING PROGRAM

## 2023-03-30 PROCEDURE — 700102 HCHG RX REV CODE 250 W/ 637 OVERRIDE(OP): Performed by: INTERNAL MEDICINE

## 2023-03-30 PROCEDURE — 99255 IP/OBS CONSLTJ NEW/EST HI 80: CPT | Performed by: HOSPITALIST

## 2023-03-30 PROCEDURE — 770001 HCHG ROOM/CARE - MED/SURG/GYN PRIV*

## 2023-03-30 PROCEDURE — 700111 HCHG RX REV CODE 636 W/ 250 OVERRIDE (IP): Performed by: HOSPITALIST

## 2023-03-30 PROCEDURE — 80048 BASIC METABOLIC PNL TOTAL CA: CPT

## 2023-03-30 PROCEDURE — 36415 COLL VENOUS BLD VENIPUNCTURE: CPT

## 2023-03-30 RX ORDER — DEXTROSE AND SODIUM CHLORIDE 10; .45 G/100ML; G/100ML
INJECTION, SOLUTION INTRAVENOUS CONTINUOUS
Status: DISCONTINUED | OUTPATIENT
Start: 2023-03-30 | End: 2023-03-30

## 2023-03-30 RX ORDER — ENOXAPARIN SODIUM 100 MG/ML
40 INJECTION SUBCUTANEOUS DAILY
Status: DISCONTINUED | OUTPATIENT
Start: 2023-03-30 | End: 2023-03-31 | Stop reason: HOSPADM

## 2023-03-30 RX ORDER — SODIUM CHLORIDE, SODIUM LACTATE, POTASSIUM CHLORIDE, CALCIUM CHLORIDE 600; 310; 30; 20 MG/100ML; MG/100ML; MG/100ML; MG/100ML
INJECTION, SOLUTION INTRAVENOUS CONTINUOUS
Status: DISCONTINUED | OUTPATIENT
Start: 2023-03-30 | End: 2023-03-31 | Stop reason: HOSPADM

## 2023-03-30 RX ORDER — DEXTROSE AND SODIUM CHLORIDE 5; .9 G/100ML; G/100ML
INJECTION, SOLUTION INTRAVENOUS CONTINUOUS
Status: DISCONTINUED | OUTPATIENT
Start: 2023-03-30 | End: 2023-03-30

## 2023-03-30 RX ADMIN — INSULIN GLARGINE-YFGN 60 UNITS: 100 INJECTION, SOLUTION SUBCUTANEOUS at 17:38

## 2023-03-30 RX ADMIN — DEXTROSE AND SODIUM CHLORIDE: 10; .45 INJECTION, SOLUTION INTRAVENOUS at 00:24

## 2023-03-30 RX ADMIN — INSULIN HUMAN 2 UNITS: 100 INJECTION, SOLUTION PARENTERAL at 06:14

## 2023-03-30 RX ADMIN — INSULIN HUMAN 3 UNITS: 100 INJECTION, SOLUTION PARENTERAL at 12:26

## 2023-03-30 RX ADMIN — INSULIN HUMAN 5 UNITS: 100 INJECTION, SOLUTION PARENTERAL at 20:50

## 2023-03-30 RX ADMIN — INSULIN HUMAN 2 UNITS: 100 INJECTION, SOLUTION PARENTERAL at 17:38

## 2023-03-30 RX ADMIN — SODIUM CHLORIDE, POTASSIUM CHLORIDE, SODIUM LACTATE AND CALCIUM CHLORIDE: 600; 310; 30; 20 INJECTION, SOLUTION INTRAVENOUS at 13:40

## 2023-03-30 RX ADMIN — DEXTROSE AND SODIUM CHLORIDE 1000 ML: 5; 900 INJECTION, SOLUTION INTRAVENOUS at 08:45

## 2023-03-30 RX ADMIN — Medication 1 APPLICATOR: at 05:57

## 2023-03-30 RX ADMIN — DOCUSATE SODIUM 50 MG AND SENNOSIDES 8.6 MG 2 TABLET: 8.6; 5 TABLET, FILM COATED ORAL at 05:55

## 2023-03-30 RX ADMIN — DOCUSATE SODIUM 50 MG AND SENNOSIDES 8.6 MG 2 TABLET: 8.6; 5 TABLET, FILM COATED ORAL at 17:40

## 2023-03-30 RX ADMIN — HEPARIN SODIUM 5000 UNITS: 5000 INJECTION, SOLUTION INTRAVENOUS; SUBCUTANEOUS at 05:58

## 2023-03-30 RX ADMIN — ACETAMINOPHEN 650 MG: 325 TABLET, FILM COATED ORAL at 17:40

## 2023-03-30 RX ADMIN — LEVOTHYROXINE SODIUM 25 MCG: 0.05 TABLET ORAL at 05:56

## 2023-03-30 RX ADMIN — THIAMINE HYDROCHLORIDE 100 MG: 100 INJECTION, SOLUTION INTRAMUSCULAR; INTRAVENOUS at 06:06

## 2023-03-30 RX ADMIN — ACETAMINOPHEN 650 MG: 325 TABLET, FILM COATED ORAL at 08:45

## 2023-03-30 RX ADMIN — ENOXAPARIN SODIUM 40 MG: 100 INJECTION SUBCUTANEOUS at 17:40

## 2023-03-30 ASSESSMENT — ENCOUNTER SYMPTOMS
FOCAL WEAKNESS: 0
VOMITING: 0
RESPIRATORY NEGATIVE: 1
WEAKNESS: 1
ABDOMINAL PAIN: 0
SORE THROAT: 1
MUSCULOSKELETAL NEGATIVE: 1
PSYCHIATRIC NEGATIVE: 1
VOMITING: 1
ABDOMINAL PAIN: 1
CHILLS: 0
COUGH: 0
HEADACHES: 0
NAUSEA: 1
EYES NEGATIVE: 1
NAUSEA: 0
CARDIOVASCULAR NEGATIVE: 1
SHORTNESS OF BREATH: 0
FEVER: 0

## 2023-03-30 ASSESSMENT — FIBROSIS 4 INDEX: FIB4 SCORE: 0.5

## 2023-03-30 NOTE — PROGRESS NOTES
"Critical Care Progress Note    Date of admission  3/28/2023    Chief Complaint  \"52 y.o. male with a history of DM and hypothyroidism who presented 3/28/2023 with somnolence, nausea, vomiting, and hyperglycemia. Apparently today he has had significant N/V throughout the day then became increasingly somnolent so was brought to he ER. In the ER his labs were consistent with DKA; CTAP reassuring; CXR without infiltrates. He was admitted to the ICU on an insulin infusion.      Following IV fluids and insulin he was more awake, stating his symptoms felt much better. He denies preceding infectious s/s, CP, headache. \" - [facility]  3/28 - Admitted to ICU on DKA protocol  3/29 - transitioned to his home glargine around 9pm  3/30 - doing well enough for transfer to floor    Interval Problem Update  Reviewed last 24 hour events:  Tmax: Afebrile  Diet: Advance once off DKA protocol  Vasopressors: None    Antibx: None      Intake / Output  Urine Output past 24 hours: None recorded      Lab Trends  WBC trending down    Cr is down to 1.4  Gap is closed  HCO3 improved      Review of Systems  Review of Systems   Constitutional:  Negative for chills, fever and malaise/fatigue.   HENT: Negative.     Eyes: Negative.    Respiratory:  Negative for cough and shortness of breath.    Cardiovascular:  Negative for chest pain and leg swelling.   Gastrointestinal:  Negative for abdominal pain, nausea and vomiting.   Genitourinary:  Negative for dysuria and frequency.   Musculoskeletal: Negative.    Skin: Negative.    Neurological:  Negative for focal weakness and headaches.   All other systems reviewed and are negative.     Vital Signs for last 24 hours   Pulse:  [] 84  Resp:  [12-42] 25  BP: (106-158)/(60-86) 149/78  SpO2:  [91 %-97 %] 96 %    Hemodynamic parameters for last 24 hours       Respiratory Information for the last 24 hours       Physical Exam   Physical Exam  Vitals and nursing note reviewed. Exam conducted " with a chaperone present.   Constitutional:       General: He is not in acute distress.     Appearance: Normal appearance. He is not ill-appearing.   HENT:      Head: Normocephalic.      Mouth/Throat:      Mouth: Mucous membranes are moist.   Eyes:      Extraocular Movements: Extraocular movements intact.   Cardiovascular:      Rate and Rhythm: Normal rate and regular rhythm.      Pulses: Normal pulses.   Pulmonary:      Effort: Pulmonary effort is normal. No respiratory distress.   Abdominal:      General: There is no distension.      Palpations: Abdomen is soft.      Tenderness: There is no abdominal tenderness. There is no guarding or rebound.   Musculoskeletal:         General: Normal range of motion.      Cervical back: Normal range of motion and neck supple.   Skin:     General: Skin is warm and dry.      Capillary Refill: Capillary refill takes less than 2 seconds.   Neurological:      General: No focal deficit present.      Mental Status: He is alert and oriented to person, place, and time. Mental status is at baseline.       Medications  Current Facility-Administered Medications   Medication Dose Route Frequency Provider Last Rate Last Admin    D10%-0.45% NaCl infusion   Intravenous Continuous Osmani Beckman M.D. 50 mL/hr at 03/30/23 0027 Rate Change at 03/30/23 0027    Nozin nasal  swab  1 Applicator Each Nostril BID Saulo Hemphill M.D.   1 Applicator at 03/30/23 0557    insulin GLARGINE (Lantus,Semglee) injection  60 Units Subcutaneous Q EVENING Osmani Beckman M.D.   60 Units at 03/29/23 2138    insulin regular (HumuLIN R,NovoLIN R) injection  2-9 Units Subcutaneous 4X/DAY HAKAN Beckman M.D.   2 Units at 03/30/23 0614    And    dextrose 10 % BOLUS 25 g  25 g Intravenous Q15 MIN PRN Osmani Beckman M.D.        thiamine (B-1) IVPB 100 mg in 100 mL D5W (premix)  100 mg Intravenous DAILY Chad Dave M.D.   Stopped at 03/30/23 0636    acetaminophen (Tylenol) tablet 650 mg   650 mg Oral Q6HRS PRN Chad Dave M.D.        senna-docusate (PERICOLACE or SENOKOT S) 8.6-50 MG per tablet 2 Tablet  2 Tablet Oral BID Chad Dave M.D.   2 Tablet at 03/30/23 0555    And    polyethylene glycol/lytes (MIRALAX) PACKET 1 Packet  1 Packet Oral QDAY PRN Chad Dave M.D.        And    magnesium hydroxide (MILK OF MAGNESIA) suspension 30 mL  30 mL Oral QDAY PRN Chad Dave M.D.        And    bisacodyl (DULCOLAX) suppository 10 mg  10 mg Rectal QDAY PRN Chad Dave M.D.        heparin injection 5,000 Units  5,000 Units Subcutaneous Q8HRS Chad Dave M.D.   5,000 Units at 03/30/23 0558    labetalol (NORMODYNE/TRANDATE) injection 10 mg  10 mg Intravenous Q4HRS PRSHAQ Dave M.D.        ondansetron (ZOFRAN) syringe/vial injection 4 mg  4 mg Intravenous Q4HRS PRSHQA Dave M.D.   4 mg at 03/29/23 0033    ondansetron (ZOFRAN ODT) dispertab 4 mg  4 mg Oral Q4HRS PRSHAQ Dave M.D.        levothyroxine (SYNTHROID) tablet 25 mcg  25 mcg Oral AM ES Chad Dave M.D.   25 mcg at 03/30/23 0556       Fluids    Intake/Output Summary (Last 24 hours) at 3/30/2023 0722  Last data filed at 3/30/2023 0200  Gross per 24 hour   Intake 3292.97 ml   Output 950 ml   Net 2342.97 ml       Laboratory  Recent Labs     03/29/23  0118   ISTATTEMP 98.3 F   ISTATFIO2 21   ISTATSPEC Venous         Recent Labs     03/29/23  0011 03/29/23  0345 03/29/23  0505 03/29/23  0630 03/29/23  1055 03/29/23  1428 03/29/23  1835 03/29/23  2215 03/30/23  0215 03/30/23  0630   SODIUM 124*   < >  --  130*   < > 140   < > 133* 134* 133*   POTASSIUM 6.0*   < >  --  4.7   < > 3.3*   < > 4.2 3.8 4.1   CHLORIDE 72*   < >  --  93*   < > 98   < > 98 98 98   CO2 6*   < >  --  15*   < > 25   < > 24 23 23   BUN 62*   < >  --  52*   < > 63*   < > 46* 40* 36*   CREATININE 2.91*   < >  --  1.83*   < > 2.15*   < > 1.68* 1.50* 1.39   MAGNESIUM 2.9*  --   --  1.7  --  2.6*  --   --   --   --    PHOSPHORUS  8.2*   < > 3.4 2.0*  --  2.7  --   --   --   --    CALCIUM 8.9   < >  --  7.7*   < > 9.1   < > 7.9* 7.9* 7.7*    < > = values in this interval not displayed.     Recent Labs     03/28/23 2145 03/29/23  0011 03/29/23  0345 03/29/23  0505 03/29/23  2215 03/30/23  0215 03/30/23  0630   ALTSGPT 35  --  29  --   --   --   --    ASTSGOT 18  --  18  --   --   --   --    ALKPHOSPHAT 105*  --  87  --   --   --   --    TBILIRUBIN 1.1  --  0.7  --   --   --   --    LIPASE 21  --   --   --   --   --   --    GLUCOSE 1030*   < > 721*   < > 159* 114* 192*    < > = values in this interval not displayed.     Recent Labs     03/28/23 2044 03/28/23 2145 03/29/23 0345   WBC 33.6*  --  27.2*   NEUTSPOLYS 91.40*  --  89.40*   LYMPHOCYTES 2.40*  --  4.70*   MONOCYTES 4.30  --  4.60   EOSINOPHILS 0.00  --  0.00   BASOPHILS 0.40  --  0.20   ASTSGOT  --  18 18   ALTSGPT  --  35 29   ALKPHOSPHAT  --  105* 87   TBILIRUBIN  --  1.1 0.7     Recent Labs     03/28/23 2044 03/29/23 0345   RBC 6.14* 5.58   HEMOGLOBIN 19.0* 16.8   HEMATOCRIT 53.5* 47.1   PLATELETCT 461* 334       Imaging  No imaging this morning    Assessment/Plan  * DKA, type 1, not at goal (HCC)- (present on admission)  Assessment & Plan  Now back on subcutaneous insulin  Home dose is glargine 60 units qam, will change to qhs given timing of transition    SSI as well  Advance diet    Ok to transfer out of icu    Encephalopathy  Assessment & Plan  Toxic metabolic  Non-focal neurologic exam    improving    Hypothyroid  Assessment & Plan  Continue home synthroid    Leukocytosis  Assessment & Plan  Reactive from DKA  Monitor off antibiotics absent shock    Improving    CLARA (acute kidney injury) (HCC)  Assessment & Plan  Resolved    Hyperkalemia  Assessment & Plan  Acidosis + Ischemic ATN    Improving    Hepatic steatosis  Assessment & Plan  03/29/23 on CTAP   EtOH cessation when appropriate     Elevated lactic acid level  Assessment & Plan  Resolved         VTE:   Heparin  Ulcer: Not Indicated  Lines: None    I have performed a physical exam and reviewed and updated ROS and Plan today (3/30/2023). In review of yesterday's note (3/29/2023), there are no changes except as documented above.     Discussed patient condition and risk of morbidity and/or mortality with RN, RT, Pharmacy, Charge nurse / hot rounds, and Patient    Ok to transfer patient out of ICU today. [facility] will sign off at transfer. Please call with questions.

## 2023-03-30 NOTE — PROGRESS NOTES
Went to evaluate patient he is awake alert, mentating, was able to take some orals, ate some Jell-O and that went down well.    Anion gap is closed, bicarbonate is 26, serum creatinine improving, patient is well-appearing    Some question about his home dose of insulin being documented as 40 every morning and 50 nightly of Lantus however the patient himself tells me he takes 60 every morning and he is adamant about this.  He does tell me that he used to be on a much higher dose up to 100/day.    He is very clear that he takes 60 units every morning.  Once daily.    At this point we will transition patient to subcutaneous insulin starting with 60 units Lantus.    -Continue fingersticks every hour x6 hours  -Continue BMP until stability  -Advance diet as tolerated  -Add correction insulin order as well    Discussed with pharmacy and bedside nursing.

## 2023-03-30 NOTE — CONSULTS
Hospital Medicine Consultation    Date of Service  3/30/2023    Referring Physician  Saulo Hemphill M.D.    Consulting Physician  Reno Diaz M.D.    Reason for Consultation  Hospital medicine consultation requested patient presenting with nausea vomiting and DKA    History of Presenting Illness  52 y.o. male who presented 3/28/2023 with insulin requiring diabetes, hypothyroidism, hypertension, presents to the emergency room with nausea, vomiting, sudden onset, lethargy and blood sugars reading high at home, the patient was found in DKA in the emergency room, no definitive source of infection as a trigger per imaging initially, admitted to ICU with a comprehensive DKA treatment including insulin drip, IV fluids, resuscitation.  The patient initially found with significant leukocytosis, white cell count 33.6, presenting hyperkalemia with a potassium of 6.8, bicarb of 4, anion gap of 48, blood sugar of 1030, Phos 9.6, magnesium 2.6, lactic acid 7.4, creatinine 2.78, beta hydroxybutyric acid greater than 8, the patient is hemoglobin A1c of 7.1 reassuring.  Cultures remain negative throughout hospitalization, CT abdomen showed hepatic steatosis, with fluid distended stomach and distal esophagus, small hiatal hernia, otherwise no acute findings.  On 3/30 the patient is overall improved, he complains of a sore throat, from vomiting, he has been tolerating a liquid diet, his blood sugars are improved, still suboptimal with readings up into the 200s, significant other at the bedside, transfer out of ICU confirmed with ICU staff, started on long-acting insulin regimen 60 units every afternoon, ongoing IV fluid support until further improved.  Review of Systems  Review of Systems   Constitutional:  Positive for malaise/fatigue.   HENT:  Positive for sore throat.    Eyes: Negative.    Respiratory: Negative.     Cardiovascular: Negative.    Gastrointestinal:  Positive for abdominal pain, nausea and vomiting.    Genitourinary: Negative.    Musculoskeletal: Negative.    Skin: Negative.    Neurological:  Positive for weakness.   Endo/Heme/Allergies: Negative.    Psychiatric/Behavioral: Negative.     All other systems reviewed and are negative.    Past Medical History   has a past medical history of Allergy, Diabetes (HCC), Hypertension, and Injury.  Hypothyroidism  Surgical History   has a past surgical history that includes pr open fix inter/subtroch fx,implnt (Right, 9/3/2020).    Family History  Patient denies pertinent family history contributory to his current admission    Social History   reports that he has never smoked. He has never used smokeless tobacco. He reports that he does not currently use alcohol. He reports that he does not currently use drugs after having used the following drugs: Inhaled.    Medications  Prior to Admission Medications   Prescriptions Last Dose Informant Patient Reported? Taking?   esomeprazole (NEXIUM) 20 MG capsule   Yes No   Sig: Take 20 mg by mouth every morning before breakfast.   insulin glargine 100 UNIT/ML SC SOLN  Patient Yes No   Sig: Inject 60 Units under the skin every morning.   levothyroxine (SYNTHROID) 25 MCG Tab   Yes No   Sig: Take 25 mcg by mouth Every morning on an empty stomach.   lidocaine (LIDODERM) 5 % Patch   No No   Sig: Apply 2 Patches to skin as directed every 24 hours.      Facility-Administered Medications: None       Allergies  Allergies   Allergen Reactions    Pcn [Penicillins] Anaphylaxis    Pcn [Penicillins]     Penicillins      Rxn as a kid       Physical Exam  Pulse:  [82-97] 82  Resp:  [12-42] 23  BP: (106-161)/(60-86) 141/77  SpO2:  [91 %-98 %] 94 %    Physical Exam  Vitals and nursing note reviewed.   Constitutional:       Appearance: He is well-developed.      Comments: Pt seen and examined.   HENT:      Head: Normocephalic and atraumatic.   Eyes:      Pupils: Pupils are equal, round, and reactive to light.   Cardiovascular:      Rate and Rhythm:  Normal rate and regular rhythm.      Heart sounds: Normal heart sounds.   Pulmonary:      Effort: Pulmonary effort is normal.      Breath sounds: Rhonchi present.   Abdominal:      General: Bowel sounds are normal.      Palpations: Abdomen is soft.   Musculoskeletal:         General: Normal range of motion.      Cervical back: Normal range of motion and neck supple.   Skin:     General: Skin is warm and dry.      Comments: Multiple tattoos   Neurological:      General: No focal deficit present.      Mental Status: He is alert and oriented to person, place, and time.   Psychiatric:         Behavior: Behavior normal.       Fluids      Laboratory  Recent Labs     03/28/23  2044 03/29/23  0345   WBC 33.6* 27.2*   RBC 6.14* 5.58   HEMOGLOBIN 19.0* 16.8   HEMATOCRIT 53.5* 47.1   MCV 87.1 84.4   MCH 30.9 30.1   MCHC 35.5* 35.7*   RDW 40.4 38.1   PLATELETCT 461* 334   MPV 10.2 9.5     Recent Labs     03/29/23  2215 03/30/23  0215 03/30/23  0630   SODIUM 133* 134* 133*   POTASSIUM 4.2 3.8 4.1   CHLORIDE 98 98 98   CO2 24 23 23   GLUCOSE 159* 114* 192*   BUN 46* 40* 36*   CREATININE 1.68* 1.50* 1.39   CALCIUM 7.9* 7.9* 7.7*                     Imaging  CT-ABDOMEN-PELVIS W/O   Final Result      1.  Hepatic steatosis.   2.  Fluid distended stomach and distal esophagus. Small hiatal hernia.      DX-CHEST-PORTABLE (1 VIEW)   Final Result      No acute cardiopulmonary abnormality.          Assessment/Plan  * DKA, type 1, not at goal (HCC)- (present on admission)  Assessment & Plan  Unclear trigger, sudden onset of nausea vomiting, question of gastroenteritis initiating      Encephalopathy  Assessment & Plan  Resolved, likely secondary to toxic metabolic effects from DKA    Leukocytosis- (present on admission)  Assessment & Plan  Likely reactive, no definitive infection    CLARA (acute kidney injury) (HCC)  Assessment & Plan  Secondary to DKA, dehydration, currently improved, monitor    Hypothyroid  Assessment & Plan  Continue  replacement    Hyperkalemia  Assessment & Plan  Resolved with correction of DKA, monitor    Hepatic steatosis  Assessment & Plan  Query alcohol, over alimentation    Elevated lactic acid level  Assessment & Plan  Secondary to DKA, resolved      Plan  Provide symptomatic treatment for the patient sore throat  Ongoing IV fluid support until the patient's p.o. intake is adequate  Monitor glycemic control is still diligently, still suboptimal control at this time, adjust depending the patient's improving p.o. intake  A.m. laboratory follow-up  See orders  Medically complex high-risk patient    Thank you for consulting with us, we will follow along closely while the patient is hospitalized    Please note that this dictation was created using voice recognition software. I have made every reasonable attempt to correct obvious errors, but I expect that there are errors of grammar and possibly context that I did not discover before finalizing the note.

## 2023-03-30 NOTE — PROGRESS NOTES
Pt assessed at shift change-- A&O x4 but drowsy. Per MD D10 0.45% gtt changed to D5 0.9% gtt and anderson catheter discontinued. Pt received transfer orders to [address]-- Report called the Zeynep MUNOZ. Pt transported via wheelchair, x1-2 assist with CNA and transport tech. Pt tolerated transfer from bed to wheelchair well.

## 2023-03-31 VITALS
WEIGHT: 211.42 LBS | HEIGHT: 75 IN | BODY MASS INDEX: 26.29 KG/M2 | HEART RATE: 77 BPM | SYSTOLIC BLOOD PRESSURE: 159 MMHG | OXYGEN SATURATION: 94 % | DIASTOLIC BLOOD PRESSURE: 79 MMHG | RESPIRATION RATE: 18 BRPM | TEMPERATURE: 99.1 F

## 2023-03-31 PROBLEM — R13.10 ODYNOPHAGIA: Status: ACTIVE | Noted: 2023-03-31

## 2023-03-31 LAB
ALBUMIN SERPL BCP-MCNC: 2.8 G/DL (ref 3.2–4.9)
ALBUMIN/GLOB SERPL: 1.2 G/DL
ALP SERPL-CCNC: 75 U/L (ref 30–99)
ALT SERPL-CCNC: 20 U/L (ref 2–50)
ANION GAP SERPL CALC-SCNC: 9 MMOL/L (ref 7–16)
AST SERPL-CCNC: 16 U/L (ref 12–45)
BILIRUB SERPL-MCNC: 0.7 MG/DL (ref 0.1–1.5)
BUN SERPL-MCNC: 23 MG/DL (ref 8–22)
CALCIUM ALBUM COR SERPL-MCNC: 8.8 MG/DL (ref 8.5–10.5)
CALCIUM SERPL-MCNC: 7.8 MG/DL (ref 8.5–10.5)
CHLORIDE SERPL-SCNC: 99 MMOL/L (ref 96–112)
CO2 SERPL-SCNC: 27 MMOL/L (ref 20–33)
CREAT SERPL-MCNC: 0.96 MG/DL (ref 0.5–1.4)
ERYTHROCYTE [DISTWIDTH] IN BLOOD BY AUTOMATED COUNT: 37.6 FL (ref 35.9–50)
GFR SERPLBLD CREATININE-BSD FMLA CKD-EPI: 96 ML/MIN/1.73 M 2
GLOBULIN SER CALC-MCNC: 2.4 G/DL (ref 1.9–3.5)
GLUCOSE BLD STRIP.AUTO-MCNC: 128 MG/DL (ref 65–99)
GLUCOSE BLD STRIP.AUTO-MCNC: 168 MG/DL (ref 65–99)
GLUCOSE BLD STRIP.AUTO-MCNC: 232 MG/DL (ref 65–99)
GLUCOSE BLD STRIP.AUTO-MCNC: 61 MG/DL (ref 65–99)
GLUCOSE BLD STRIP.AUTO-MCNC: 66 MG/DL (ref 65–99)
GLUCOSE SERPL-MCNC: 193 MG/DL (ref 65–99)
HCT VFR BLD AUTO: 40 % (ref 42–52)
HGB BLD-MCNC: 14.5 G/DL (ref 14–18)
MAGNESIUM SERPL-MCNC: 2.2 MG/DL (ref 1.5–2.5)
MCH RBC QN AUTO: 30.2 PG (ref 27–33)
MCHC RBC AUTO-ENTMCNC: 36.3 G/DL (ref 33.7–35.3)
MCV RBC AUTO: 83.3 FL (ref 81.4–97.8)
PHOSPHATE SERPL-MCNC: 1.3 MG/DL (ref 2.5–4.5)
PLATELET # BLD AUTO: 175 K/UL (ref 164–446)
PMV BLD AUTO: 9.5 FL (ref 9–12.9)
POTASSIUM SERPL-SCNC: 3.7 MMOL/L (ref 3.6–5.5)
PROT SERPL-MCNC: 5.2 G/DL (ref 6–8.2)
RBC # BLD AUTO: 4.8 M/UL (ref 4.7–6.1)
SODIUM SERPL-SCNC: 135 MMOL/L (ref 135–145)
T4 FREE SERPL-MCNC: 0.9 NG/DL (ref 0.93–1.7)
TSH SERPL DL<=0.005 MIU/L-ACNC: 2.93 UIU/ML (ref 0.38–5.33)
WBC # BLD AUTO: 9.8 K/UL (ref 4.8–10.8)

## 2023-03-31 PROCEDURE — 85027 COMPLETE CBC AUTOMATED: CPT

## 2023-03-31 PROCEDURE — 700105 HCHG RX REV CODE 258: Performed by: HOSPITALIST

## 2023-03-31 PROCEDURE — 700102 HCHG RX REV CODE 250 W/ 637 OVERRIDE(OP): Performed by: HOSPITALIST

## 2023-03-31 PROCEDURE — 700111 HCHG RX REV CODE 636 W/ 250 OVERRIDE (IP): Performed by: INTERNAL MEDICINE

## 2023-03-31 PROCEDURE — 82962 GLUCOSE BLOOD TEST: CPT | Mod: 91

## 2023-03-31 PROCEDURE — A9270 NON-COVERED ITEM OR SERVICE: HCPCS | Performed by: INTERNAL MEDICINE

## 2023-03-31 PROCEDURE — 700102 HCHG RX REV CODE 250 W/ 637 OVERRIDE(OP): Performed by: INTERNAL MEDICINE

## 2023-03-31 PROCEDURE — 700101 HCHG RX REV CODE 250: Performed by: HOSPITALIST

## 2023-03-31 PROCEDURE — 80053 COMPREHEN METABOLIC PANEL: CPT

## 2023-03-31 PROCEDURE — 99239 HOSP IP/OBS DSCHRG MGMT >30: CPT | Performed by: HOSPITALIST

## 2023-03-31 PROCEDURE — 84100 ASSAY OF PHOSPHORUS: CPT

## 2023-03-31 PROCEDURE — 84439 ASSAY OF FREE THYROXINE: CPT

## 2023-03-31 PROCEDURE — 83735 ASSAY OF MAGNESIUM: CPT

## 2023-03-31 PROCEDURE — 84443 ASSAY THYROID STIM HORMONE: CPT

## 2023-03-31 PROCEDURE — 36415 COLL VENOUS BLD VENIPUNCTURE: CPT

## 2023-03-31 PROCEDURE — 700105 HCHG RX REV CODE 258: Performed by: INTERNAL MEDICINE

## 2023-03-31 PROCEDURE — A9270 NON-COVERED ITEM OR SERVICE: HCPCS | Performed by: HOSPITALIST

## 2023-03-31 RX ORDER — OMEPRAZOLE 20 MG/1
20 CAPSULE, DELAYED RELEASE ORAL EVERY 12 HOURS
Status: DISCONTINUED | OUTPATIENT
Start: 2023-03-31 | End: 2023-03-31 | Stop reason: HOSPADM

## 2023-03-31 RX ORDER — SUCRALFATE ORAL 1 G/10ML
1 SUSPENSION ORAL EVERY 6 HOURS
Status: DISCONTINUED | OUTPATIENT
Start: 2023-03-31 | End: 2023-03-31 | Stop reason: HOSPADM

## 2023-03-31 RX ADMIN — SUCRALFATE 1 G: 1 SUSPENSION ORAL at 15:53

## 2023-03-31 RX ADMIN — INSULIN HUMAN 2 UNITS: 100 INJECTION, SOLUTION PARENTERAL at 12:07

## 2023-03-31 RX ADMIN — DOCUSATE SODIUM 50 MG AND SENNOSIDES 8.6 MG 2 TABLET: 8.6; 5 TABLET, FILM COATED ORAL at 04:51

## 2023-03-31 RX ADMIN — OMEPRAZOLE 20 MG: 20 CAPSULE, DELAYED RELEASE ORAL at 08:48

## 2023-03-31 RX ADMIN — Medication 1 SPRAY: at 15:52

## 2023-03-31 RX ADMIN — Medication 1 LOZENGE: at 15:54

## 2023-03-31 RX ADMIN — SODIUM CHLORIDE, POTASSIUM CHLORIDE, SODIUM LACTATE AND CALCIUM CHLORIDE: 600; 310; 30; 20 INJECTION, SOLUTION INTRAVENOUS at 01:27

## 2023-03-31 RX ADMIN — LIDOCAINE HYDROCHLORIDE 30 ML: 20 SOLUTION OROPHARYNGEAL at 08:47

## 2023-03-31 RX ADMIN — INSULIN HUMAN 3 UNITS: 100 INJECTION, SOLUTION PARENTERAL at 17:23

## 2023-03-31 RX ADMIN — INSULIN GLARGINE-YFGN 60 UNITS: 100 INJECTION, SOLUTION SUBCUTANEOUS at 17:22

## 2023-03-31 RX ADMIN — THIAMINE HYDROCHLORIDE 100 MG: 100 INJECTION, SOLUTION INTRAMUSCULAR; INTRAVENOUS at 04:59

## 2023-03-31 RX ADMIN — LEVOTHYROXINE SODIUM 25 MCG: 0.05 TABLET ORAL at 04:51

## 2023-03-31 RX ADMIN — POTASSIUM PHOSPHATE, MONOBASIC AND POTASSIUM PHOSPHATE, DIBASIC 30 MMOL: 224; 236 INJECTION, SOLUTION, CONCENTRATE INTRAVENOUS at 16:01

## 2023-03-31 ASSESSMENT — ENCOUNTER SYMPTOMS
MYALGIAS: 1
FEVER: 0
WEAKNESS: 1
CHILLS: 0
COUGH: 1
GASTROINTESTINAL NEGATIVE: 1
NERVOUS/ANXIOUS: 1
SORE THROAT: 1
CARDIOVASCULAR NEGATIVE: 1
EYES NEGATIVE: 1

## 2023-03-31 NOTE — CARE PLAN
The patient is Stable - Low risk of patient condition declining or worsening    Shift Goals  Clinical Goals: Monitor blood sugar, VSS  Patient Goals: REst  Family Goals: LAYNE    Progress made toward(s) clinical / shift goals:      Problem: Knowledge Deficit - Standard  Goal: Patient and family/care givers will demonstrate understanding of plan of care, disease process/condition, diagnostic tests and medications  Outcome: Progressing     Problem: Skin Integrity  Goal: Skin integrity is maintained or improved  Outcome: Progressing     Problem: Fall Risk  Goal: Patient will remain free from falls  Outcome: Progressing     Problem: Diabetes Management  Goal: Patient will achieve and maintain glucose in satisfactory range  Outcome: Progressing     Problem: Knowledge Deficit - Diabetes  Goal: Patient will demonstrate knowledge of insulin injection, symptoms, and treatment of hypoglycemia and diet prior to discharge  Outcome: Progressing     Problem: Pain - Standard  Goal: Alleviation of pain or a reduction in pain to the patient’s comfort goal  Outcome: Progressing       Patient is not progressing towards the following goals:

## 2023-03-31 NOTE — PROGRESS NOTES
Hypoglycemia Intervention    Hypoglycemia protocol intervention:  Blood glucose 61 at 0819  Intervention: 8 oz whole milk   Repeat blood glucose 66 at 0847.  Intervention: 8 oz whole milk  Repeat blood glucose 128  Additional interventions needed: n/a  Dr. Diaz notified of the above at 0830

## 2023-03-31 NOTE — DIETARY
Nutrition Services: Type 1 Diabetes Education Consult   Day 3 of admit.  Peña Guerrero is a 52 y.o. male with admitting DX of DKA, type 1, not at goal (HCC) [E10.10]    RD able to visit pt at bedside to provide Type 1 diabetes diet education. Pt declined education but accepted handouts. RD encouraged pt to read through information and reach back out if any questions come up.     No other education needs identified at this time. Consider referral to outpatient nutrition services for continuation of education as indicated or per pt preferences.     Please re-consult RD as indicated.

## 2023-03-31 NOTE — PROGRESS NOTES
Hospital Medicine Daily Progress Note    Date of Service  3/31/2023    Chief Complaint  Peña Guerrero is a 52 y.o. male admitted 3/28/2023 with nausea vomiting and DKA    Hospital Course  52 y.o. male who presented 3/28/2023 with insulin requiring diabetes, hypothyroidism, hypertension, presents to the emergency room with nausea, vomiting, sudden onset, lethargy and blood sugars reading high at home, the patient was found in DKA in the emergency room, no definitive source of infection as a trigger per imaging initially, admitted to ICU with a comprehensive DKA treatment including insulin drip, IV fluids, resuscitation.  The patient initially found with significant leukocytosis, white cell count 33.6, presenting hyperkalemia with a potassium of 6.8, bicarb of 4, anion gap of 48, blood sugar of 1030, Phos 9.6, magnesium 2.6, lactic acid 7.4, creatinine 2.78, beta hydroxybutyric acid greater than 8, the patient is hemoglobin A1c of 7.1 reassuring.  Cultures remain negative throughout hospitalization, CT abdomen showed hepatic steatosis, with fluid distended stomach and distal esophagus, small hiatal hernia, otherwise no acute findings.  On 3/30 the patient is overall improved, he complains of a sore throat, from vomiting, he has been tolerating a liquid diet, his blood sugars are improved, still suboptimal with readings up into the 200s, significant other at the bedside, transfer out of ICU confirmed with ICU staff, started on long-acting insulin regimen 60 units every afternoon, ongoing IV fluid support until further improved.    Interval Problem Update  Patient seen and examined today.  Data, Medication data reviewed.  Case discussed with nursing as available.  Plan of Care reviewed with patient and notified of changes.   3/31 the patient with hypoglycemia this morning, poor p.o. intake secondary to reports of still significantly sore throat and difficulty with swallowing, additional medication provided,  encouraged patient to keep nutrition intake adequate, ongoing glycemic control, met with the diabetic educator for his diabetic control.  The patient is afebrile, artery in the 70s, blood pressure 159/79, the patient is saturating 94% on room air, chemistry and CBC reviewed, blood glucose 193, no metabolic acidosis today, slightly elevated BUN still at 23, Phos of 1.3, the patient overall still feels weak, lethargic and has myalgias    I have discussed this patient's plan of care and discharge plan at IDT rounds today with Case Management, Nursing, Nursing leadership, and other members of the IDT team.    Consultants/Specialty  critical care    Code Status  Full Code    Disposition  Patient is not medically cleared for discharge.   Anticipate discharge to to home with close outpatient follow-up.  I have placed the appropriate orders for post-discharge needs.    Review of Systems  Review of Systems   Constitutional:  Positive for malaise/fatigue. Negative for chills and fever.   HENT:  Positive for sore throat.    Eyes: Negative.    Respiratory:  Positive for cough.    Cardiovascular: Negative.    Gastrointestinal: Negative.    Genitourinary: Negative.    Musculoskeletal:  Positive for myalgias.   Skin: Negative.    Neurological:  Positive for weakness.   Endo/Heme/Allergies: Negative.    Psychiatric/Behavioral:  The patient is nervous/anxious.    All other systems reviewed and are negative.     Physical Exam  Temp:  [36.4 °C (97.6 °F)-37.3 °C (99.1 °F)] 37.3 °C (99.1 °F)  Pulse:  [74-80] 77  Resp:  [17-18] 18  BP: (136-159)/(63-79) 159/79  SpO2:  [93 %-95 %] 94 %    Physical Exam  Vitals and nursing note reviewed.   Constitutional:       Appearance: He is well-developed.      Comments: Pt seen and examined.   HENT:      Head: Normocephalic and atraumatic.   Eyes:      Pupils: Pupils are equal, round, and reactive to light.   Cardiovascular:      Rate and Rhythm: Normal rate and regular rhythm.      Heart sounds:  Normal heart sounds.   Pulmonary:      Effort: Pulmonary effort is normal.      Breath sounds: Rhonchi present.   Abdominal:      General: Bowel sounds are normal.      Palpations: Abdomen is soft.   Musculoskeletal:         General: Normal range of motion.      Cervical back: Normal range of motion and neck supple.   Skin:     General: Skin is warm and dry.   Neurological:      General: No focal deficit present.      Mental Status: He is alert and oriented to person, place, and time.   Psychiatric:         Behavior: Behavior normal.       Fluids    Intake/Output Summary (Last 24 hours) at 3/31/2023 1403  Last data filed at 3/30/2023 2026  Gross per 24 hour   Intake 240 ml   Output 850 ml   Net -610 ml       Laboratory  Recent Labs     03/29/23  0345 03/30/23  1118 03/31/23  0014   WBC 27.2* 12.4* 9.8   RBC 5.58 5.07 4.80   HEMOGLOBIN 16.8 15.6 14.5   HEMATOCRIT 47.1 43.1 40.0*   MCV 84.4 85.0 83.3   MCH 30.1 30.8 30.2   MCHC 35.7* 36.2* 36.3*   RDW 38.1 38.5 37.6   PLATELETCT 334 202 175   MPV 9.5 9.3 9.5     Recent Labs     03/30/23  0630 03/30/23  1118 03/31/23  0014   SODIUM 133* 132* 135   POTASSIUM 4.1 4.1 3.7   CHLORIDE 98 97 99   CO2 23 23 27   GLUCOSE 192* 266* 193*   BUN 36* 35* 23*   CREATININE 1.39 1.25 0.96   CALCIUM 7.7* 7.8* 7.8*                   Imaging  CT-ABDOMEN-PELVIS W/O   Final Result      1.  Hepatic steatosis.   2.  Fluid distended stomach and distal esophagus. Small hiatal hernia.      DX-CHEST-PORTABLE (1 VIEW)   Final Result      No acute cardiopulmonary abnormality.           Assessment/Plan  * DKA, type 1, not at goal (HCC)- (present on admission)  Assessment & Plan  Unclear trigger, sudden onset of nausea vomiting, question of gastroenteritis initiating      Encephalopathy  Assessment & Plan  Resolved, likely secondary to toxic metabolic effects from DKA    Leukocytosis- (present on admission)  Assessment & Plan  Likely reactive, no definitive infection    CLARA (acute kidney injury)  (HCC)  Assessment & Plan  Secondary to DKA, dehydration, currently improved, monitor    Odynophagia  Assessment & Plan  Acute from vomiting, irritation, superficial/symptomatic remedies    Hypothyroid  Assessment & Plan  Continue replacement    Hyperkalemia  Assessment & Plan  Resolved with correction of DKA, monitor    Hepatic steatosis  Assessment & Plan  Query alcohol, over alimentation    Elevated lactic acid level  Assessment & Plan  Secondary to DKA, resolved       Plan  Ongoing diligent glycemic control, avoid further hypoglycemia,  encourage p.o. intake  Multimodality symptomatic treatment for odynophagia, likely nausea vomiting emesis related pain syndrome  Close laboratory follow-up  Continue IV fluids until the patient's intake is adequate  Pain control  See orders  Medically complex high-risk patient  Significant risk of the patient falling back into ketoacidosis if he is not adequately hydrating and taking p.o.'s    VTE prophylaxis: enoxaparin ppx    I have performed a physical exam and reviewed and updated ROS and Plan today (3/31/2023). In review of yesterday's note (3/30/2023), there are no changes except as documented above.      Please note that this dictation was created using voice recognition software. I have made every reasonable attempt to correct obvious errors, but I expect that there are errors of grammar and possibly context that I did not discover before finalizing the note.

## 2023-03-31 NOTE — CONSULTS
Diabetes education: Pt has a hx of diabetes on insulin prior to admit. Pt states he was not able to eat for four days and did not take his insulin. Pt was admitted with blood sugar of 1,030, and Hga1c of 7.1%.  Pt is currently on Glargine 60 units pm with regular insulin per sliding scale coverage ac and hs. Blood sugars are 190 ( 2 units), 236  ( 3 units), and  not yet downloaded nor charted ( 2 units).  Met with pt who was sleepy. Briefly reviewed what effects blood sugars and sick day.  Plan: Pt will need his insulin, pen needles, and  meter and supplies at discharge. CDE to follow up tomorrow to clarify.

## 2023-03-31 NOTE — DISCHARGE SUMMARY
Discharge Summary    CHIEF COMPLAINT ON ADMISSION  Chief Complaint   Patient presents with    High Blood Sugar     Patient lethargic in triage and N/V since this morning. Blood sugar check in triage reads high.         Reason for Admission  Diabetes, DKA, nausea vomiting, likely gastroenteritis    Admission Date  3/28/2023    CODE STATUS  Full Code    HPI & HOSPITAL COURSE  52 y.o. male who presented 3/28/2023 with insulin requiring diabetes, hypothyroidism, hypertension, presents to the emergency room with nausea, vomiting, sudden onset, lethargy and blood sugars reading high at home, the patient was found in DKA in the emergency room, no definitive source of infection as a trigger per imaging initially, admitted to ICU with a comprehensive DKA treatment including insulin drip, IV fluids, resuscitation.  The patient initially found with significant leukocytosis, white cell count 33.6, presenting hyperkalemia with a potassium of 6.8, bicarb of 4, anion gap of 48, blood sugar of 1030, Phos 9.6, magnesium 2.6, lactic acid 7.4, creatinine 2.78, beta hydroxybutyric acid greater than 8, the patient is hemoglobin A1c of 7.1 reassuring.  Cultures remain negative throughout hospitalization, CT abdomen showed hepatic steatosis, with fluid distended stomach and distal esophagus, small hiatal hernia, otherwise no acute findings.  On 3/30 the patient is overall improved, he complains of a sore throat, from vomiting, he has been tolerating a liquid diet, his blood sugars are improved, still suboptimal with readings up into the 200s, significant other at the bedside, transfer out of ICU confirmed with ICU staff, started on long-acting insulin regimen 60 units every afternoon, ongoing IV fluid support until further improved.  On 3/31 the patient further improved, he did have some difficulty still with swallowing but on further follow-up, after symptomatic treatment patient improved and wishes to be discharged    Therefore, he is  discharged in fair and stable condition to home with close outpatient follow-up.    The patient met 2-midnight criteria for an inpatient stay at the time of discharge.    Discharge Date  3/31/2023    FOLLOW UP ITEMS POST DISCHARGE  Patient to follow-up with his primary care physician and possibly endocrinologist to optimize his glycemic management    DISCHARGE DIAGNOSES  Principal Problem:    DKA, type 1, not at goal (HCC) POA: Yes  Active Problems:    CLARA (acute kidney injury) (HCC) POA: Unknown    Leukocytosis POA: Yes    Encephalopathy POA: Unknown    Elevated lactic acid level POA: Unknown    Hepatic steatosis POA: Unknown    Hyperkalemia POA: Unknown    Hypothyroid POA: Unknown    Odynophagia POA: Unknown  Resolved Problems:    Lactic acidosis POA: Unknown      FOLLOW UP  No future appointments.  No follow-up provider specified.    MEDICATIONS ON DISCHARGE     Medication List        CONTINUE taking these medications        Instructions   insulin glargine 100 UNIT/ML Soln  Commonly known as: Lantus   Inject 60 Units under the skin every morning.  Dose: 60 Units     levothyroxine 25 MCG Tabs  Commonly known as: SYNTHROID   Take 25 mcg by mouth Every morning on an empty stomach.  Dose: 25 mcg     lidocaine 5 % Ptch  Commonly known as: LIDODERM   Apply 2 Patches to skin as directed every 24 hours.  Dose: 2 Patch     NexIUM 20 MG capsule  Generic drug: esomeprazole   Take 20 mg by mouth every morning before breakfast.  Dose: 20 mg              Allergies  Allergies   Allergen Reactions    Pcn [Penicillins] Anaphylaxis    Pcn [Penicillins]     Penicillins      Rxn as a kid       DIET  Orders Placed This Encounter   Procedures    Diet Order Diet: Consistent CHO (Diabetic) (diabetic)     Standing Status:   Standing     Number of Occurrences:   1     Order Specific Question:   Diet:     Answer:   Consistent CHO (Diabetic) [4]     Comments:   diabetic       ACTIVITY  As tolerated.  Weight bearing as  tolerated    CONSULTATIONS  Critical care    PROCEDURES  None, ICU DKA management    LABORATORY  Lab Results   Component Value Date    SODIUM 135 03/31/2023    POTASSIUM 3.7 03/31/2023    CHLORIDE 99 03/31/2023    CO2 27 03/31/2023    GLUCOSE 193 (H) 03/31/2023    BUN 23 (H) 03/31/2023    CREATININE 0.96 03/31/2023        Lab Results   Component Value Date    WBC 9.8 03/31/2023    HEMOGLOBIN 14.5 03/31/2023    HEMATOCRIT 40.0 (L) 03/31/2023    PLATELETCT 175 03/31/2023        Total time of the discharge process to the extent of 55 minutes.

## 2023-03-31 NOTE — ASSESSMENT & PLAN NOTE
03/29/23 on CTAP   EtOH cessation when appropriate   
Acidosis + Ischemic ATN    Improving  
Acute from vomiting, irritation, superficial/symptomatic remedies  
Continue home synthroid  
Continue replacement  
Dehydration   
Likely reactive, no definitive infection  
Now back on subcutaneous insulin  Home dose is glargine 60 units qam, will change to qhs given timing of transition    SSI as well  Advance diet    Ok to transfer out of icu  
Query alcohol, over alimentation  
Reactive from DKA  Monitor off antibiotics absent shock    Improving  
Resolved  
Resolved  
Resolved with correction of DKA, monitor  
Resolved, likely secondary to toxic metabolic effects from DKA  
Secondary to DKA, dehydration, currently improved, monitor  
Secondary to DKA, resolved  
Toxic metabolic  Non-focal neurologic exam    improving  
Unclear trigger, sudden onset of nausea vomiting, question of gastroenteritis initiating    
no
no

## 2023-03-31 NOTE — CARE PLAN
The patient is Stable - Low risk of patient condition declining or worsening    Shift Goals  Clinical Goals: Monitor blood sugar, VSS  Patient Goals: REst  Family Goals: LAYNE    Progress made toward(s) clinical / shift goals:    Problem: Knowledge Deficit - Standard  Goal: Patient and family/care givers will demonstrate understanding of plan of care, disease process/condition, diagnostic tests and medications  Description: Target End Date:  1-3 days or as soon as patient condition allows    Document in Patient Education    1.  Patient and family/caregiver oriented to unit, equipment, visitation policy and means for communicating concern  2.  Complete/review Learning Assessment  3.  Assess knowledge level of disease process/condition, treatment plan, diagnostic tests and medications  4.  Explain disease process/condition, treatment plan, diagnostic tests and medications  Outcome: Progressing     Problem: Skin Integrity  Goal: Skin integrity is maintained or improved  Description: Target End Date:  Prior to discharge or change in level of care    Document interventions on Skin Risk/Mg flowsheet groups and corresponding LDA    1.  Assess and monitor skin integrity, appearance and/or temperature  2.  Assess risk factors for impaired skin integrity and/or pressures ulcers  3.  Implement precautions to protect skin integrity in collaboration with interdisciplinary team  4.  Implement pressure ulcer prevention protocol if at risk for skin breakdown  5.  Confirm wound care consult if at risk for skin breakdown  6.  Ensure patient use of pressure relieving devices  (Low air loss bed, waffle overlay, heel protectors, ROHO cushion, etc)  Outcome: Progressing     Problem: Fall Risk  Goal: Patient will remain free from falls  Description: Target End Date:  Prior to discharge or change in level of care    Document interventions on the [place] Fall Risk Assessment    1.  Assess for fall risk factors  2.  Implement fall  precautions  Outcome: Progressing     Problem: Diabetes Management  Goal: Patient will achieve and maintain glucose in satisfactory range  Description: Target End Date:  Prior to discharge or change in level of care    1.  Monitor glucose levels per provider order  2.  Assess for signs and symptoms of hyperglycemia (abdominal pain, bloating, nausea or vomiting)  3.  Assess for signs and symptoms of hypoglycemia (anxiety, tremors, slurred speech, change in LOC, tachycardia, fatigue)  4.  Monitor for early signs and symptoms of infection  5.  Monitor daily weights  6.  Consult to diabetes educator  Outcome: Progressing       Patient is not progressing towards the following goals:

## 2023-04-01 NOTE — PROGRESS NOTES
Patient discharged home no svcs. Vitals stable. IV removed x3. Patient transported home by friend. AVS given to patient & reviewed. All pt's questions answered.

## 2023-04-02 LAB
BACTERIA BLD CULT: NORMAL
SIGNIFICANT IND 70042: NORMAL
SITE SITE: NORMAL
SOURCE SOURCE: NORMAL

## 2023-04-03 LAB
BACTERIA BLD CULT: NORMAL
SIGNIFICANT IND 70042: NORMAL
SITE SITE: NORMAL
SOURCE SOURCE: NORMAL

## 2023-05-05 ENCOUNTER — OFFICE VISIT (OUTPATIENT)
Dept: URGENT CARE | Facility: CLINIC | Age: 53
End: 2023-05-05
Payer: MEDICAID

## 2023-05-05 VITALS
OXYGEN SATURATION: 98 % | RESPIRATION RATE: 18 BRPM | HEART RATE: 74 BPM | TEMPERATURE: 96.6 F | BODY MASS INDEX: 25.99 KG/M2 | WEIGHT: 209 LBS | DIASTOLIC BLOOD PRESSURE: 78 MMHG | HEIGHT: 75 IN | SYSTOLIC BLOOD PRESSURE: 124 MMHG

## 2023-05-05 DIAGNOSIS — J02.9 PHARYNGITIS, UNSPECIFIED ETIOLOGY: ICD-10-CM

## 2023-05-05 DIAGNOSIS — Z76.0 MEDICATION REFILL: ICD-10-CM

## 2023-05-05 DIAGNOSIS — Z86.39 HISTORY OF DIABETES MELLITUS: ICD-10-CM

## 2023-05-05 LAB — S PYO DNA SPEC NAA+PROBE: NOT DETECTED

## 2023-05-05 PROCEDURE — 87651 STREP A DNA AMP PROBE: CPT | Performed by: PHYSICIAN ASSISTANT

## 2023-05-05 PROCEDURE — 99204 OFFICE O/P NEW MOD 45 MIN: CPT | Performed by: PHYSICIAN ASSISTANT

## 2023-05-05 RX ORDER — LIDOCAINE HYDROCHLORIDE 20 MG/ML
5 SOLUTION OROPHARYNGEAL PRN
Qty: 200 ML | Refills: 1 | Status: SHIPPED | OUTPATIENT
Start: 2023-05-05 | End: 2023-07-24

## 2023-05-05 RX ORDER — INSULIN GLARGINE 100 [IU]/ML
60 INJECTION, SOLUTION SUBCUTANEOUS EVERY MORNING
Qty: 10 ML | Refills: 3 | Status: SHIPPED | OUTPATIENT
Start: 2023-05-05 | End: 2023-07-03 | Stop reason: SDUPTHER

## 2023-05-05 RX ORDER — FAMOTIDINE 20 MG/1
20 TABLET, FILM COATED ORAL 2 TIMES DAILY
COMMUNITY
End: 2023-07-03 | Stop reason: SDUPTHER

## 2023-05-05 RX ORDER — FLUTICASONE PROPIONATE 50 MCG
2 SPRAY, SUSPENSION (ML) NASAL DAILY
Qty: 16 G | Refills: 0 | Status: SHIPPED | OUTPATIENT
Start: 2023-05-05 | End: 2023-07-03

## 2023-05-05 RX ORDER — LISINOPRIL 20 MG/1
30 TABLET ORAL DAILY
COMMUNITY
End: 2023-07-03 | Stop reason: SDUPTHER

## 2023-05-05 ASSESSMENT — ENCOUNTER SYMPTOMS
POLYDIPSIA: 0
SORE THROAT: 1
DIARRHEA: 0
NAUSEA: 0
FEVER: 0
ABDOMINAL PAIN: 0
VOMITING: 0
CHILLS: 0
COUGH: 0
BLURRED VISION: 0

## 2023-05-05 ASSESSMENT — FIBROSIS 4 INDEX: FIB4 SCORE: 1.022202503999903861

## 2023-05-05 NOTE — PROGRESS NOTES
Subjective:     Peña Guerrero  is a 52 y.o. male who presents for Medication Refill      Other  This is a new problem. The current episode started in the past 7 days. Associated symptoms include a sore throat. Pertinent negatives include no abdominal pain, chills, congestion, coughing, fever, nausea, rash or vomiting.     Patient presents urgent care noting sore throat and present for the last approximate 1 month.  Patient states prior to that he had been hospitalized with diabetic ketoacidosis.  For 2 days prior to hospitalization he had been persistently him forcefully vomiting which he feels may have contributed to persistent sore throat.  Patient was then hospitalized during which time he says he was prescribed throat spray and nothing else.  He states he has had persistent sore throat since.  Denies fevers or chills.  Patient is a diabetic and has been consistent with checking blood sugar daily.  He requests refill of insulin.  He denies known exposure to strep pharyngitis.  Patient does not have a primary care currently but has recently been released from [facility] and is interested in establishing with 1.  Patient notes remote history of 20 years ago with diagnosis of diabetes after forceful vomiting and injury to esophagus.  He is concerned with similar and would like to follow-up with ENT.  He denies hematemesis blood per stool or melena.  He denies a history of allergic rhinitis.  He checks his glucose daily and was at 80 this morning.    Review of Systems   Constitutional:  Negative for chills and fever.   HENT:  Positive for sore throat. Negative for congestion and ear pain.    Eyes:  Negative for blurred vision.   Respiratory:  Negative for cough.    Gastrointestinal:  Negative for abdominal pain, diarrhea, nausea and vomiting.   Genitourinary:  Negative for frequency.   Skin:  Negative for rash.   Endo/Heme/Allergies:  Negative for polydipsia.     Medications:    esomeprazole  famotidine  "Tabs  insulin glargine Soln  levothyroxine Tabs  lidocaine Ptch  lisinopril Tabs    Allergies: Pcn [penicillins], Pcn [penicillins], and Penicillins    Problem List: Peña Guerrero does not have any pertinent problems on file.    Surgical History:  Past Surgical History:   Procedure Laterality Date    PB OPEN FIX INTER/SUBTROCH FX,IMPLNT Right 9/3/2020    Procedure: INSERTION, INTRAMEDULLARY LLOYD, FEMUR, PROXIMAL;  Surgeon: Shaka George M.D.;  Location: SURGERY [facility];  Service: Orthopedics       Past Social Hx: Peña Guerrero  reports that he has never smoked. He has never used smokeless tobacco. He reports that he does not currently use alcohol. He reports that he does not currently use drugs after having used the following drugs: Inhaled.     Past Family Hx:  Peña Guerrero family history is not on file.     Problem list, medications, and allergies reviewed by myself today in Epic.     Objective:   /78   Pulse 74   Temp 35.9 °C (96.6 °F) (Temporal)   Resp 18   Ht 1.905 m (6' 3\")   Wt 94.8 kg (209 lb)   SpO2 98%   BMI 26.12 kg/m²     Physical Exam  Vitals and nursing note reviewed.   Constitutional:       General: He is not in acute distress.     Appearance: Normal appearance. He is well-developed. He is not diaphoretic.   HENT:      Head: Normocephalic and atraumatic.      Right Ear: Tympanic membrane, ear canal and external ear normal.      Left Ear: Tympanic membrane, ear canal and external ear normal.      Nose: Nose normal.      Mouth/Throat:      Lips: Pink.      Mouth: Mucous membranes are moist.      Pharynx: Uvula midline. Posterior oropharyngeal erythema ( mild PND) present. No oropharyngeal exudate or uvula swelling.      Tonsils: No tonsillar exudate or tonsillar abscesses.      Comments: Mild appearance of PND to oropharynx, no edema, no exudate, no evident swelling, uvula midline  Eyes:      General: No scleral icterus.        Right eye: No discharge.         Left " eye: No discharge.      Conjunctiva/sclera: Conjunctivae normal.   Pulmonary:      Effort: Pulmonary effort is normal. No respiratory distress.      Breath sounds: Normal breath sounds. No stridor. No decreased breath sounds, wheezing, rhonchi or rales.   Musculoskeletal:         General: Normal range of motion.      Cervical back: Neck supple.   Skin:     General: Skin is warm and dry.      Coloration: Skin is not pale.   Neurological:      Mental Status: He is alert and oriented to person, place, and time.      Coordination: Coordination normal.     Point-of-care strep-negative    Assessment/Plan:   Assessment      1. Pharyngitis, unspecified etiology  - POCT GROUP A STREP, PCR  - lidocaine (XYLOCAINE) 2 % Solution; Take 5 mL by mouth as needed for Throat/Mouth Pain (q6hr PRN throat pain, ok to rinse and spit or swallow).  Dispense: 200 mL; Refill: 1  - Referral to ENT    2. Medication refill  - insulin glargine 100 UNIT/ML SC SOLN; Inject 60 Units under the skin every morning.  Dispense: 10 mL; Refill: 3  - Referral to establish with [facility] PCP    3. History of diabetes mellitus  - insulin glargine 100 UNIT/ML SC SOLN; Inject 60 Units under the skin every morning.  Dispense: 10 mL; Refill: 3  - Referral to establish with [facility] PCP    Other orders  - lisinopril (PRINIVIL) 20 MG Tab; Take 30 mg by mouth every day.  - famotidine (PEPCID) 20 MG Tab; Take 20 mg by mouth 2 times a day.  Supportive care is reviewed with patient/caregiver - recommend to push PO fluids and electrolytes, patient sent with viscous lidocaine and Flonase to help with symptoms of sore throat, patient is sent with referral for ENT.    He is refilled of insulin and referrals placed for follow-up with primary care    Return to clinic with lack of resolution or progression of symptoms.  ER precautions with any worsening symptoms are reviewed with patient/caregiver and they do express understanding  I have worn an N95 mask, gloves and eye  protection for the entire encounter with this patient.     Differential diagnosis, natural history, supportive care, and indications for immediate follow-up discussed.

## 2023-05-11 ENCOUNTER — TELEPHONE (OUTPATIENT)
Dept: HEALTH INFORMATION MANAGEMENT | Facility: OTHER | Age: 53
End: 2023-05-11
Payer: MEDICAID

## 2023-07-03 ENCOUNTER — OFFICE VISIT (OUTPATIENT)
Dept: MEDICAL GROUP | Facility: MEDICAL CENTER | Age: 53
End: 2023-07-03
Attending: FAMILY MEDICINE
Payer: OTHER GOVERNMENT

## 2023-07-03 VITALS
SYSTOLIC BLOOD PRESSURE: 148 MMHG | HEART RATE: 92 BPM | DIASTOLIC BLOOD PRESSURE: 98 MMHG | TEMPERATURE: 97.1 F | HEIGHT: 75 IN | WEIGHT: 219 LBS | BODY MASS INDEX: 27.23 KG/M2 | OXYGEN SATURATION: 98 % | RESPIRATION RATE: 16 BRPM

## 2023-07-03 DIAGNOSIS — R13.10 ODYNOPHAGIA: ICD-10-CM

## 2023-07-03 DIAGNOSIS — K30 ACID INDIGESTION: ICD-10-CM

## 2023-07-03 DIAGNOSIS — E11.65 TYPE 2 DIABETES MELLITUS WITH HYPERGLYCEMIA, WITH LONG-TERM CURRENT USE OF INSULIN (HCC): Chronic | ICD-10-CM

## 2023-07-03 DIAGNOSIS — Z79.4 TYPE 2 DIABETES MELLITUS WITH HYPERGLYCEMIA, WITH LONG-TERM CURRENT USE OF INSULIN (HCC): Chronic | ICD-10-CM

## 2023-07-03 DIAGNOSIS — E03.9 HYPOTHYROIDISM, UNSPECIFIED TYPE: ICD-10-CM

## 2023-07-03 PROBLEM — K56.7 ILEUS (HCC): Status: RESOLVED | Noted: 2020-02-21 | Resolved: 2023-07-03

## 2023-07-03 PROBLEM — S43.101A AC SEPARATION, RIGHT, INITIAL ENCOUNTER: Status: RESOLVED | Noted: 2020-02-17 | Resolved: 2023-07-03

## 2023-07-03 PROBLEM — E10.10 DKA, TYPE 1, NOT AT GOAL (HCC): Status: RESOLVED | Noted: 2023-03-28 | Resolved: 2023-07-03

## 2023-07-03 PROBLEM — S82.61XA CLOSED FRACTURE OF DISTAL LATERAL MALLEOLUS OF RIGHT FIBULA: Status: RESOLVED | Noted: 2020-02-17 | Resolved: 2023-07-03

## 2023-07-03 PROBLEM — F10.929 ACUTE ALCOHOL INTOXICATION (HCC): Status: RESOLVED | Noted: 2020-02-17 | Resolved: 2023-07-03

## 2023-07-03 PROBLEM — Z78.9 NO CONTRAINDICATION TO DEEP VEIN THROMBOSIS (DVT) PROPHYLAXIS: Status: RESOLVED | Noted: 2020-09-03 | Resolved: 2023-07-03

## 2023-07-03 PROBLEM — S72.141A INTERTROCHANTERIC FRACTURE OF RIGHT FEMUR, CLOSED, INITIAL ENCOUNTER (HCC): Status: RESOLVED | Noted: 2020-09-03 | Resolved: 2023-07-03

## 2023-07-03 PROBLEM — D72.829 LEUKOCYTOSIS: Status: RESOLVED | Noted: 2023-03-28 | Resolved: 2023-07-03

## 2023-07-03 PROBLEM — R33.9 URINARY RETENTION: Status: RESOLVED | Noted: 2020-09-05 | Resolved: 2023-07-03

## 2023-07-03 PROBLEM — T14.90XA TRAUMA: Status: RESOLVED | Noted: 2020-09-04 | Resolved: 2023-07-03

## 2023-07-03 PROBLEM — S22.010A COMPRESSION FRACTURE OF T1 VERTEBRA (HCC): Status: RESOLVED | Noted: 2020-02-17 | Resolved: 2023-07-03

## 2023-07-03 PROBLEM — R79.89 ELEVATED LACTIC ACID LEVEL: Status: RESOLVED | Noted: 2023-03-28 | Resolved: 2023-07-03

## 2023-07-03 PROBLEM — S22.43XA CLOSED FRACTURE OF MULTIPLE RIBS OF BOTH SIDES: Status: RESOLVED | Noted: 2020-09-03 | Resolved: 2023-07-03

## 2023-07-03 PROBLEM — K76.0 HEPATIC STEATOSIS: Status: RESOLVED | Noted: 2023-03-28 | Resolved: 2023-07-03

## 2023-07-03 PROBLEM — S22.5XXA FLAIL CHEST, INITIAL ENCOUNTER FOR CLOSED FRACTURE: Status: RESOLVED | Noted: 2020-02-17 | Resolved: 2023-07-03

## 2023-07-03 PROBLEM — E87.5 HYPERKALEMIA: Status: RESOLVED | Noted: 2023-03-28 | Resolved: 2023-07-03

## 2023-07-03 PROBLEM — N17.9 AKI (ACUTE KIDNEY INJURY) (HCC): Status: RESOLVED | Noted: 2023-03-28 | Resolved: 2023-07-03

## 2023-07-03 PROBLEM — T14.90XA TRAUMA: Status: RESOLVED | Noted: 2020-02-17 | Resolved: 2023-07-03

## 2023-07-03 LAB
HBA1C MFR BLD: 6.9 % (ref ?–5.8)
POCT INT CON NEG: NEGATIVE
POCT INT CON POS: POSITIVE

## 2023-07-03 PROCEDURE — 3080F DIAST BP >= 90 MM HG: CPT | Performed by: FAMILY MEDICINE

## 2023-07-03 PROCEDURE — 99213 OFFICE O/P EST LOW 20 MIN: CPT | Performed by: FAMILY MEDICINE

## 2023-07-03 PROCEDURE — 83036 HEMOGLOBIN GLYCOSYLATED A1C: CPT | Performed by: FAMILY MEDICINE

## 2023-07-03 PROCEDURE — 3077F SYST BP >= 140 MM HG: CPT | Performed by: FAMILY MEDICINE

## 2023-07-03 PROCEDURE — 99214 OFFICE O/P EST MOD 30 MIN: CPT | Performed by: FAMILY MEDICINE

## 2023-07-03 RX ORDER — LISINOPRIL 20 MG/1
30 TABLET ORAL DAILY
Qty: 90 TABLET | Refills: 2 | Status: SHIPPED | OUTPATIENT
Start: 2023-07-03 | End: 2023-07-24

## 2023-07-03 RX ORDER — FAMOTIDINE 20 MG/1
20 TABLET, FILM COATED ORAL 2 TIMES DAILY
Qty: 60 TABLET | Refills: 2 | Status: SHIPPED | OUTPATIENT
Start: 2023-07-03 | End: 2023-07-24

## 2023-07-03 RX ORDER — INSULIN GLARGINE 100 [IU]/ML
60 INJECTION, SOLUTION SUBCUTANEOUS EVERY MORNING
Qty: 10 ML | Refills: 3 | Status: SHIPPED | OUTPATIENT
Start: 2023-07-03 | End: 2023-11-16 | Stop reason: SDUPTHER

## 2023-07-03 RX ORDER — INSULIN GLARGINE-YFGN 100 [IU]/ML
INJECTION, SOLUTION SUBCUTANEOUS
COMMUNITY
Start: 2023-06-26 | End: 2023-07-03

## 2023-07-03 ASSESSMENT — FIBROSIS 4 INDEX: FIB4 SCORE: 1.022202503999903861

## 2023-07-03 ASSESSMENT — PATIENT HEALTH QUESTIONNAIRE - PHQ9: CLINICAL INTERPRETATION OF PHQ2 SCORE: 0

## 2023-07-03 NOTE — PROGRESS NOTES
Subjective:     CC:    Chief Complaint   Patient presents with    Diabetes    Hypertension    Establish Care       HISTORY OF THE PRESENT ILLNESS: Patient is a 52 y.o. male. This pleasant patient is here today with his s/o Monik to establish primary care with me and discuss the following issues.     His prior PCP was in [place]; last seen 3-4 years ago. Unable to recall name    Specialists   None     Hypothyroid  Patient denies previous diagnosis of thyroid disorder. On lab review patient does have one TSH that was elevated years ago, but denies every taking thyroid medication.     Diabetes (HCC)  This is a chronic condition. Diagnosed close to 20 years ago. Denies issues with DM as a child    Current medications:  Insulin: Glargine 60 units AM  Last A1c:   Lab Results   Component Value Date/Time    HBA1C 6.9 (A) 07/03/2023 11:27 AM      Last Microalb/Cr ratio:  Fasting sugars:  Last diabetic foot exam:  Last retinal eye exam:  ACEi/ARB? Lisinopril; out of medication for BP  Statin? States never indicated  Concomitant HTN?  Nightly foot checks?      Odynophagia  This has been ongoing since March 2023 following hospitalization for DKA. Initially attributed to acute vomiting, but is now having issues with continued pain and trouble with swallowing- feels like solid foods are getting caught in his throat. Denies difficulty with swallowing liquids. He is just using chloraseptic rinse.      Allergies: Pcn [penicillins], Pcn [penicillins], and Penicillins      [id number] - [place], [place] - [address]  [address] [address]  Phone: [phone] Fax: [phone]    [facility] - [place], [place] - [address]  [address] [address]  Phone: [phone] Fax: [phone]    [id number] - [place], [place] - [address]  [address] [place] [address]  Phone: [phone] Fax:  [phone]    [id number] - [place], [place] - [address]  [address] [place] [address]  Phone: [phone] Fax: [phone]      Current Outpatient Medications   Medication Sig Dispense Refill    lisinopril (PRINIVIL) 20 MG Tab Take 1.5 Tablets by mouth every day. 90 Tablet 2    famotidine (PEPCID) 20 MG Tab Take 1 Tablet by mouth 2 times a day. 60 Tablet 2    insulin glargine 100 UNIT/ML SC SOLN Inject 60 Units under the skin every morning. 10 mL 3    lidocaine (XYLOCAINE) 2 % Solution Take 5 mL by mouth as needed for Throat/Mouth Pain (q6hr PRN throat pain, ok to rinse and spit or swallow). 200 mL 1    lidocaine (LIDODERM) 5 % Patch Apply 2 Patches to skin as directed every 24 hours. 10 Patch 0     No current facility-administered medications for this visit.       Past Medical History:   Diagnosis Date    AC separation, right, initial encounter 2/17/2020    Acute alcohol intoxication (HCC) 2/17/2020    CLARA (acute kidney injury) ([facility]) 3/28/2023    Allergy     Closed fracture of distal lateral malleolus of right fibula 2/17/2020    Closed fracture of multiple ribs of both sides 9/3/2020    Compression fracture of T1 vertebra ([facility]) 2/17/2020    Diabetes ([facility])     DKA, type 1, not at goal ([facility]) 3/28/2023    Elevated lactic acid level 3/28/2023    Flail chest, initial encounter for closed fracture 2/17/2020    Hepatic steatosis 3/28/2023    Hyperkalemia 3/28/2023    Hypertension     Ileus ([facility]) 2/21/2020 2/21: nausea and emesis may be opiate related.  Abdomen benign.  Flat Plate a few dilated loops in pelvis.  Suppository.      Injury     Intertrochanteric fracture of right femur, closed, initial encounter ([facility]) 9/3/2020    Leukocytosis 3/28/2023    No contraindication to deep vein thrombosis (DVT) prophylaxis 9/3/2020    Pain of left thumb 3/16/2015    Screening examination for infectious disease     Thumb laceration 3/16/2015    Trauma 9/4/2020    Urinary retention 9/5/2020  "      Past Surgical History:   Procedure Laterality Date    PB OPEN FIX INTER/SUBTROCH FX,IMPLNT Right 9/3/2020    Procedure: INSERTION, INTRAMEDULLARY LLOYD, FEMUR, PROXIMAL;  Surgeon: Shaka George M.D.;  Location: SURGERY [facility];  Service: Orthopedics       Social History     Tobacco Use    Smoking status: Never    Smokeless tobacco: Never   Vaping Use    Vaping Use: Never used   Substance Use Topics    Alcohol use: Not Currently     Comment: Rarely    Drug use: Not Currently     Types: Inhaled     Comment: daily THC; denies IVDU       Family History   Problem Relation Age of Onset    No Known Problems Daughter     No Known Problems Son     Cancer Neg Hx     Heart Disease Neg Hx          Objective:     BP (!) 148/98 (BP Location: Left arm, Patient Position: Sitting, BP Cuff Size: Adult)   Pulse 92   Temp 36.2 °C (97.1 °F) (Temporal)   Resp 16   Ht 1.905 m (6' 3\")   Wt 99.3 kg (219 lb)   SpO2 98%   BMI 27.37 kg/m²   Physical Exam  Constitutional: Alert, no distress  Skin: No rashes in visible areas  HEENT: Conjunctiva clear, lids normal, oropharynx clear, neck supple, no neck masses palpable  Respiratory: Unlabored respiratory effort, no cough, lungs clear to auscultation bilaterally  CV: RRR  MSK: Normal gait, moves all extremities  Psych: Alert and oriented x3, normal affect and mood      Assessment & Plan:   52 y.o. male with the following -    1.Type 2 diabetes mellitus with hyperglycemia, with long-term current use of insulin (HCC)  Chronic, controlled.  Hemoglobin A1c is under 7.0%.  He is due for diabetic eye screening. He is on an ACE inhibitor for renal protection, but  is not on a statin for cardiovascular protection. Encouraged continued diet and exercise modifications to help control blood sugars. BP elevated today likely due to missed medications.  Lab Results   Component Value Date/Time    HBA1C 6.9 (A) 07/03/2023 11:27 AM    - insulin glargine 100 UNIT/ML SC SOLN; Inject 60 Units under " the skin every morning.  Dispense: 10 mL; Refill: 3  - lisinopril (PRINIVIL) 20 MG Tab; Take 1.5 Tablets by mouth every day.  Dispense: 90 Tablet; Refill: 2  - Lipid Profile to reassess risk for CV disease and determine need for statin  - Microalbumin Creat Ratio Urine - Lab Collect; Future  - Referral to Ophthalmology  - Referral to Pharmacotherapy Service for diabetic education and discussion of treatment options    2. Odynophagia  - Given chronicity, recommend further evaluation through:  - Referral to ENT  - Referral to Gastroenterology  - VALDEZ. FLUORO (BARIUM SWALLOW)  - ER/Call/Return precautions discussed. He verbalized understanding and agreed with plan.    3. Hypothyroidism, unspecified type  - Denies prior diagnosis or medication management for this  - Recent TSH within normal limits off of medication. Recommend re-checking to insure stability  - TSH WITH REFLEX TO FT4; Future    4. Acid indigestion  - Recommend further evaluation by specialist above to rule out disease complications  - famotidine (PEPCID) 20 MG Tab; Take 1 Tablet by mouth 2 times a day.  Dispense: 60 Tablet; Refill: 2      Return pending test results.    Please note that this dictation was created using voice recognition software. I have made every reasonable attempt to correct obvious errors, but I expect that there are errors of grammar and possibly content that I did not discover before finalizing the note.

## 2023-07-03 NOTE — ASSESSMENT & PLAN NOTE
Patient denies previous diagnosis of thyroid disorder. On lab review patient does have one TSH that was elevated years ago, but denies every taking thyroid medication.

## 2023-07-03 NOTE — ASSESSMENT & PLAN NOTE
This is a chronic condition. Diagnosed close to 20 years ago. Denies issues with DM as a child    Current medications:  Insulin: Glargine 60 units AM  Last A1c:   Lab Results   Component Value Date/Time    HBA1C 6.9 (A) 07/03/2023 11:27 AM      Last Microalb/Cr ratio:  Fasting sugars:  Last diabetic foot exam:  Last retinal eye exam:  ACEi/ARB? Lisinopril; out of medication for BP  Statin? States never indicated  Concomitant HTN?  Nightly foot checks?

## 2023-07-03 NOTE — ASSESSMENT & PLAN NOTE
This has been ongoing since March 2023 following hospitalization for DKA. Initially attributed to acute vomiting, but is now having issues with continued pain and trouble with swallowing- feels like solid foods are getting caught in his throat. Denies difficulty with swallowing liquids. He is just using chloraseptic rinse.

## 2023-07-05 ENCOUNTER — TELEPHONE (OUTPATIENT)
Dept: MEDICAL GROUP | Facility: MEDICAL CENTER | Age: 53
End: 2023-07-05
Payer: MEDICAID

## 2023-07-05 NOTE — TELEPHONE ENCOUNTER
DOCUMENTATION OF PAR STATUS:    1. Name of Medication & Dose:  lisinopril (PRINIVIL) 20 MG Tab       2. Name of Prescription Coverage Company & phone #: [employer].    3. Date Prior Auth Submitted: 7/5/23    4. What information was given to obtain insurance decision? Chart notes,icd-10 code, med hx.    5. Prior Auth Status? Pending    6. Patient Notified: N\A

## 2023-07-05 NOTE — TELEPHONE ENCOUNTER
DOCUMENTATION OF PAR STATUS:    1. Name of Medication & Dose: Famotidine 20MG tablets    2. Name of Prescription Coverage Company & phone #: [employer].    3. Date Prior Auth Submitted: 7/5/23    4. What information was given to obtain insurance decision? Chart notes, icd-10 code, med hx.    5. Prior Auth Status? Pending    6. Patient Notified: n/a

## 2023-07-12 ENCOUNTER — TELEPHONE (OUTPATIENT)
Dept: MEDICAL GROUP | Facility: MEDICAL CENTER | Age: 53
End: 2023-07-12
Payer: MEDICAID

## 2023-07-12 NOTE — TELEPHONE ENCOUNTER
FINAL PRIOR AUTHORIZATION STATUS:    1.  Name of Medication & Dose: Lisinopril 20mg     2. Prior Auth Status: Approved through 07/05/2024     3. Action Taken: Pharmacy Notified: N\A Patient Notified: yes

## 2023-07-21 ENCOUNTER — APPOINTMENT (OUTPATIENT)
Dept: RADIOLOGY | Facility: MEDICAL CENTER | Age: 53
End: 2023-07-21
Attending: FAMILY MEDICINE
Payer: OTHER GOVERNMENT

## 2023-07-21 ENCOUNTER — PATIENT MESSAGE (OUTPATIENT)
Dept: MEDICAL GROUP | Facility: MEDICAL CENTER | Age: 53
End: 2023-07-21
Payer: MEDICAID

## 2023-07-21 DIAGNOSIS — R13.10 ODYNOPHAGIA: ICD-10-CM

## 2023-07-24 ENCOUNTER — HOSPITAL ENCOUNTER (OUTPATIENT)
Facility: MEDICAL CENTER | Age: 53
End: 2023-07-27
Attending: EMERGENCY MEDICINE | Admitting: INTERNAL MEDICINE
Payer: MEDICAID

## 2023-07-24 ENCOUNTER — APPOINTMENT (OUTPATIENT)
Dept: RADIOLOGY | Facility: MEDICAL CENTER | Age: 53
End: 2023-07-24
Attending: EMERGENCY MEDICINE
Payer: MEDICAID

## 2023-07-24 DIAGNOSIS — K44.9 HIATAL HERNIA: ICD-10-CM

## 2023-07-24 DIAGNOSIS — I10 PRIMARY HYPERTENSION: Chronic | ICD-10-CM

## 2023-07-24 DIAGNOSIS — R11.0 NAUSEA: ICD-10-CM

## 2023-07-24 DIAGNOSIS — R13.10 DYSPHAGIA, UNSPECIFIED TYPE: ICD-10-CM

## 2023-07-24 PROBLEM — E16.2 HYPOGLYCEMIA: Status: ACTIVE | Noted: 2023-07-24

## 2023-07-24 LAB
ALBUMIN SERPL BCP-MCNC: 3.9 G/DL (ref 3.2–4.9)
ALBUMIN/GLOB SERPL: 1.3 G/DL
ALP SERPL-CCNC: 77 U/L (ref 30–99)
ALT SERPL-CCNC: 21 U/L (ref 2–50)
ANION GAP SERPL CALC-SCNC: 9 MMOL/L (ref 7–16)
AST SERPL-CCNC: 18 U/L (ref 12–45)
BASOPHILS # BLD AUTO: 0.2 % (ref 0–1.8)
BASOPHILS # BLD: 0.02 K/UL (ref 0–0.12)
BILIRUB SERPL-MCNC: 0.8 MG/DL (ref 0.1–1.5)
BUN SERPL-MCNC: 14 MG/DL (ref 8–22)
CALCIUM ALBUM COR SERPL-MCNC: 9 MG/DL (ref 8.5–10.5)
CALCIUM SERPL-MCNC: 8.9 MG/DL (ref 8.5–10.5)
CHLORIDE SERPL-SCNC: 102 MMOL/L (ref 96–112)
CO2 SERPL-SCNC: 27 MMOL/L (ref 20–33)
CREAT SERPL-MCNC: 1 MG/DL (ref 0.5–1.4)
EOSINOPHIL # BLD AUTO: 0.04 K/UL (ref 0–0.51)
EOSINOPHIL NFR BLD: 0.3 % (ref 0–6.9)
ERYTHROCYTE [DISTWIDTH] IN BLOOD BY AUTOMATED COUNT: 35.8 FL (ref 35.9–50)
GFR SERPLBLD CREATININE-BSD FMLA CKD-EPI: 91 ML/MIN/1.73 M 2
GLOBULIN SER CALC-MCNC: 3.1 G/DL (ref 1.9–3.5)
GLUCOSE BLD STRIP.AUTO-MCNC: 136 MG/DL (ref 65–99)
GLUCOSE BLD STRIP.AUTO-MCNC: 155 MG/DL (ref 65–99)
GLUCOSE BLD STRIP.AUTO-MCNC: 63 MG/DL (ref 65–99)
GLUCOSE SERPL-MCNC: 150 MG/DL (ref 65–99)
HCT VFR BLD AUTO: 54.2 % (ref 42–52)
HGB BLD-MCNC: 19 G/DL (ref 14–18)
IMM GRANULOCYTES # BLD AUTO: 0.03 K/UL (ref 0–0.11)
IMM GRANULOCYTES NFR BLD AUTO: 0.3 % (ref 0–0.9)
LYMPHOCYTES # BLD AUTO: 1.72 K/UL (ref 1–4.8)
LYMPHOCYTES NFR BLD: 14.5 % (ref 22–41)
MCH RBC QN AUTO: 30 PG (ref 27–33)
MCHC RBC AUTO-ENTMCNC: 35.1 G/DL (ref 32.3–36.5)
MCV RBC AUTO: 85.5 FL (ref 81.4–97.8)
MONOCYTES # BLD AUTO: 0.57 K/UL (ref 0–0.85)
MONOCYTES NFR BLD AUTO: 4.8 % (ref 0–13.4)
NEUTROPHILS # BLD AUTO: 9.46 K/UL (ref 1.82–7.42)
NEUTROPHILS NFR BLD: 79.9 % (ref 44–72)
NRBC # BLD AUTO: 0 K/UL
NRBC BLD-RTO: 0 /100 WBC (ref 0–0.2)
PLATELET # BLD AUTO: 294 K/UL (ref 164–446)
PMV BLD AUTO: 8.9 FL (ref 9–12.9)
POTASSIUM SERPL-SCNC: 3.8 MMOL/L (ref 3.6–5.5)
PROT SERPL-MCNC: 7 G/DL (ref 6–8.2)
RBC # BLD AUTO: 6.34 M/UL (ref 4.7–6.1)
SODIUM SERPL-SCNC: 138 MMOL/L (ref 135–145)
WBC # BLD AUTO: 11.8 K/UL (ref 4.8–10.8)

## 2023-07-24 PROCEDURE — 99285 EMERGENCY DEPT VISIT HI MDM: CPT

## 2023-07-24 PROCEDURE — 99223 1ST HOSP IP/OBS HIGH 75: CPT | Performed by: INTERNAL MEDICINE

## 2023-07-24 PROCEDURE — 82962 GLUCOSE BLOOD TEST: CPT | Mod: 91

## 2023-07-24 PROCEDURE — 85025 COMPLETE CBC W/AUTO DIFF WBC: CPT

## 2023-07-24 PROCEDURE — 80053 COMPREHEN METABOLIC PANEL: CPT

## 2023-07-24 PROCEDURE — 96374 THER/PROPH/DIAG INJ IV PUSH: CPT | Mod: XU

## 2023-07-24 PROCEDURE — 700111 HCHG RX REV CODE 636 W/ 250 OVERRIDE (IP): Mod: JZ,UD | Performed by: INTERNAL MEDICINE

## 2023-07-24 PROCEDURE — 700117 HCHG RX CONTRAST REV CODE 255: Mod: UD | Performed by: EMERGENCY MEDICINE

## 2023-07-24 PROCEDURE — 36415 COLL VENOUS BLD VENIPUNCTURE: CPT

## 2023-07-24 PROCEDURE — G0378 HOSPITAL OBSERVATION PER HR: HCPCS

## 2023-07-24 PROCEDURE — 74177 CT ABD & PELVIS W/CONTRAST: CPT

## 2023-07-24 PROCEDURE — 96372 THER/PROPH/DIAG INJ SC/IM: CPT | Mod: XU

## 2023-07-24 RX ORDER — METOCLOPRAMIDE HYDROCHLORIDE 5 MG/ML
10 INJECTION INTRAMUSCULAR; INTRAVENOUS EVERY 6 HOURS
Status: COMPLETED | OUTPATIENT
Start: 2023-07-24 | End: 2023-07-26

## 2023-07-24 RX ORDER — LABETALOL HYDROCHLORIDE 5 MG/ML
10 INJECTION, SOLUTION INTRAVENOUS EVERY 4 HOURS PRN
Status: DISCONTINUED | OUTPATIENT
Start: 2023-07-24 | End: 2023-07-27 | Stop reason: HOSPADM

## 2023-07-24 RX ORDER — ACETAMINOPHEN 325 MG/1
650 TABLET ORAL EVERY 6 HOURS PRN
Status: DISCONTINUED | OUTPATIENT
Start: 2023-07-24 | End: 2023-07-27 | Stop reason: HOSPADM

## 2023-07-24 RX ORDER — AMOXICILLIN 250 MG
2 CAPSULE ORAL 2 TIMES DAILY
Status: DISCONTINUED | OUTPATIENT
Start: 2023-07-24 | End: 2023-07-27 | Stop reason: HOSPADM

## 2023-07-24 RX ORDER — PROMETHAZINE HYDROCHLORIDE 25 MG/1
12.5-25 TABLET ORAL EVERY 4 HOURS PRN
Status: DISCONTINUED | OUTPATIENT
Start: 2023-07-24 | End: 2023-07-24

## 2023-07-24 RX ORDER — ENOXAPARIN SODIUM 100 MG/ML
40 INJECTION SUBCUTANEOUS DAILY
Status: DISCONTINUED | OUTPATIENT
Start: 2023-07-24 | End: 2023-07-27 | Stop reason: HOSPADM

## 2023-07-24 RX ORDER — PROCHLORPERAZINE EDISYLATE 5 MG/ML
5-10 INJECTION INTRAMUSCULAR; INTRAVENOUS EVERY 4 HOURS PRN
Status: DISCONTINUED | OUTPATIENT
Start: 2023-07-24 | End: 2023-07-24

## 2023-07-24 RX ORDER — HYDRALAZINE HYDROCHLORIDE 20 MG/ML
20 INJECTION INTRAMUSCULAR; INTRAVENOUS EVERY 6 HOURS PRN
Status: DISCONTINUED | OUTPATIENT
Start: 2023-07-24 | End: 2023-07-27 | Stop reason: HOSPADM

## 2023-07-24 RX ORDER — PROMETHAZINE HYDROCHLORIDE 12.5 MG/1
12.5-25 SUPPOSITORY RECTAL EVERY 4 HOURS PRN
Status: DISCONTINUED | OUTPATIENT
Start: 2023-07-24 | End: 2023-07-24

## 2023-07-24 RX ORDER — DEXTROSE MONOHYDRATE 25 G/50ML
25 INJECTION, SOLUTION INTRAVENOUS
Status: DISCONTINUED | OUTPATIENT
Start: 2023-07-24 | End: 2023-07-27 | Stop reason: HOSPADM

## 2023-07-24 RX ORDER — POLYETHYLENE GLYCOL 3350 17 G/17G
1 POWDER, FOR SOLUTION ORAL
Status: DISCONTINUED | OUTPATIENT
Start: 2023-07-24 | End: 2023-07-27 | Stop reason: HOSPADM

## 2023-07-24 RX ORDER — FAMOTIDINE 20 MG/1
20 TABLET, FILM COATED ORAL 2 TIMES DAILY
Qty: 60 TABLET | Refills: 0 | Status: SHIPPED | OUTPATIENT
Start: 2023-07-24

## 2023-07-24 RX ORDER — ONDANSETRON 2 MG/ML
4 INJECTION INTRAMUSCULAR; INTRAVENOUS EVERY 4 HOURS PRN
Status: DISCONTINUED | OUTPATIENT
Start: 2023-07-24 | End: 2023-07-27 | Stop reason: HOSPADM

## 2023-07-24 RX ORDER — BISACODYL 10 MG
10 SUPPOSITORY, RECTAL RECTAL
Status: DISCONTINUED | OUTPATIENT
Start: 2023-07-24 | End: 2023-07-27 | Stop reason: HOSPADM

## 2023-07-24 RX ORDER — ONDANSETRON 4 MG/1
4 TABLET, ORALLY DISINTEGRATING ORAL EVERY 4 HOURS PRN
Status: DISCONTINUED | OUTPATIENT
Start: 2023-07-24 | End: 2023-07-27 | Stop reason: HOSPADM

## 2023-07-24 RX ADMIN — ONDANSETRON 4 MG: 4 TABLET, ORALLY DISINTEGRATING ORAL at 16:41

## 2023-07-24 RX ADMIN — METOCLOPRAMIDE HYDROCHLORIDE 10 MG: 5 INJECTION INTRAMUSCULAR; INTRAVENOUS at 18:21

## 2023-07-24 RX ADMIN — IOHEXOL 100 ML: 350 INJECTION, SOLUTION INTRAVENOUS at 12:45

## 2023-07-24 RX ADMIN — ENOXAPARIN SODIUM 40 MG: 100 INJECTION SUBCUTANEOUS at 18:21

## 2023-07-24 ASSESSMENT — PATIENT HEALTH QUESTIONNAIRE - PHQ9
2. FEELING DOWN, DEPRESSED, IRRITABLE, OR HOPELESS: NOT AT ALL
SUM OF ALL RESPONSES TO PHQ9 QUESTIONS 1 AND 2: 0
1. LITTLE INTEREST OR PLEASURE IN DOING THINGS: NOT AT ALL

## 2023-07-24 ASSESSMENT — PAIN DESCRIPTION - PAIN TYPE
TYPE: ACUTE PAIN
TYPE: ACUTE PAIN

## 2023-07-24 ASSESSMENT — ENCOUNTER SYMPTOMS: NAUSEA: 1

## 2023-07-24 ASSESSMENT — LIFESTYLE VARIABLES
ON A TYPICAL DAY WHEN YOU DRINK ALCOHOL HOW MANY DRINKS DO YOU HAVE: 0
AVERAGE NUMBER OF DAYS PER WEEK YOU HAVE A DRINK CONTAINING ALCOHOL: 0
EVER FELT BAD OR GUILTY ABOUT YOUR DRINKING: NO
TOTAL SCORE: 0
HAVE YOU EVER FELT YOU SHOULD CUT DOWN ON YOUR DRINKING: NO
CONSUMPTION TOTAL: NEGATIVE
HOW MANY TIMES IN THE PAST YEAR HAVE YOU HAD 5 OR MORE DRINKS IN A DAY: 0
EVER HAD A DRINK FIRST THING IN THE MORNING TO STEADY YOUR NERVES TO GET RID OF A HANGOVER: NO
ALCOHOL_USE: NO
HAVE PEOPLE ANNOYED YOU BY CRITICIZING YOUR DRINKING: NO
TOTAL SCORE: 0
TOTAL SCORE: 0

## 2023-07-24 ASSESSMENT — COPD QUESTIONNAIRES
COPD SCREENING SCORE: 1
DO YOU EVER COUGH UP ANY MUCUS OR PHLEGM?: NO/ONLY WITH OCCASIONAL COLDS OR INFECTIONS
HAVE YOU SMOKED AT LEAST 100 CIGARETTES IN YOUR ENTIRE LIFE: NO/DON'T KNOW
DURING THE PAST 4 WEEKS HOW MUCH DID YOU FEEL SHORT OF BREATH: NONE/LITTLE OF THE TIME

## 2023-07-24 ASSESSMENT — FIBROSIS 4 INDEX
FIB4 SCORE: 0.67
FIB4 SCORE: 0.67
FIB4 SCORE: 1.022202503999903861

## 2023-07-24 NOTE — H&P
Hospital Medicine History & Physical Note    Date of Service  7/24/2023    Primary Care Physician  Merlene Esteban M.D.    Consultants  None    Code Status  Full Code    Chief Complaint  Chief Complaint   Patient presents with    Low Blood Sugar     BIB EMS from home for low blood sugar, hx of type 2 diabetes. Pt reports blood sugar dropping low due to being unable to eat and continuing to take lantus. Patients FSBS upon arrival of EMS=49. Pt received 250mL of D10 from [facility]. OAFL=570 PTA.        History of Presenting Illness  Peña Guerrero is a 52 y.o. male with a past medical history of insulin-dependent diabetes mellitus, hiatal hernia who presented 7/24/2023 with hypoglycemia, nausea and dysphagia.  Patient states that he has had worsening dysphagia and nausea due to which she has been having poor oral intake however he continues to take his Lantus as prescribed and noticed that his fasting blood sugar was low at 49 therefore was brought into the ER.  Patient denies any fevers, chills, abdominal pain, diarrhea or dysuria.  CT abdomen pelvis with IV contrast in the ER was negative for bowel obstruction or focal inflammatory change.  Showed a fatty liver and small hiatal hernia.  Patient will be admitted to the clinical decision unit for swallow eval and treated with IV Reglan for possible gastroparesis with close monitoring of his blood glucose    I discussed the plan of care with patient, bedside RN, and ERP .    Review of Systems  Review of Systems   HENT:          Dysphagia   Gastrointestinal:  Positive for nausea.       Past Medical History   has a past medical history of AC separation, right, initial encounter (2/17/2020), Acute alcohol intoxication ([facility]) (2/17/2020), CLARA (acute kidney injury) ([facility]) (3/28/2023), Allergy, Closed fracture of distal lateral malleolus of right fibula (2/17/2020), Closed fracture of multiple ribs of both sides (9/3/2020), Compression fracture of T1 vertebra ([facility])  (2/17/2020), Diabetes ([facility]), DKA, type 1, not at goal ([facility]) (3/28/2023), Elevated lactic acid level (3/28/2023), Flail chest, initial encounter for closed fracture (2/17/2020), Hepatic steatosis (3/28/2023), Hyperkalemia (3/28/2023), Hypertension, Ileus ([facility]) (2/21/2020), Injury, Intertrochanteric fracture of right femur, closed, initial encounter ([facility]) (9/3/2020), Leukocytosis (3/28/2023), No contraindication to deep vein thrombosis (DVT) prophylaxis (9/3/2020), Pain of left thumb (3/16/2015), Screening examination for infectious disease, Thumb laceration (3/16/2015), Trauma (9/4/2020), and Urinary retention (9/5/2020).    Surgical History   has a past surgical history that includes pr open fix inter/subtroch fx,implnt (Right, 9/3/2020).     Family History  family history includes No Known Problems in his daughter and son.   Family history reviewed with patient. There is no family history that is pertinent to the chief complaint.     Social History   reports that he has never smoked. He has never used smokeless tobacco. He reports that he does not currently use alcohol. He reports that he does not currently use drugs after having used the following drugs: Inhaled.    Allergies  Allergies   Allergen Reactions    Pcn [Penicillins] Anaphylaxis    Penicillins      Rxn as a kid       Medications  Prior to Admission Medications   Prescriptions Last Dose Informant Patient Reported? Taking?   insulin glargine 100 UNIT/ML SC SOLN 7/23/2023 at AM Patient No No   Sig: Inject 60 Units under the skin every morning.      Facility-Administered Medications: None       Physical Exam  Temp:  [35.9 °C (96.6 °F)-36.4 °C (97.6 °F)] 35.9 °C (96.6 °F)  Pulse:  [65-88] 88  Resp:  [12-20] 16  BP: (121-175)/() 175/105  SpO2:  [94 %-99 %] 99 %  Blood Pressure: (!) 175/105 (Notified RN)   Temperature: 35.9 °C (96.6 °F)   Pulse: 88   Respiration: 16   Pulse Oximetry: 99 %       Physical Exam  Vitals and nursing note reviewed.    Constitutional:       General: He is not in acute distress.     Appearance: Normal appearance.   HENT:      Head: Normocephalic and atraumatic.      Nose: Nose normal.      Mouth/Throat:      Mouth: Mucous membranes are moist.   Eyes:      Extraocular Movements: Extraocular movements intact.      Conjunctiva/sclera: Conjunctivae normal.      Pupils: Pupils are equal, round, and reactive to light.   Cardiovascular:      Rate and Rhythm: Normal rate and regular rhythm.      Pulses: Normal pulses.      Heart sounds: Normal heart sounds.   Pulmonary:      Effort: Pulmonary effort is normal. No respiratory distress.      Breath sounds: Normal breath sounds. No wheezing, rhonchi or rales.   Abdominal:      General: Bowel sounds are normal. There is no distension.      Palpations: Abdomen is soft.      Tenderness: There is no abdominal tenderness.   Musculoskeletal:         General: No swelling or tenderness. Normal range of motion.      Cervical back: Normal range of motion and neck supple.   Lymphadenopathy:      Cervical: No cervical adenopathy.   Skin:     General: Skin is warm.      Coloration: Skin is not jaundiced.      Findings: No rash.   Neurological:      General: No focal deficit present.      Mental Status: He is alert and oriented to person, place, and time.      Cranial Nerves: No cranial nerve deficit.      Motor: No weakness.   Psychiatric:         Mood and Affect: Mood normal.         Behavior: Behavior normal.         Laboratory:  Recent Labs     07/24/23  1021   WBC 11.8*   RBC 6.34*   HEMOGLOBIN 19.0*   HEMATOCRIT 54.2*   MCV 85.5   MCH 30.0   MCHC 35.1   RDW 35.8*   PLATELETCT 294   MPV 8.9*     Recent Labs     07/24/23  1021   SODIUM 138   POTASSIUM 3.8   CHLORIDE 102   CO2 27   GLUCOSE 150*   BUN 14   CREATININE 1.00   CALCIUM 8.9     Recent Labs     07/24/23  1021   ALTSGPT 21   ASTSGOT 18   ALKPHOSPHAT 77   TBILIRUBIN 0.8   GLUCOSE 150*         No results for input(s): NTPROBNP in the last 72  hours.      No results for input(s): TROPONINT in the last 72 hours.    Imaging:  CT-ABDOMEN-PELVIS WITH   Final Result      1.  No evidence of bowel obstruction or focal inflammatory change.      2.  Fatty liver.      3.  Small hiatal hernia.              Assessment/Plan:  Justification for Admission Status  I anticipate this patient is appropriate for observation status at this time because      Patient will need a Med/Surg bed on MEDICAL service .  The need is secondary to .    * Dysphagia- (present on admission)  Assessment & Plan  Ordered SLP eval and treat  CT abdomen pelvis was negative for obstruction    Nausea- (present on admission)  Assessment & Plan  Started on IV Reglan for possible gastroparesis  Zofran as needed    Hypoglycemia- (present on admission)  Assessment & Plan  Secondary to long-acting insulin with poor oral intake  Hold Lantus at this time  Hypoglycemic protocol in place    Type 2 diabetes mellitus, with long-term current use of insulin (HCC)- (present on admission)  Assessment & Plan  Start on insulin sliding scale with serial Accu-Checks  Hold Lantus  Hypoglycemic protocol in place            VTE prophylaxis: enoxaparin ppx  I independently reviewed pertinent clinical lab tests since admission and ordered additional follow up clinical lab tests.  I independently reviewed pertinent radiographic images and the radiologist's reports since admission and ordered additional follow up radiographic studies.  The details of the available patient records in [facility] (including laboratory tests, culture data, medications, imaging, and other pertinent diagnostic tests) and that information was utilized as warranted in today's medical decision making for this patient.  I personally evaluated the patient condition at bedside.     Care interventions include:   Review of interval medical and surgical history, current medications and outpatient medication reconciliation, interval imaging studies and  radiologist interpretation, and interval laboratory values.     Aggregated care time spent evaluating, reassessing, reviewing documentation, providing care, counseling, coordinating care and managing this patient exclusive of procedures: 71 minutes

## 2023-07-24 NOTE — ED PROVIDER NOTES
"ED Provider Note    CHIEF COMPLAINT  Chief Complaint   Patient presents with    Low Blood Sugar     BIB EMS from home for low blood sugar, hx of type 2 diabetes. Pt reports blood sugar dropping low due to being unable to eat and continuing to take lantus. Patients FSBS upon arrival of EMS=49. Pt received 250mL of D10 from [facility]. ISUF=710 PTA.        EXTERNAL RECORDS REVIEWED  Inpatient notes    HPI/ROS  LIMITATION TO HISTORY   None  OUTSIDE HISTORIAN(S):  None    Peña Guerrero is a 52 y.o. male who presents patient states that he has a history of hiatal hernia, has been unable to eat because he states that it makes him nauseated.  He states he has a surgeon appointment on the 11th, but states he has had decreased intake secondary to the \"hernia.\"  He has no vomiting, and no fever or chills.  He has no chest pain or shortness of breath.  Patient states he was having some low blood sugar, secondary to \"being unable to eat\" but taking his medication.  He has no other medical concerns at this time.    PAST MEDICAL HISTORY   has a past medical history of AC separation, right, initial encounter (2/17/2020), Acute alcohol intoxication ([facility]) (2/17/2020), CLARA (acute kidney injury) ([facility]) (3/28/2023), Allergy, Closed fracture of distal lateral malleolus of right fibula (2/17/2020), Closed fracture of multiple ribs of both sides (9/3/2020), Compression fracture of T1 vertebra ([facility]) (2/17/2020), Diabetes ([facility]), DKA, type 1, not at goal ([facility]) (3/28/2023), Elevated lactic acid level (3/28/2023), Flail chest, initial encounter for closed fracture (2/17/2020), Hepatic steatosis (3/28/2023), Hyperkalemia (3/28/2023), Hypertension, Ileus ([facility]) (2/21/2020), Injury, Intertrochanteric fracture of right femur, closed, initial encounter ([facility]) (9/3/2020), Leukocytosis (3/28/2023), No contraindication to deep vein thrombosis (DVT) prophylaxis (9/3/2020), Pain of left thumb (3/16/2015), Screening examination for infectious disease, " "Thumb laceration (3/16/2015), Trauma (9/4/2020), and Urinary retention (9/5/2020).    SURGICAL HISTORY   has a past surgical history that includes open fix inter/subtroch fx,implnt (Right, 9/3/2020).    FAMILY HISTORY  Family History   Problem Relation Age of Onset    No Known Problems Daughter     No Known Problems Son     Cancer Neg Hx     Heart Disease Neg Hx        SOCIAL HISTORY  Social History     Tobacco Use    Smoking status: Never    Smokeless tobacco: Never   Vaping Use    Vaping Use: Never used   Substance and Sexual Activity    Alcohol use: Not Currently     Comment: Rarely    Drug use: Not Currently     Types: Inhaled     Comment: daily THC; denies IVDU    Sexual activity: Yes     Partners: Female       CURRENT MEDICATIONS  Home Medications    **Home medications have not yet been reviewed for this encounter**         ALLERGIES  Allergies   Allergen Reactions    Pcn [Penicillins] Anaphylaxis    Pcn [Penicillins]     Penicillins      Rxn as a kid       PHYSICAL EXAM  VITAL SIGNS: BP (P) 121/80   Pulse (P) 80   Resp (P) 17   Ht (P) 1.905 m (6' 3\")   Wt (P) 101 kg (222 lb)   SpO2 (P) 98%   BMI (P) 27.75 kg/m²    Constitutional: Well developed, well nourished.  Mild acute distress.  HEENT: Normocephalic, atraumatic. Posterior pharynx clear and moist.  Eyes:  EOMI. Normal sclera.  Neck: Supple, Full range of motion, nontender.  Chest/Pulmonary: clear to ausculation. Symmetrical expansion.   Cardio: Regular rate and rhythm with no murmur.   Abdomen: Soft, nontender. No peritoneal signs. No guarding. No palpable masses.  Musculoskeletal: No deformity, no edema, neurovascular intact.   Neuro: Clear speech, appropriate, cooperative, cranial nerves II-XII grossly intact.  Psych: Normal mood and affect      DIAGNOSTIC STUDIES / PROCEDURES  Results for orders placed or performed during the hospital encounter of 07/24/23   CBC WITH DIFFERENTIAL   Result Value Ref Range    WBC 11.8 (H) 4.8 - 10.8 K/uL    RBC " 6.34 (H) 4.70 - 6.10 M/uL    Hemoglobin 19.0 (H) 14.0 - 18.0 g/dL    Hematocrit 54.2 (H) 42.0 - 52.0 %    MCV 85.5 81.4 - 97.8 fL    MCH 30.0 27.0 - 33.0 pg    MCHC 35.1 32.3 - 36.5 g/dL    RDW 35.8 (L) 35.9 - 50.0 fL    Platelet Count 294 164 - 446 K/uL    MPV 8.9 (L) 9.0 - 12.9 fL    Neutrophils-Polys 79.90 (H) 44.00 - 72.00 %    Lymphocytes 14.50 (L) 22.00 - 41.00 %    Monocytes 4.80 0.00 - 13.40 %    Eosinophils 0.30 0.00 - 6.90 %    Basophils 0.20 0.00 - 1.80 %    Immature Granulocytes 0.30 0.00 - 0.90 %    Nucleated RBC 0.00 0.00 - 0.20 /100 WBC    Neutrophils (Absolute) 9.46 (H) 1.82 - 7.42 K/uL    Lymphs (Absolute) 1.72 1.00 - 4.80 K/uL    Monos (Absolute) 0.57 0.00 - 0.85 K/uL    Eos (Absolute) 0.04 0.00 - 0.51 K/uL    Baso (Absolute) 0.02 0.00 - 0.12 K/uL    Immature Granulocytes (abs) 0.03 0.00 - 0.11 K/uL    NRBC (Absolute) 0.00 K/uL   COMP METABOLIC PANEL   Result Value Ref Range    Sodium 138 135 - 145 mmol/L    Potassium 3.8 3.6 - 5.5 mmol/L    Chloride 102 96 - 112 mmol/L    Co2 27 20 - 33 mmol/L    Anion Gap 9.0 7.0 - 16.0    Glucose 150 (H) 65 - 99 mg/dL    Bun 14 8 - 22 mg/dL    Creatinine 1.00 0.50 - 1.40 mg/dL    Calcium 8.9 8.5 - 10.5 mg/dL    Correct Calcium 9.0 8.5 - 10.5 mg/dL    AST(SGOT) 18 12 - 45 U/L    ALT(SGPT) 21 2 - 50 U/L    Alkaline Phosphatase 77 30 - 99 U/L    Total Bilirubin 0.8 0.1 - 1.5 mg/dL    Albumin 3.9 3.2 - 4.9 g/dL    Total Protein 7.0 6.0 - 8.2 g/dL    Globulin 3.1 1.9 - 3.5 g/dL    A-G Ratio 1.3 g/dL   ESTIMATED GFR   Result Value Ref Range    GFR (CKD-EPI) 91 >60 mL/min/1.73 m 2        RADIOLOGY  I have independently interpreted the diagnostic imaging associated with this visit and am waiting the final reading from the radiologist.   My preliminary interpretation is as follows: See below  Radiologist interpretation:   CT-ABDOMEN-PELVIS WITH   Final Result      1.  No evidence of bowel obstruction or focal inflammatory change.      2.  Fatty liver.      3.  Small  "hiatal hernia.            COURSE & MEDICAL DECISION MAKING    Patient will be discharged home in stable condition.  He has no acute finding or obstruction on CT, his lab work was unremarkable, and he will be placed on Pepcid, pending his general surgery appointment.    INITIAL ASSESSMENT, COURSE AND PLAN  Care Narrative: This is a 52-year-old male here for evaluation of decreased appetite and low blood sugar secondary to \"not being able to eat.\"  Patient continues to take his Lantus, despite not eating, when she has been educated on this, and and agrees to eat more.  He will return for any further issues or concerns.  Also watch his blood sugar more closely.  1:29 PM  Patient states that he is not comfortable going home secondary to his dysmotility issues and swallowing.  He states he is supposed to have a swallow study done, and he would like to stay in the hospital to have this done.  He states he is worried that his blood sugars will become too low, when he is at home by himself.    DISPOSITION AND DISCUSSIONS  I have discussed management of the patient with the following physicians and GAETANO's: None    Discussion of management with other QHP or appropriate source(s): None    Escalation of care considered, and ultimately not performed: None    Barriers to care at this time, including but not limited to: Patient does not have established PCP.     Decision tools and prescription drugs considered including, but not limited to:  None .    FINAL DIAGNOSIS  1. Hiatal hernia    2.      Motility issues       Electronically signed by: Luis Rao D.O., 7/24/2023 10:26 AM      "

## 2023-07-24 NOTE — ED NOTES
Patient provided with pillows, resting in gurney. Aware of POC, thanked for patience. Patient AOX4.

## 2023-07-24 NOTE — ED TRIAGE NOTES
Chief Complaint   Patient presents with    Low Blood Sugar     BIB EMS from home for low blood sugar, hx of type 2 diabetes. Pt reports blood sugar dropping low due to being unable to eat and continuing to take lantus. Patients FSBS upon arrival of EMS=49. Pt received 250mL of D10 from [facility]. JAIF=827 PTA.      Patient has a swallow eval on August 11th due to being unable to swallow d/t hiatal hernia. Patient takes 60 units of lantus daily, last reports taking it yesterday.   Patient currently AOX4, GCS of 15.   Vitals:    07/24/23 1011   BP: (P) 121/80   Pulse: (P) 80   Resp: (P) 17   SpO2: (P) 98%

## 2023-07-24 NOTE — DISCHARGE INSTRUCTIONS
Recommend soft foods, avoid foods that catch like large pieces chicken or steak    Speech therapist recommendations: Fully upright and midline during oral intake, Meals sitting upright in a chair, as tolerated, Remain upright for 30-45 minutes after oral intake. Alternate between bites and sips of water, Slow rate of intake, Multiple swallows (2-3) per bite/sips    A referral has been sent for gasteroenterology. If they do not contact you for an appointment in the next day or two, call your PCP to help with referral.

## 2023-07-24 NOTE — ED NOTES
Med Rec complete per patient   Allergies reviewed  No oral antibiotics in the last 30 days  Preferred pharmacy: [facility] [place]    Patient states he doesn't take any OTC/Supplements

## 2023-07-25 LAB
ANION GAP SERPL CALC-SCNC: 10 MMOL/L (ref 7–16)
BUN SERPL-MCNC: 13 MG/DL (ref 8–22)
CALCIUM SERPL-MCNC: 9.1 MG/DL (ref 8.5–10.5)
CHLORIDE SERPL-SCNC: 105 MMOL/L (ref 96–112)
CO2 SERPL-SCNC: 23 MMOL/L (ref 20–33)
CREAT SERPL-MCNC: 0.84 MG/DL (ref 0.5–1.4)
ERYTHROCYTE [DISTWIDTH] IN BLOOD BY AUTOMATED COUNT: 36.1 FL (ref 35.9–50)
GFR SERPLBLD CREATININE-BSD FMLA CKD-EPI: 106 ML/MIN/1.73 M 2
GLUCOSE BLD STRIP.AUTO-MCNC: 179 MG/DL (ref 65–99)
GLUCOSE BLD STRIP.AUTO-MCNC: 239 MG/DL (ref 65–99)
GLUCOSE BLD STRIP.AUTO-MCNC: 282 MG/DL (ref 65–99)
GLUCOSE BLD STRIP.AUTO-MCNC: 84 MG/DL (ref 65–99)
GLUCOSE SERPL-MCNC: 130 MG/DL (ref 65–99)
HCT VFR BLD AUTO: 53 % (ref 42–52)
HGB BLD-MCNC: 18.8 G/DL (ref 14–18)
MCH RBC QN AUTO: 30.2 PG (ref 27–33)
MCHC RBC AUTO-ENTMCNC: 35.5 G/DL (ref 32.3–36.5)
MCV RBC AUTO: 85.1 FL (ref 81.4–97.8)
PLATELET # BLD AUTO: 266 K/UL (ref 164–446)
PMV BLD AUTO: 8.6 FL (ref 9–12.9)
POTASSIUM SERPL-SCNC: 4.1 MMOL/L (ref 3.6–5.5)
RBC # BLD AUTO: 6.23 M/UL (ref 4.7–6.1)
SODIUM SERPL-SCNC: 138 MMOL/L (ref 135–145)
WBC # BLD AUTO: 10.7 K/UL (ref 4.8–10.8)

## 2023-07-25 PROCEDURE — 92610 EVALUATE SWALLOWING FUNCTION: CPT

## 2023-07-25 PROCEDURE — G0378 HOSPITAL OBSERVATION PER HR: HCPCS

## 2023-07-25 PROCEDURE — 700111 HCHG RX REV CODE 636 W/ 250 OVERRIDE (IP): Mod: JZ,UD | Performed by: INTERNAL MEDICINE

## 2023-07-25 PROCEDURE — 85027 COMPLETE CBC AUTOMATED: CPT

## 2023-07-25 PROCEDURE — 82962 GLUCOSE BLOOD TEST: CPT

## 2023-07-25 PROCEDURE — 700102 HCHG RX REV CODE 250 W/ 637 OVERRIDE(OP): Mod: UD | Performed by: INTERNAL MEDICINE

## 2023-07-25 PROCEDURE — 96372 THER/PROPH/DIAG INJ SC/IM: CPT

## 2023-07-25 PROCEDURE — 99232 SBSQ HOSP IP/OBS MODERATE 35: CPT | Mod: FS

## 2023-07-25 PROCEDURE — 96376 TX/PRO/DX INJ SAME DRUG ADON: CPT

## 2023-07-25 PROCEDURE — 80048 BASIC METABOLIC PNL TOTAL CA: CPT

## 2023-07-25 RX ADMIN — INSULIN HUMAN 2 UNITS: 100 INJECTION, SOLUTION PARENTERAL at 18:44

## 2023-07-25 RX ADMIN — ENOXAPARIN SODIUM 40 MG: 100 INJECTION SUBCUTANEOUS at 18:44

## 2023-07-25 RX ADMIN — METOCLOPRAMIDE HYDROCHLORIDE 10 MG: 5 INJECTION INTRAMUSCULAR; INTRAVENOUS at 18:48

## 2023-07-25 RX ADMIN — METOCLOPRAMIDE HYDROCHLORIDE 10 MG: 5 INJECTION INTRAMUSCULAR; INTRAVENOUS at 12:24

## 2023-07-25 RX ADMIN — METOCLOPRAMIDE HYDROCHLORIDE 10 MG: 5 INJECTION INTRAMUSCULAR; INTRAVENOUS at 06:21

## 2023-07-25 RX ADMIN — INSULIN HUMAN 1 UNITS: 100 INJECTION, SOLUTION PARENTERAL at 12:24

## 2023-07-25 RX ADMIN — METOCLOPRAMIDE HYDROCHLORIDE 10 MG: 5 INJECTION INTRAMUSCULAR; INTRAVENOUS at 00:19

## 2023-07-25 RX ADMIN — INSULIN HUMAN 3 UNITS: 100 INJECTION, SOLUTION PARENTERAL at 22:09

## 2023-07-25 ASSESSMENT — PAIN DESCRIPTION - PAIN TYPE: TYPE: ACUTE PAIN

## 2023-07-25 ASSESSMENT — ENCOUNTER SYMPTOMS
MYALGIAS: 0
SHORTNESS OF BREATH: 0
SINUS PAIN: 0
DIARRHEA: 0
WEAKNESS: 0
FEVER: 0
SPEECH CHANGE: 0
CHILLS: 0
VOMITING: 0
SORE THROAT: 0
NAUSEA: 1
SENSORY CHANGE: 0
NECK PAIN: 0

## 2023-07-25 NOTE — ASSESSMENT & PLAN NOTE
Insulin sliding scale with serial Accu-Checks  Lantus held - Will resume as PO intake increases  Hypoglycemic protocol in place

## 2023-07-25 NOTE — ASSESSMENT & PLAN NOTE
Secondary to long-acting insulin with poor oral intake  Hold Lantus at this time  Hypoglycemic protocol in place  Will resume as PO intake increases

## 2023-07-25 NOTE — CARE PLAN
The patient is Stable - Low risk of patient condition declining or worsening    Shift Goals  Clinical Goals: SLP c/s, advance diet? Pain control  Patient Goals: rest, eat, pain control  Family Goals: LAYNE      Problem: Knowledge Deficit - Standard  Goal: Patient and family/care givers will demonstrate understanding of plan of care, disease process/condition, diagnostic tests and medications  Outcome: Progressing     Problem: Pain - Standard  Goal: Alleviation of pain or a reduction in pain to the patient’s comfort goal  Outcome: Progressing

## 2023-07-25 NOTE — PROGRESS NOTES
4 Eyes Skin Assessment Completed by ALEXANDER Quiles and ALEXANDER Constantino.    Head WDL  Ears WDL  Nose WDL  Mouth WDL  Neck WDL  Breast/Chest WDL  Shoulder Blades WDL  Spine WDL  (R) Arm/Elbow/Hand WDL  (L) Arm/Elbow/Hand WDL  Abdomen WDL  Groin WDL  Scrotum/Coccyx/Buttocks WDL  (R) Leg WDL  (L) Leg WDL  (R) Heel/Foot/Toe WDL  (L) Heel/Foot/Toe WDL          Devices In Places Tele Box, Blood Pressure Cuff, and Pulse Ox      Interventions In Place Pressure Redistribution Mattress    Possible Skin Injury No    Pictures Uploaded Into Epic N/A  Wound Consult Placed N/A  RN Wound Prevention Protocol Ordered No

## 2023-07-25 NOTE — THERAPY
Speech Language Pathology   Clinical Swallow Evaluation     Patient Name: Peña Guerrero  AGE:  52 y.o., SEX:  male  Medical Record #: [id number]  Date of Service: 7/25/2023      History of Present Illness  Pt is a 52 y.o. M who presented 7/24/23 with hypoglycemia, naseua, and dysphagia. Pt with reporting worsening dysphagia with nausea to which he has reported having limited oral intake. Pt stated he has history of hiatal hernia, and has been unable to eat because he states that it makes him nasueated.     No skilled ST indicated in the pt's chart.     CMHx: Dysphagia, nausea, hypoglycemia   PMHx: AC separation, Acute alcohol intoxication ([facility]), CLARA (acute kidney injury), Closed fracture of distal lateral malleolus of right fibula, Closed fracture of multiple ribs of both sides, Compression fracture of T1 vertebra, Diabetes ([facility]), DKA, type 1, Elevated lactic acid level, Flail chest, initial encounter for closed fracture, Hepatic steatosis, Hyperkalemia, Hypertension, Intertrochanteric fracture of right femur, closed, initial encounter ([facility]), Leukocytosis, No contraindication to deep vein thrombosis (DVT) prophylaxis, Urinary retention     CT ABDOMEN 7/24/23:   1.  No evidence of bowel obstruction or focal inflammatory change.  2.  Fatty liver.  3.  Small hiatal hernia.    General Information:  Vitals  O2 (LPM): 0  O2 Delivery Device: None - Room Air  Level of Consciousness: Alert, Awake  Orientation: Oriented x 4  Follows Directives: Yes    Prior Living Situation & Level of Function:  Prior Services: Home-Independent  Lives with - Patient's Self Care Capacity: Friends  Swallowing: Per pt report, swallowing difficulty begun approximately 3 months ago. Pt reporting globus/odynophagia on solid foods intermittently. Per pt report, pt has a hernia and first though swallowing difficulty was s/t naseua/pain with hernia. However, pt does not believe that they are related.     Oral Mechanism Evaluation:  Dentition: Fair,  Natural dentition, Some missing dentition   Facial Symmetry: Equal  Facial Sensation: Equal     Labial Observations: WFL   Lingual Observations: Midline  Motor Speech: WNL       Laryngeal Function:  Secretion Management: Adequate  Voice Quality: WFL  Cough: Perceptually WNL       Subjective  Pt was cleared by RN for clinical bedside swallow evaluation. Pt was AAOx4, pleasant, and agreeable to SLP tasks. Per pt report, swallowing difficulty began approximately 3 months ago. Pt reporting intermittent globus/odynophagia on solid foods with reported emesis. Pt reported limited PO and primarily eating liquids in order to prevent pain and emesis. Pt initally believed dysphagia was s/t nausea/pain from hernia; however, now believes them to be unrelated. Pt denied Hx of pnuemonia. Pt endorsed Hx of acid reflux managed with OTC medication.    Assessment  Current Method of Nutrition: Oral diet (full liquid)  Positioning: Thompson's (60-90 degrees)  Bolus Administration: Patient  O2 (LPM): 0 O2 Delivery Device: None - Room Air     Swallowing Trials:  Swallowing Trials  Thin Liquid (TN0): Impaired  Liquidised (LQ3): WFL  Soft & Bite Sized (SB6): Impaired    Comments:   Pt was positioned upright, midline, at 90 degrees prior to PO trials. Pt managed single sips of thin liquids x5, sequential sips of thin liquids x5, liquidized x5, and soft and bite size x2 completed. Additional PO discontinued s/t odynophagia on soft and bite size solids. Pt demonstrated adequate labial seal, stripping, and mastication was coordinated/timely. HLE was palpated and present; however, multiple swallow triggers inferred on single sips of thin liquids. Clinical s/sx of aspiration appreciated including delayed cough x1 on liquidized and wet vocal quality x1 following sequential sips of thin liquids. Vocal quality improved with use of saliva swallow. Pt with report of odynophagia in both throat and sternum. Per pt report, increased pain with soft and bite  size items. Valerio appreciated x1 following liquidized.     Pt education provided on relevant anatomy and reflux precautions including remaining upright 30-45 minutes following PO and sitting upright at midline during oral intake.  Additional education provided on the importance of good, thorough, oral care and mobility, as medically appropriate, to mitigate the risk for aspiration. Utilizing the teach back method, pt recalled precautions and education with 100% accuracy.     Clinical Impressions  Patient is presenting with suspected pharyngeal and esophageal dysphagia consistent with report of emesis following PO, odynophagia, globus, and s/sx of aspiration noted following PO including wet vocal quality and cough. Due to reported emesis following PO, pt is at risk for ascending aspiration. RN and APRN aware of results from today's evaluation and recommendations. SLP recommending continuation of full liquid diet with use of aspiration precautions. Instrumental swallow study via MBSS recommended to determine oral feeding safety, potential for diet consistency change, assess swallow efficiency and airway protection, and determine the efficacy of swallowing exercises and/or compensatory strategies to improve dysphagia outcomes. Pt verbally consented to participate in MBSS.     Recommendations  Diet Consistency: full liquid diet  Instrumentation: VFSS (MBSS)  Medication: Crush with applesauce, as appropriate, Non Oral, As tolerated  Supervision: Independent  Positioning: Fully upright and midline during oral intake, Remain upright for 30-45 minutes after oral intake  Risk Management : Small bites/sips, Alternate bites and sips, Slow rate of intake  Oral Care: BID  Consult: GI      SLP Treatment Plan  Treatment Plan: Dysphagia Treatment, Patient/Family/Caregiver Training  SLP Frequency: 4x Per Week  Estimated Duration: Until Therapy Goals Met    Anticipated Discharge Needs  Discharge Recommendations: Recommend outpatient  "speech therapy services ([facility] clinical progress)   Therapy Recommendations Upon DC: Dysphagia Training, Patient / Family / Caregiver Education      Patient / Family Goals  Patient / Family Goal #1: \"I just want to get this figured out.\"  Goal #1 Outcome: Progressing as expected  Short Term Goals  Short Term Goal # 1: Pt will participate in instrumental swallow study to determine swallow efficiency/airway protection and guide dysphagia mangement.    Jeni Segal, SLP   "

## 2023-07-25 NOTE — PROGRESS NOTES
Hospital Medicine Daily Progress Note    Date of Service  7/25/2023    Chief Complaint  Peña Guerrero is a 52 y.o. male admitted 7/24/2023 with nausea and dysphagia    Hospital Course  Peña Guerrero is a 52 y.o. male with a past medical history of insulin-dependent diabetes mellitus, hiatal hernia who presented 7/24/2023 with hypoglycemia, nausea and dysphagia.      Patient states that he has had worsening dysphagia and nausea due to which she has been having poor oral intake however he continues to take his Lantus as prescribed and noticed that his fasting blood sugar was low at 49 therefore was brought into the ER.  Patient denies any fevers, chills, abdominal pain, diarrhea or dysuria.    CT abdomen pelvis with IV contrast in the ER was negative for bowel obstruction or focal inflammatory change.  Showed a fatty liver and small hiatal hernia.  Patient was admitted to the clinical decision unit for swallow eval and treated with IV Reglan for possible gastroparesis with close monitoring of his blood glucose.    Interval Problem Update  7/25: Nausea improving on reglan. On liquid diet. SLP consulted - concern for possible odynophagia, recommending MBSS, full liquid diet. Will consider GI consult if indicated by MBSS result.     I have discussed this patient's plan of care and discharge plan at IDT rounds today with Case Management, Nursing, Nursing leadership, and other members of the IDT team.    Consultants/Specialty  SLP    Code Status  Full Code    Disposition  The patient is not medically cleared for discharge to home or a post-acute facility.  Anticipate discharge to: home with close outpatient follow-up    I have placed the appropriate orders for post-discharge needs.    Review of Systems  Review of Systems   Constitutional:  Negative for chills, fever and malaise/fatigue.   HENT:  Negative for sinus pain and sore throat.         Globus sensation   Respiratory:  Negative for shortness of breath.     Cardiovascular:  Negative for chest pain and leg swelling.   Gastrointestinal:  Positive for nausea. Negative for diarrhea and vomiting.   Genitourinary:  Negative for dysuria.   Musculoskeletal:  Negative for myalgias and neck pain.   Neurological:  Negative for sensory change, speech change and weakness.        Physical Exam  Temp:  [36.1 °C (97 °F)-36.7 °C (98.1 °F)] 36.1 °C (97 °F)  Pulse:  [62-92] 71  Resp:  [16-18] 16  BP: (117-145)/(71-92) 144/91  SpO2:  [93 %-96 %] 96 %    Physical Exam  Constitutional:       General: He is not in acute distress.     Appearance: He is not toxic-appearing.   HENT:      Head: Normocephalic.      Mouth/Throat:      Mouth: Mucous membranes are moist.      Pharynx: Oropharynx is clear.   Eyes:      Conjunctiva/sclera: Conjunctivae normal.      Pupils: Pupils are equal, round, and reactive to light.   Cardiovascular:      Rate and Rhythm: Normal rate and regular rhythm.      Pulses: Normal pulses.      Heart sounds: Normal heart sounds.   Pulmonary:      Effort: Pulmonary effort is normal.      Breath sounds: Normal breath sounds.   Abdominal:      General: Abdomen is flat. Bowel sounds are normal.      Palpations: Abdomen is soft.   Musculoskeletal:         General: Normal range of motion.      Cervical back: Normal range of motion and neck supple.   Skin:     General: Skin is warm and dry.      Capillary Refill: Capillary refill takes less than 2 seconds.   Neurological:      General: No focal deficit present.      Mental Status: He is alert and oriented to person, place, and time.         Fluids    Intake/Output Summary (Last 24 hours) at 7/25/2023 1852  Last data filed at 7/25/2023 0629  Gross per 24 hour   Intake 600 ml   Output 700 ml   Net -100 ml       Laboratory  Recent Labs     07/24/23  1021 07/25/23  0419   WBC 11.8* 10.7   RBC 6.34* 6.23*   HEMOGLOBIN 19.0* 18.8*   HEMATOCRIT 54.2* 53.0*   MCV 85.5 85.1   MCH 30.0 30.2   MCHC 35.1 35.5   RDW 35.8* 36.1   PLATELETCT  294 266   MPV 8.9* 8.6*     Recent Labs     07/24/23  1021 07/25/23  0419   SODIUM 138 138   POTASSIUM 3.8 4.1   CHLORIDE 102 105   CO2 27 23   GLUCOSE 150* 130*   BUN 14 13   CREATININE 1.00 0.84   CALCIUM 8.9 9.1     Imaging  CT-ABDOMEN-PELVIS WITH   Final Result      1.  No evidence of bowel obstruction or focal inflammatory change.      2.  Fatty liver.      3.  Small hiatal hernia.      DX-ESOPHAGUS - [id number]    (Results Pending)        Assessment/Plan  * Dysphagia- (present on admission)  Assessment & Plan  Intermittent globus / odynophagia for approx 3 months  CT abdomen pelvis was negative for obstruction  SLP following  MBSS in AM  Full liquid diet  Consider further imaging / GI consult if indicated by MBSS    Nausea- (present on admission)  Assessment & Plan  Improving  Started on IV Reglan for possible gastroparesis  Zofran as needed    Hypoglycemia- (present on admission)  Assessment & Plan  Secondary to long-acting insulin with poor oral intake  Hold Lantus at this time  Hypoglycemic protocol in place    Hypertension- (present on admission)  Assessment & Plan  Currently controlled  PRN available    Type 2 diabetes mellitus, with long-term current use of insulin (HCC)- (present on admission)  Assessment & Plan  Insulin sliding scale with serial Accu-Checks  Hold Lantus  Hypoglycemic protocol in place             VTE prophylaxis: SCDs/TEDs and enoxaparin ppx    I have performed a physical exam and reviewed and updated ROS and Plan today (7/25/2023). In review of yesterday's note (7/24/2023), there are no changes except as documented above.

## 2023-07-25 NOTE — PROGRESS NOTES
Report received from ALEXANDER Acosta.  Assumed care of patient.  Assessment complete.  Plan of care gone over with the patient and all concerns addressed.  Patient resting in bed.  Patient A & O  x 4.  No apparent signs of distress.  Safety precautions in place.  Patient educated to call for assistance.  Hourly rounding in place.

## 2023-07-25 NOTE — HOSPITAL COURSE
Peña Guerrero is a 52 y.o. male with a past medical history of insulin-dependent diabetes mellitus, hiatal hernia who presented 7/24/2023 with hypoglycemia, nausea and dysphagia.      Patient states that he has had worsening dysphagia and nausea due to which she has been having poor oral intake however he continues to take his Lantus as prescribed and noticed that his fasting blood sugar was low at 49 therefore was brought into the ER.  Patient denies any fevers, chills, abdominal pain, diarrhea or dysuria.    CT abdomen pelvis with IV contrast in the ER was negative for bowel obstruction or focal inflammatory change.  Showed a fatty liver and small hiatal hernia.  Patient was admitted to the clinical decision unit for swallow eval and treated with IV Reglan for possible gastroparesis with close monitoring of his blood glucose.    On 7/26, nausea improved. MBSS performed by speech, indicative of possible delayed esophageal emptying. No oropharyngeal dysphagia. Trialed soft diet with reflux precautions. As intake improved, hypoglycemia resolved. By 7/27 he was tolerate a regular diet using reflux precautions without difficulty.     Provided information on dysphagia eating plan and provided written copy of SLP eating strategies. Discussed sick day strategies for insulin. Provided pepcid and refilled his home lisinopril per his request. He will follow with his PCP at scheduled appointment 8/1. Recommended that he also follow with outpatient gastroenterology, referral sent.

## 2023-07-25 NOTE — ASSESSMENT & PLAN NOTE
Intermittent globus / odynophagia for approx 3 months  CT abdomen pelvis was negative for obstruction  No oropharyngeal dysphagia on MBSS. He did have some delayed esophageal emptying.   SLP recommends regular diet with reflux precautions  Advance diet. If he tolerates, can follow further with GI outpatient.

## 2023-07-25 NOTE — PROGRESS NOTES
Pt awake,a&ox4,had full liquid diet,tolerated,pt unable to tolerate solid diet per patient,awaiting for speech eval,pt states less nauseated.will monitor for hypoglycemia.

## 2023-07-26 ENCOUNTER — APPOINTMENT (OUTPATIENT)
Dept: RADIOLOGY | Facility: MEDICAL CENTER | Age: 53
End: 2023-07-26
Payer: MEDICAID

## 2023-07-26 LAB
ANION GAP SERPL CALC-SCNC: 10 MMOL/L (ref 7–16)
BASOPHILS # BLD AUTO: 0.5 % (ref 0–1.8)
BASOPHILS # BLD: 0.04 K/UL (ref 0–0.12)
BUN SERPL-MCNC: 16 MG/DL (ref 8–22)
CALCIUM SERPL-MCNC: 8.9 MG/DL (ref 8.5–10.5)
CHLORIDE SERPL-SCNC: 103 MMOL/L (ref 96–112)
CO2 SERPL-SCNC: 24 MMOL/L (ref 20–33)
CREAT SERPL-MCNC: 0.95 MG/DL (ref 0.5–1.4)
EOSINOPHIL # BLD AUTO: 0.1 K/UL (ref 0–0.51)
EOSINOPHIL NFR BLD: 1.2 % (ref 0–6.9)
ERYTHROCYTE [DISTWIDTH] IN BLOOD BY AUTOMATED COUNT: 35.2 FL (ref 35.9–50)
GFR SERPLBLD CREATININE-BSD FMLA CKD-EPI: 97 ML/MIN/1.73 M 2
GLUCOSE BLD STRIP.AUTO-MCNC: 129 MG/DL (ref 65–99)
GLUCOSE BLD STRIP.AUTO-MCNC: 288 MG/DL (ref 65–99)
GLUCOSE BLD STRIP.AUTO-MCNC: 307 MG/DL (ref 65–99)
GLUCOSE BLD STRIP.AUTO-MCNC: 314 MG/DL (ref 65–99)
GLUCOSE BLD STRIP.AUTO-MCNC: 57 MG/DL (ref 65–99)
GLUCOSE BLD STRIP.AUTO-MCNC: 70 MG/DL (ref 65–99)
GLUCOSE SERPL-MCNC: 73 MG/DL (ref 65–99)
HCT VFR BLD AUTO: 50.3 % (ref 42–52)
HGB BLD-MCNC: 18.2 G/DL (ref 14–18)
IMM GRANULOCYTES # BLD AUTO: 0.03 K/UL (ref 0–0.11)
IMM GRANULOCYTES NFR BLD AUTO: 0.3 % (ref 0–0.9)
LYMPHOCYTES # BLD AUTO: 2.76 K/UL (ref 1–4.8)
LYMPHOCYTES NFR BLD: 32 % (ref 22–41)
MCH RBC QN AUTO: 30.3 PG (ref 27–33)
MCHC RBC AUTO-ENTMCNC: 36.2 G/DL (ref 32.3–36.5)
MCV RBC AUTO: 83.8 FL (ref 81.4–97.8)
MONOCYTES # BLD AUTO: 0.66 K/UL (ref 0–0.85)
MONOCYTES NFR BLD AUTO: 7.6 % (ref 0–13.4)
NEUTROPHILS # BLD AUTO: 5.04 K/UL (ref 1.82–7.42)
NEUTROPHILS NFR BLD: 58.4 % (ref 44–72)
NRBC # BLD AUTO: 0 K/UL
NRBC BLD-RTO: 0 /100 WBC (ref 0–0.2)
PLATELET # BLD AUTO: 242 K/UL (ref 164–446)
PMV BLD AUTO: 8.7 FL (ref 9–12.9)
POTASSIUM SERPL-SCNC: 3.8 MMOL/L (ref 3.6–5.5)
RBC # BLD AUTO: 6 M/UL (ref 4.7–6.1)
SODIUM SERPL-SCNC: 137 MMOL/L (ref 135–145)
WBC # BLD AUTO: 8.6 K/UL (ref 4.8–10.8)

## 2023-07-26 PROCEDURE — 96372 THER/PROPH/DIAG INJ SC/IM: CPT

## 2023-07-26 PROCEDURE — 82962 GLUCOSE BLOOD TEST: CPT

## 2023-07-26 PROCEDURE — 80048 BASIC METABOLIC PNL TOTAL CA: CPT

## 2023-07-26 PROCEDURE — 85025 COMPLETE CBC W/AUTO DIFF WBC: CPT

## 2023-07-26 PROCEDURE — G0378 HOSPITAL OBSERVATION PER HR: HCPCS

## 2023-07-26 PROCEDURE — 74230 X-RAY XM SWLNG FUNCJ C+: CPT

## 2023-07-26 PROCEDURE — 700111 HCHG RX REV CODE 636 W/ 250 OVERRIDE (IP): Mod: JZ,UD | Performed by: INTERNAL MEDICINE

## 2023-07-26 PROCEDURE — 96376 TX/PRO/DX INJ SAME DRUG ADON: CPT

## 2023-07-26 PROCEDURE — 92611 MOTION FLUOROSCOPY/SWALLOW: CPT

## 2023-07-26 PROCEDURE — 99232 SBSQ HOSP IP/OBS MODERATE 35: CPT | Mod: FS

## 2023-07-26 RX ADMIN — INSULIN HUMAN 4 UNITS: 100 INJECTION, SOLUTION PARENTERAL at 17:39

## 2023-07-26 RX ADMIN — ENOXAPARIN SODIUM 40 MG: 100 INJECTION SUBCUTANEOUS at 17:41

## 2023-07-26 RX ADMIN — METOCLOPRAMIDE HYDROCHLORIDE 10 MG: 5 INJECTION INTRAMUSCULAR; INTRAVENOUS at 11:45

## 2023-07-26 RX ADMIN — INSULIN HUMAN 4 UNITS: 100 INJECTION, SOLUTION PARENTERAL at 20:46

## 2023-07-26 RX ADMIN — METOCLOPRAMIDE HYDROCHLORIDE 10 MG: 5 INJECTION INTRAMUSCULAR; INTRAVENOUS at 06:07

## 2023-07-26 RX ADMIN — METOCLOPRAMIDE HYDROCHLORIDE 10 MG: 5 INJECTION INTRAMUSCULAR; INTRAVENOUS at 00:25

## 2023-07-26 ASSESSMENT — ENCOUNTER SYMPTOMS
NAUSEA: 0
DIARRHEA: 0
SINUS PAIN: 0
SORE THROAT: 1
MYALGIAS: 0
VOMITING: 0
WEAKNESS: 0
SENSORY CHANGE: 0
CHILLS: 0
NECK PAIN: 0
SHORTNESS OF BREATH: 0
FEVER: 0
SPEECH CHANGE: 0

## 2023-07-26 ASSESSMENT — PAIN DESCRIPTION - PAIN TYPE: TYPE: ACUTE PAIN

## 2023-07-26 NOTE — PROGRESS NOTES
Patient back from radiology for swallow evaluation. APRN at bedside to assess patient. Plan for possible GI consult, keep NPO until GI clears, and continue to check BG. Educated patient on the importance of calling RN if he feels his BG is low. Answered all questions and concerns.

## 2023-07-26 NOTE — THERAPY
Speech Language Pathology   Videofluoroscopic Swallow Study Evaluation     Patient Name: Peña Guerrero  AGE:  52 y.o., SEX:  male  Medical Record #: [id number]  Date of Service: 7/26/2023      History of Present Illness  Pt is a 52 y.o. M who presented 7/24/23 with hypoglycemia, naseua, and dysphagia. Pt with reporting worsening dysphagia with nausea to which he has reported having limited oral intake. Pt stated he has history of hiatal hernia, and has been unable to eat because he states that it makes him nasueated.      No skilled ST indicated in the pt's chart.      CMHx: Dysphagia, nausea, hypoglycemia   PMHx: AC separation, Acute alcohol intoxication ([facility]), CLARA (acute kidney injury), Closed fracture of distal lateral malleolus of right fibula, Closed fracture of multiple ribs of both sides, Compression fracture of T1 vertebra, Diabetes ([facility]), DKA, type 1, Elevated lactic acid level, Flail chest, initial encounter for closed fracture, Hepatic steatosis, Hyperkalemia, Hypertension, Intertrochanteric fracture of right femur, closed, initial encounter ([facility]), Leukocytosis, No contraindication to deep vein thrombosis (DVT) prophylaxis, Urinary retention      CT ABDOMEN 7/24/23:   1.  No evidence of bowel obstruction or focal inflammatory change.  2.  Fatty liver.  3.  Small hiatal hernia.       Pertinent Information  Current Method of Nutrition: NPO until cleared by speech pathology     Dentition: Fair, Natural dentition, Some missing dentition  Secretion Management: Adequate  Feeding Tube: None  Tracheostomy: No       Factor(s) Affecting Performance: None      Discussed the risks, benefits, and alternatives of the VFSS procedure. Patient/family acknowledged and agreed to proceed.      Assessment  Videofluoroscopic Swallow Study was conducted in the Lateral, A-P projection(s) to evaluate oropharyngeal swallow function. A radiology tech was present to assist with the procedure.      Positioning: Seated upright  in fluoroscopy chair, Seated (AP view)  Anatomic View: WFL  Bolus Administration: SLP, Patient  PO Barium Contrast Trials: Varibar thin, Varibar nectar (mildly thick), Liquidised mixed with Varibar powder, Varibar pudding, Soft & Bite sized coated with Varibar powder, Regular solid coated with Varibar pudding, Mixed consistency with Varibar powder      Consistency PAS Score Timing Residue Comments   Thin Liquid 8 Pre Swallow Vallecular Residue: Mild (5%-25%)  Pyriform Sinus Residue: None (0%) PAS 8 -  tsp (loss of bolus control, later cleared by cued cough)  PAS 3 - cup x2, straw     Mildly Thick 3 During Swallow Vallecular Residue: Trace (1%-5%)  Pyriform Sinus Residue: None (0%) PAS 3 - tsp, cup   Liquidised 2 During Swallow Vallecular Residue: Trace (1%-5%)  Pyriform Sinus Residue: None (0%)    Pudding 2 During Swallow Vallecular Residue: Trace (1%-5%)  Pyriform Sinus Residue: None (0%)    Soft & Bite Sized 1 N/A Vallecular Residue: Mild (5%-25%)  Pyriform Sinus Residue: None (0%)    Regular Solid 1 N/A Vallecular Residue: Trace (1%-5%)  Pyriform Sinus Residue: None (0%)    Mixed 1 N/A Vallecular Residue: Mild (5%-25%)  Pyriform Sinus Residue: None (0%)      Penetration-Aspiration Scale (PAS)  1     No contrast enters airway  2     Contrast enters the airway, remains above the vocal folds, and is ejected from the airway (not seen in the airway at the end of the swallow).  3     Contrast enters the airway, remains above the vocal folds, and is not ejected from the airway (is seen in the airway after the swallow).  4     Contrast enters the airway, contacts the vocal folds, and is ejected from the airway.  5     Contrast enters the airway, contacts the vocal folds, and is not ejected from the airway  6     Contrast enters the airway, crosses the plane of the vocal folds, and is ejected from the airway.  7     Contrast enters the airway, crosses the plane of the vocal folds, and is not ejected from the airway  despite effort.  8     Contrast enters the airway, crosses the plane of the vocal folds, is not ejected from the airway and there is no response to aspiration.       Oral phase:  Adequate oral acceptance and containment. Prolonged but functional mastication of solids. Impaired A-P transit evidenced by piecemeal swallowing across all trials. Swallow initiation at the level of valleculae.    Pharyngeal phase:  -  Loss of bolus control and mistiming of laryngeal vestibule closure resulted in aspiration of thin liquids via tsp x1. Pt was able to clear aspirate with cued cough.  - Reduced base of tongue retraction and epiglottic inversion resulted in trace-mild vallecular residue across all consistencies. Residue resolved with cleansing or cued swallows.  - Incomplete laryngeal vestibule closure resulted in high penetration of thin liquids and mildly thick liquids. Flash penetration during swallow with liquidized and purees with spontaneous ejection.      Esophageal phase:  In AP view, slowed bolus transit through the esophagus with stasis of bolus noted in thoracic esophagus with solids > thins. Unable to visualize clearance during the study.           Compensatory Strategies:  -cleansing swallow was effective in eliminating vallecular residue  -cued cough was effective in clearing aspirate    Severity Rating:   Severity Rating: MERLINE  MERLINE: Functional      Clinical Impressions  The pt presents with a functional oropharyngeal swallow, suspect esophageal dysphagia. Swallow safety is guarded; swallow efficiency is preserved. Risk for aspiration PNA is guarded. Risk for malnutrition/dehydration is low. A modified diet is not indicated. Swallow prognosis is excellent given clinical progression. Patient would benefit from GI Consult given suspect esophageal retention on AP view        Recommendations  Diet Consistency: Regular solids (RG7), Thin liquids (TN0)  Medication: As tolerated  Supervision: Independent  Positioning:  "Fully upright and midline during oral intake, Meals sitting upright in a chair, as tolerated, Remain upright for 30-45 minutes after oral intake  Strategies: Alternate bites and sips, Slow rate of intake, Multiple swallows (2-3) per bite/sips  Oral Care: BID  Additional Instrumentation: None  Consult Referral(s): Gastroenterologist      SLP Treatment Plan  Treatment Plan: Dysphagia Treatment, Patient/Family/Caregiver Training  SLP Frequency: 4x Per Week  Estimated Duration: Until Therapy Goals Met      Anticipated Discharge Needs  Discharge Recommendations: Other (TBD pending clinical progress)   Therapy Recommendations Upon DC: Dysphagia Training, Patient / Family / Caregiver Education        Patient / Family Goals  Patient / Family Goal #1: \"I just want to get this figured out.\"  Goal #1 Outcome: Progressing as expected  Short Term Goal # 1: Pt will participate in instrumental swallow study to determine swallow efficiency/airway protection and guide dysphagia mangement.  Goal Outcome # 1: Goal met, new goal added  Short Term Goal # 1 B : Patient will consume a diet or regular solids and thin lqiuids with no overt s/sx of aspiration with adherence to swallow/reflux precautions      Cristela Hennessy MS,CCC-SLP    "

## 2023-07-26 NOTE — PROGRESS NOTES
Hypoglycemia Intervention    Hypoglycemia protocol intervention:  Blood glucose 57 at 0604.  Intervention: 8 oz of fruit juice   Repeat blood glucose 70 at 0622.  Intervention: 8 oz of fruit juice  Saritha LAWRENCE notified of the above at 0626.     Recheck 129 at 0640. Report given to Guerda MUNOZ

## 2023-07-26 NOTE — PROGRESS NOTES
Received bedside report by night RN. Patient was awaken from sleep. Plan for barium swallow today with speech therapist, denies other needs at this time. Patient aware that updates will be completed after morning rounds. Chart check completed.

## 2023-07-26 NOTE — PROGRESS NOTES
ACT-A responded to call light. Patient stated he had an accident in his bed and requested for a shower. Confirmed with HOWARD Baptiste that patient can take a shower. Neal ACT-A assisted patient with shower. Denies other needs at this time.

## 2023-07-26 NOTE — PROGRESS NOTES
Provided medication per MAR. Held insulin due to patient being NPO and very sensitive to insulin injection. . Patient had normal BM yesterday and denies nausea now. Patient agreed to plan. Answered all questions and concerns.

## 2023-07-26 NOTE — PROGRESS NOTES
Updated patient and family that he can have a diet and see how he tolerates the GI soft. Provided snacks until meal tray. Family asked for a list of food options for when he is discharged. Provided list of soft foods and diabetic food options. Will continue to monitor.

## 2023-07-26 NOTE — CARE PLAN
The patient is Stable - Low risk of patient condition declining or worsening    Shift Goals  Clinical Goals: Barrium swallow tomorrow, advance diet  Patient Goals: Rest, test  Family Goals: LAYNE      Problem: Knowledge Deficit - Standard  Goal: Patient and family/care givers will demonstrate understanding of plan of care, disease process/condition, diagnostic tests and medications  Outcome: Progressing     Problem: Pain - Standard  Goal: Alleviation of pain or a reduction in pain to the patient’s comfort goal  Outcome: Progressing

## 2023-07-27 ENCOUNTER — PHARMACY VISIT (OUTPATIENT)
Dept: PHARMACY | Facility: MEDICAL CENTER | Age: 53
End: 2023-07-27
Payer: COMMERCIAL

## 2023-07-27 VITALS
BODY MASS INDEX: 25.41 KG/M2 | SYSTOLIC BLOOD PRESSURE: 134 MMHG | TEMPERATURE: 97.6 F | OXYGEN SATURATION: 96 % | DIASTOLIC BLOOD PRESSURE: 86 MMHG | HEIGHT: 75 IN | WEIGHT: 204.37 LBS | HEART RATE: 68 BPM | RESPIRATION RATE: 18 BRPM

## 2023-07-27 PROBLEM — R11.0 NAUSEA: Status: RESOLVED | Noted: 2023-07-24 | Resolved: 2023-07-27

## 2023-07-27 PROBLEM — E16.2 HYPOGLYCEMIA: Status: RESOLVED | Noted: 2023-07-24 | Resolved: 2023-07-27

## 2023-07-27 LAB
ANION GAP SERPL CALC-SCNC: 8 MMOL/L (ref 7–16)
BUN SERPL-MCNC: 17 MG/DL (ref 8–22)
CALCIUM SERPL-MCNC: 9 MG/DL (ref 8.5–10.5)
CHLORIDE SERPL-SCNC: 100 MMOL/L (ref 96–112)
CO2 SERPL-SCNC: 28 MMOL/L (ref 20–33)
CREAT SERPL-MCNC: 1.07 MG/DL (ref 0.5–1.4)
ERYTHROCYTE [DISTWIDTH] IN BLOOD BY AUTOMATED COUNT: 35.5 FL (ref 35.9–50)
GFR SERPLBLD CREATININE-BSD FMLA CKD-EPI: 84 ML/MIN/1.73 M 2
GLUCOSE BLD STRIP.AUTO-MCNC: 298 MG/DL (ref 65–99)
GLUCOSE SERPL-MCNC: 289 MG/DL (ref 65–99)
HCT VFR BLD AUTO: 49.2 % (ref 42–52)
HGB BLD-MCNC: 17.2 G/DL (ref 14–18)
MCH RBC QN AUTO: 29.9 PG (ref 27–33)
MCHC RBC AUTO-ENTMCNC: 35 G/DL (ref 32.3–36.5)
MCV RBC AUTO: 85.4 FL (ref 81.4–97.8)
PLATELET # BLD AUTO: 245 K/UL (ref 164–446)
PMV BLD AUTO: 8.8 FL (ref 9–12.9)
POTASSIUM SERPL-SCNC: 4.7 MMOL/L (ref 3.6–5.5)
RBC # BLD AUTO: 5.76 M/UL (ref 4.7–6.1)
SODIUM SERPL-SCNC: 136 MMOL/L (ref 135–145)
WBC # BLD AUTO: 8.4 K/UL (ref 4.8–10.8)

## 2023-07-27 PROCEDURE — G0378 HOSPITAL OBSERVATION PER HR: HCPCS

## 2023-07-27 PROCEDURE — 82962 GLUCOSE BLOOD TEST: CPT

## 2023-07-27 PROCEDURE — 99239 HOSP IP/OBS DSCHRG MGMT >30: CPT | Performed by: INTERNAL MEDICINE

## 2023-07-27 PROCEDURE — 96372 THER/PROPH/DIAG INJ SC/IM: CPT

## 2023-07-27 PROCEDURE — 80048 BASIC METABOLIC PNL TOTAL CA: CPT

## 2023-07-27 PROCEDURE — 85027 COMPLETE CBC AUTOMATED: CPT

## 2023-07-27 PROCEDURE — RXMED WILLOW AMBULATORY MEDICATION CHARGE

## 2023-07-27 RX ORDER — LISINOPRIL 20 MG/1
20 TABLET ORAL DAILY
Qty: 30 TABLET | Refills: 0 | Status: SHIPPED | OUTPATIENT
Start: 2023-07-27 | End: 2023-08-03 | Stop reason: SDUPTHER

## 2023-07-27 RX ADMIN — INSULIN HUMAN 3 UNITS: 100 INJECTION, SOLUTION PARENTERAL at 05:49

## 2023-07-27 SDOH — ECONOMIC STABILITY: FOOD INSECURITY: WITHIN THE PAST 12 MONTHS, YOU WORRIED THAT YOUR FOOD WOULD RUN OUT BEFORE YOU GOT MONEY TO BUY MORE.: SOMETIMES TRUE

## 2023-07-27 SDOH — ECONOMIC STABILITY: HOUSING INSECURITY
IN THE LAST 12 MONTHS, WAS THERE A TIME WHEN YOU DID NOT HAVE A STEADY PLACE TO SLEEP OR SLEPT IN A SHELTER (INCLUDING NOW)?: YES

## 2023-07-27 SDOH — ECONOMIC STABILITY: TRANSPORTATION INSECURITY
IN THE PAST 12 MONTHS, HAS LACK OF TRANSPORTATION KEPT YOU FROM MEETINGS, WORK, OR FROM GETTING THINGS NEEDED FOR DAILY LIVING?: YES

## 2023-07-27 SDOH — ECONOMIC STABILITY: FOOD INSECURITY: WITHIN THE PAST 12 MONTHS, THE FOOD YOU BOUGHT JUST DIDN'T LAST AND YOU DIDN'T HAVE MONEY TO GET MORE.: SOMETIMES TRUE

## 2023-07-27 SDOH — ECONOMIC STABILITY: INCOME INSECURITY: IN THE LAST 12 MONTHS, WAS THERE A TIME WHEN YOU WERE NOT ABLE TO PAY THE MORTGAGE OR RENT ON TIME?: YES

## 2023-07-27 SDOH — ECONOMIC STABILITY: TRANSPORTATION INSECURITY
IN THE PAST 12 MONTHS, HAS THE LACK OF TRANSPORTATION KEPT YOU FROM MEDICAL APPOINTMENTS OR FROM GETTING MEDICATIONS?: YES

## 2023-07-27 ASSESSMENT — SOCIAL DETERMINANTS OF HEALTH (SDOH): HOW HARD IS IT FOR YOU TO PAY FOR THE VERY BASICS LIKE FOOD, HOUSING, MEDICAL CARE, AND HEATING?: VERY HARD

## 2023-07-27 NOTE — CARE PLAN
The patient is Stable - Low risk of patient condition declining or worsening    Shift Goals  Clinical Goals: Barrium swallow, keep NPO, monitor BG  Patient Goals: monitor BG  Family Goals: LAYNE    Progress made toward(s) clinical / shift goals:    Problem: Pain - Standard  Goal: Alleviation of pain or a reduction in pain to the patient’s comfort goal  Outcome: Progressing     Problem: Dysphagia  Goal: Dysphagia will improve  Outcome: Progressing  Flowsheets (Taken 7/26/2023 6607)  Head of Bed Elevated: Self regulated  Head Of Bed: Greater than 30 degrees  Note: Patient was able to advance diet for dinner. Plan to see if patient can tolerate diet change overnight.

## 2023-07-27 NOTE — PROGRESS NOTES
Patient is medically cleared for discharge.   Pt is being picked up by friend from [facility].   Pt reports he is out of his lisinopril, initially requested meds to beds however his ride is here and he says he will pickup medication tomorrow himself downstairs; I called meds to beds and let them know and they said this was OK for him to do.  Emile ODENN aware and will send med downstairs.

## 2023-07-27 NOTE — CARE PLAN
Problem: Knowledge Deficit - Standard  Goal: Patient and family/care givers will demonstrate understanding of plan of care, disease process/condition, diagnostic tests and medications  Outcome: Progressing     Problem: Pain - Standard  Goal: Alleviation of pain or a reduction in pain to the patient’s comfort goal  Outcome: Progressing     Problem: Psychosocial  Goal: Patient's level of anxiety will decrease  Outcome: Progressing  Goal: Patient's ability to verbalize feelings about condition will improve  Outcome: Progressing  Goal: Patient's ability to re-evaluate and adapt role responsibilities will improve  Outcome: Progressing  Goal: Patient and family will demonstrate ability to cope with life altering diagnosis and/or procedure  Outcome: Progressing  Goal: Spiritual and cultural needs incorporated into hospitalization  Outcome: Progressing     Problem: Communication  Goal: The ability to communicate needs accurately and effectively will improve  Outcome: Progressing     Problem: Discharge Barriers/Planning  Goal: Patient's continuum of care needs are met  Outcome: Progressing     Problem: Hemodynamics  Goal: Patient's hemodynamics, fluid balance and neurologic status will be stable or improve  Outcome: Progressing     Problem: Respiratory  Goal: Patient will achieve/maintain optimum respiratory ventilation and gas exchange  Outcome: Progressing     Problem: Chest Tube Management  Goal: Complications related to chest tube will be avoided or minimized  Outcome: Progressing     Problem: Fluid Volume  Goal: Fluid volume balance will be maintained  Outcome: Progressing     Problem: Mechanical Ventilation  Goal: Safe management of artificial airway and ventilation  Outcome: Met  Goal: Successful weaning off mechanical ventilator, spontaneously maintains adequate gas exchange  Outcome: Met  Goal: Patient will be able to express needs and understand communication  Outcome: Met     Problem: Dysphagia  Goal: Dysphagia  will improve  Outcome: Progressing     Problem: Nutrition  Goal: Patient's nutritional and fluid intake will be adequate or improve  Outcome: Progressing  Goal: Enteral nutrition will be maintained or improve  Outcome: Progressing  Goal: Enteral nutrition will be maintained or improve  Outcome: Progressing     Problem: Urinary Elimination  Goal: Establish and maintain regular urinary output  Outcome: Progressing     Problem: Gastrointestinal Irritability  Goal: Nausea and vomiting will be absent or improve  Outcome: Progressing  Goal: Diarrhea will be absent or improved  Outcome: Progressing     Problem: Rectal Tube  Goal: Fecal output will be contained and skin will remain free from irritation  Outcome: Met     Problem: Mobility  Goal: Patient's capacity to carry out activities will improve  Outcome: Progressing     Problem: Self Care  Goal: Patient will have the ability to perform ADLs independently or with assistance (bathe, groom, dress, toilet and feed)  Outcome: Progressing     Problem: Infection - Standard  Goal: Patient will remain free from infection  Outcome: Progressing     Problem: Wound/ / Incision Healing  Goal: Patient's wound/surgical incision will decrease in size and heals properly  Outcome: Met   The patient is Stable - Low risk of patient condition declining or worsening    Shift Goals  Clinical Goals: ADAT, monitor for worsening dysphagia, monitor blood sugars,  D/C  Patient Goals: Rest, D/C  Family Goals: LAYNE    Progress made toward(s) clinical / shift goals:  tolerate PO diet    Patient is not progressing towards the following goals:

## 2023-07-27 NOTE — DISCHARGE PLANNING
CHW Ric met with patient bedside and introduced Community Care Management and completed SDOH. Pt lives with a friend in [facility] and is not an active . Pt relies on friend and [facility] for all transportation needs.Pt is not employed and receives SNAP benefits. Pt is current with PCP .Pt is active on My Chart. Pt states that he is diabetic and is currently out of his BP medications.Pt states that he will have a referral for GI. Pt is confident I managing his health and medications. Pt's friend will provide transportation home at the time of discharge.   Community Health Worker Initial First Visit Intake  Social determinates of health intake complete.   Identified barriers to BP Meds, Income.  Contact information provided to Peña Guerrero Yes  Has PCP appointment scheduled for :08/01/23 @ 11:00 am  Inpatient assessment completed.    Plan:   Spoke with RN and she will check on BP medications.   Advise pt to contact PCP on My Chart to request a refill if unable to get medications.   Discussed and provided all resources for MTM ( transportation- verified [employer] website), [employer] ( [employer]), [employer] bus pass and CHW contact information.   Will continue to follow post hospital discharge.

## 2023-07-27 NOTE — CARE PLAN
The patient is Stable - Low risk of patient condition declining or worsening    Shift Goals  Clinical Goals: ADAT, monitor for worsening dysphagia, monitor blood sugars,  D/C  Patient Goals: Rest, D/C  Family Goals: LAYNE      Problem: Knowledge Deficit - Standard  Goal: Patient and family/care givers will demonstrate understanding of plan of care, disease process/condition, diagnostic tests and medications  Outcome: Progressing     Problem: Pain - Standard  Goal: Alleviation of pain or a reduction in pain to the patient’s comfort goal  Outcome: Progressing

## 2023-07-27 NOTE — PROGRESS NOTES
Hospital Medicine Daily Progress Note    Date of Service  7/26/2023    Chief Complaint  Peña Guerrero is a 52 y.o. male admitted 7/24/2023 with nausea and dysphagia    Hospital Course  Peña Guerrero is a 52 y.o. male with a past medical history of insulin-dependent diabetes mellitus, hiatal hernia who presented 7/24/2023 with hypoglycemia, nausea and dysphagia.      Patient states that he has had worsening dysphagia and nausea due to which she has been having poor oral intake however he continues to take his Lantus as prescribed and noticed that his fasting blood sugar was low at 49 therefore was brought into the ER.  Patient denies any fevers, chills, abdominal pain, diarrhea or dysuria.    CT abdomen pelvis with IV contrast in the ER was negative for bowel obstruction or focal inflammatory change.  Showed a fatty liver and small hiatal hernia.  Patient was admitted to the clinical decision unit for swallow eval and treated with IV Reglan for possible gastroparesis with close monitoring of his blood glucose.    Interval Problem Update  7/26: Nausea improving. MBSS performed by speech, indicative of possible delayed esophageal emptying. No oropharyngeal dysphagia. Will trial soft diet with reflux precautions. If he tolerates advancement of diet with precautions, he can work up further on outpatient basis with GI.    I have discussed this patient's plan of care and discharge plan at IDT rounds today with Case Management, Nursing, Nursing leadership, and other members of the IDT team.    Consultants/Specialty  SLP    Code Status  Full Code    Disposition  The patient is not medically cleared for discharge to home or a post-acute facility.  Anticipate discharge to: home with close outpatient follow-up    I have placed the appropriate orders for post-discharge needs.    Review of Systems  Review of Systems   Constitutional:  Negative for chills, fever and malaise/fatigue.   HENT:  Positive for sore throat.  Negative for sinus pain.         Globus sensation   Respiratory:  Negative for shortness of breath.    Cardiovascular:  Negative for chest pain and leg swelling.   Gastrointestinal:  Negative for diarrhea, nausea and vomiting.   Genitourinary:  Negative for dysuria.   Musculoskeletal:  Negative for myalgias and neck pain.   Neurological:  Negative for sensory change, speech change and weakness.        Physical Exam  Temp:  [36 °C (96.8 °F)-36.2 °C (97.2 °F)] 36 °C (96.8 °F)  Pulse:  [68-82] 82  Resp:  [16-18] 18  BP: (129-148)/(70-80) 136/80  SpO2:  [94 %-96 %] 94 %    Physical Exam  Vitals and nursing note reviewed.   Constitutional:       General: He is not in acute distress.     Appearance: He is not toxic-appearing.   HENT:      Head: Normocephalic.      Mouth/Throat:      Mouth: Mucous membranes are moist.      Pharynx: Oropharynx is clear.   Eyes:      Conjunctiva/sclera: Conjunctivae normal.      Pupils: Pupils are equal, round, and reactive to light.   Cardiovascular:      Rate and Rhythm: Normal rate and regular rhythm.      Pulses: Normal pulses.      Heart sounds: Normal heart sounds.   Pulmonary:      Effort: Pulmonary effort is normal.      Breath sounds: Normal breath sounds.   Abdominal:      General: Abdomen is flat. Bowel sounds are normal.      Palpations: Abdomen is soft.   Musculoskeletal:         General: Normal range of motion.      Cervical back: Normal range of motion and neck supple.   Skin:     General: Skin is warm and dry.      Capillary Refill: Capillary refill takes less than 2 seconds.   Neurological:      General: No focal deficit present.      Mental Status: He is alert and oriented to person, place, and time.       Fluids    Intake/Output Summary (Last 24 hours) at 7/26/2023 1923  Last data filed at 7/26/2023 1153  Gross per 24 hour   Intake --   Output 475 ml   Net -475 ml       Laboratory  Recent Labs     07/24/23  1021 07/25/23  0419 07/26/23  0209   WBC 11.8* 10.7 8.6   RBC  6.34* 6.23* 6.00   HEMOGLOBIN 19.0* 18.8* 18.2*   HEMATOCRIT 54.2* 53.0* 50.3   MCV 85.5 85.1 83.8   MCH 30.0 30.2 30.3   MCHC 35.1 35.5 36.2   RDW 35.8* 36.1 35.2*   PLATELETCT 294 266 242   MPV 8.9* 8.6* 8.7*     Recent Labs     07/24/23  1021 07/25/23  0419 07/26/23  0209   SODIUM 138 138 137   POTASSIUM 3.8 4.1 3.8   CHLORIDE 102 105 103   CO2 27 23 24   GLUCOSE 150* 130* 73   BUN 14 13 16   CREATININE 1.00 0.84 0.95   CALCIUM 8.9 9.1 8.9     Imaging  DX-ESOPHAGUS - [id number]   Final Result         Swallowing study performed by the speech language pathologist who will issue a full report and used a total of 3.38 minutes of fluoroscopy time.      CT-ABDOMEN-PELVIS WITH   Final Result      1.  No evidence of bowel obstruction or focal inflammatory change.      2.  Fatty liver.      3.  Small hiatal hernia.           Assessment/Plan  * Dysphagia- (present on admission)  Assessment & Plan  Intermittent globus / odynophagia for approx 3 months  CT abdomen pelvis was negative for obstruction  No oropharyngeal dysphagia on MBSS. He did have some delayed esophageal emptying.   SLP recommends regular diet with reflux precautions  Advance diet. If he tolerates, can follow further with GI outpatient.     Nausea- (present on admission)  Assessment & Plan  Improving  Started on IV Reglan for possible gastroparesis  Zofran as needed    Hypoglycemia- (present on admission)  Assessment & Plan  Secondary to long-acting insulin with poor oral intake  Hold Lantus at this time  Hypoglycemic protocol in place  Will resume as PO intake increases    Hypertension- (present on admission)  Assessment & Plan  Currently controlled  PRN available    Type 2 diabetes mellitus, with long-term current use of insulin (HCC)- (present on admission)  Assessment & Plan  Insulin sliding scale with serial Accu-Checks  Lantus held - Will resume as PO intake increases  Hypoglycemic protocol in place        VTE prophylaxis: SCDs/TEDs and enoxaparin  ppx    I have performed a physical exam and reviewed and updated ROS and Plan today (7/26/2023). In review of yesterday's note (7/25/2023), there are no changes except as documented above.      Time spent with patient, 30 minutes: 10 minutes assessment and history, 20 minutes education and counseling.

## 2023-07-27 NOTE — PROGRESS NOTES
Patient is being discharged home from the discharge lounge. Patient is A&Ox4. IV removed. Discharge instructions provided to patient and reviewed. Patient verbalized understanding and all questions and concerns were addressed. Patient awaiting meds to beds in lobby with family member.

## 2023-07-27 NOTE — PROGRESS NOTES
MD at bedside to assess patient. Patient has food tray at bedside, provided sliding scale insulin. First attempt at GI soft. Patient agreed with plan. Educated patient to call RN if he feels his BG is low. Answered all questions and concerns.

## 2023-07-28 NOTE — DISCHARGE SUMMARY
Discharge Summary    CHIEF COMPLAINT ON ADMISSION  Chief Complaint   Patient presents with    Low Blood Sugar     BIB EMS from home for low blood sugar, hx of type 2 diabetes. Pt reports blood sugar dropping low due to being unable to eat and continuing to take lantus. Patients FSBS upon arrival of EMS=49. Pt received 250mL of D10 from [facility]. LNJD=116 PTA.        Reason for Admission  EMS     Admission Date  7/24/2023    CODE STATUS  Prior    HPI & HOSPITAL COURSE    Peña Guerrero is a 52 y.o. male with a past medical history of insulin-dependent diabetes mellitus, hiatal hernia who presented 7/24/2023 with hypoglycemia, nausea and dysphagia.      Patient states that he has had worsening dysphagia and nausea due to which she has been having poor oral intake however he continues to take his Lantus as prescribed and noticed that his fasting blood sugar was low at 49 therefore was brought into the ER.  Patient denies any fevers, chills, abdominal pain, diarrhea or dysuria.    CT abdomen pelvis with IV contrast in the ER was negative for bowel obstruction or focal inflammatory change.  Showed a fatty liver and small hiatal hernia.  Patient was admitted to the clinical decision unit for swallow eval and treated with IV Reglan for possible gastroparesis with close monitoring of his blood glucose.    On 7/26, nausea improved. MBSS performed by speech, indicative of possible delayed esophageal emptying. No oropharyngeal dysphagia. Trialed soft diet with reflux precautions. As intake improved, hypoglycemia resolved. By 7/27 he was tolerate a regular diet using reflux precautions without difficulty.     Provided information on dysphagia eating plan and provided written copy of SLP eating strategies. Discussed sick day strategies for insulin. Provided pepcid and refilled his home lisinopril per his request. He will follow with his PCP at scheduled appointment 8/1. Recommended that he also follow with outpatient  gastroenterology, referral sent.    Therefore, he is discharged in good and stable condition to home with close outpatient follow-up.    Discharge Date  7/27/2023    FOLLOW UP ITEMS POST DISCHARGE  Dysphagia eating plan   Follow with PCP on 8/1  Follow with GI referral    DISCHARGE DIAGNOSES  Principal Problem:    Dysphagia (POA: Yes)  Active Problems:    Type 2 diabetes mellitus, with long-term current use of insulin (HCC) (POA: Yes)    Hypertension (Chronic) (POA: Yes)  Resolved Problems:    Hypoglycemia (POA: Yes)    Nausea (POA: Yes)      FOLLOW UP  Future Appointments   Date Time Provider Department Center   8/1/2023 11:00 AM Merlene sEteban M.D. [facility]   8/11/2023  1:00 PM [address] CHELSEA Anderson M.D.  [address]  [place] [address]  [phone]    In 2 days      Merlene Esteban M.D.  [address]  [address]  [facility] [address]  [phone]    Go in 1 week(s)      Gastroenterology    Go to  A referral has been sent to establish you with a gastroenterologist. Their office should be in contact with you in the next few days to set up appointment. If this does not occur, please contact your PCP to assist.      MEDICATIONS ON DISCHARGE     Medication List        START taking these medications        Instructions   famotidine 20 MG Tabs  Commonly known as: Pepcid   Take 1 Tablet by mouth 2 times a day.  Dose: 20 mg     lisinopril 20 MG Tabs  Commonly known as: Prinivil   Take 1 Tablet by mouth every day.  Dose: 20 mg            CONTINUE taking these medications        Instructions   insulin glargine 100 UNIT/ML Soln  Commonly known as: Lantus   Inject 60 Units under the skin every morning.  Dose: 60 Units              Allergies  Allergies   Allergen Reactions    Pcn [Penicillins] Anaphylaxis    Penicillins      Rxn as a kid       DIET  No orders of the defined types were placed in this encounter.      ACTIVITY  As tolerated.  Weight bearing as  tolerated    CONSULTATIONS  none    PROCEDURES  none    LABORATORY  Lab Results   Component Value Date    SODIUM 136 07/27/2023    POTASSIUM 4.7 07/27/2023    CHLORIDE 100 07/27/2023    CO2 28 07/27/2023    GLUCOSE 289 (H) 07/27/2023    BUN 17 07/27/2023    CREATININE 1.07 07/27/2023        Lab Results   Component Value Date    WBC 8.4 07/27/2023    HEMOGLOBIN 17.2 07/27/2023    HEMATOCRIT 49.2 07/27/2023    PLATELETCT 245 07/27/2023        Total time of the discharge process exceeds 35 minutes.

## 2023-08-03 ENCOUNTER — OFFICE VISIT (OUTPATIENT)
Dept: MEDICAL GROUP | Facility: MEDICAL CENTER | Age: 53
End: 2023-08-03
Attending: FAMILY MEDICINE
Payer: MEDICAID

## 2023-08-03 VITALS
TEMPERATURE: 97.7 F | HEIGHT: 76 IN | SYSTOLIC BLOOD PRESSURE: 126 MMHG | WEIGHT: 209.3 LBS | DIASTOLIC BLOOD PRESSURE: 84 MMHG | OXYGEN SATURATION: 97 % | HEART RATE: 103 BPM | RESPIRATION RATE: 16 BRPM | BODY MASS INDEX: 25.49 KG/M2

## 2023-08-03 DIAGNOSIS — K44.9 HIATAL HERNIA: ICD-10-CM

## 2023-08-03 DIAGNOSIS — R13.19 ESOPHAGEAL DYSPHAGIA: ICD-10-CM

## 2023-08-03 DIAGNOSIS — Z12.11 SCREENING FOR COLORECTAL CANCER: ICD-10-CM

## 2023-08-03 DIAGNOSIS — Z79.4 TYPE 2 DIABETES MELLITUS WITH HYPERGLYCEMIA, WITH LONG-TERM CURRENT USE OF INSULIN (HCC): ICD-10-CM

## 2023-08-03 DIAGNOSIS — E11.65 TYPE 2 DIABETES MELLITUS WITH HYPERGLYCEMIA, WITH LONG-TERM CURRENT USE OF INSULIN (HCC): ICD-10-CM

## 2023-08-03 DIAGNOSIS — Z12.12 SCREENING FOR COLORECTAL CANCER: ICD-10-CM

## 2023-08-03 DIAGNOSIS — I10 PRIMARY HYPERTENSION: Chronic | ICD-10-CM

## 2023-08-03 PROBLEM — G93.40 ENCEPHALOPATHY: Status: RESOLVED | Noted: 2023-03-29 | Resolved: 2023-08-03

## 2023-08-03 PROCEDURE — 3079F DIAST BP 80-89 MM HG: CPT | Performed by: FAMILY MEDICINE

## 2023-08-03 PROCEDURE — 99214 OFFICE O/P EST MOD 30 MIN: CPT | Performed by: FAMILY MEDICINE

## 2023-08-03 PROCEDURE — 99212 OFFICE O/P EST SF 10 MIN: CPT | Performed by: FAMILY MEDICINE

## 2023-08-03 PROCEDURE — 3074F SYST BP LT 130 MM HG: CPT | Performed by: FAMILY MEDICINE

## 2023-08-03 RX ORDER — PANTOPRAZOLE SODIUM 40 MG/1
40 TABLET, DELAYED RELEASE ORAL DAILY
Qty: 90 TABLET | Refills: 3 | Status: SHIPPED | OUTPATIENT
Start: 2023-08-03

## 2023-08-03 RX ORDER — LISINOPRIL 20 MG/1
20 TABLET ORAL DAILY
Qty: 90 TABLET | Refills: 3 | Status: SHIPPED | OUTPATIENT
Start: 2023-08-03 | End: 2023-12-26 | Stop reason: SDUPTHER

## 2023-08-03 RX ORDER — INSULIN GLARGINE-YFGN 100 [IU]/ML
INJECTION, SOLUTION SUBCUTANEOUS
COMMUNITY
Start: 2023-08-02

## 2023-08-03 ASSESSMENT — FIBROSIS 4 INDEX: FIB4 SCORE: 0.8

## 2023-08-03 NOTE — ASSESSMENT & PLAN NOTE
Lab Results   Component Value Date/Time    HBA1C 6.9 (A) 07/03/2023 11:27 AM      Lantus 60 u  Never previously on any

## 2023-08-03 NOTE — PROGRESS NOTES
"Subjective   Chief Complaint   Patient presents with    Transitional Care Management Hospital Follow-up     Admitted.     Medication Refill     Lisinopril         HPI:   Peña presents today with his partner Monik for hospital follow up. He was admitted at [facility] from 7/24/23 - 7/27/23 for evaluation and management of hypoglycemia and worsening dysphagia. Patient had been having decreased PO intake due to chronic issues with dysphagia but had been taking his basal insulin as usual. His glucose level ended up dropping to the 40s. Ct abd was done and was normal. Patient had speech and swallow evaluation and was found to have possible delayed esophageal emptying. He was referred to GI for follow up and has outpatient appointment already scheduled for 8/11/23. He was discharged without any dietary restrictions. He is still tolerating PO, but is having difficulty with solids. While in the hospital he was only on an insulin sliding scale.  Since returning home he has resumed somewhat of his normal diet and has been taking his insulin 60u nightly. Reports last FBS have been in low 120s-150s. He denies every being on an oral pill for diabetes treatment.      Objective   Social History     Tobacco Use    Smoking status: Never    Smokeless tobacco: Never   Vaping Use    Vaping Use: Never used   Substance Use Topics    Alcohol use: Not Currently     Comment: Rarely    Drug use: Not Currently     Types: Inhaled     Comment: daily THC; denies IVDU       Exam:  /84 (BP Location: Right arm, Patient Position: Sitting, BP Cuff Size: Adult)   Pulse (!) 103   Temp 36.5 °C (97.7 °F) (Temporal)   Resp 16   Ht 1.93 m (6' 4\")   Wt 94.9 kg (209 lb 4.8 oz)   SpO2 97%   BMI 25.48 kg/m²     Physical Exam  Constitutional: Alert, no distress  Skin: No rashes in visible areas  Eye: Conjunctiva clear, lids normal  Respiratory: Unlabored respiratory effort, no cough  MSK: Normal gait, moves all extremities  Psych: Alert and oriented x3, " normal affect and mood    Allergies   Allergen Reactions    Pcn [Penicillins] Anaphylaxis    Penicillins      Rxn as a kid       [facility] - [facility], [place] - [address]  [address] [facility] [place] [address]  Phone: [phone] Fax: [phone]    [facility] [place]  [address]  [facility] [address]  Phone: [phone] Fax: [phone]    Current Outpatient Medications   Medication Sig Dispense Refill    Insulin Glargine-yfgn 100 UNIT/ML Solution INJECT 60 UNITS SUBCUTANEOUSLY IN THE MORNING      lisinopril (PRINIVIL) 20 MG Tab Take 1 Tablet by mouth every day. 90 Tablet 3    pantoprazole (PROTONIX) 40 MG Tablet Delayed Response Take 1 Tablet by mouth every day. 90 Tablet 3    famotidine (PEPCID) 20 MG Tab Take 1 Tablet by mouth 2 times a day. 60 Tablet 0    insulin glargine 100 UNIT/ML SC SOLN Inject 60 Units under the skin every morning. 10 mL 3     No current facility-administered medications for this visit.       Assessment & Plan    52 y.o. male with the following -     1. Type 2 diabetes mellitus with hyperglycemia, with long-term current use of insulin ([facility])  - Long discussion with patient and his s/o regarding treatment options due to concern for episode of hypoglycemia while taking his usual dose of insulin. Recommended oral diabetic medication, but patient is very hesitant to change his regimen as he is concerned it might cause his DM to be uncontrolled. Given issues with difficulty swallowing, it is reasonable to continue with insulin, but he is agreeable to decreasing dose to 50 units nightly for now. Discussed plan to titrate medication so that FBS stay within  range. He will discuss further at upcoming pharmacotherapy consultation. ER/Call/Return precautions discussed. He verbalized understanding and agreed with plan.  - Insulin Glargine-yfgn 100 UNIT/ML Solution; INJECT 50 UNITS SUBCUTANEOUSLY IN THE MORNING    2. Esophageal dysphagia  - As above; patient  has follow up with GI for further assessment. Encouraged plant based diet to help with difficulty swallowing animal proteins.    3. Primary hypertension  - Encouraged heart healthy diet and exercise; resources and recommendations shared via secure messaging.  - lisinopril (PRINIVIL) 20 MG Tab; Take 1 Tablet by mouth every day.  Dispense: 90 Tablet; Refill: 3    4. Hiatal hernia  - Previously prescribed pepcid; not covered by insurance.   - pantoprazole (PROTONIX) 40 MG Tablet Delayed Response; Take 1 Tablet by mouth every day.  Dispense: 90 Tablet; Refill: 3    5. Screening for colorectal cancer  - Referral to GI for Colonoscopy    Other orders      Return in about 3 months (around 11/3/2023) for diabetes follow up, chronic condition follow up.    Please note that this dictation was created using voice recognition software. I have made every reasonable attempt to correct obvious errors, but I expect that there are errors of grammar and possibly content that I did not discover before finalizing the note.

## 2023-08-08 ENCOUNTER — PATIENT OUTREACH (OUTPATIENT)
Dept: HEALTH INFORMATION MANAGEMENT | Facility: OTHER | Age: 53
End: 2023-08-08
Payer: MEDICAID

## 2023-08-08 NOTE — PROGRESS NOTES
MALGORZATA Larios called patient via TC post hospital discharge and left a voicemail and provided all information for upcoming appointment and contact information.    08/21/23  Called pt via TC post hospital discharge and pt was not available and spoke with Monik ( HIPPA approved contact)  pt missed appointment with GI due to car issues. PT rescheduled appointment for September 14th.  Pt has no needs at this time. Will discharge pt from CCM and remove from case load as all goals have been met.

## 2023-09-22 ENCOUNTER — PATIENT OUTREACH (OUTPATIENT)
Dept: HEALTH INFORMATION MANAGEMENT | Facility: OTHER | Age: 53
End: 2023-09-22
Payer: MEDICAID

## 2023-11-16 ENCOUNTER — OFFICE VISIT (OUTPATIENT)
Dept: MEDICAL GROUP | Facility: MEDICAL CENTER | Age: 53
End: 2023-11-16
Attending: FAMILY MEDICINE
Payer: MEDICAID

## 2023-11-16 VITALS
RESPIRATION RATE: 16 BRPM | SYSTOLIC BLOOD PRESSURE: 128 MMHG | HEIGHT: 76 IN | WEIGHT: 206 LBS | TEMPERATURE: 98.1 F | BODY MASS INDEX: 25.09 KG/M2 | DIASTOLIC BLOOD PRESSURE: 82 MMHG | OXYGEN SATURATION: 98 % | HEART RATE: 88 BPM

## 2023-11-16 DIAGNOSIS — E11.65 TYPE 2 DIABETES MELLITUS WITH HYPERGLYCEMIA, WITH LONG-TERM CURRENT USE OF INSULIN (HCC): ICD-10-CM

## 2023-11-16 DIAGNOSIS — R13.10 ODYNOPHAGIA: ICD-10-CM

## 2023-11-16 DIAGNOSIS — Z79.4 TYPE 2 DIABETES MELLITUS WITH HYPERGLYCEMIA, WITH LONG-TERM CURRENT USE OF INSULIN (HCC): ICD-10-CM

## 2023-11-16 PROCEDURE — 99214 OFFICE O/P EST MOD 30 MIN: CPT | Performed by: FAMILY MEDICINE

## 2023-11-16 PROCEDURE — 99213 OFFICE O/P EST LOW 20 MIN: CPT | Performed by: FAMILY MEDICINE

## 2023-11-16 RX ORDER — INSULIN GLARGINE 100 [IU]/ML
60 INJECTION, SOLUTION SUBCUTANEOUS EVERY EVENING
Qty: 18 ML | Refills: 0 | Status: SHIPPED | OUTPATIENT
Start: 2023-11-16 | End: 2023-12-22 | Stop reason: SDUPTHER

## 2023-11-16 RX ORDER — INSULIN GLARGINE 100 [IU]/ML
60 INJECTION, SOLUTION SUBCUTANEOUS EVERY EVENING
Qty: 15 ML | Refills: 5 | Status: SHIPPED | OUTPATIENT
Start: 2023-11-16 | End: 2024-01-30

## 2023-11-16 ASSESSMENT — FIBROSIS 4 INDEX: FIB4 SCORE: 0.82

## 2023-11-17 NOTE — ASSESSMENT & PLAN NOTE
Feels like food is literally getting stuck in his food where as previously it was taking more to swallow. Will cough and choke on food and feels like he has to force things down with water or milk.

## 2023-11-17 NOTE — ASSESSMENT & PLAN NOTE
Had been taking Glargine 50 u as previously discussed. However was in detention in [facility] and had another insulin formulation that caused him to have extreme fluctuations. He was just released yesterday. Reports blood sugars are best controlled when on brand name Lantus. States that similarly with previously prescribed generic formulation would have more difficult controlled and as a result becomes symptomatic including fatigue and feeling generally unwell due to labile sugars. Historically has been much better and well controlled with brand name Lantus.

## 2023-11-17 NOTE — PROGRESS NOTES
Subjective   Chief Complaint   Patient presents with    Follow-Up        HPI:   Peña presents today with his partner Monik for routine follow up.    Odynophagia  Feels like food is literally getting stuck in his food where as previously it was taking more to swallow. Will cough and choke on food and feels like he has to force things down with water or milk.    Type 2 diabetes mellitus, with long-term current use of insulin ([facility])  Had been taking Glargine 50 u as previously discussed. However was in assisted in [facility] and had another insulin formulation that caused him to have extreme fluctuations. He was just released yesterday. Reports blood sugars are best controlled when on brand name Lantus. States that similarly with previously prescribed generic formulation would have more difficult controlled and as a result becomes symptomatic including fatigue and feeling generally unwell due to labile sugars. Historically has been much better and well controlled with brand name Lantus.      Health Maintenance Due   Topic Date Due    HIV Screening  Never done    Hepatitis C Screening  Never done    Fasting Lipid Profile  Never done    Diabetes: Urine Protein Screening  Never done    Diabetes: Monofilament / LE Exam  Never done    COVID-19 Vaccine (1) Never done    Pneumococcal Vaccine: 0-64 Years (1 - PCV) Never done    Diabetes: Retinopathy Screening  Never done    Colorectal Cancer Screening  Never done    Hepatitis B Vaccine (Hep B) (2 of 3 - Hep B Twinrix 3-dose series) 02/02/2022    Zoster (Shingles) Vaccines (1 of 2) Never done    Influenza Vaccine (1) Never done       Objective   Social History     Tobacco Use    Smoking status: Never    Smokeless tobacco: Never   Vaping Use    Vaping Use: Never used   Substance Use Topics    Alcohol use: Not Currently     Comment: Rarely    Drug use: Not Currently     Types: Inhaled     Comment: daily THC; denies IVDU       Exam:  There were no vitals taken for this  visit.    Physical Exam  Constitutional: Alert, no distress  Skin: No rashes in visible areas  Eye: Conjunctiva clear, lids normal  Respiratory: Unlabored respiratory effort, no cough  MSK: Normal gait, moves all extremities  Psych: Alert and oriented x3, normal affect and mood    Allergies   Allergen Reactions    Pcn [Penicillins] Anaphylaxis    Penicillins      Rxn as a kid       [id number] - [facility], [place] - [address]  [address] [facility] [place] [address]  Phone: [phone] Fax: [phone]    [address]  [facility] [address]  Phone: [phone] Fax: [phone]    Current Outpatient Medications   Medication Sig Dispense Refill    insulin glargine (LANTUS SOLOSTAR) 100 UNIT/ML Solution Pen-injector injection Inject 60 Units under the skin every evening. 5 pens per month 15 mL 5    insulin glargine 100 UNIT/ML Solution Pen-injector injection Inject 60 Units under the skin every evening for 30 days. 18 mL 0    Insulin Glargine-yfgn 100 UNIT/ML Solution INJECT 60 UNITS SUBCUTANEOUSLY IN THE MORNING      lisinopril (PRINIVIL) 20 MG Tab Take 1 Tablet by mouth every day. 90 Tablet 3    pantoprazole (PROTONIX) 40 MG Tablet Delayed Response Take 1 Tablet by mouth every day. 90 Tablet 3    famotidine (PEPCID) 20 MG Tab Take 1 Tablet by mouth 2 times a day. 60 Tablet 0     No current facility-administered medications for this visit.       Assessment & Plan    53 y.o. male with the following -     1. Odynophagia  - Chronic; worsening  - Patient unfortunately was unable to make GI appointments and is having difficulty scheduling appointment. Will have staff reach out to help coordinate appointment given worsening symptoms.  Patient also previously ordered barium swallow study however was unable to get test done.  Provided number to call to reschedule.  Patient will reach out if he is unable or needs a new order for test.  - Strict ER/Call/Return precautions  discussed. He verbalized understanding and agreed with plan.    2. Type 2 diabetes mellitus with hyperglycemia, with long-term current use of insulin (HCC)  Chronic, clinically uncontrolled. Last Hemoglobin A1c is under 7.0%.  Long discussion regarding potential treatment options including weekly injectable versus oral medications.  Given persistent difficulty with swelling will defer oral antidiabetic regimens.  Patient to also consider referral to diabetic pharmacotherapy team for further discussion of various treatment options.  We will continue current regimen with long-acting basal insulin regimen.  Recommend restarting brand-name Lantus while maintained on insulin therapy given long history of improved control of condition patient.  Unfortunately medication is not covered by his insurance so in the meantime we will send for previously prescribed glargine while awaiting prior authorization approval.  We will follow-up pending repeat labs that have already been ordered for him.    - insulin glargine (LANTUS SOLOSTAR) 100 UNIT/ML Solution Pen-injector injection; Inject 60 Units under the skin every evening. 5 pens per month  Dispense: 15 mL; Refill: 5  - insulin glargine 100 UNIT/ML Solution Pen-injector injection; Inject 60 Units under the skin every evening for 30 days.  Dispense: 18 mL; Refill: 0      Return if symptoms worsen or fail to improve.    Please note that this dictation was created using voice recognition software. I have made every reasonable attempt to correct obvious errors, but I expect that there are errors of grammar and possibly content that I did not discover before finalizing the note.

## 2023-11-20 ENCOUNTER — PATIENT MESSAGE (OUTPATIENT)
Dept: MEDICAL GROUP | Facility: MEDICAL CENTER | Age: 53
End: 2023-11-20
Payer: MEDICAID

## 2023-11-20 DIAGNOSIS — Z02.83 BLOOD DRUG TESTING FOR MEDICOLEGAL REASONS: ICD-10-CM

## 2023-11-21 ENCOUNTER — HOSPITAL ENCOUNTER (OUTPATIENT)
Dept: RADIOLOGY | Facility: MEDICAL CENTER | Age: 53
End: 2023-11-21
Attending: FAMILY MEDICINE
Payer: MEDICAID

## 2023-11-21 ENCOUNTER — PATIENT MESSAGE (OUTPATIENT)
Dept: MEDICAL GROUP | Facility: MEDICAL CENTER | Age: 53
End: 2023-11-21

## 2023-11-21 DIAGNOSIS — Z04.89 EXAMINATION FOR MEDICOLEGAL REASON: ICD-10-CM

## 2023-11-21 PROCEDURE — 74220 X-RAY XM ESOPHAGUS 1CNTRST: CPT

## 2023-11-27 ENCOUNTER — PATIENT MESSAGE (OUTPATIENT)
Dept: MEDICAL GROUP | Facility: MEDICAL CENTER | Age: 53
End: 2023-11-27
Payer: MEDICAID

## 2023-11-27 DIAGNOSIS — K22.2 ESOPHAGEAL STRICTURE: ICD-10-CM

## 2023-11-28 ENCOUNTER — TELEPHONE (OUTPATIENT)
Dept: HEALTH INFORMATION MANAGEMENT | Facility: OTHER | Age: 53
End: 2023-11-28
Payer: MEDICAID

## 2023-11-28 NOTE — TELEPHONE ENCOUNTER
Phone Number Called: [phone]    Call outcome: Did not leave a detailed message. Requested patient to call back.    Message: Called [facility] to inform of urgent referral placed today by PCP which has been authorized and also to see if they can schedule patient asap d/t esophageal stricture. Per PCP, pt previously dismissed from practice d/t no show, however, pt had unique and extenuating circumstances, which were previously described by pcp in alternate encounter dated 11/21/23, that prevented pt from attending [facility] appt. Did not leave detailed VM regarding these circumstances, however, requested office call back.     RN sent pcp message to update on status. RN will re-attempt call if no call back from [facility]    Update: 11/29: per CAMMY Liao in alternate encounter, spoke with [facility] and they will contact pt this afternoon. MA notified pt.

## 2023-12-16 ENCOUNTER — HOSPITAL ENCOUNTER (EMERGENCY)
Facility: MEDICAL CENTER | Age: 53
End: 2023-12-16
Attending: EMERGENCY MEDICINE
Payer: MEDICAID

## 2023-12-16 VITALS
WEIGHT: 222 LBS | DIASTOLIC BLOOD PRESSURE: 80 MMHG | HEART RATE: 92 BPM | RESPIRATION RATE: 18 BRPM | OXYGEN SATURATION: 97 % | TEMPERATURE: 97.5 F | BODY MASS INDEX: 27.03 KG/M2 | SYSTOLIC BLOOD PRESSURE: 160 MMHG | HEIGHT: 76 IN

## 2023-12-16 DIAGNOSIS — R41.0 CONFUSION: ICD-10-CM

## 2023-12-16 DIAGNOSIS — E16.2 HYPOGLYCEMIA: ICD-10-CM

## 2023-12-16 LAB
ALBUMIN SERPL BCP-MCNC: 4.4 G/DL (ref 3.2–4.9)
ALBUMIN/GLOB SERPL: 1.5 G/DL
ALP SERPL-CCNC: 69 U/L (ref 30–99)
ALT SERPL-CCNC: 22 U/L (ref 2–50)
ANION GAP SERPL CALC-SCNC: 11 MMOL/L (ref 7–16)
AST SERPL-CCNC: 39 U/L (ref 12–45)
BASOPHILS # BLD AUTO: 0.3 % (ref 0–1.8)
BASOPHILS # BLD: 0.03 K/UL (ref 0–0.12)
BILIRUB SERPL-MCNC: 1 MG/DL (ref 0.1–1.5)
BUN SERPL-MCNC: 20 MG/DL (ref 8–22)
CALCIUM ALBUM COR SERPL-MCNC: 9 MG/DL (ref 8.5–10.5)
CALCIUM SERPL-MCNC: 9.3 MG/DL (ref 8.5–10.5)
CHLORIDE SERPL-SCNC: 104 MMOL/L (ref 96–112)
CO2 SERPL-SCNC: 27 MMOL/L (ref 20–33)
CREAT SERPL-MCNC: 0.87 MG/DL (ref 0.5–1.4)
EOSINOPHIL # BLD AUTO: 0.06 K/UL (ref 0–0.51)
EOSINOPHIL NFR BLD: 0.7 % (ref 0–6.9)
ERYTHROCYTE [DISTWIDTH] IN BLOOD BY AUTOMATED COUNT: 37.9 FL (ref 35.9–50)
GFR SERPLBLD CREATININE-BSD FMLA CKD-EPI: 104 ML/MIN/1.73 M 2
GLOBULIN SER CALC-MCNC: 2.9 G/DL (ref 1.9–3.5)
GLUCOSE BLD STRIP.AUTO-MCNC: 116 MG/DL (ref 65–99)
GLUCOSE BLD STRIP.AUTO-MCNC: 126 MG/DL (ref 65–99)
GLUCOSE BLD STRIP.AUTO-MCNC: 30 MG/DL (ref 65–99)
GLUCOSE BLD STRIP.AUTO-MCNC: 99 MG/DL (ref 65–99)
GLUCOSE SERPL-MCNC: 39 MG/DL (ref 65–99)
HCT VFR BLD AUTO: 48.5 % (ref 42–52)
HGB BLD-MCNC: 17.5 G/DL (ref 14–18)
IMM GRANULOCYTES # BLD AUTO: 0.03 K/UL (ref 0–0.11)
IMM GRANULOCYTES NFR BLD AUTO: 0.3 % (ref 0–0.9)
LYMPHOCYTES # BLD AUTO: 1.48 K/UL (ref 1–4.8)
LYMPHOCYTES NFR BLD: 16.7 % (ref 22–41)
MCH RBC QN AUTO: 31 PG (ref 27–33)
MCHC RBC AUTO-ENTMCNC: 36.1 G/DL (ref 32.3–36.5)
MCV RBC AUTO: 86 FL (ref 81.4–97.8)
MONOCYTES # BLD AUTO: 0.63 K/UL (ref 0–0.85)
MONOCYTES NFR BLD AUTO: 7.1 % (ref 0–13.4)
NEUTROPHILS # BLD AUTO: 6.65 K/UL (ref 1.82–7.42)
NEUTROPHILS NFR BLD: 74.9 % (ref 44–72)
NRBC # BLD AUTO: 0 K/UL
NRBC BLD-RTO: 0 /100 WBC (ref 0–0.2)
PLATELET # BLD AUTO: 218 K/UL (ref 164–446)
PMV BLD AUTO: 8.6 FL (ref 9–12.9)
POTASSIUM SERPL-SCNC: 3.3 MMOL/L (ref 3.6–5.5)
PROT SERPL-MCNC: 7.3 G/DL (ref 6–8.2)
RBC # BLD AUTO: 5.64 M/UL (ref 4.7–6.1)
SODIUM SERPL-SCNC: 142 MMOL/L (ref 135–145)
WBC # BLD AUTO: 8.9 K/UL (ref 4.8–10.8)

## 2023-12-16 PROCEDURE — 82962 GLUCOSE BLOOD TEST: CPT

## 2023-12-16 PROCEDURE — 36415 COLL VENOUS BLD VENIPUNCTURE: CPT

## 2023-12-16 PROCEDURE — 700101 HCHG RX REV CODE 250: Mod: UD | Performed by: EMERGENCY MEDICINE

## 2023-12-16 PROCEDURE — A9270 NON-COVERED ITEM OR SERVICE: HCPCS | Mod: UD | Performed by: EMERGENCY MEDICINE

## 2023-12-16 PROCEDURE — 99285 EMERGENCY DEPT VISIT HI MDM: CPT

## 2023-12-16 PROCEDURE — 700102 HCHG RX REV CODE 250 W/ 637 OVERRIDE(OP): Mod: UD | Performed by: EMERGENCY MEDICINE

## 2023-12-16 PROCEDURE — 85025 COMPLETE CBC W/AUTO DIFF WBC: CPT

## 2023-12-16 PROCEDURE — 80053 COMPREHEN METABOLIC PANEL: CPT

## 2023-12-16 PROCEDURE — 96374 THER/PROPH/DIAG INJ IV PUSH: CPT

## 2023-12-16 RX ORDER — DEXTROSE MONOHYDRATE 25 G/50ML
50 INJECTION, SOLUTION INTRAVENOUS ONCE
Status: COMPLETED | OUTPATIENT
Start: 2023-12-16 | End: 2023-12-16

## 2023-12-16 RX ORDER — BUTALBITAL, ACETAMINOPHEN AND CAFFEINE 50; 325; 40 MG/1; MG/1; MG/1
1 TABLET ORAL ONCE
Status: COMPLETED | OUTPATIENT
Start: 2023-12-16 | End: 2023-12-16

## 2023-12-16 RX ADMIN — BUTALBITAL, ACETAMINOPHEN AND CAFFEINE 1 TABLET: 325; 50; 40 TABLET ORAL at 10:24

## 2023-12-16 RX ADMIN — DEXTROSE MONOHYDRATE 50 ML: 25 INJECTION, SOLUTION INTRAVENOUS at 09:45

## 2023-12-16 ASSESSMENT — FIBROSIS 4 INDEX: FIB4 SCORE: 0.82

## 2023-12-16 NOTE — ED PROVIDER NOTES
ED Provider Note    CHIEF COMPLAINT  Chief Complaint   Patient presents with    Low Blood Sugar     Pt's initial BG 30s. Pt received 75ml D10 per EMS. Pt's BG up to 69 but is now down to 30 again.       EXTERNAL RECORDS REVIEWED  Outpatient Notes from primary care yesterday noted to be on glargine lisinopril pantoprazole    HPI/ROS  LIMITATION TO HISTORY   Select: Altered mental status / Confusion  OUTSIDE HISTORIAN(S):  EMS reports that family called due to patient's abnormal behavior.     Peña Guerrero is a 53 y.o. male who presents with altered mental status.  Apparently patient was in his normal state of health yesterday, and this morning he began to become confused and acting more sleepy.  There is no report of trauma, no reported seizure activity.    After D50, further history is obtained from the patient.  He reports he took Lantus 60, did not eat after this and has had this happen a few times before.  He has been in his normal state of health without any recent fevers or chills.  No abdominal pain, nausea or vomiting or diarrhea.  He reports no headache, no head injury and no other concerns    PAST MEDICAL HISTORY   has a past medical history of AC separation, right, initial encounter (2/17/2020), Acute alcohol intoxication ([facility]) (2/17/2020), CLARA (acute kidney injury) ([facility]) (3/28/2023), Allergy, Closed fracture of distal lateral malleolus of right fibula (2/17/2020), Closed fracture of multiple ribs of both sides (9/3/2020), Compression fracture of T1 vertebra ([facility]) (2/17/2020), Diabetes ([facility]), DKA, type 1, not at goal ([facility]) (3/28/2023), Elevated lactic acid level (3/28/2023), Encephalopathy (3/29/2023), Flail chest, initial encounter for closed fracture (2/17/2020), Hepatic steatosis (3/28/2023), Hyperkalemia (3/28/2023), Hypertension, Ileus ([facility]) (2/21/2020), Injury, Intertrochanteric fracture of right femur, closed, initial encounter ([facility]) (9/3/2020), Leukocytosis (3/28/2023), No contraindication to  "deep vein thrombosis (DVT) prophylaxis (9/3/2020), Pain of left thumb (3/16/2015), Screening examination for infectious disease, Thumb laceration (3/16/2015), Trauma (9/4/2020), and Urinary retention (9/5/2020).    SURGICAL HISTORY   has a past surgical history that includes open fix inter/subtroch fx,implnt (Right, 9/3/2020).    FAMILY HISTORY  Family History   Problem Relation Age of Onset    No Known Problems Daughter     No Known Problems Son     Cancer Neg Hx     Heart Disease Neg Hx        SOCIAL HISTORY  Social History     Tobacco Use    Smoking status: Never    Smokeless tobacco: Never   Vaping Use    Vaping Use: Never used   Substance and Sexual Activity    Alcohol use: Not Currently     Comment: Rarely    Drug use: Not Currently     Types: Inhaled     Comment: daily THC; denies IVDU    Sexual activity: Yes     Partners: Female       CURRENT MEDICATIONS  Home Medications    **Home medications have not yet been reviewed for this encounter**         ALLERGIES  Allergies   Allergen Reactions    Pcn [Penicillins] Anaphylaxis    Penicillins      Rxn as a kid       PHYSICAL EXAM  VITAL SIGNS: BP (!) 160/80   Pulse 92   Temp 36.4 °C (97.5 °F) (Temporal)   Resp 18   Ht 1.93 m (6' 4\")   Wt 101 kg (222 lb)   SpO2 97%   BMI 27.02 kg/m²      Pulse ox interpretation:   I interpret this pulse ox as normal.  Constitutional: Mildly sleepy but easily arousable and conversant  HENT: No signs of trauma, Bilateral external ears normal, Nose normal.   Eyes: Pupils are equal and reactive, Conjunctiva normal, Non-icteric.   Neck: Normal range of motion, No tenderness, Supple, No stridor.   Cardiovascular: Regular rate and rhythm, no murmurs.   Thorax & Lungs: Normal breath sounds, No respiratory distress, No wheezing, No chest tenderness.   Abdomen: Bowel sounds normal, Soft, No tenderness, No masses, No pulsatile masses. No peritoneal signs.  Skin: Warm, Dry, No erythema, No rash.   Back: No bony tenderness, No CVA " tenderness.   Extremities: Intact distal pulses, No edema, No tenderness, No cyanosis,  Negative Huan's sign.   Musculoskeletal: Good range of motion in all major joints. No tenderness to palpation or major deformities noted.   Neurologic: Alert oriented to person, subsequently after D50, ANO x 3, cranial nerves intact, speech is clear and fluent, equal  strength bilaterally, no drift normal motor function, Normal sensory function, No focal deficits noted.   Psychiatric: Affect normal, Judgment normal, Mood normal.               DIAGNOSTIC STUDIES / PROCEDURES  Labs Reviewed   CBC WITH DIFFERENTIAL - Abnormal; Notable for the following components:       Result Value    MPV 8.6 (*)     Neutrophils-Polys 74.90 (*)     Lymphocytes 16.70 (*)     All other components within normal limits   COMP METABOLIC PANEL - Abnormal; Notable for the following components:    Potassium 3.3 (*)     Glucose 39 (*)     All other components within normal limits   POCT GLUCOSE DEVICE RESULTS - Abnormal; Notable for the following components:    POC Glucose, Blood 30 (*)     All other components within normal limits   POCT GLUCOSE DEVICE RESULTS - Abnormal; Notable for the following components:    POC Glucose, Blood 126 (*)     All other components within normal limits   POCT GLUCOSE DEVICE RESULTS - Abnormal; Notable for the following components:    POC Glucose, Blood 116 (*)     All other components within normal limits   ESTIMATED GFR   POCT GLUCOSE DEVICE RESULTS             COURSE & MEDICAL DECISION MAKING        INITIAL ASSESSMENT, COURSE AND PLAN  Care Narrative: 9:17 AM  Was asked to see the patient due to his mental status.  At bedside, glucose checked is 30.  order for D50.  No findings of head trauma at this time, did consider potential intracranial pathology such as bleed or other potential metabolic or electrolyte derangement such as hyponatremia.  However at this point we will monitor response to glucose, diagnostic labs  ordered.    After D50, patient is awake, alert, appropriate, will continue to monitor    10:23 AM  Patient is reevaluated, he is currently comfortable, some mild headache, ambulatory, well-appearing neurologically intact    11:31 AM  Patient is reevaluated, well-appearing, blood sugar is normal, will plan for discharge          PROBLEM LIST  # Hypoglycemia.  Patient apparently took his insulin without eating and had an episode of significant hypoglycemia.  He was given D50 here, as well as food, and his mental status has resolved and his blood sugar has been stable throughout his visit.  I have counseled him on checking his blood sugar today and monitoring this closely and ensuring he is eating appropriately.  Follow-up with primary care to discuss insulin dosing    # Confusion.  Appears secondary to above.  DISPOSITION AND DISCUSSIONS    Barriers to care at this time, including but not limited to:  none .     Decision tools and prescription drugs considered including, but not limited to: Medication modification considered however I think this is likely secondary to not eating and he will discuss potential insulin management with his primary care as needed .    The patient will return for new or worsening symptoms and is stable at the time of discharge.    The patient is referred to a primary physician for blood pressure management, diabetic screening, and for all other preventative health concerns.        DISPOSITION:  Patient will be discharged home in stable condition.    FOLLOW UP:  Merlene Esteban M.D.  [address] [place] [id number]  [phone]    Schedule an appointment as soon as possible for a visit         OUTPATIENT MEDICATIONS:  New Prescriptions    No medications on file         FINAL DIAGNOSIS  1. Hypoglycemia    2. Confusion           Electronically signed by: Todd Sparrow M.D., 12/16/2023 9:16 AM

## 2023-12-16 NOTE — ED NOTES
"Pt discharged to home w/ steady gait. Pt A&Ox4 on RA. IV discontinued and gauze placed, pt in possession of belongings. Pt provided discharge education and information pertaining to medications and follow up appointments. Pt received copy of discharge instructions and verbalized understanding. BP (!) 160/80   Pulse 92   Temp 36.4 °C (97.5 °F) (Temporal)   Resp 18   Ht 1.93 m (6' 4\")   Wt 101 kg (222 lb)   SpO2 97%   BMI 27.02 kg/m²   "

## 2023-12-16 NOTE — DISCHARGE INSTRUCTIONS
Your blood sugar was quite low this morning.  This is a result of your insulin and is very important to continue eating and monitor your blood sugar.  Would advise checking your blood sugar every 2-3 hours throughout the rest of the day.  And ensure that you are eating appropriately with your insulin dosing.  If you continue to have episodes of low blood sugar in spite of eating please contact your primary care to discuss adjustment of your diabetes medications

## 2023-12-22 DIAGNOSIS — E11.65 TYPE 2 DIABETES MELLITUS WITH HYPERGLYCEMIA, WITH LONG-TERM CURRENT USE OF INSULIN (HCC): ICD-10-CM

## 2023-12-22 DIAGNOSIS — I10 PRIMARY HYPERTENSION: Chronic | ICD-10-CM

## 2023-12-22 DIAGNOSIS — Z79.4 TYPE 2 DIABETES MELLITUS WITH HYPERGLYCEMIA, WITH LONG-TERM CURRENT USE OF INSULIN (HCC): ICD-10-CM

## 2023-12-26 RX ORDER — LISINOPRIL 20 MG/1
20 TABLET ORAL DAILY
Qty: 90 TABLET | Refills: 3 | Status: SHIPPED | OUTPATIENT
Start: 2023-12-26

## 2023-12-26 RX ORDER — INSULIN GLARGINE 100 [IU]/ML
60 INJECTION, SOLUTION SUBCUTANEOUS EVERY EVENING
Qty: 18 ML | Refills: 0 | Status: SHIPPED | OUTPATIENT
Start: 2023-12-26 | End: 2024-01-25

## 2024-01-30 ENCOUNTER — HOSPITAL ENCOUNTER (EMERGENCY)
Facility: MEDICAL CENTER | Age: 54
End: 2024-01-30
Attending: EMERGENCY MEDICINE | Admitting: INTERNAL MEDICINE
Payer: MEDICAID

## 2024-01-30 ENCOUNTER — TELEPHONE (OUTPATIENT)
Dept: HEALTH INFORMATION MANAGEMENT | Facility: OTHER | Age: 54
End: 2024-01-30
Payer: MEDICAID

## 2024-01-30 VITALS
TEMPERATURE: 97.3 F | WEIGHT: 222 LBS | HEART RATE: 89 BPM | SYSTOLIC BLOOD PRESSURE: 160 MMHG | OXYGEN SATURATION: 97 % | BODY MASS INDEX: 27.03 KG/M2 | HEIGHT: 76 IN | RESPIRATION RATE: 18 BRPM | DIASTOLIC BLOOD PRESSURE: 96 MMHG

## 2024-01-30 DIAGNOSIS — Z79.4 TYPE 2 DIABETES MELLITUS WITH HYPERGLYCEMIA, WITH LONG-TERM CURRENT USE OF INSULIN (HCC): ICD-10-CM

## 2024-01-30 DIAGNOSIS — E11.65 TYPE 2 DIABETES MELLITUS WITH HYPERGLYCEMIA, WITH LONG-TERM CURRENT USE OF INSULIN (HCC): ICD-10-CM

## 2024-01-30 DIAGNOSIS — E16.2 HYPOGLYCEMIA: ICD-10-CM

## 2024-01-30 LAB
ALBUMIN SERPL BCP-MCNC: 4.3 G/DL (ref 3.2–4.9)
ALBUMIN/GLOB SERPL: 1.4 G/DL
ALP SERPL-CCNC: 80 U/L (ref 30–99)
ALT SERPL-CCNC: 24 U/L (ref 2–50)
ANION GAP SERPL CALC-SCNC: 12 MMOL/L (ref 7–16)
AST SERPL-CCNC: 38 U/L (ref 12–45)
BASOPHILS # BLD AUTO: 0.2 % (ref 0–1.8)
BASOPHILS # BLD: 0.02 K/UL (ref 0–0.12)
BILIRUB SERPL-MCNC: 0.8 MG/DL (ref 0.1–1.5)
BUN SERPL-MCNC: 16 MG/DL (ref 8–22)
CALCIUM ALBUM COR SERPL-MCNC: 9 MG/DL (ref 8.5–10.5)
CALCIUM SERPL-MCNC: 9.2 MG/DL (ref 8.5–10.5)
CHLORIDE SERPL-SCNC: 103 MMOL/L (ref 96–112)
CO2 SERPL-SCNC: 26 MMOL/L (ref 20–33)
CREAT SERPL-MCNC: 0.88 MG/DL (ref 0.5–1.4)
EOSINOPHIL # BLD AUTO: 0.07 K/UL (ref 0–0.51)
EOSINOPHIL NFR BLD: 0.7 % (ref 0–6.9)
ERYTHROCYTE [DISTWIDTH] IN BLOOD BY AUTOMATED COUNT: 38.8 FL (ref 35.9–50)
EST. AVERAGE GLUCOSE BLD GHB EST-MCNC: 120 MG/DL
GFR SERPLBLD CREATININE-BSD FMLA CKD-EPI: 104 ML/MIN/1.73 M 2
GLOBULIN SER CALC-MCNC: 3.1 G/DL (ref 1.9–3.5)
GLUCOSE BLD STRIP.AUTO-MCNC: 112 MG/DL (ref 65–99)
GLUCOSE BLD STRIP.AUTO-MCNC: 134 MG/DL (ref 65–99)
GLUCOSE BLD STRIP.AUTO-MCNC: 69 MG/DL (ref 65–99)
GLUCOSE BLD STRIP.AUTO-MCNC: 72 MG/DL (ref 65–99)
GLUCOSE SERPL-MCNC: 60 MG/DL (ref 65–99)
HBA1C MFR BLD: 5.8 % (ref 4–5.6)
HCT VFR BLD AUTO: 53.1 % (ref 42–52)
HGB BLD-MCNC: 18.6 G/DL (ref 14–18)
IMM GRANULOCYTES # BLD AUTO: 0.03 K/UL (ref 0–0.11)
IMM GRANULOCYTES NFR BLD AUTO: 0.3 % (ref 0–0.9)
LYMPHOCYTES # BLD AUTO: 1.53 K/UL (ref 1–4.8)
LYMPHOCYTES NFR BLD: 16.1 % (ref 22–41)
MCH RBC QN AUTO: 30.5 PG (ref 27–33)
MCHC RBC AUTO-ENTMCNC: 35 G/DL (ref 32.3–36.5)
MCV RBC AUTO: 87 FL (ref 81.4–97.8)
MONOCYTES # BLD AUTO: 0.63 K/UL (ref 0–0.85)
MONOCYTES NFR BLD AUTO: 6.6 % (ref 0–13.4)
NEUTROPHILS # BLD AUTO: 7.2 K/UL (ref 1.82–7.42)
NEUTROPHILS NFR BLD: 76.1 % (ref 44–72)
NRBC # BLD AUTO: 0 K/UL
NRBC BLD-RTO: 0 /100 WBC (ref 0–0.2)
PLATELET # BLD AUTO: 276 K/UL (ref 164–446)
PMV BLD AUTO: 8.6 FL (ref 9–12.9)
POTASSIUM SERPL-SCNC: 3.5 MMOL/L (ref 3.6–5.5)
PROT SERPL-MCNC: 7.4 G/DL (ref 6–8.2)
RBC # BLD AUTO: 6.1 M/UL (ref 4.7–6.1)
SODIUM SERPL-SCNC: 141 MMOL/L (ref 135–145)
WBC # BLD AUTO: 9.5 K/UL (ref 4.8–10.8)

## 2024-01-30 PROCEDURE — 83036 HEMOGLOBIN GLYCOSYLATED A1C: CPT

## 2024-01-30 PROCEDURE — 82962 GLUCOSE BLOOD TEST: CPT

## 2024-01-30 PROCEDURE — 36415 COLL VENOUS BLD VENIPUNCTURE: CPT

## 2024-01-30 PROCEDURE — 80053 COMPREHEN METABOLIC PANEL: CPT

## 2024-01-30 PROCEDURE — 85025 COMPLETE CBC W/AUTO DIFF WBC: CPT

## 2024-01-30 PROCEDURE — 99285 EMERGENCY DEPT VISIT HI MDM: CPT

## 2024-01-30 RX ORDER — INSULIN GLARGINE 100 [IU]/ML
30 INJECTION, SOLUTION SUBCUTANEOUS EVERY EVENING
Qty: 15 ML | Refills: 5 | Status: ACTIVE | OUTPATIENT
Start: 2024-01-30

## 2024-01-30 ASSESSMENT — FIBROSIS 4 INDEX: FIB4 SCORE: 1.95

## 2024-01-30 ASSESSMENT — PAIN DESCRIPTION - PAIN TYPE: TYPE: ACUTE PAIN

## 2024-01-30 NOTE — TELEPHONE ENCOUNTER
----- Message from Merlene Esteban M.D. sent at 1/30/2024  8:09 AM PST -----  Regarding: Triage  Hello, I just received a call from the ER physician that patient has been leaving AMA after presenting twice with episodes of hypoglycemia.  Could you please triage patient and see if he be able to schedule an extended follow-up with me this Friday?    Thanks,  Dr. Esteban  Family Medicine Physician  [facility]

## 2024-01-30 NOTE — ED NOTES
Patient provided additional blankets. Pt resting with even chest rise and fall, reports no needs at this time, call light available and in reach.  Wife remains bedside.

## 2024-01-30 NOTE — ED NOTES
FSBG performed - 134. ERP notified. Pt resting with even chest rise and fall, reports no needs at this time, call light available and in reach.

## 2024-01-30 NOTE — ED NOTES
Patient given 3 cups of juice, tolerated well. Pt resting with even chest rise and fall, reports no needs at this time, call light available and in reach.

## 2024-01-30 NOTE — ED NOTES
Bedside report received from off going RN/: Kenia BONILLA RN, assumed care of patient.  POC discussed with patient. Call light within reach, all needs addressed at this time.       Fall risk interventions in place: Move the patient closer to the nurse's station, Patient's personal possessions are with in their safe reach, Place fall risk sign on patient's door, and Keep floor surfaces clean and dry (all applicable per [facility] Fall risk assessment)   Continuous monitoring: Cardiac Leads, Pulse Ox, or Blood Pressure  IVF/IV medications: Not Applicable   Oxygen: Room Air  Bedside sitter: Not Applicable   Isolation: Not Applicable

## 2024-01-30 NOTE — ED PROVIDER NOTES
"ED Provider Note    CHIEF COMPLAINT  Chief Complaint   Patient presents with    ALOC     Patient wife woke up to patient clammy and moaning and only responding to sternal rub.     Low Blood Sugar     Usually takes 60 units of insulin glargine in the morning, has not to wife's knowledge taken insulin this morning. PT was found by EMS with a blood sugar of \"low\"    Other     Patient has a known esophageal stricture, wife reports he has had difficulty eating recently.        EXTERNAL RECORDS REVIEWED  Recent emergency department visit 12/16/2023 for altered mental status secondary to hypoglycemia, basic labs at that time were reassuring.  Patient discharged home in good condition.    HPI/ROS    OUTSIDE HISTORIAN(S):  Report taken from EMS, see below for further details that they provided.  Of note EMS also reports that they have had to respond to patient's residence multiple times for symptomatic hypoglycemia in the last month.    Peña Guerrero is a 53 y.o. male who presents with altered mental status.  Patient has had multiple episodes in the past of hypoglycemia in the setting of taking his insulin and then not eating following this.  Patient takes Lantus, 60 units in the mornings but has had significant issues swallowing over the last few months.  He is scheduled for an EGD and colonoscopy 2 days from now for further evaluation into this.  As patient is a difficulty swallowing he has not needed much.  He apparently was severely hypoglycemic on Rehana as well and had a seizure related to this per his family.  EMS was called, patient was treated on site but refused transport.  Since then patient has been doing relatively well.  He did not eat much yesterday as he was having some issues swallowing.  He was confused this morning, and diaphoretic.  EMS reports their blood sugar read as low upon their presentation, he was given D10.  And sugar rebounded to the 110s, then to 79.  Patient without any associated " nausea or vomiting.  No witnessed seizure activity per wife.  No associated fall.  Per wife, primary care physician recommended switching him to oral hypoglycemics but patient refused at that time.    PAST MEDICAL HISTORY   has a past medical history of AC separation, right, initial encounter (2/17/2020), Acute alcohol intoxication ([facility]) (2/17/2020), CLARA (acute kidney injury) ([facility]) (3/28/2023), Allergy, Closed fracture of distal lateral malleolus of right fibula (2/17/2020), Closed fracture of multiple ribs of both sides (9/3/2020), Compression fracture of T1 vertebra ([facility]) (2/17/2020), Diabetes ([facility]), DKA, type 1, not at goal ([facility]) (3/28/2023), Elevated lactic acid level (3/28/2023), Encephalopathy (3/29/2023), Flail chest, initial encounter for closed fracture (2/17/2020), Hepatic steatosis (3/28/2023), Hyperkalemia (3/28/2023), Hypertension, Ileus ([facility]) (2/21/2020), Injury, Intertrochanteric fracture of right femur, closed, initial encounter ([facility]) (9/3/2020), Leukocytosis (3/28/2023), No contraindication to deep vein thrombosis (DVT) prophylaxis (9/3/2020), Pain of left thumb (3/16/2015), Screening examination for infectious disease, Thumb laceration (3/16/2015), Trauma (9/4/2020), and Urinary retention (9/5/2020).    SURGICAL HISTORY   has a past surgical history that includes open fix inter/subtroch fx,implnt (Right, 9/3/2020).    FAMILY HISTORY  Family History   Problem Relation Age of Onset    No Known Problems Daughter     No Known Problems Son     Cancer Neg Hx     Heart Disease Neg Hx        SOCIAL HISTORY  Social History     Tobacco Use    Smoking status: Never    Smokeless tobacco: Never   Vaping Use    Vaping Use: Never used   Substance and Sexual Activity    Alcohol use: Not Currently     Comment: Rarely    Drug use: Not Currently     Types: Inhaled     Comment: daily THC; denies IVDU    Sexual activity: Yes     Partners: Female       CURRENT MEDICATIONS  Home Medications       Reviewed by Kenia HANLEY  "YUSEF Santana (Registered Nurse) on 01/30/24 at 0537  Med List Status: Partial     Medication Last Dose Status   famotidine (PEPCID) 20 MG Tab  Active   insulin glargine (LANTUS SOLOSTAR) 100 UNIT/ML Solution Pen-injector injection  Active   Insulin Glargine-yfgn 100 UNIT/ML Solution  Active   lisinopril (PRINIVIL) 20 MG Tab  Active   pantoprazole (PROTONIX) 40 MG Tablet Delayed Response  Active                    ALLERGIES  Allergies   Allergen Reactions    Pcn [Penicillins] Anaphylaxis    Penicillins      Rxn as a kid       PHYSICAL EXAM  VITAL SIGNS: BP (!) 156/99   Pulse 86   Temp 36.2 °C (97.1 °F) (Temporal)   Resp 19   Ht 1.93 m (6' 4\")   Wt 101 kg (222 lb)   SpO2 97%   BMI 27.02 kg/m²    Physical Exam  Constitutional:       Appearance: Normal appearance.   HENT:      Head: Normocephalic.      Right Ear: Tympanic membrane normal.      Left Ear: Tympanic membrane normal.      Nose: Nose normal.      Mouth/Throat:      Mouth: Mucous membranes are moist.   Eyes:      Extraocular Movements: Extraocular movements intact.      Pupils: Pupils are equal, round, and reactive to light.   Cardiovascular:      Rate and Rhythm: Normal rate and regular rhythm.   Pulmonary:      Effort: Pulmonary effort is normal. No respiratory distress.      Breath sounds: Normal breath sounds. No stridor. No wheezing or rales.   Chest:      Chest wall: No tenderness.   Abdominal:      General: Abdomen is flat. There is no distension.      Palpations: Abdomen is soft. There is no mass.      Tenderness: There is no abdominal tenderness.   Musculoskeletal:      Cervical back: Normal range of motion.   Skin:     General: Skin is warm.      Capillary Refill: Capillary refill takes less than 2 seconds.   Neurological:      General: No focal deficit present.      Mental Status: He is alert and oriented to person, place, and time.      Comments: Distal and proximal strength 5/5 in upper and lower extremities. Normal gait. No " dysmetria. No sensation deficits. No visual field deficits. Cranial nerves intact.        Psychiatric:         Mood and Affect: Mood normal.           DIAGNOSTIC STUDIES / PROCEDURES      LABS  Results for orders placed or performed during the hospital encounter of 01/30/24   CBC WITH DIFFERENTIAL   Result Value Ref Range    WBC 9.5 4.8 - 10.8 K/uL    RBC 6.10 4.70 - 6.10 M/uL    Hemoglobin 18.6 (H) 14.0 - 18.0 g/dL    Hematocrit 53.1 (H) 42.0 - 52.0 %    MCV 87.0 81.4 - 97.8 fL    MCH 30.5 27.0 - 33.0 pg    MCHC 35.0 32.3 - 36.5 g/dL    RDW 38.8 35.9 - 50.0 fL    Platelet Count 276 164 - 446 K/uL    MPV 8.6 (L) 9.0 - 12.9 fL    Neutrophils-Polys 76.10 (H) 44.00 - 72.00 %    Lymphocytes 16.10 (L) 22.00 - 41.00 %    Monocytes 6.60 0.00 - 13.40 %    Eosinophils 0.70 0.00 - 6.90 %    Basophils 0.20 0.00 - 1.80 %    Immature Granulocytes 0.30 0.00 - 0.90 %    Nucleated RBC 0.00 0.00 - 0.20 /100 WBC    Neutrophils (Absolute) 7.20 1.82 - 7.42 K/uL    Lymphs (Absolute) 1.53 1.00 - 4.80 K/uL    Monos (Absolute) 0.63 0.00 - 0.85 K/uL    Eos (Absolute) 0.07 0.00 - 0.51 K/uL    Baso (Absolute) 0.02 0.00 - 0.12 K/uL    Immature Granulocytes (abs) 0.03 0.00 - 0.11 K/uL    NRBC (Absolute) 0.00 K/uL   CMP   Result Value Ref Range    Sodium 141 135 - 145 mmol/L    Potassium 3.5 (L) 3.6 - 5.5 mmol/L    Chloride 103 96 - 112 mmol/L    Co2 26 20 - 33 mmol/L    Anion Gap 12.0 7.0 - 16.0    Glucose 60 (L) 65 - 99 mg/dL    Bun 16 8 - 22 mg/dL    Creatinine 0.88 0.50 - 1.40 mg/dL    Calcium 9.2 8.5 - 10.5 mg/dL    Correct Calcium 9.0 8.5 - 10.5 mg/dL    AST(SGOT) 38 12 - 45 U/L    ALT(SGPT) 24 2 - 50 U/L    Alkaline Phosphatase 80 30 - 99 U/L    Total Bilirubin 0.8 0.1 - 1.5 mg/dL    Albumin 4.3 3.2 - 4.9 g/dL    Total Protein 7.4 6.0 - 8.2 g/dL    Globulin 3.1 1.9 - 3.5 g/dL    A-G Ratio 1.4 g/dL   ESTIMATED GFR   Result Value Ref Range    GFR (CKD-EPI) 104 >60 mL/min/1.73 m 2   HEMOGLOBIN A1C   Result Value Ref Range    Glycohemoglobin  5.8 (H) 4.0 - 5.6 %    Est Avg Glucose 120 mg/dL   POCT glucose device results   Result Value Ref Range    POC Glucose, Blood 72 65 - 99 mg/dL   POCT glucose device results   Result Value Ref Range    POC Glucose, Blood 69 65 - 99 mg/dL   POCT glucose device results   Result Value Ref Range    POC Glucose, Blood 134 (H) 65 - 99 mg/dL   POCT glucose device results   Result Value Ref Range    POC Glucose, Blood 112 (H) 65 - 99 mg/dL         COURSE & MEDICAL DECISION MAKING      INITIAL ASSESSMENT, COURSE AND PLAN  Care Narrative: Patient here with symptoms that appear most consistent with hypoglycemic secondary to poor p.o. intake and ongoing insulin use.  Will attempt to discuss the case with patient's primary care physician as I believe switching him to a oral hypoglycemic especially if they believe this is appropriate would be ideal especially given patient's ongoing issues with severe hypoglycemia.  Will check basic labs to ensure patient is not experiencing high levels of insulin secondary to kidney failure.  Check basic labs for further restratification as well.  Patient's neurologic examination at this point is very reassuring.  He is otherwise very well-appearing.  Patient is afebrile.  No evidence of infection on exam.  On multiple repeat Accu-Cheks patient's glucose remains stable, we have given him some juice and some wheat bread and he is able to eat this, albeit slowly.  Patient with ongoing repeat Accu-Cheks throughout his stay here without ongoing severe hypoglycemia.  Given his multiple hypoglycemic episodes I wanted to admit patient for ongoing monitoring and to identify the correct doses of insulin so patient can be safely discharged.  Patient is refusing admission, we discussed the risk of symptomatic hypoglycemia including stroke, severe debility, heart attack, or even death.  Patient is understanding of this.  He understands to decrease his insulin to 30 units, I also asked that he check his  glucose throughout the day and  rescue tablets and gel as needed.   Patient discharged home  AGAINST MEDICAL ADVICE.  I discussed the case with his primary care physician who will follow-up closely with him.        DISPOSITION AND DISCUSSIONS  I have discussed management of the patient with the following physicians and GAETANO's: I discussed the case with patient's primary care physician    Discussion of management with other Q or appropriate source(s): I discussed the case with clinical pharmacy, they recommend cutting patient's insulin in half    Escalation of care considered, and ultimately not performed:after discussion with the patient / family, they have elected to decline an escalation in care      FINAL DIAGNOSIS  Hyperglycemia

## 2024-01-30 NOTE — ED NOTES
DC home with written and verbal instructions regarding f/u, activity and RX.  Verbalized understanding, ambulated out.  Pt provided with cab voucher to go home.

## 2024-01-30 NOTE — ED TRIAGE NOTES
"Pt BIB EMS for   Chief Complaint   Patient presents with    ALOC     Patient wife woke up to patient clammy and moaning and only responding to sternal rub.     Low Blood Sugar     Usually takes 60 units of insulin glargine in the morning, has not to wife's knowledge taken insulin this morning. PT was found by EMS with a blood sugar of \"low\"    Other     Patient has a known esophageal stricture, wife reports he has had difficulty eating recently.       Pt is now responsive to verbal stimuli and is A&O x 4. Pt given 350ml of D10 by ems. FSBG currently 72.   "

## 2024-01-30 NOTE — DISCHARGE INSTRUCTIONS
I would like do not take the 60 units moving forward, this is far too high for you and is becoming extremely dangerous.  Would like you to take 20 to 30 units of glargine.  I would recommend since tomorrow you are doing nothing but clear liquids taking 20 units, and I would also recommend checking your blood sugar every 3 hours. I would like you to go to your local pharmacy later today and  rescue glucose tablets or rescue glucose gel, have this available in case your glucose drops significantly.  Follow-up closely with your primary care physician.  Seriously consider transitioning to oral diabetes medicines.

## 2024-01-30 NOTE — ED NOTES
Additional blankets provided to patient. Pt resting with even chest rise and fall, reports no needs at this time, call light available and in reach.

## 2024-02-08 ENCOUNTER — OFFICE VISIT (OUTPATIENT)
Dept: MEDICAL GROUP | Facility: MEDICAL CENTER | Age: 54
End: 2024-02-08
Attending: FAMILY MEDICINE
Payer: MEDICAID

## 2024-02-08 VITALS
BODY MASS INDEX: 26.81 KG/M2 | HEIGHT: 74 IN | TEMPERATURE: 97.5 F | DIASTOLIC BLOOD PRESSURE: 78 MMHG | RESPIRATION RATE: 16 BRPM | OXYGEN SATURATION: 96 % | SYSTOLIC BLOOD PRESSURE: 128 MMHG | WEIGHT: 208.9 LBS | HEART RATE: 81 BPM

## 2024-02-08 DIAGNOSIS — Z79.4 TYPE 2 DIABETES MELLITUS WITH HYPERGLYCEMIA, WITH LONG-TERM CURRENT USE OF INSULIN (HCC): ICD-10-CM

## 2024-02-08 DIAGNOSIS — K22.2 ESOPHAGEAL STRICTURE: ICD-10-CM

## 2024-02-08 DIAGNOSIS — E11.65 TYPE 2 DIABETES MELLITUS WITH HYPERGLYCEMIA, WITH LONG-TERM CURRENT USE OF INSULIN (HCC): ICD-10-CM

## 2024-02-08 PROBLEM — K21.9 GASTRO-ESOPHAGEAL REFLUX DISEASE WITHOUT ESOPHAGITIS: Status: ACTIVE | Noted: 2024-02-08

## 2024-02-08 PROCEDURE — 99214 OFFICE O/P EST MOD 30 MIN: CPT | Performed by: FAMILY MEDICINE

## 2024-02-08 PROCEDURE — 3074F SYST BP LT 130 MM HG: CPT | Performed by: FAMILY MEDICINE

## 2024-02-08 PROCEDURE — 3078F DIAST BP <80 MM HG: CPT | Performed by: FAMILY MEDICINE

## 2024-02-08 PROCEDURE — 99213 OFFICE O/P EST LOW 20 MIN: CPT | Performed by: FAMILY MEDICINE

## 2024-02-08 ASSESSMENT — FIBROSIS 4 INDEX: FIB4 SCORE: 1.43

## 2024-02-09 NOTE — PROGRESS NOTES
"Subjective   Chief Complaint   Patient presents with    Follow-Up        HPI:   Peña presents today with    Type 2 diabetes mellitus, with long-term current use of insulin (HCC)  Currently taking 50u of insulin.  Denies any episodes of hypoglycemia.  He has been checking his sugars regularly and reports consistently between 80 and 140.  He has no desire to change his regimen at this time.  Lab Results   Component Value Date/Time    HBA1C 5.8 (H) 01/30/2024 05:37 AM     FBS     Esophageal stricture  Patient had EGD colonoscopy performed on February 1, 2024 by Dr. Torrez.  Noted to have severe esophageal stricture status post dilatation.  States following procedure he was finally able to eat satisfying meal.  Was very happy about being able to tolerate more solid foods.  However since then his noticed some more issues with difficulty with certain foods.  He has follow-up in 5 weeks with GI provider.      Health Maintenance Due   Topic Date Due    HIV Screening  Never done    Hepatitis C Screening  Never done    Fasting Lipid Profile  Never done    Diabetes: Urine Protein Screening  Never done    Diabetes: Monofilament / LE Exam  Never done    COVID-19 Vaccine (1) Never done    Pneumococcal Vaccine: 0-64 Years (1 - PCV) Never done    Diabetes: Retinopathy Screening  Never done    Hepatitis B Vaccine (Hep B) (2 of 3 - Hep B Twinrix 3-dose series) 02/02/2022    Zoster (Shingles) Vaccines (1 of 2) Never done    Influenza Vaccine (1) Never done       Objective   Social History     Tobacco Use    Smoking status: Never    Smokeless tobacco: Never   Vaping Use    Vaping Use: Never used   Substance Use Topics    Alcohol use: Not Currently     Comment: Rarely    Drug use: Not Currently     Types: Inhaled     Comment: daily THC; denies IVDU       Exam:  /78 (BP Location: Right arm, Patient Position: Sitting, BP Cuff Size: Adult)   Pulse 81   Temp 36.4 °C (97.5 °F) (Temporal)   Resp 16   Ht 1.88 m (6' 2\")   Wt 94.8 " kg (208 lb 14.4 oz)   SpO2 96%   BMI 26.82 kg/m²     Physical Exam  Constitutional: Alert, no distress  Skin: No rashes in visible areas  Eye: Conjunctiva clear, lids normal  Respiratory: Unlabored respiratory effort, no cough  MSK: Normal gait, moves all extremities  Psych: Alert and oriented x3, normal affect and mood    Allergies   Allergen Reactions    Pcn [Penicillins] Anaphylaxis    Penicillins      Rxn as a kid       [facility] - [facility], [place] - [address]  [address] [place] [address]  Phone: [phone] Fax: [phone]    Current Outpatient Medications   Medication Sig Dispense Refill    insulin glargine (LANTUS SOLOSTAR) 100 UNIT/ML Solution Pen-injector injection Inject 30 Units under the skin every evening. 5 pens per month 15 mL 5    lisinopril (PRINIVIL) 20 MG Tab Take 1 Tablet by mouth every day. 90 Tablet 3    Insulin Glargine-yfgn 100 UNIT/ML Solution INJECT 60 UNITS SUBCUTANEOUSLY IN THE MORNING      pantoprazole (PROTONIX) 40 MG Tablet Delayed Response Take 1 Tablet by mouth every day. 90 Tablet 3    famotidine (PEPCID) 20 MG Tab Take 1 Tablet by mouth 2 times a day. 60 Tablet 0     No current facility-administered medications for this visit.       Assessment & Plan    53 y.o. male with the following -     1. Type 2 diabetes mellitus with hyperglycemia, with long-term current use of insulin ([facility])  - Chronic; currently well controlled  Lab Results   Component Value Date/Time    HBA1C 5.8 (H) 01/30/2024 05:37 AM      -Extensive discussion regarding patient's current diabetic regimen with high doses of insulin and recent episodes of hypoglycemia.  Likely secondary to altered p.o. intake given problem below as well as prep for colonoscopy.  Patient's reported home blood sugar levels are within goal.  Discussed reasonable to continue given patient clinically stable and anticipate increase p.o. intake.  Stressed importance of continuing glucose monitoring as his diet  changes now that he is able to tolerate more food.  Encourage patient to consider future adjustment of his medication.  Ideally would like to help control his diabetes through lifestyle changes alone which patient expressed interest in.  - Encouraged heart healthy diet and exercise; resources and recommendations shared via secure messaging.  - MICROALBUMIN CREAT RATIO URINE; Future    2. Esophageal stricture  -Status post dilatation to 2/1/24.  Encourage patient to reach out to GI office to see if he may be able to schedule sooner appointment with midlevel provider however patient schedule follow-up weeks which he confirmed he will maintain    Return in about 3 months (around 5/8/2024) for chronic condition follow up.    Please note that this dictation was created using voice recognition software. I have made every reasonable attempt to correct obvious errors, but I expect that there are errors of grammar and possibly content that I did not discover before finalizing the note.

## 2024-02-09 NOTE — ASSESSMENT & PLAN NOTE
Currently taking 50u of insulin.  Denies any episodes of hypoglycemia.  He has been checking his sugars regularly and reports consistently between 80 and 140.  He has no desire to change his regimen at this time.  Lab Results   Component Value Date/Time    HBA1C 5.8 (H) 01/30/2024 05:37 AM     FBS

## 2024-02-09 NOTE — ASSESSMENT & PLAN NOTE
Patient had EGD colonoscopy performed on February 1, 2024 by Dr. Torrez.  Noted to have severe esophageal stricture status post dilatation.  States following procedure he was finally able to eat satisfying meal.  Was very happy about being able to tolerate more solid foods.  However since then his noticed some more issues with difficulty with certain foods.  He has follow-up in 5 weeks with GI provider.

## 2024-03-13 ENCOUNTER — PATIENT MESSAGE (OUTPATIENT)
Dept: MEDICAL GROUP | Facility: MEDICAL CENTER | Age: 54
End: 2024-03-13
Payer: MEDICAID

## 2024-03-13 DIAGNOSIS — Z79.4 TYPE 2 DIABETES MELLITUS WITH HYPERGLYCEMIA, WITH LONG-TERM CURRENT USE OF INSULIN (HCC): ICD-10-CM

## 2024-03-13 DIAGNOSIS — E11.65 TYPE 2 DIABETES MELLITUS WITH HYPERGLYCEMIA, WITH LONG-TERM CURRENT USE OF INSULIN (HCC): ICD-10-CM

## 2024-03-13 RX ORDER — PEN NEEDLE, DIABETIC 32GX 5/32"
NEEDLE, DISPOSABLE MISCELLANEOUS
Qty: 400 EACH | Refills: 3 | Status: SHIPPED | OUTPATIENT
Start: 2024-03-13 | End: 2024-03-28 | Stop reason: SDUPTHER

## 2024-03-28 ENCOUNTER — PATIENT MESSAGE (OUTPATIENT)
Dept: MEDICAL GROUP | Facility: MEDICAL CENTER | Age: 54
End: 2024-03-28
Payer: MEDICAID

## 2024-03-28 DIAGNOSIS — Z79.4 TYPE 2 DIABETES MELLITUS WITH HYPERGLYCEMIA, WITH LONG-TERM CURRENT USE OF INSULIN (HCC): ICD-10-CM

## 2024-03-28 DIAGNOSIS — E11.65 TYPE 2 DIABETES MELLITUS WITH HYPERGLYCEMIA, WITH LONG-TERM CURRENT USE OF INSULIN (HCC): ICD-10-CM

## 2024-03-28 RX ORDER — PEN NEEDLE, DIABETIC 32GX 5/32"
NEEDLE, DISPOSABLE MISCELLANEOUS
Qty: 400 EACH | Refills: 3 | Status: SHIPPED | OUTPATIENT
Start: 2024-03-28

## 2024-04-10 ENCOUNTER — APPOINTMENT (OUTPATIENT)
Dept: ADMISSIONS | Facility: MEDICAL CENTER | Age: 54
End: 2024-04-10
Attending: STUDENT IN AN ORGANIZED HEALTH CARE EDUCATION/TRAINING PROGRAM
Payer: MEDICAID

## 2024-04-16 ENCOUNTER — PRE-ADMISSION TESTING (OUTPATIENT)
Dept: ADMISSIONS | Facility: MEDICAL CENTER | Age: 54
End: 2024-04-16
Attending: STUDENT IN AN ORGANIZED HEALTH CARE EDUCATION/TRAINING PROGRAM
Payer: MEDICAID

## 2024-04-16 DIAGNOSIS — E11.65 TYPE 2 DIABETES MELLITUS WITH HYPERGLYCEMIA, WITH LONG-TERM CURRENT USE OF INSULIN (HCC): ICD-10-CM

## 2024-04-16 DIAGNOSIS — Z79.4 TYPE 2 DIABETES MELLITUS WITH HYPERGLYCEMIA, WITH LONG-TERM CURRENT USE OF INSULIN (HCC): ICD-10-CM

## 2024-04-16 NOTE — OR NURSING
Pre-Admit RN tele appointment completed. Discussed medication instructions, per most current anesthesia guidelines (Hold Insulin Day of surgery, Hold Lisinopril for 24 hrs prior to surgery, and Hold all vitamins/supplements/and NSAIDs for 1 week prior to surgery), with pt. Pt verbalized understanding. Copy of Med Rec to be given to pt on day of PAT.

## 2024-04-17 ENCOUNTER — PRE-ADMISSION TESTING (OUTPATIENT)
Dept: ADMISSIONS | Facility: MEDICAL CENTER | Age: 54
End: 2024-04-17
Attending: STUDENT IN AN ORGANIZED HEALTH CARE EDUCATION/TRAINING PROGRAM
Payer: MEDICAID

## 2024-04-17 DIAGNOSIS — Z01.810 PRE-OPERATIVE CARDIOVASCULAR EXAMINATION: ICD-10-CM

## 2024-04-17 DIAGNOSIS — Z01.812 PRE-OPERATIVE LABORATORY EXAMINATION: ICD-10-CM

## 2024-04-17 LAB
ANION GAP SERPL CALC-SCNC: 11 MMOL/L (ref 7–16)
BUN SERPL-MCNC: 11 MG/DL (ref 8–22)
CALCIUM SERPL-MCNC: 8.9 MG/DL (ref 8.5–10.5)
CHLORIDE SERPL-SCNC: 102 MMOL/L (ref 96–112)
CO2 SERPL-SCNC: 27 MMOL/L (ref 20–33)
CREAT SERPL-MCNC: 0.77 MG/DL (ref 0.5–1.4)
EKG IMPRESSION: NORMAL
ERYTHROCYTE [DISTWIDTH] IN BLOOD BY AUTOMATED COUNT: 35.7 FL (ref 35.9–50)
EST. AVERAGE GLUCOSE BLD GHB EST-MCNC: 197 MG/DL
GFR SERPLBLD CREATININE-BSD FMLA CKD-EPI: 108 ML/MIN/1.73 M 2
GLUCOSE SERPL-MCNC: 226 MG/DL (ref 65–99)
HBA1C MFR BLD: 8.5 % (ref 4–5.6)
HCT VFR BLD AUTO: 43.2 % (ref 42–52)
HGB BLD-MCNC: 15.8 G/DL (ref 14–18)
MCH RBC QN AUTO: 31 PG (ref 27–33)
MCHC RBC AUTO-ENTMCNC: 36.6 G/DL (ref 32.3–36.5)
MCV RBC AUTO: 84.9 FL (ref 81.4–97.8)
PLATELET # BLD AUTO: 267 K/UL (ref 164–446)
PMV BLD AUTO: 9.7 FL (ref 9–12.9)
POTASSIUM SERPL-SCNC: 4.5 MMOL/L (ref 3.6–5.5)
RBC # BLD AUTO: 5.09 M/UL (ref 4.7–6.1)
SODIUM SERPL-SCNC: 140 MMOL/L (ref 135–145)
WBC # BLD AUTO: 7.6 K/UL (ref 4.8–10.8)

## 2024-04-17 PROCEDURE — 83036 HEMOGLOBIN GLYCOSYLATED A1C: CPT

## 2024-04-17 PROCEDURE — 93010 ELECTROCARDIOGRAM REPORT: CPT | Performed by: INTERNAL MEDICINE

## 2024-04-17 PROCEDURE — 93005 ELECTROCARDIOGRAM TRACING: CPT

## 2024-04-17 PROCEDURE — 85027 COMPLETE CBC AUTOMATED: CPT

## 2024-04-17 PROCEDURE — 36415 COLL VENOUS BLD VENIPUNCTURE: CPT

## 2024-04-17 PROCEDURE — 80048 BASIC METABOLIC PNL TOTAL CA: CPT

## 2024-04-25 ENCOUNTER — ANESTHESIA (OUTPATIENT)
Dept: SURGERY | Facility: MEDICAL CENTER | Age: 54
End: 2024-04-25
Payer: MEDICAID

## 2024-04-25 ENCOUNTER — ANESTHESIA EVENT (OUTPATIENT)
Dept: SURGERY | Facility: MEDICAL CENTER | Age: 54
End: 2024-04-25
Payer: MEDICAID

## 2024-04-25 ENCOUNTER — HOSPITAL ENCOUNTER (OUTPATIENT)
Facility: MEDICAL CENTER | Age: 54
End: 2024-04-26
Attending: STUDENT IN AN ORGANIZED HEALTH CARE EDUCATION/TRAINING PROGRAM | Admitting: STUDENT IN AN ORGANIZED HEALTH CARE EDUCATION/TRAINING PROGRAM
Payer: MEDICAID

## 2024-04-25 DIAGNOSIS — K44.9 PARAESOPHAGEAL HERNIA: Primary | ICD-10-CM

## 2024-04-25 LAB
GLUCOSE BLD STRIP.AUTO-MCNC: 155 MG/DL (ref 65–99)
GLUCOSE BLD STRIP.AUTO-MCNC: 203 MG/DL (ref 65–99)

## 2024-04-25 PROCEDURE — 700101 HCHG RX REV CODE 250: Mod: UD | Performed by: STUDENT IN AN ORGANIZED HEALTH CARE EDUCATION/TRAINING PROGRAM

## 2024-04-25 PROCEDURE — 700111 HCHG RX REV CODE 636 W/ 250 OVERRIDE (IP): Mod: JZ,UD | Performed by: ANESTHESIOLOGY

## 2024-04-25 PROCEDURE — 700101 HCHG RX REV CODE 250: Performed by: ANESTHESIOLOGY

## 2024-04-25 PROCEDURE — A9270 NON-COVERED ITEM OR SERVICE: HCPCS | Mod: UD | Performed by: STUDENT IN AN ORGANIZED HEALTH CARE EDUCATION/TRAINING PROGRAM

## 2024-04-25 PROCEDURE — 700102 HCHG RX REV CODE 250 W/ 637 OVERRIDE(OP): Mod: UD | Performed by: ANESTHESIOLOGY

## 2024-04-25 PROCEDURE — 700111 HCHG RX REV CODE 636 W/ 250 OVERRIDE (IP): Mod: UD | Performed by: STUDENT IN AN ORGANIZED HEALTH CARE EDUCATION/TRAINING PROGRAM

## 2024-04-25 PROCEDURE — 82962 GLUCOSE BLOOD TEST: CPT

## 2024-04-25 PROCEDURE — 160042 HCHG SURGERY MINUTES - EA ADDL 1 MIN LEVEL 5: Performed by: STUDENT IN AN ORGANIZED HEALTH CARE EDUCATION/TRAINING PROGRAM

## 2024-04-25 PROCEDURE — 700111 HCHG RX REV CODE 636 W/ 250 OVERRIDE (IP): Mod: UD | Performed by: ANESTHESIOLOGY

## 2024-04-25 PROCEDURE — C1726 CATH, BAL DIL, NON-VASCULAR: HCPCS | Performed by: STUDENT IN AN ORGANIZED HEALTH CARE EDUCATION/TRAINING PROGRAM

## 2024-04-25 PROCEDURE — 700105 HCHG RX REV CODE 258: Mod: UD | Performed by: STUDENT IN AN ORGANIZED HEALTH CARE EDUCATION/TRAINING PROGRAM

## 2024-04-25 PROCEDURE — A9270 NON-COVERED ITEM OR SERVICE: HCPCS | Mod: UD | Performed by: ANESTHESIOLOGY

## 2024-04-25 PROCEDURE — 160031 HCHG SURGERY MINUTES - 1ST 30 MINS LEVEL 5: Performed by: STUDENT IN AN ORGANIZED HEALTH CARE EDUCATION/TRAINING PROGRAM

## 2024-04-25 PROCEDURE — 160009 HCHG ANES TIME/MIN: Performed by: STUDENT IN AN ORGANIZED HEALTH CARE EDUCATION/TRAINING PROGRAM

## 2024-04-25 PROCEDURE — 160035 HCHG PACU - 1ST 60 MINS PHASE I: Performed by: STUDENT IN AN ORGANIZED HEALTH CARE EDUCATION/TRAINING PROGRAM

## 2024-04-25 PROCEDURE — C1729 CATH, DRAINAGE: HCPCS | Performed by: STUDENT IN AN ORGANIZED HEALTH CARE EDUCATION/TRAINING PROGRAM

## 2024-04-25 PROCEDURE — 700102 HCHG RX REV CODE 250 W/ 637 OVERRIDE(OP): Mod: UD | Performed by: STUDENT IN AN ORGANIZED HEALTH CARE EDUCATION/TRAINING PROGRAM

## 2024-04-25 PROCEDURE — 160036 HCHG PACU - EA ADDL 30 MINS PHASE I: Performed by: STUDENT IN AN ORGANIZED HEALTH CARE EDUCATION/TRAINING PROGRAM

## 2024-04-25 PROCEDURE — 160002 HCHG RECOVERY MINUTES (STAT): Performed by: STUDENT IN AN ORGANIZED HEALTH CARE EDUCATION/TRAINING PROGRAM

## 2024-04-25 PROCEDURE — 502714 HCHG ROBOTIC SURGERY SERVICES: Performed by: STUDENT IN AN ORGANIZED HEALTH CARE EDUCATION/TRAINING PROGRAM

## 2024-04-25 PROCEDURE — 160048 HCHG OR STATISTICAL LEVEL 1-5: Performed by: STUDENT IN AN ORGANIZED HEALTH CARE EDUCATION/TRAINING PROGRAM

## 2024-04-25 RX ORDER — DIPHENHYDRAMINE HYDROCHLORIDE 50 MG/ML
25 INJECTION INTRAMUSCULAR; INTRAVENOUS ONCE
Status: COMPLETED | OUTPATIENT
Start: 2024-04-25 | End: 2024-04-25

## 2024-04-25 RX ORDER — PROMETHAZINE HYDROCHLORIDE 25 MG/1
25 SUPPOSITORY RECTAL EVERY 4 HOURS PRN
Status: DISCONTINUED | OUTPATIENT
Start: 2024-04-25 | End: 2024-04-26 | Stop reason: HOSPADM

## 2024-04-25 RX ORDER — ACETAMINOPHEN 10 MG/ML
1000 INJECTION, SOLUTION INTRAVENOUS EVERY 6 HOURS
Status: COMPLETED | OUTPATIENT
Start: 2024-04-25 | End: 2024-04-26

## 2024-04-25 RX ORDER — ONDANSETRON 2 MG/ML
4 INJECTION INTRAMUSCULAR; INTRAVENOUS EVERY 4 HOURS PRN
Status: DISCONTINUED | OUTPATIENT
Start: 2024-04-25 | End: 2024-04-26 | Stop reason: HOSPADM

## 2024-04-25 RX ORDER — ENOXAPARIN SODIUM 100 MG/ML
40 INJECTION SUBCUTANEOUS DAILY
Status: DISCONTINUED | OUTPATIENT
Start: 2024-04-26 | End: 2024-04-26 | Stop reason: HOSPADM

## 2024-04-25 RX ORDER — CEFAZOLIN SODIUM 1 G/3ML
INJECTION, POWDER, FOR SOLUTION INTRAMUSCULAR; INTRAVENOUS PRN
Status: DISCONTINUED | OUTPATIENT
Start: 2024-04-25 | End: 2024-04-25 | Stop reason: SURG

## 2024-04-25 RX ORDER — ONDANSETRON 2 MG/ML
4 INJECTION INTRAMUSCULAR; INTRAVENOUS
Status: DISCONTINUED | OUTPATIENT
Start: 2024-04-25 | End: 2024-04-25 | Stop reason: HOSPADM

## 2024-04-25 RX ORDER — HYDROMORPHONE HYDROCHLORIDE 2 MG/ML
INJECTION, SOLUTION INTRAMUSCULAR; INTRAVENOUS; SUBCUTANEOUS PRN
Status: DISCONTINUED | OUTPATIENT
Start: 2024-04-25 | End: 2024-04-25 | Stop reason: SURG

## 2024-04-25 RX ORDER — OXYCODONE HYDROCHLORIDE 5 MG/1
5 TABLET ORAL
Status: DISCONTINUED | OUTPATIENT
Start: 2024-04-25 | End: 2024-04-26 | Stop reason: HOSPADM

## 2024-04-25 RX ORDER — BUPIVACAINE HYDROCHLORIDE AND EPINEPHRINE 5; 5 MG/ML; UG/ML
INJECTION, SOLUTION EPIDURAL; INTRACAUDAL; PERINEURAL
Status: DISCONTINUED | OUTPATIENT
Start: 2024-04-25 | End: 2024-04-25 | Stop reason: HOSPADM

## 2024-04-25 RX ORDER — SODIUM CHLORIDE, SODIUM LACTATE, POTASSIUM CHLORIDE, AND CALCIUM CHLORIDE .6; .31; .03; .02 G/100ML; G/100ML; G/100ML; G/100ML
500 INJECTION, SOLUTION INTRAVENOUS
Status: DISCONTINUED | OUTPATIENT
Start: 2024-04-25 | End: 2024-04-26 | Stop reason: HOSPADM

## 2024-04-25 RX ORDER — DIPHENHYDRAMINE HYDROCHLORIDE 50 MG/ML
25 INJECTION INTRAMUSCULAR; INTRAVENOUS
Status: COMPLETED | OUTPATIENT
Start: 2024-04-25 | End: 2024-04-25

## 2024-04-25 RX ORDER — SODIUM CHLORIDE, SODIUM LACTATE, POTASSIUM CHLORIDE, CALCIUM CHLORIDE 600; 310; 30; 20 MG/100ML; MG/100ML; MG/100ML; MG/100ML
INJECTION, SOLUTION INTRAVENOUS CONTINUOUS
Status: DISCONTINUED | OUTPATIENT
Start: 2024-04-25 | End: 2024-04-26

## 2024-04-25 RX ORDER — OXYCODONE HYDROCHLORIDE 10 MG/1
10 TABLET ORAL
Status: DISCONTINUED | OUTPATIENT
Start: 2024-04-25 | End: 2024-04-26 | Stop reason: HOSPADM

## 2024-04-25 RX ORDER — ROCURONIUM BROMIDE 10 MG/ML
INJECTION, SOLUTION INTRAVENOUS PRN
Status: DISCONTINUED | OUTPATIENT
Start: 2024-04-25 | End: 2024-04-25 | Stop reason: SURG

## 2024-04-25 RX ORDER — SODIUM CHLORIDE, SODIUM LACTATE, POTASSIUM CHLORIDE, CALCIUM CHLORIDE 600; 310; 30; 20 MG/100ML; MG/100ML; MG/100ML; MG/100ML
INJECTION, SOLUTION INTRAVENOUS CONTINUOUS
Status: ACTIVE | OUTPATIENT
Start: 2024-04-25 | End: 2024-04-25

## 2024-04-25 RX ORDER — ENALAPRILAT 1.25 MG/ML
2.5 INJECTION INTRAVENOUS EVERY 6 HOURS PRN
Status: DISCONTINUED | OUTPATIENT
Start: 2024-04-25 | End: 2024-04-26 | Stop reason: HOSPADM

## 2024-04-25 RX ORDER — OXYCODONE HCL 5 MG/5 ML
5 SOLUTION, ORAL ORAL
Status: COMPLETED | OUTPATIENT
Start: 2024-04-25 | End: 2024-04-25

## 2024-04-25 RX ORDER — DIPHENHYDRAMINE HYDROCHLORIDE 50 MG/ML
12.5 INJECTION INTRAMUSCULAR; INTRAVENOUS
Status: DISCONTINUED | OUTPATIENT
Start: 2024-04-25 | End: 2024-04-25 | Stop reason: HOSPADM

## 2024-04-25 RX ORDER — IBUPROFEN 800 MG/1
800 TABLET ORAL 3 TIMES DAILY PRN
Status: DISCONTINUED | OUTPATIENT
Start: 2024-04-28 | End: 2024-04-26 | Stop reason: HOSPADM

## 2024-04-25 RX ORDER — OXYCODONE HCL 5 MG/5 ML
10 SOLUTION, ORAL ORAL
Status: COMPLETED | OUTPATIENT
Start: 2024-04-25 | End: 2024-04-25

## 2024-04-25 RX ORDER — SIMETHICONE 125 MG
125 TABLET,CHEWABLE ORAL 3 TIMES DAILY PRN
Status: DISCONTINUED | OUTPATIENT
Start: 2024-04-25 | End: 2024-04-26 | Stop reason: HOSPADM

## 2024-04-25 RX ORDER — DIPHENHYDRAMINE HYDROCHLORIDE 50 MG/ML
12.5 INJECTION INTRAMUSCULAR; INTRAVENOUS EVERY 6 HOURS PRN
Status: DISCONTINUED | OUTPATIENT
Start: 2024-04-25 | End: 2024-04-26 | Stop reason: HOSPADM

## 2024-04-25 RX ORDER — HYDRALAZINE HYDROCHLORIDE 20 MG/ML
5 INJECTION INTRAMUSCULAR; INTRAVENOUS
Status: DISCONTINUED | OUTPATIENT
Start: 2024-04-25 | End: 2024-04-25 | Stop reason: HOSPADM

## 2024-04-25 RX ORDER — HYDRALAZINE HYDROCHLORIDE 20 MG/ML
INJECTION INTRAMUSCULAR; INTRAVENOUS PRN
Status: DISCONTINUED | OUTPATIENT
Start: 2024-04-25 | End: 2024-04-25 | Stop reason: SURG

## 2024-04-25 RX ORDER — MIDAZOLAM HYDROCHLORIDE 1 MG/ML
INJECTION INTRAMUSCULAR; INTRAVENOUS PRN
Status: DISCONTINUED | OUTPATIENT
Start: 2024-04-25 | End: 2024-04-25 | Stop reason: SURG

## 2024-04-25 RX ORDER — ACETAMINOPHEN 500 MG
1000 TABLET ORAL EVERY 6 HOURS
Status: DISCONTINUED | OUTPATIENT
Start: 2024-04-26 | End: 2024-04-26 | Stop reason: HOSPADM

## 2024-04-25 RX ORDER — HYDROMORPHONE HYDROCHLORIDE 1 MG/ML
0.4 INJECTION, SOLUTION INTRAMUSCULAR; INTRAVENOUS; SUBCUTANEOUS
Status: DISCONTINUED | OUTPATIENT
Start: 2024-04-25 | End: 2024-04-25 | Stop reason: HOSPADM

## 2024-04-25 RX ORDER — ACETAMINOPHEN 500 MG
1000 TABLET ORAL EVERY 6 HOURS PRN
Status: DISCONTINUED | OUTPATIENT
Start: 2024-04-30 | End: 2024-04-26 | Stop reason: HOSPADM

## 2024-04-25 RX ORDER — HYDROMORPHONE HYDROCHLORIDE 1 MG/ML
0.2 INJECTION, SOLUTION INTRAMUSCULAR; INTRAVENOUS; SUBCUTANEOUS
Status: DISCONTINUED | OUTPATIENT
Start: 2024-04-25 | End: 2024-04-25 | Stop reason: HOSPADM

## 2024-04-25 RX ORDER — HYDROMORPHONE HYDROCHLORIDE 1 MG/ML
0.1 INJECTION, SOLUTION INTRAMUSCULAR; INTRAVENOUS; SUBCUTANEOUS
Status: DISCONTINUED | OUTPATIENT
Start: 2024-04-25 | End: 2024-04-25 | Stop reason: HOSPADM

## 2024-04-25 RX ORDER — KETOROLAC TROMETHAMINE 15 MG/ML
15 INJECTION, SOLUTION INTRAMUSCULAR; INTRAVENOUS EVERY 6 HOURS PRN
Status: DISCONTINUED | OUTPATIENT
Start: 2024-04-25 | End: 2024-04-26 | Stop reason: HOSPADM

## 2024-04-25 RX ORDER — LIDOCAINE HYDROCHLORIDE 20 MG/ML
INJECTION, SOLUTION EPIDURAL; INFILTRATION; INTRACAUDAL; PERINEURAL PRN
Status: DISCONTINUED | OUTPATIENT
Start: 2024-04-25 | End: 2024-04-25 | Stop reason: SURG

## 2024-04-25 RX ORDER — HALOPERIDOL 5 MG/ML
1 INJECTION INTRAMUSCULAR
Status: DISCONTINUED | OUTPATIENT
Start: 2024-04-25 | End: 2024-04-25 | Stop reason: HOSPADM

## 2024-04-25 RX ORDER — MEPERIDINE HYDROCHLORIDE 25 MG/ML
6.25 INJECTION INTRAMUSCULAR; INTRAVENOUS; SUBCUTANEOUS
Status: DISCONTINUED | OUTPATIENT
Start: 2024-04-25 | End: 2024-04-25 | Stop reason: HOSPADM

## 2024-04-25 RX ORDER — HYDROMORPHONE HYDROCHLORIDE 1 MG/ML
0.5 INJECTION, SOLUTION INTRAMUSCULAR; INTRAVENOUS; SUBCUTANEOUS
Status: DISCONTINUED | OUTPATIENT
Start: 2024-04-25 | End: 2024-04-26 | Stop reason: HOSPADM

## 2024-04-25 RX ADMIN — FENTANYL CITRATE 50 MCG: 50 INJECTION, SOLUTION INTRAMUSCULAR; INTRAVENOUS at 16:36

## 2024-04-25 RX ADMIN — PROPOFOL 200 MG: 10 INJECTION, EMULSION INTRAVENOUS at 15:07

## 2024-04-25 RX ADMIN — FENTANYL CITRATE 50 MCG: 50 INJECTION, SOLUTION INTRAMUSCULAR; INTRAVENOUS at 17:37

## 2024-04-25 RX ADMIN — ROCURONIUM BROMIDE 20 MG: 50 INJECTION, SOLUTION INTRAVENOUS at 15:45

## 2024-04-25 RX ADMIN — ROCURONIUM BROMIDE 20 MG: 50 INJECTION, SOLUTION INTRAVENOUS at 16:20

## 2024-04-25 RX ADMIN — DIPHENHYDRAMINE HYDROCHLORIDE 25 MG: 50 INJECTION, SOLUTION INTRAMUSCULAR; INTRAVENOUS at 19:13

## 2024-04-25 RX ADMIN — FENTANYL CITRATE 50 MCG: 50 INJECTION, SOLUTION INTRAMUSCULAR; INTRAVENOUS at 17:07

## 2024-04-25 RX ADMIN — SODIUM CHLORIDE, POTASSIUM CHLORIDE, SODIUM LACTATE AND CALCIUM CHLORIDE: 600; 310; 30; 20 INJECTION, SOLUTION INTRAVENOUS at 13:26

## 2024-04-25 RX ADMIN — HYDROMORPHONE HYDROCHLORIDE 0.5 MG: 1 INJECTION, SOLUTION INTRAMUSCULAR; INTRAVENOUS; SUBCUTANEOUS at 22:21

## 2024-04-25 RX ADMIN — ROCURONIUM BROMIDE 60 MG: 50 INJECTION, SOLUTION INTRAVENOUS at 15:08

## 2024-04-25 RX ADMIN — HYDRALAZINE HYDROCHLORIDE 5 MG: 20 INJECTION, SOLUTION INTRAMUSCULAR; INTRAVENOUS at 18:58

## 2024-04-25 RX ADMIN — LIDOCAINE HYDROCHLORIDE 20 MG: 20 INJECTION, SOLUTION EPIDURAL; INFILTRATION; INTRACAUDAL at 15:07

## 2024-04-25 RX ADMIN — FENTANYL CITRATE 50 MCG: 50 INJECTION, SOLUTION INTRAMUSCULAR; INTRAVENOUS at 17:27

## 2024-04-25 RX ADMIN — HYDRALAZINE HYDROCHLORIDE 5 MG: 20 INJECTION, SOLUTION INTRAMUSCULAR; INTRAVENOUS at 18:49

## 2024-04-25 RX ADMIN — HYDROMORPHONE HYDROCHLORIDE 0.5 MG: 2 INJECTION INTRAMUSCULAR; INTRAVENOUS; SUBCUTANEOUS at 16:35

## 2024-04-25 RX ADMIN — SUGAMMADEX 200 MG: 100 INJECTION, SOLUTION INTRAVENOUS at 17:31

## 2024-04-25 RX ADMIN — FENTANYL CITRATE 50 MCG: 50 INJECTION, SOLUTION INTRAMUSCULAR; INTRAVENOUS at 18:20

## 2024-04-25 RX ADMIN — HYDROMORPHONE HYDROCHLORIDE 1 MG: 2 INJECTION INTRAMUSCULAR; INTRAVENOUS; SUBCUTANEOUS at 17:29

## 2024-04-25 RX ADMIN — OXYCODONE HYDROCHLORIDE 10 MG: 5 SOLUTION ORAL at 18:20

## 2024-04-25 RX ADMIN — DIPHENHYDRAMINE HYDROCHLORIDE 25 MG: 50 INJECTION, SOLUTION INTRAMUSCULAR; INTRAVENOUS at 18:50

## 2024-04-25 RX ADMIN — FENTANYL CITRATE 50 MCG: 50 INJECTION, SOLUTION INTRAMUSCULAR; INTRAVENOUS at 15:26

## 2024-04-25 RX ADMIN — SODIUM CHLORIDE, POTASSIUM CHLORIDE, SODIUM LACTATE AND CALCIUM CHLORIDE: 600; 310; 30; 20 INJECTION, SOLUTION INTRAVENOUS at 22:18

## 2024-04-25 RX ADMIN — FENTANYL CITRATE 100 MCG: 50 INJECTION, SOLUTION INTRAMUSCULAR; INTRAVENOUS at 15:07

## 2024-04-25 RX ADMIN — FAMOTIDINE 20 MG: 10 INJECTION, SOLUTION INTRAVENOUS at 20:51

## 2024-04-25 RX ADMIN — FENTANYL CITRATE 50 MCG: 50 INJECTION, SOLUTION INTRAMUSCULAR; INTRAVENOUS at 18:15

## 2024-04-25 RX ADMIN — SODIUM CHLORIDE, POTASSIUM CHLORIDE, SODIUM LACTATE AND CALCIUM CHLORIDE: 600; 310; 30; 20 INJECTION, SOLUTION INTRAVENOUS at 20:36

## 2024-04-25 RX ADMIN — ACETAMINOPHEN 1000 MG: 1000 INJECTION INTRAVENOUS at 20:52

## 2024-04-25 RX ADMIN — HYDRALAZINE HYDROCHLORIDE 5 MG: 20 INJECTION, SOLUTION INTRAMUSCULAR; INTRAVENOUS at 15:40

## 2024-04-25 RX ADMIN — CEFAZOLIN 2 G: 1 INJECTION, POWDER, FOR SOLUTION INTRAMUSCULAR; INTRAVENOUS at 15:13

## 2024-04-25 RX ADMIN — HYDROMORPHONE HYDROCHLORIDE 0.5 MG: 2 INJECTION INTRAMUSCULAR; INTRAVENOUS; SUBCUTANEOUS at 17:11

## 2024-04-25 RX ADMIN — DIPHENHYDRAMINE HYDROCHLORIDE 25 MG: 50 INJECTION, SOLUTION INTRAMUSCULAR; INTRAVENOUS at 18:30

## 2024-04-25 RX ADMIN — FENTANYL CITRATE 50 MCG: 50 INJECTION, SOLUTION INTRAMUSCULAR; INTRAVENOUS at 15:34

## 2024-04-25 RX ADMIN — OXYCODONE HYDROCHLORIDE 10 MG: 10 TABLET ORAL at 20:51

## 2024-04-25 RX ADMIN — MIDAZOLAM HYDROCHLORIDE 2 MG: 1 INJECTION, SOLUTION INTRAMUSCULAR; INTRAVENOUS at 15:00

## 2024-04-25 ASSESSMENT — PAIN DESCRIPTION - PAIN TYPE
TYPE: SURGICAL PAIN
TYPE: SURGICAL PAIN
TYPE: ACUTE PAIN;SURGICAL PAIN
TYPE: SURGICAL PAIN
TYPE: SURGICAL PAIN
TYPE: ACUTE PAIN;SURGICAL PAIN

## 2024-04-25 ASSESSMENT — PAIN SCALES - GENERAL: PAIN_LEVEL: 2

## 2024-04-25 ASSESSMENT — FIBROSIS 4 INDEX: FIB4 SCORE: 1.48

## 2024-04-25 NOTE — ANESTHESIA PREPROCEDURE EVALUATION
Case: [id number] Date/Time: 04/25/24 1415    Procedures:       ROBOTIC HIATAL HERNIA REPAIR; ESOPHAGOGASTRODUODENOSCOPY WITH BALLOON DILATION      GASTROSCOPY    Pre-op diagnosis: STRICTURE OF ESOPHAGUS    Location: [facility] OR 17 / SURGERY [facility]    Surgeons: Chad Huang M.D.            Relevant Problems   CARDIAC   (positive) Hypertension      GI   (positive) Gastro-esophageal reflux disease without esophagitis      ENDO   (positive) Hypothyroid   (positive) Type 2 diabetes mellitus, with long-term current use of insulin (HCC)       Physical Exam    Airway   Mallampati: II  TM distance: >3 FB  Neck ROM: full       Cardiovascular - normal exam  Rhythm: regular  Rate: normal  (-) murmur  Comments: EKG NSR   Dental - normal exam  Comments: Loose tooth right upper          Pulmonary - normal exam  Breath sounds clear to auscultation  Comments: Smokes marijuana daily, smoked last night    Abdominal    Neurological - normal exam                   Anesthesia Plan    ASA 3   ASA physical status 3 criteria: diabetes - poorly controlled    Plan - general       Airway plan will be ETT        Plan Factors:   Patient was previously instructed to abstain from smoking on day of procedure.  Patient did not smoke on day of procedure.      Induction: intravenous    Postoperative Plan: Postoperative administration of opioids is intended.    Pertinent diagnostic labs and testing reviewed    Informed Consent:    Anesthetic plan and risks discussed with patient.    Use of blood products discussed with: patient whom consented to blood products.

## 2024-04-25 NOTE — ANESTHESIA PROCEDURE NOTES
Airway    Date/Time: 4/25/2024 3:11 PM    Performed by: Annelise Gastelum M.D.  Authorized by: Annelise Gastelum M.D.    Location:  OR  Urgency:  Elective  Indications for Airway Management:  Anesthesia      Spontaneous Ventilation: absent    Sedation Level:  Deep  Preoxygenated: Yes    Patient Position:  Sniffing  Mask Difficulty Assessment:  1 - vent by mask  Final Airway Type:  Endotracheal airway  Final Endotracheal Airway:  ETT  Cuffed: Yes    Technique Used for Successful ETT Placement:  Direct laryngoscopy    Insertion Site:  Oral  Blade Type:  Adrienne  Laryngoscope Blade/Videolaryngoscope Blade Size:  4  ETT Size (mm):  8.0  Measured from:  Teeth  ETT to Teeth (cm):  21  Placement Verified by: auscultation and capnometry    Cormack-Lehane Classification:  Grade IIa - partial view of glottis  Number of Attempts at Approach:  1

## 2024-04-25 NOTE — PROGRESS NOTES
Medication history reviewed with PT at bedside    [facility] is complete per PT reporting    Allergies reviewed.     Patient denies any outpatient antibiotics in the last 30 days.     Patient is not taking anticoagulants.    Preferred pharmacy for this visit - [facility] on [address] ([phone])

## 2024-04-26 ENCOUNTER — PHARMACY VISIT (OUTPATIENT)
Dept: PHARMACY | Facility: MEDICAL CENTER | Age: 54
End: 2024-04-26
Payer: COMMERCIAL

## 2024-04-26 ENCOUNTER — APPOINTMENT (OUTPATIENT)
Dept: RADIOLOGY | Facility: MEDICAL CENTER | Age: 54
End: 2024-04-26
Attending: STUDENT IN AN ORGANIZED HEALTH CARE EDUCATION/TRAINING PROGRAM
Payer: MEDICAID

## 2024-04-26 VITALS
SYSTOLIC BLOOD PRESSURE: 127 MMHG | HEART RATE: 60 BPM | DIASTOLIC BLOOD PRESSURE: 69 MMHG | BODY MASS INDEX: 24.89 KG/M2 | TEMPERATURE: 97.9 F | HEIGHT: 75 IN | OXYGEN SATURATION: 94 % | WEIGHT: 200.18 LBS | RESPIRATION RATE: 18 BRPM

## 2024-04-26 LAB
ANION GAP SERPL CALC-SCNC: 8 MMOL/L (ref 7–16)
BUN SERPL-MCNC: 14 MG/DL (ref 8–22)
CALCIUM SERPL-MCNC: 8.4 MG/DL (ref 8.5–10.5)
CHLORIDE SERPL-SCNC: 105 MMOL/L (ref 96–112)
CO2 SERPL-SCNC: 24 MMOL/L (ref 20–33)
CREAT SERPL-MCNC: 0.93 MG/DL (ref 0.5–1.4)
ERYTHROCYTE [DISTWIDTH] IN BLOOD BY AUTOMATED COUNT: 38.5 FL (ref 35.9–50)
GFR SERPLBLD CREATININE-BSD FMLA CKD-EPI: 99 ML/MIN/1.73 M 2
GLUCOSE BLD STRIP.AUTO-MCNC: 104 MG/DL (ref 65–99)
GLUCOSE BLD STRIP.AUTO-MCNC: 216 MG/DL (ref 65–99)
GLUCOSE SERPL-MCNC: 228 MG/DL (ref 65–99)
HCT VFR BLD AUTO: 40.9 % (ref 42–52)
HGB BLD-MCNC: 14.6 G/DL (ref 14–18)
MCH RBC QN AUTO: 30.7 PG (ref 27–33)
MCHC RBC AUTO-ENTMCNC: 35.7 G/DL (ref 32.3–36.5)
MCV RBC AUTO: 86.1 FL (ref 81.4–97.8)
PLATELET # BLD AUTO: 236 K/UL (ref 164–446)
PMV BLD AUTO: 8.6 FL (ref 9–12.9)
POTASSIUM SERPL-SCNC: 4.5 MMOL/L (ref 3.6–5.5)
RBC # BLD AUTO: 4.75 M/UL (ref 4.7–6.1)
SODIUM SERPL-SCNC: 137 MMOL/L (ref 135–145)
WBC # BLD AUTO: 11.9 K/UL (ref 4.8–10.8)

## 2024-04-26 PROCEDURE — 80048 BASIC METABOLIC PNL TOTAL CA: CPT

## 2024-04-26 PROCEDURE — 82962 GLUCOSE BLOOD TEST: CPT | Mod: 91

## 2024-04-26 PROCEDURE — G0378 HOSPITAL OBSERVATION PER HR: HCPCS

## 2024-04-26 PROCEDURE — 74240 X-RAY XM UPR GI TRC 1CNTRST: CPT

## 2024-04-26 PROCEDURE — 700117 HCHG RX CONTRAST REV CODE 255: Mod: UD | Performed by: STUDENT IN AN ORGANIZED HEALTH CARE EDUCATION/TRAINING PROGRAM

## 2024-04-26 PROCEDURE — 700111 HCHG RX REV CODE 636 W/ 250 OVERRIDE (IP): Mod: JZ,UD | Performed by: STUDENT IN AN ORGANIZED HEALTH CARE EDUCATION/TRAINING PROGRAM

## 2024-04-26 PROCEDURE — 85027 COMPLETE CBC AUTOMATED: CPT

## 2024-04-26 PROCEDURE — A9270 NON-COVERED ITEM OR SERVICE: HCPCS | Mod: UD | Performed by: STUDENT IN AN ORGANIZED HEALTH CARE EDUCATION/TRAINING PROGRAM

## 2024-04-26 PROCEDURE — 700102 HCHG RX REV CODE 250 W/ 637 OVERRIDE(OP): Mod: UD | Performed by: STUDENT IN AN ORGANIZED HEALTH CARE EDUCATION/TRAINING PROGRAM

## 2024-04-26 PROCEDURE — RXMED WILLOW AMBULATORY MEDICATION CHARGE: Performed by: STUDENT IN AN ORGANIZED HEALTH CARE EDUCATION/TRAINING PROGRAM

## 2024-04-26 RX ORDER — OXYCODONE HYDROCHLORIDE AND ACETAMINOPHEN 5; 325 MG/1; MG/1
1 TABLET ORAL EVERY 4 HOURS PRN
Qty: 15 TABLET | Refills: 0 | Status: SHIPPED | OUTPATIENT
Start: 2024-04-26 | End: 2024-05-01

## 2024-04-26 RX ORDER — METHOCARBAMOL 750 MG/1
750 TABLET, FILM COATED ORAL 4 TIMES DAILY
Qty: 28 TABLET | Refills: 0 | Status: SHIPPED | OUTPATIENT
Start: 2024-04-26 | End: 2024-05-03

## 2024-04-26 RX ORDER — SUCRALFATE ORAL 1 G/10ML
1 SUSPENSION ORAL 4 TIMES DAILY
Qty: 414 ML | Refills: 3 | Status: SHIPPED | OUTPATIENT
Start: 2024-04-26 | End: 2024-04-26

## 2024-04-26 RX ORDER — SUCRALFATE ORAL 1 G/10ML
1 SUSPENSION ORAL 4 TIMES DAILY
Qty: 414 ML | Refills: 3 | Status: ON HOLD | OUTPATIENT
Start: 2024-04-26 | End: 2024-05-15

## 2024-04-26 RX ORDER — DEXTROSE MONOHYDRATE 25 G/50ML
25 INJECTION, SOLUTION INTRAVENOUS
Status: DISCONTINUED | OUTPATIENT
Start: 2024-04-26 | End: 2024-04-26 | Stop reason: HOSPADM

## 2024-04-26 RX ORDER — METHOCARBAMOL 750 MG/1
750 TABLET, FILM COATED ORAL 4 TIMES DAILY
Qty: 28 TABLET | Refills: 0 | Status: SHIPPED | OUTPATIENT
Start: 2024-04-26 | End: 2024-04-26

## 2024-04-26 RX ORDER — OXYCODONE HYDROCHLORIDE AND ACETAMINOPHEN 5; 325 MG/1; MG/1
1 TABLET ORAL EVERY 4 HOURS PRN
Qty: 15 TABLET | Refills: 0 | Status: SHIPPED | OUTPATIENT
Start: 2024-04-26 | End: 2024-04-26

## 2024-04-26 RX ADMIN — ACETAMINOPHEN 1000 MG: 1000 INJECTION INTRAVENOUS at 00:28

## 2024-04-26 RX ADMIN — OXYCODONE HYDROCHLORIDE 10 MG: 10 TABLET ORAL at 12:29

## 2024-04-26 RX ADMIN — IOHEXOL 100 ML: 350 INJECTION, SOLUTION INTRAVENOUS at 12:10

## 2024-04-26 RX ADMIN — FAMOTIDINE 20 MG: 10 INJECTION, SOLUTION INTRAVENOUS at 05:33

## 2024-04-26 RX ADMIN — ACETAMINOPHEN 1000 MG: 500 TABLET, FILM COATED ORAL at 12:29

## 2024-04-26 RX ADMIN — KETOROLAC TROMETHAMINE 15 MG: 15 INJECTION, SOLUTION INTRAMUSCULAR; INTRAVENOUS at 07:21

## 2024-04-26 RX ADMIN — OXYCODONE HYDROCHLORIDE 10 MG: 10 TABLET ORAL at 15:26

## 2024-04-26 RX ADMIN — OXYCODONE HYDROCHLORIDE 10 MG: 10 TABLET ORAL at 04:38

## 2024-04-26 RX ADMIN — OXYCODONE HYDROCHLORIDE 10 MG: 10 TABLET ORAL at 01:30

## 2024-04-26 RX ADMIN — ACETAMINOPHEN 1000 MG: 500 TABLET, FILM COATED ORAL at 05:33

## 2024-04-26 ASSESSMENT — LIFESTYLE VARIABLES
AVERAGE NUMBER OF DAYS PER WEEK YOU HAVE A DRINK CONTAINING ALCOHOL: 0
TOTAL SCORE: 0
TOTAL SCORE: 0
CONSUMPTION TOTAL: NEGATIVE
HAVE PEOPLE ANNOYED YOU BY CRITICIZING YOUR DRINKING: NO
EVER FELT BAD OR GUILTY ABOUT YOUR DRINKING: NO
TOTAL SCORE: 0
DOES PATIENT WANT TO STOP DRINKING: NO
HOW MANY TIMES IN THE PAST YEAR HAVE YOU HAD 5 OR MORE DRINKS IN A DAY: 0
ALCOHOL_USE: NO
HAVE YOU EVER FELT YOU SHOULD CUT DOWN ON YOUR DRINKING: NO
EVER HAD A DRINK FIRST THING IN THE MORNING TO STEADY YOUR NERVES TO GET RID OF A HANGOVER: NO
ON A TYPICAL DAY WHEN YOU DRINK ALCOHOL HOW MANY DRINKS DO YOU HAVE: 0

## 2024-04-26 ASSESSMENT — COGNITIVE AND FUNCTIONAL STATUS - GENERAL
MOVING TO AND FROM BED TO CHAIR: A LITTLE
SUGGESTED CMS G CODE MODIFIER DAILY ACTIVITY: CI
CLIMB 3 TO 5 STEPS WITH RAILING: A LITTLE
STANDING UP FROM CHAIR USING ARMS: A LITTLE
DRESSING REGULAR LOWER BODY CLOTHING: A LITTLE
WALKING IN HOSPITAL ROOM: A LITTLE
DAILY ACTIVITIY SCORE: 23
SUGGESTED CMS G CODE MODIFIER MOBILITY: CJ
MOBILITY SCORE: 20

## 2024-04-26 ASSESSMENT — PAIN DESCRIPTION - PAIN TYPE
TYPE: ACUTE PAIN
TYPE: ACUTE PAIN;SURGICAL PAIN
TYPE: ACUTE PAIN
TYPE: ACUTE PAIN
TYPE: ACUTE PAIN;SURGICAL PAIN

## 2024-04-26 NOTE — OP REPORT
Operative Report    Date: 4/25/2024    Surgeon: Chad Huang M.D.     Assistant: none    Pre-operative Diagnosis: Paraesophageal hernia, esophageal stricture    Post-operative Diagnosis: same     Procedure:  1) Laparoscopic repair of hiatal hernia, partial fundoplication  2) Esophageal dilation        Indications: This is a 53 y.o. male who presented with symptoms of paraesophageal hernia and stricture here for repair      Findings: The patient had a paraesophageal hernia which was reduced and a performed toupee fundoplication.  On an upper endoscopy he was noted to have a stricture at 37 cm from the incisors.  With white plaque.  I was able to dilate this up to 12 mm with balloon dilation.  There was resistance with the dilator during this.  After this I was able to pass the scope through the stricture.  The GE junction was at approximately 42 cm from the incisors.  Once in the stomach the stomach appeared normal the duodenum appeared normal on retroflexion in the stomach I was able to visualize the wrap which did not look twisted and was partial in nature as expected    Wound Classification: Class I, I, Clean..    Procedure in detail: The patient was seen and examined in the preoperative holding area.  The risks benefits and alternatives of the procedure were discussed with the patient who wished to proceed with the procedure as described.  The patient was transferred to the operating room placed in supine position and all pressure points were properly padded.  General endotracheal anesthesia was induced and preoperative antibiotics were given per SCIP protocol.  Patient's abdomen was prepped with ChloraPrep and draped in the normal sterile fashion.  A timeout was performed confirming correct patient, correct procedure, and that all necessary equipment was in the room.      I began by placing a 5 mm Optiview port just to the left of midline in the supraumbilical area.  Once we were in the abdomen and  the abdomen was insufflated there were noticeable adhesions from his prior exploratory laparotomy.  I was able to place a robotic trocar 8 mm out in the right mid abdomen and a 5 mm port out in the right lateral abdomen.  Using these and the hook cautery I was able to take down most of the adhesions in the upper abdomen.  I then placed 2 more robotic trocars in the left abdomen and left lateral abdomen.  The 5 mm initial port was replaced with an 8 mm robotic port.  A snake retractor was placed in the right lateral port and used to retract the liver away from the hiatus.  The patient was then placed in steep reverse nonwork position and the robot was docked.     We then carefully inspected the hiatal hernia and reduced as much of these abdominal contents as we could out of the hernia space.  We then opened the gastrohepatic ligament and carried this dissection to the diaphragm. We then dissected the hernia sac free from the diaphragm and continued our dissection around the superior portion of the diaphragmatic hernia and down the left tamara.  We continued this dissection freeing the hernia sac from the left and right tamara alternating where the tension was most appropriate.  We will continue this until we were able to identify the confluence of the tamara and freed this to allow for adequate exposure for our hiatal closure stitches.  We then continue to apply pressure to the hernia sac dissecting this free from the mediastinal attachments care being taken to preserve the vagus nerves as they are identified.  Once the hernia sac was free from the mediastinal attachments we transected this free from the stomach by releasing the short gastric vessels along the greater curve.  We then turned our attention back to the mediastinal dissection and continued this carefully dissecting the mediastinal attachments until greater than 3 cm of the esophagus was within the abdomen without tension.    We then proceeded with closure of  the hiatus  using a running 0 permanent V-Loc suture. This was continued until the esophagus passed to the hiatus with appropriate closure allowing for the esophagus passed through freely.      Given the patient's presenting symptomatology decision was made to perform a toupet fundoplication.  The greater curve the stomach was grasped and brought around the posterior aspect of the esophagus and 3 stitches were used to construct the right lateral aspect of the toupee fundoplication.  The remainder of fundoplication was completed by taking the left lateral edge of the stomach to esophagus creating a 270 degree wrap with the fundus. This resulted in a fundoplication which was over 2.5 centimeters in total length. The posterior aspect of this wrap was secured to the hiatal repair with a separate stitch.    We then performed an on table esophagogastroduodenoscopy and advanced the scope under direct visualization through the esophagus.  At approximately 37 cm in the esophagus we encountered a stricture with a white plaque.  I was unable to pass the scope past the stricture.  I then performed a balloon dilation to 12 mm.  When dilating there was significant pressure in the balloon.  After 1 minute of holding the dilator at 12 mm.  The balloon dilator was removed and the scope passed through easily.  The GE junction was found to be at 42 cm from the incisors.  On insertion to the stomach the stomach appeared normal.  The small bowel appeared normal.  On retroflexion in the stomach I was able to visualize the wrap which appeared intact and partial in nature.  We then remove the scope after releasing insufflation.    The snake retractor was then removed under direct visualization and the remainder of the ports were removed.  The skin was closed with 4-0 Monocryl subcuticularfashion and Dermabond was placed over the wounds.    The patient was awakened from general anesthetic, and was taken to the recovery room in stable  condition.    Sponge and needle counts were correct at the end of the case.     Specimen: none    EBL: 20 mL    Dispo: stable, extubated, to PACU    Chad Huang MD  Thoracic & General Surgeon  [facility]  [phone]

## 2024-04-26 NOTE — CARE PLAN
The patient is Stable - Low risk of patient condition declining or worsening    Shift Goals  Clinical Goals: barium swallow  Patient Goals: have food and diabetic care    Progress made toward(s) clinical / shift goals:  barium swallow completed and discussed between patient and surgeon. Discharge orders now placed    Patient is not progressing towards the following goals:

## 2024-04-26 NOTE — ANESTHESIA POSTPROCEDURE EVALUATION
Patient: Peña Guerrero    Procedure Summary       Date: 04/25/24 Room / Location: [place] [address] / SURGERY [facility]    Anesthesia Start: 1500 Anesthesia Stop: 1802    Procedures:       ROBOTIC HIATAL HERNIA REPAIR (Abdomen)      ESOPHAGOGASTRODUODENOSCOPY WITH BALLOON DILATION (Esophagus) Diagnosis: (HIATAL HERNIA, ESOPHAGEAL STRICTURE)    Surgeons: Chad Huang M.D. Responsible Provider: Annelise Gastelum M.D.    Anesthesia Type: general ASA Status: 3            Final Anesthesia Type: general  Last vitals  BP   Blood Pressure: (Abnormal) 153/72    Temp   36.1 °C (96.9 °F)    Pulse   82   Resp   20    SpO2   93 %      Anesthesia Post Evaluation    Patient location during evaluation: PACU  Patient participation: complete - patient participated  Level of consciousness: awake and alert  Pain score: 2    Airway patency: patent  Anesthetic complications: no  Cardiovascular status: hemodynamically stable  Respiratory status: acceptable  Hydration status: euvolemic    PONV: none          No notable events documented.     Nurse Pain Score: 0 (NPRS)

## 2024-04-26 NOTE — ANESTHESIA TIME REPORT
Anesthesia Start and Stop Event Times       Date Time Event    4/25/2024 02:50 PM Ready for Procedure    4/25/2024 03:00 PM Anesthesia Start    4/25/2024 06:02 PM Anesthesia Stop          Responsible Staff  04/25/24      Name Role Begin End    Annelise Gastelum M.D. Anesthesiologist 04/25/24 03:00 PM 04/25/24 06:02 PM          Overtime Reason:  no overtime (within assigned shift)    Comments:

## 2024-04-26 NOTE — CARE PLAN
The patient is Stable - Low risk of patient condition declining or worsening    Shift Goals  Clinical Goals: Pain control  Patient Goals: Pain control    Progress made toward(s) clinical / shift goals:      Patient is not progressing towards the following goals:  Pain medication and IV breakthrough meds administered. Patient's pain still 8/10 throughout the shift.

## 2024-04-26 NOTE — PROGRESS NOTES
INES abad ordered with active dc order in place. Cab voucher provided and medications changed from [facility] to [id number]

## 2024-04-26 NOTE — PROGRESS NOTES
4 Eyes Skin Assessment Completed by ALEXANDER Leon and ALEXANDER Witt     Head WDL  Ears WDL  Nose WDL  Mouth WDL  Neck WDL  Breast/Chest WDL   Shoulder Blades WDL   Spine  WDL  (R) Arm/Elbow/Hand WDL, PIV in place  (L) Arm/Elbow/Hand WDL  Abdomen 5 lap sites dermabond  Groin WDL  Scrotum/Coccyx/Buttocks WDL  (R) Leg WDL  (L) Leg WDL  (R) Heel/Foot/Toe WDL  (L) Heel/Foot/Toe WDL              Devices In Places SCDs, , oxymask        Interventions In Place n/a        Pictures Uploaded Into Epic n/a  Wound Consult Placed n/a  RN Wound Prevention Protocol Ordered n/a

## 2024-04-26 NOTE — DIETARY
"Nutrition services: Day 0 of admit.  Peña Guerrero is a 53 y.o. male with admitting DX of paraesophageal hernia.    Consult received for wt loss (14-23 lbs in 1 month), poor PO per admit screen. Met with pt at bedside. Pt appeared adequately nourished. Pt reports to poor appetite, early satiety with vomiting and wt loss for ~1 year. Previous wt per pt was 222 lbs (he weighed 280 lbs when he was working out prior to this). Pt currently on clear liquids, stated he hasn't eaten much yet as he is pending a procedure.     Assessment:  Height: 190.5 cm (6' 3\")  Weight: 90.8 kg (200 lb 2.8 oz)  Body mass index is 25.02 kg/m²., BMI classification: normal/cusp of overweight - per RD judgement, pt appeared to have more muscle mass than fat tissue, he did not appear overweight  Diet/Intake: Clear liquid/bariatric modification; no PO recorded to assess    Evaluation:   Admitted for hernia repair. S/p lap repair of hiatal hernia, partial fundoplication, esophageal dilation 4/25.  Hx of type 2 DM. A1c 8.5 4/17  Wt hx per chart review: 206 lbs 11/16/23, 209 lbs 8/3/23, 209 lbs 5/5/23. Wt loss of 3% in five months is not significant.    Malnutrition Risk: Does not meet criteria per ASPEN guidelines at this time.     Recommendations/Plan:  Advance diet as tolerated per MD.  Recommend consistent CHO diet.  Encourage intake of meals.  Document intake of all meals as % taken in ADLs to provide interdisciplinary communication across all shifts.   Monitor weight.  Nutrition rep will continue to see patient for ongoing meal and snack preferences.     RD will follow per dept guidelines.       "

## 2024-04-26 NOTE — OR NURSING
Report given to ALEXANDER Lindsey. Pt A&OX4. VSS. Pt on 10 L of oxygen via oxymask - ERAS. Afebrile. Five lap sites to abd, dermabond closure. IV Benadryl administered for itching. Pain medication administered and pt able to rest comfortably. No nausea. No issues noted with pt's swallow. Did report sore throat. Pt out of PACU via gurney with transport.

## 2024-04-26 NOTE — PROGRESS NOTES
Bedside report received from PACU RN, assumed care upon arrival.  Assessment complete.  A&O x 4. Patient calls appropriately.  Patient ambulates with standby assist.   Patient has 8/10 pain. Pain managed with prescribed medications.  Denies N&V. Tolerating clear liquid diet.  Surgical lap sites x5, dermabond.  + void, - flatus, - BM.  Patient denies SOB. On 10L ERAS  SCD's on.  Patient calm, pleasant, and cooperative.  Review plan with of care with patient. Call light and personal belongings within reach. Hourly rounding in place. All needs met at this time.

## 2024-04-26 NOTE — DISCHARGE INSTRUCTIONS
Discharge instructions:    DIET: Upon discharge from the hospital you may be on a full liquid diet for 7 days then may advance as tolerated. Depending on how you are feeling and whether you have nausea or not, you may wish to stay with a bland diet for the first few days. However, you can advance this as quickly as you feel ready.    ACTIVITIES: After discharge from the hospital, you may resume full routine activities. However, there should be no heavy lifting (greater than 15 pounds) and no strenuous activities for two weeks. Otherwise, routine activities of daily living are acceptable.    DRIVING: You may drive whenever you are off pain medications and are able to perform the activities needed to drive, i.e. turning, bending, twisting, etc.    BATHING: You may shower the day after surgery, but do not let the jets hit directly on the incisions. Let the soapy water drip down over the incisions. Do not submerge in a bath for at least a week. Your skin has glue on the incisions that will act as a scab and slowly come off after a week or two.    BOWEL FUNCTION:  Much more common than loose stools, is constipation. The combination of pain medication and decreased activity level can cause constipation in otherwise normal patients. If you feel this is occurring, take a laxative (Milk of Magnesia, Ex-Lax, Senokot, etc.) until the problem has resolved.    PAIN MEDICATION: You will be given a prescription for pain medication at discharge. Please take these as directed. It is important to remember not to take medications on an empty stomach as this may cause nausea. Also, be aware narcotic pain medications cause constipation. Please take over the counter stool softeners while taking narcotic pain medications.    CALL IF YOU HAVE: (1) Fevers to more than 1010 F, (2) Unusual chest or leg pain, (3) Drainage or fluid from incision that may be foul smelling, increased tenderness or soreness at the wound or the wound edges are no  longer together, redness or swelling at the incision site. Please do not hesitate to call with any other questions.     APPOINTMENT: Contact our office at [phone] for a follow-up appointment in 1 to 2 weeks following your procedure.    If you have any additional questions, please do not hesitate to call the office.    Office address:  [address] [place] [address], [address] [place] [address]    Chad Huang MD  Thoracic & General Surgeon  [facility]  [phone]

## 2024-05-13 ENCOUNTER — APPOINTMENT (OUTPATIENT)
Dept: ADMISSIONS | Facility: MEDICAL CENTER | Age: 54
End: 2024-05-13
Attending: STUDENT IN AN ORGANIZED HEALTH CARE EDUCATION/TRAINING PROGRAM
Payer: MEDICAID

## 2024-05-14 ENCOUNTER — PRE-ADMISSION TESTING (OUTPATIENT)
Dept: ADMISSIONS | Facility: MEDICAL CENTER | Age: 54
End: 2024-05-14
Attending: STUDENT IN AN ORGANIZED HEALTH CARE EDUCATION/TRAINING PROGRAM
Payer: MEDICAID

## 2024-05-14 NOTE — OR NURSING
"Pre-Admit RN tele appointment completed with pt, by this RN. Discussed pre-procedure instructions, unless they have been otherwise instructed by their surgeon. Instructed pt on medication instructions referenced from \"Guideline for Pre-Operative Medication Management\", provided by Anesthesia & Pharmacy to RN staff, unless otherwise instructed by prescribing MD or Surgeon. Pt verbalized understanding of all instructions. Pt denies any questions or concerns. Copy of Medication Reconciliation with instructions provided to pt via email.   Abnormal labs from 4/17/24 faxed to Dr. Huang.    "

## 2024-05-15 ENCOUNTER — ANESTHESIA (OUTPATIENT)
Dept: SURGERY | Facility: MEDICAL CENTER | Age: 54
End: 2024-05-15
Payer: MEDICAID

## 2024-05-15 ENCOUNTER — HOSPITAL ENCOUNTER (OUTPATIENT)
Facility: MEDICAL CENTER | Age: 54
End: 2024-05-15
Attending: STUDENT IN AN ORGANIZED HEALTH CARE EDUCATION/TRAINING PROGRAM | Admitting: STUDENT IN AN ORGANIZED HEALTH CARE EDUCATION/TRAINING PROGRAM
Payer: MEDICAID

## 2024-05-15 ENCOUNTER — ANESTHESIA EVENT (OUTPATIENT)
Dept: SURGERY | Facility: MEDICAL CENTER | Age: 54
End: 2024-05-15
Payer: MEDICAID

## 2024-05-15 VITALS
HEART RATE: 64 BPM | DIASTOLIC BLOOD PRESSURE: 81 MMHG | SYSTOLIC BLOOD PRESSURE: 127 MMHG | TEMPERATURE: 97.3 F | OXYGEN SATURATION: 93 % | WEIGHT: 196.65 LBS | BODY MASS INDEX: 24.45 KG/M2 | RESPIRATION RATE: 20 BRPM | HEIGHT: 75 IN

## 2024-05-15 DIAGNOSIS — E11.65 TYPE 2 DIABETES MELLITUS WITH HYPERGLYCEMIA, WITH LONG-TERM CURRENT USE OF INSULIN (HCC): ICD-10-CM

## 2024-05-15 DIAGNOSIS — Z79.4 TYPE 2 DIABETES MELLITUS WITH HYPERGLYCEMIA, WITH LONG-TERM CURRENT USE OF INSULIN (HCC): ICD-10-CM

## 2024-05-15 LAB — GLUCOSE BLD STRIP.AUTO-MCNC: 102 MG/DL (ref 65–99)

## 2024-05-15 RX ORDER — TRIAMCINOLONE ACETONIDE 40 MG/ML
INJECTION, SUSPENSION INTRA-ARTICULAR; INTRAMUSCULAR
Status: COMPLETED
Start: 2024-05-15 | End: 2024-05-15

## 2024-05-15 RX ORDER — DIPHENHYDRAMINE HYDROCHLORIDE 50 MG/ML
12.5 INJECTION INTRAMUSCULAR; INTRAVENOUS
Status: DISCONTINUED | OUTPATIENT
Start: 2024-05-15 | End: 2024-05-15 | Stop reason: HOSPADM

## 2024-05-15 RX ORDER — SODIUM CHLORIDE, SODIUM LACTATE, POTASSIUM CHLORIDE, CALCIUM CHLORIDE 600; 310; 30; 20 MG/100ML; MG/100ML; MG/100ML; MG/100ML
INJECTION, SOLUTION INTRAVENOUS CONTINUOUS
Status: DISCONTINUED | OUTPATIENT
Start: 2024-05-15 | End: 2024-05-15 | Stop reason: HOSPADM

## 2024-05-15 RX ORDER — HALOPERIDOL 5 MG/ML
1 INJECTION INTRAMUSCULAR
Status: DISCONTINUED | OUTPATIENT
Start: 2024-05-15 | End: 2024-05-15 | Stop reason: HOSPADM

## 2024-05-15 RX ORDER — ONDANSETRON 2 MG/ML
4 INJECTION INTRAMUSCULAR; INTRAVENOUS
Status: DISCONTINUED | OUTPATIENT
Start: 2024-05-15 | End: 2024-05-15 | Stop reason: HOSPADM

## 2024-05-15 RX ORDER — TRIAMCINOLONE ACETONIDE 40 MG/ML
80 INJECTION, SUSPENSION INTRA-ARTICULAR; INTRAMUSCULAR ONCE
Status: COMPLETED | OUTPATIENT
Start: 2024-05-15 | End: 2024-05-15

## 2024-05-15 RX ADMIN — SODIUM CHLORIDE, POTASSIUM CHLORIDE, SODIUM LACTATE AND CALCIUM CHLORIDE: 600; 310; 30; 20 INJECTION, SOLUTION INTRAVENOUS at 07:36

## 2024-05-15 RX ADMIN — PROPOFOL 450 MG: 10 INJECTION, EMULSION INTRAVENOUS at 09:28

## 2024-05-15 RX ADMIN — TRIAMCINOLONE ACETONIDE 80 MG: 40 INJECTION, SUSPENSION INTRA-ARTICULAR; INTRAMUSCULAR at 09:10

## 2024-05-15 ASSESSMENT — FIBROSIS 4 INDEX
FIB4 SCORE: 1.68
FIB4 SCORE: 1.68

## 2024-05-15 NOTE — ANESTHESIA PREPROCEDURE EVALUATION
Case: [id number] Anesthesia Start Date/Time: 05/15/24 0857    Procedures:       UPPER GASTROINTESTINAL ENDOSCOPY, WITH BALLOON DILATION OF ESOPHAGUS (Esophagus)      GASTROSCOPY (Esophagus)      GASTROSCOPY, WITH SCLEROTHERAPY (Esophagus)    Anesthesia type: MAC    Pre-op diagnosis: STRICTURE OF ESOPHAGUS    Location: [facility] ROOM 26 / SURGERY SAME DAY [facility]    Surgeons: Chad Huang M.D.            Relevant Problems   CARDIAC   (positive) Hypertension      GI   (positive) Gastro-esophageal reflux disease without esophagitis   (positive) Paraesophageal hernia      ENDO   (positive) Hypothyroid   (positive) Type 2 diabetes mellitus, with long-term current use of insulin (HCC)       Physical Exam    Airway   Mallampati: II  TM distance: >3 FB  Neck ROM: full       Cardiovascular - normal exam  Rhythm: regular  Rate: normal  (-) murmur     Dental - normal exam           Pulmonary - normal exam  Breath sounds clear to auscultation     Abdominal    Neurological - normal exam                   Anesthesia Plan    ASA 2       Plan - MAC               Induction: intravenous    Postoperative Plan: Postoperative administration of opioids is intended.    Pertinent diagnostic labs and testing reviewed    Informed Consent:    Anesthetic plan and risks discussed with patient.    Use of blood products discussed with: patient whom consented to blood products.

## 2024-05-15 NOTE — DISCHARGE SUMMARY
Discharge Summary    CHIEF COMPLAINT ON ADMISSION  No chief complaint on file.      Reason for Admission  Paraesophageal hernia     Admission Date  4/25/2024    CODE STATUS  Prior    HPI & HOSPITAL COURSE  This is a 53 y.o. male here with an esophageal stricture and a paraesophageal hernia.  He underwent an uncomplicated paraesophageal hernia repair with toupet fundoplication and upper endoscopy with balloon dilation.  The patient tolerated the procedure well without complication.  The next day he was able to tolerate a liquid diet and have his pain adequately controlled.  He was discharged home in good condition.  No notes on file    Therefore, he is discharged in good and stable condition to home with close outpatient follow-up.    The patient recovered much more quickly than anticipated on admission.    Discharge Date  4/26/2024    FOLLOW UP ITEMS POST DISCHARGE  Esophageal stricture    DISCHARGE DIAGNOSES  Principal Problem:    Paraesophageal hernia (POA: Yes)  Active Problems:    Esophageal stricture (POA: Yes)    Gastro-esophageal reflux disease without esophagitis (POA: Yes)  Resolved Problems:    * No resolved hospital problems. *      FOLLOW UP  No future appointments.  Merlene Esteban M.D.  [address] [facility]  [address]  [place] [address]  [phone]    Follow up      Chad Huang M.D.  [address] [facility] [address]  [place] [address]  [phone]    Schedule an appointment as soon as possible for a visit in 1 week(s)        MEDICATIONS ON DISCHARGE     Medication List        CHANGE how you take these medications        Instructions   Lantus SoloStar 100 UNIT/ML Sopn injection  What changed: how much to take  Generic drug: insulin glargine   Inject 30 Units under the skin every evening. 5 pens per month  Dose: 30 Units     lisinopril 20 MG Tabs  What changed: when to take this  Commonly known as: Prinivil   Take 1 Tablet by mouth every day.  Dose: 20 mg            CONTINUE taking these medications         Instructions   Insulin Pen Needle 32G X 6 MM Misc   For use with 2-3 daily insulin and or GLP-1 injection.     pantoprazole 40 MG Tbec  Commonly known as: Protonix   Take 1 Tablet by mouth every day.  Dose: 40 mg            ASK your doctor about these medications        Instructions   methocarbamol 750 MG Tabs  Commonly known as: Robaxin  Ask about: Should I take this medication?   Take 1 Tablet by mouth 4 times a day for 7 days.  Dose: 750 mg     oxyCODONE-acetaminophen 5-325 MG Tabs  Commonly known as: Percocet  Ask about: Should I take this medication?   Take 1 Tablet by mouth every four hours as needed for Moderate Pain for up to 5 days.  Dose: 1 Tablet              Allergies  Allergies   Allergen Reactions    Pcn [Penicillins] Anaphylaxis     Historic reporting from childhood       DIET  No orders of the defined types were placed in this encounter.      ACTIVITY  As tolerated.  Weight bearing as tolerated    CONSULTATIONS  None    PROCEDURES  Laparoscopic paraesophageal hernia repair upper endoscopy with dilation    LABORATORY  Lab Results   Component Value Date    SODIUM 137 04/26/2024    POTASSIUM 4.5 04/26/2024    CHLORIDE 105 04/26/2024    CO2 24 04/26/2024    GLUCOSE 228 (H) 04/26/2024    BUN 14 04/26/2024    CREATININE 0.93 04/26/2024        Lab Results   Component Value Date    WBC 11.9 (H) 04/26/2024    HEMOGLOBIN 14.6 04/26/2024    HEMATOCRIT 40.9 (L) 04/26/2024    PLATELETCT 236 04/26/2024        Total time of the discharge process exceeds 30 minutes.

## 2024-05-15 NOTE — OP REPORT
Thoracic Surgery Operative Report    DATE OF OPERATION:    5/15/2024    PREOPERATIVE DIAGNOSIS:    Esophageal stricture    POSTOPERATIVE DIAGNOSIS:   Same    PROCEDURE PERFORMED:   1 upper endoscopy with balloon dilation to 12 mm  2 injection of 80 mg Kenalog in 4 quadrants around the stricture    SURGEON:      Chad Huang M.D.    ASSISTANT:            ANESTHESIOLOGIST:    Corky Villanueva M.D.    ANESTHESIA:     Deep sedation with monitored anesthesia care and local anesthetic.    INDICATIONS:    Mr. Peña Guerrero is a 53 y.o. male who presents with a history of esophageal stricture. He is taken to the operating room for dilation and injection of steroids.        FINDINGS:   The esophagus was narrowed and the scope could not fit past the narrowing.  I was able to balloon dilate until 12 mm.  He had bleeding after the second dilation at 12 mm.  I thus stop the dilation at this point and injected 20 mg Kenalog in each quadrant totaling 80 mg.  There did appear to be a segment Lobo's esophagus        SPECIMEN:       None.    ESTIMATED BLOOD LOSS:    10 mL.    DESCRIPTION OF PROCEDURE:    Patient was brought the operating room placed the operating table in the supine position.  A timeout was performed and find the correct patient and procedure to be performed.  Monitored anesthesia care was begun after the patient was then positioned into the left lateral decubitus.  Once adequate sedation was administered the scope was inserted into the oropharynx and advanced to the stricture which was at approximately 33 cm from the incisors.  The scope was unable to fit past the stricture.  I thus used a 10 to 12 mm balloon dilator was able to dilate to 12 mm.  On the second dilation he began to have bleeding from the stricture.  At this point I stopped with my dilations.  The scope was able to pass through the stricture.  The stricture was about 2 cm in length.  Upon entering the stomach there appeared to be some  Lobo's esophagus.  I then withdrew the scope and injected 20 mg of Kenalog into each quadrant totaling 80 mg Kenalog.  The stricture was irrigated.  Bleeding was minimal and hemostasis was adequate.  The scope was then withdrawn and the patient awoken from anesthesia taken the recovery room in good condition.  There were no complications throughout the case.  The patient tolerated the procedure well.      Chad Huang MD  Thoracic & General Surgeon  [facility]  [phone]

## 2024-05-15 NOTE — ANESTHESIA TIME REPORT
Anesthesia Start and Stop Event Times       Date Time Event    5/15/2024 0857 Anesthesia Start     0858 Ready for Procedure     0928 Anesthesia Stop          Responsible Staff  05/15/24      Name Role Begin End    Corky Villanueva M.D. Anesth 0857 0928          Overtime Reason:  no overtime (within assigned shift)    Comments:

## 2024-05-15 NOTE — ANESTHESIA POSTPROCEDURE EVALUATION
Patient: Peña Guerrero    Procedure Summary       Date: 05/15/24 Room / Location: [facility] ROOM 26 / SURGERY SAME DAY [facility]    Anesthesia Start: 0857 Anesthesia Stop: 0928    Procedures:       UPPER GASTROINTESTINAL ENDOSCOPY, WITH BALLOON DILATION OF ESOPHAGUS (Esophagus)      GASTROSCOPY (Esophagus)      GASTROSCOPY, WITH SCLEROTHERAPY (Esophagus) Diagnosis: (STRICTURE OF ESOPHAGUS)    Surgeons: Chad Huang M.D. Responsible Provider: Corky Villanueva M.D.    Anesthesia Type: MAC ASA Status: 2            Final Anesthesia Type: MAC  Last vitals  BP   Blood Pressure: 99/52    Temp   36.3 °C (97.3 °F)    Pulse   69   Resp   14    SpO2   98 %      Anesthesia Post Evaluation    Patient location during evaluation: PACU  Patient participation: complete - patient participated  Level of consciousness: awake and alert    Airway patency: patent  Anesthetic complications: no  Cardiovascular status: hemodynamically stable  Respiratory status: acceptable  Hydration status: euvolemic    PONV: none          No notable events documented.     Nurse Pain Score: 0 (NPRS)

## 2024-05-15 NOTE — DISCHARGE INSTRUCTIONS
What to Expect Post Anesthesia    Rest and take it easy for the first 24 hours.  A responsible adult is recommended to remain with you during that time.  It is normal to feel sleepy.  We encourage you to not do anything that requires balance, judgment or coordination.    FOR 24 HOURS DO NOT:  Drive, operate machinery or run household appliances.  Drink beer or alcoholic beverages.  Make important decisions or sign legal documents.    To avoid nausea, slowly advance diet as tolerated, avoiding spicy or greasy foods for the first day.  Add more substantial food to your diet according to your provider's instructions.  Babies can be fed formula or breast milk as soon as they are hungry.  INCREASE FLUIDS AND FIBER TO AVOID CONSTIPATION.    MILD FLU-LIKE SYMPTOMS ARE NORMAL.  YOU MAY EXPERIENCE GENERALIZED MUSCLE ACHES, THROAT IRRITATION, HEADACHE AND/OR SOME NAUSEA.    If any questions arise, call your provider.  If your provider is not available, please feel free to call the Surgical Center at [phone].    MEDICATIONS: Resume taking daily medication.  Take prescribed pain medication with food.  If no medication is prescribed, you may take non-aspirin pain medication if needed.  PAIN MEDICATION CAN BE VERY CONSTIPATING.  Take a stool softener or laxative such as senokot, pericolace, or milk of magnesia if needed.

## 2024-05-15 NOTE — OR NURSING
0921 Patient arrived to PACU from Endo. Report received from RN and anesthesia. Patient attached to monitoring. VSS. Patient oxygenating well on 6 L via mask.     0938 No complaint of pain or nausea. Pt wanting to rest at this time.    0950 Patient meets phase two criteria. Tolerating PO liquids without complication.     0955 RN went over discharge instructions with patient and patient's friend, Monik. All questions answered.     1000 Intact IV removed by RN.    1005 Patient meets discharge criteria. VSS. Patient dressed self without assistance. Patient needs a cab back home to [facility]. MD approved via phone. Cab company called and waiting for .     1035 Pt discharged to a responsible adult  and sent to get into the cab to go home. Pt in possession of all personal belongings.

## 2024-05-20 ENCOUNTER — HOSPITAL ENCOUNTER (OUTPATIENT)
Facility: MEDICAL CENTER | Age: 54
End: 2024-05-20
Attending: STUDENT IN AN ORGANIZED HEALTH CARE EDUCATION/TRAINING PROGRAM | Admitting: STUDENT IN AN ORGANIZED HEALTH CARE EDUCATION/TRAINING PROGRAM
Payer: MEDICAID

## 2024-05-21 NOTE — OR NURSING
Patient spoke with preregistration team and declines PAT RN interview. Patient stated to PAR that he has had the procedure multiple times before and understands the pre-procedure process and instructions as he was here 5/15. Recent lab work noted from 4/26/24.

## 2024-05-27 ENCOUNTER — APPOINTMENT (OUTPATIENT)
Dept: RADIOLOGY | Facility: MEDICAL CENTER | Age: 54
DRG: 328 | End: 2024-05-27
Attending: STUDENT IN AN ORGANIZED HEALTH CARE EDUCATION/TRAINING PROGRAM
Payer: MEDICAID

## 2024-05-27 ENCOUNTER — HOSPITAL ENCOUNTER (INPATIENT)
Facility: MEDICAL CENTER | Age: 54
End: 2024-05-27
Attending: STUDENT IN AN ORGANIZED HEALTH CARE EDUCATION/TRAINING PROGRAM | Admitting: STUDENT IN AN ORGANIZED HEALTH CARE EDUCATION/TRAINING PROGRAM
Payer: MEDICAID

## 2024-05-27 DIAGNOSIS — R06.6 HICCUP: ICD-10-CM

## 2024-05-27 DIAGNOSIS — R25.2 SPASM: ICD-10-CM

## 2024-05-27 DIAGNOSIS — E16.2 HYPOGLYCEMIA: ICD-10-CM

## 2024-05-27 DIAGNOSIS — E11.65 TYPE 2 DIABETES MELLITUS WITH HYPERGLYCEMIA, WITH LONG-TERM CURRENT USE OF INSULIN (HCC): ICD-10-CM

## 2024-05-27 DIAGNOSIS — Z91.89 AT RISK FOR MALNUTRITION: ICD-10-CM

## 2024-05-27 DIAGNOSIS — R11.2 NAUSEA AND VOMITING, UNSPECIFIED VOMITING TYPE: ICD-10-CM

## 2024-05-27 DIAGNOSIS — K21.9 GASTRO-ESOPHAGEAL REFLUX DISEASE WITHOUT ESOPHAGITIS: ICD-10-CM

## 2024-05-27 DIAGNOSIS — K31.89 ACUTE GASTRIC VOLVULUS: ICD-10-CM

## 2024-05-27 DIAGNOSIS — K44.9 PARAESOPHAGEAL HERNIA: ICD-10-CM

## 2024-05-27 DIAGNOSIS — Z79.4 TYPE 2 DIABETES MELLITUS WITH HYPERGLYCEMIA, WITH LONG-TERM CURRENT USE OF INSULIN (HCC): ICD-10-CM

## 2024-05-27 LAB
BASOPHILS # BLD AUTO: 0.3 % (ref 0–1.8)
BASOPHILS # BLD: 0.04 K/UL (ref 0–0.12)
EOSINOPHIL # BLD AUTO: 0.23 K/UL (ref 0–0.51)
EOSINOPHIL NFR BLD: 1.8 % (ref 0–6.9)
ERYTHROCYTE [DISTWIDTH] IN BLOOD BY AUTOMATED COUNT: 40.2 FL (ref 35.9–50)
GLUCOSE BLD STRIP.AUTO-MCNC: 117 MG/DL (ref 65–99)
GLUCOSE BLD STRIP.AUTO-MCNC: 42 MG/DL (ref 65–99)
HCT VFR BLD AUTO: 50.8 % (ref 42–52)
HGB BLD-MCNC: 18 G/DL (ref 14–18)
IMM GRANULOCYTES # BLD AUTO: 0.05 K/UL (ref 0–0.11)
IMM GRANULOCYTES NFR BLD AUTO: 0.4 % (ref 0–0.9)
LACTATE SERPL-SCNC: 1.3 MMOL/L (ref 0.5–2)
LYMPHOCYTES # BLD AUTO: 1.89 K/UL (ref 1–4.8)
LYMPHOCYTES NFR BLD: 14.5 % (ref 22–41)
MCH RBC QN AUTO: 31.1 PG (ref 27–33)
MCHC RBC AUTO-ENTMCNC: 35.4 G/DL (ref 32.3–36.5)
MCV RBC AUTO: 87.7 FL (ref 81.4–97.8)
MONOCYTES # BLD AUTO: 0.62 K/UL (ref 0–0.85)
MONOCYTES NFR BLD AUTO: 4.8 % (ref 0–13.4)
NEUTROPHILS # BLD AUTO: 10.19 K/UL (ref 1.82–7.42)
NEUTROPHILS NFR BLD: 78.2 % (ref 44–72)
NRBC # BLD AUTO: 0 K/UL
NRBC BLD-RTO: 0 /100 WBC (ref 0–0.2)
PLATELET # BLD AUTO: 309 K/UL (ref 164–446)
PMV BLD AUTO: 8.9 FL (ref 9–12.9)
RBC # BLD AUTO: 5.79 M/UL (ref 4.7–6.1)
WBC # BLD AUTO: 13 K/UL (ref 4.8–10.8)

## 2024-05-27 PROCEDURE — 36415 COLL VENOUS BLD VENIPUNCTURE: CPT

## 2024-05-27 PROCEDURE — 83605 ASSAY OF LACTIC ACID: CPT

## 2024-05-27 PROCEDURE — 96365 THER/PROPH/DIAG IV INF INIT: CPT

## 2024-05-27 PROCEDURE — 82962 GLUCOSE BLOOD TEST: CPT | Mod: 91

## 2024-05-27 PROCEDURE — 99285 EMERGENCY DEPT VISIT HI MDM: CPT

## 2024-05-27 PROCEDURE — 700111 HCHG RX REV CODE 636 W/ 250 OVERRIDE (IP): Mod: JZ,UD

## 2024-05-27 PROCEDURE — 93005 ELECTROCARDIOGRAM TRACING: CPT | Performed by: STUDENT IN AN ORGANIZED HEALTH CARE EDUCATION/TRAINING PROGRAM

## 2024-05-27 PROCEDURE — 700101 HCHG RX REV CODE 250: Mod: UD | Performed by: STUDENT IN AN ORGANIZED HEALTH CARE EDUCATION/TRAINING PROGRAM

## 2024-05-27 PROCEDURE — 700105 HCHG RX REV CODE 258: Mod: UD | Performed by: STUDENT IN AN ORGANIZED HEALTH CARE EDUCATION/TRAINING PROGRAM

## 2024-05-27 PROCEDURE — 84484 ASSAY OF TROPONIN QUANT: CPT

## 2024-05-27 PROCEDURE — 80053 COMPREHEN METABOLIC PANEL: CPT

## 2024-05-27 PROCEDURE — 96375 TX/PRO/DX INJ NEW DRUG ADDON: CPT

## 2024-05-27 PROCEDURE — 71045 X-RAY EXAM CHEST 1 VIEW: CPT

## 2024-05-27 PROCEDURE — 85025 COMPLETE CBC W/AUTO DIFF WBC: CPT

## 2024-05-27 PROCEDURE — 700111 HCHG RX REV CODE 636 W/ 250 OVERRIDE (IP): Mod: UD | Performed by: STUDENT IN AN ORGANIZED HEALTH CARE EDUCATION/TRAINING PROGRAM

## 2024-05-27 PROCEDURE — 83735 ASSAY OF MAGNESIUM: CPT

## 2024-05-27 RX ORDER — HYDROMORPHONE HYDROCHLORIDE 1 MG/ML
1 INJECTION, SOLUTION INTRAMUSCULAR; INTRAVENOUS; SUBCUTANEOUS ONCE
Status: COMPLETED | OUTPATIENT
Start: 2024-05-27 | End: 2024-05-27

## 2024-05-27 RX ORDER — DEXTROSE MONOHYDRATE 25 G/50ML
50 INJECTION, SOLUTION INTRAVENOUS ONCE
Status: COMPLETED | OUTPATIENT
Start: 2024-05-27 | End: 2024-05-27

## 2024-05-27 RX ORDER — ONDANSETRON 2 MG/ML
4 INJECTION INTRAMUSCULAR; INTRAVENOUS ONCE
Status: COMPLETED | OUTPATIENT
Start: 2024-05-27 | End: 2024-05-27

## 2024-05-27 RX ORDER — DEXTROSE MONOHYDRATE AND SODIUM CHLORIDE 5; .9 G/100ML; G/100ML
INJECTION, SOLUTION INTRAVENOUS ONCE
Status: COMPLETED | OUTPATIENT
Start: 2024-05-27 | End: 2024-05-28

## 2024-05-27 RX ADMIN — DEXTROSE MONOHYDRATE 50 ML: 25 INJECTION, SOLUTION INTRAVENOUS at 22:14

## 2024-05-27 RX ADMIN — HYDROMORPHONE HYDROCHLORIDE 1 MG: 1 INJECTION, SOLUTION INTRAMUSCULAR; INTRAVENOUS; SUBCUTANEOUS at 22:32

## 2024-05-27 RX ADMIN — ONDANSETRON 4 MG: 2 INJECTION INTRAMUSCULAR; INTRAVENOUS at 22:32

## 2024-05-27 RX ADMIN — DEXTROSE AND SODIUM CHLORIDE: 5; 900 INJECTION, SOLUTION INTRAVENOUS at 22:27

## 2024-05-27 RX ADMIN — MORPHINE SULFATE 6 MG: 10 INJECTION INTRAVENOUS at 23:58

## 2024-05-27 ASSESSMENT — FIBROSIS 4 INDEX: FIB4 SCORE: 1.68

## 2024-05-27 ASSESSMENT — PAIN DESCRIPTION - PAIN TYPE: TYPE: ACUTE PAIN

## 2024-05-28 ENCOUNTER — APPOINTMENT (OUTPATIENT)
Dept: RADIOLOGY | Facility: MEDICAL CENTER | Age: 54
DRG: 328 | End: 2024-05-28
Attending: STUDENT IN AN ORGANIZED HEALTH CARE EDUCATION/TRAINING PROGRAM
Payer: MEDICAID

## 2024-05-28 ENCOUNTER — ANESTHESIA (OUTPATIENT)
Dept: SURGERY | Facility: MEDICAL CENTER | Age: 54
End: 2024-05-28
Payer: MEDICAID

## 2024-05-28 ENCOUNTER — ANESTHESIA EVENT (OUTPATIENT)
Dept: SURGERY | Facility: MEDICAL CENTER | Age: 54
End: 2024-05-28
Payer: MEDICAID

## 2024-05-28 PROBLEM — K31.89 ACUTE GASTRIC VOLVULUS: Status: ACTIVE | Noted: 2024-05-28

## 2024-05-28 PROBLEM — E87.6 HYPOKALEMIA: Status: ACTIVE | Noted: 2024-05-28

## 2024-05-28 LAB
ALBUMIN SERPL BCP-MCNC: 4.7 G/DL (ref 3.2–4.9)
ALBUMIN/GLOB SERPL: 1.6 G/DL
ALP SERPL-CCNC: 101 U/L (ref 30–99)
ALT SERPL-CCNC: 27 U/L (ref 2–50)
ANION GAP SERPL CALC-SCNC: 16 MMOL/L (ref 7–16)
AST SERPL-CCNC: 34 U/L (ref 12–45)
BILIRUB SERPL-MCNC: 0.5 MG/DL (ref 0.1–1.5)
BUN SERPL-MCNC: 10 MG/DL (ref 8–22)
CALCIUM ALBUM COR SERPL-MCNC: 9 MG/DL (ref 8.5–10.5)
CALCIUM SERPL-MCNC: 9.6 MG/DL (ref 8.5–10.5)
CHLORIDE SERPL-SCNC: 101 MMOL/L (ref 96–112)
CO2 SERPL-SCNC: 26 MMOL/L (ref 20–33)
CREAT SERPL-MCNC: 0.93 MG/DL (ref 0.5–1.4)
EKG IMPRESSION: NORMAL
GFR SERPLBLD CREATININE-BSD FMLA CKD-EPI: 99 ML/MIN/1.73 M 2
GLOBULIN SER CALC-MCNC: 3 G/DL (ref 1.9–3.5)
GLUCOSE BLD STRIP.AUTO-MCNC: 100 MG/DL (ref 65–99)
GLUCOSE BLD STRIP.AUTO-MCNC: 123 MG/DL (ref 65–99)
GLUCOSE BLD STRIP.AUTO-MCNC: 179 MG/DL (ref 65–99)
GLUCOSE BLD STRIP.AUTO-MCNC: 184 MG/DL (ref 65–99)
GLUCOSE BLD STRIP.AUTO-MCNC: 204 MG/DL (ref 65–99)
GLUCOSE BLD STRIP.AUTO-MCNC: 241 MG/DL (ref 65–99)
GLUCOSE SERPL-MCNC: 42 MG/DL (ref 65–99)
MAGNESIUM SERPL-MCNC: 2.1 MG/DL (ref 1.5–2.5)
POTASSIUM SERPL-SCNC: 3.1 MMOL/L (ref 3.6–5.5)
POTASSIUM SERPL-SCNC: 4 MMOL/L (ref 3.6–5.5)
PROT SERPL-MCNC: 7.7 G/DL (ref 6–8.2)
SODIUM SERPL-SCNC: 143 MMOL/L (ref 135–145)
TROPONIN T SERPL-MCNC: 10 NG/L (ref 6–19)

## 2024-05-28 PROCEDURE — 96365 THER/PROPH/DIAG IV INF INIT: CPT

## 2024-05-28 PROCEDURE — 0BQT4ZZ REPAIR DIAPHRAGM, PERCUTANEOUS ENDOSCOPIC APPROACH: ICD-10-PCS | Performed by: STUDENT IN AN ORGANIZED HEALTH CARE EDUCATION/TRAINING PROGRAM

## 2024-05-28 PROCEDURE — 160031 HCHG SURGERY MINUTES - 1ST 30 MINS LEVEL 5: Performed by: STUDENT IN AN ORGANIZED HEALTH CARE EDUCATION/TRAINING PROGRAM

## 2024-05-28 PROCEDURE — 0DJ08ZZ INSPECTION OF UPPER INTESTINAL TRACT, VIA NATURAL OR ARTIFICIAL OPENING ENDOSCOPIC: ICD-10-PCS | Performed by: STUDENT IN AN ORGANIZED HEALTH CARE EDUCATION/TRAINING PROGRAM

## 2024-05-28 PROCEDURE — 99253 IP/OBS CNSLTJ NEW/EST LOW 45: CPT | Performed by: STUDENT IN AN ORGANIZED HEALTH CARE EDUCATION/TRAINING PROGRAM

## 2024-05-28 PROCEDURE — 160048 HCHG OR STATISTICAL LEVEL 1-5: Performed by: STUDENT IN AN ORGANIZED HEALTH CARE EDUCATION/TRAINING PROGRAM

## 2024-05-28 PROCEDURE — 700117 HCHG RX CONTRAST REV CODE 255: Mod: UD | Performed by: STUDENT IN AN ORGANIZED HEALTH CARE EDUCATION/TRAINING PROGRAM

## 2024-05-28 PROCEDURE — 502714 HCHG ROBOTIC SURGERY SERVICES: Performed by: STUDENT IN AN ORGANIZED HEALTH CARE EDUCATION/TRAINING PROGRAM

## 2024-05-28 PROCEDURE — A9270 NON-COVERED ITEM OR SERVICE: HCPCS | Performed by: ANESTHESIOLOGY

## 2024-05-28 PROCEDURE — 0DS64ZZ REPOSITION STOMACH, PERCUTANEOUS ENDOSCOPIC APPROACH: ICD-10-PCS | Performed by: STUDENT IN AN ORGANIZED HEALTH CARE EDUCATION/TRAINING PROGRAM

## 2024-05-28 PROCEDURE — 700111 HCHG RX REV CODE 636 W/ 250 OVERRIDE (IP): Mod: JZ | Performed by: ANESTHESIOLOGY

## 2024-05-28 PROCEDURE — 8E0W4CZ ROBOTIC ASSISTED PROCEDURE OF TRUNK REGION, PERCUTANEOUS ENDOSCOPIC APPROACH: ICD-10-PCS | Performed by: STUDENT IN AN ORGANIZED HEALTH CARE EDUCATION/TRAINING PROGRAM

## 2024-05-28 PROCEDURE — 700105 HCHG RX REV CODE 258: Performed by: REGISTERED NURSE

## 2024-05-28 PROCEDURE — 36415 COLL VENOUS BLD VENIPUNCTURE: CPT

## 2024-05-28 PROCEDURE — 74177 CT ABD & PELVIS W/CONTRAST: CPT

## 2024-05-28 PROCEDURE — 84132 ASSAY OF SERUM POTASSIUM: CPT

## 2024-05-28 PROCEDURE — 700102 HCHG RX REV CODE 250 W/ 637 OVERRIDE(OP): Performed by: ANESTHESIOLOGY

## 2024-05-28 PROCEDURE — C1729 CATH, DRAINAGE: HCPCS | Performed by: STUDENT IN AN ORGANIZED HEALTH CARE EDUCATION/TRAINING PROGRAM

## 2024-05-28 PROCEDURE — 700101 HCHG RX REV CODE 250

## 2024-05-28 PROCEDURE — 160035 HCHG PACU - 1ST 60 MINS PHASE I: Performed by: STUDENT IN AN ORGANIZED HEALTH CARE EDUCATION/TRAINING PROGRAM

## 2024-05-28 PROCEDURE — 700111 HCHG RX REV CODE 636 W/ 250 OVERRIDE (IP): Performed by: STUDENT IN AN ORGANIZED HEALTH CARE EDUCATION/TRAINING PROGRAM

## 2024-05-28 PROCEDURE — 160009 HCHG ANES TIME/MIN: Performed by: STUDENT IN AN ORGANIZED HEALTH CARE EDUCATION/TRAINING PROGRAM

## 2024-05-28 PROCEDURE — 82962 GLUCOSE BLOOD TEST: CPT | Mod: 91

## 2024-05-28 PROCEDURE — 110371 HCHG SHELL REV 272: Performed by: STUDENT IN AN ORGANIZED HEALTH CARE EDUCATION/TRAINING PROGRAM

## 2024-05-28 PROCEDURE — 700111 HCHG RX REV CODE 636 W/ 250 OVERRIDE (IP): Performed by: REGISTERED NURSE

## 2024-05-28 PROCEDURE — 160002 HCHG RECOVERY MINUTES (STAT): Performed by: STUDENT IN AN ORGANIZED HEALTH CARE EDUCATION/TRAINING PROGRAM

## 2024-05-28 PROCEDURE — 160036 HCHG PACU - EA ADDL 30 MINS PHASE I: Performed by: STUDENT IN AN ORGANIZED HEALTH CARE EDUCATION/TRAINING PROGRAM

## 2024-05-28 PROCEDURE — 770006 HCHG ROOM/CARE - MED/SURG/GYN SEMI*

## 2024-05-28 PROCEDURE — 700101 HCHG RX REV CODE 250: Performed by: STUDENT IN AN ORGANIZED HEALTH CARE EDUCATION/TRAINING PROGRAM

## 2024-05-28 PROCEDURE — C9113 INJ PANTOPRAZOLE SODIUM, VIA: HCPCS | Performed by: STUDENT IN AN ORGANIZED HEALTH CARE EDUCATION/TRAINING PROGRAM

## 2024-05-28 PROCEDURE — 160042 HCHG SURGERY MINUTES - EA ADDL 1 MIN LEVEL 5: Performed by: STUDENT IN AN ORGANIZED HEALTH CARE EDUCATION/TRAINING PROGRAM

## 2024-05-28 PROCEDURE — 99223 1ST HOSP IP/OBS HIGH 75: CPT | Performed by: STUDENT IN AN ORGANIZED HEALTH CARE EDUCATION/TRAINING PROGRAM

## 2024-05-28 RX ORDER — DEXTROSE MONOHYDRATE 25 G/50ML
25 INJECTION, SOLUTION INTRAVENOUS
Status: DISPENSED | OUTPATIENT
Start: 2024-05-28

## 2024-05-28 RX ORDER — KETOROLAC TROMETHAMINE 15 MG/ML
INJECTION, SOLUTION INTRAMUSCULAR; INTRAVENOUS PRN
Status: DISCONTINUED | OUTPATIENT
Start: 2024-05-28 | End: 2024-05-28 | Stop reason: SURG

## 2024-05-28 RX ORDER — DIPHENHYDRAMINE HYDROCHLORIDE 50 MG/ML
12.5 INJECTION INTRAMUSCULAR; INTRAVENOUS
Status: DISCONTINUED | OUTPATIENT
Start: 2024-05-28 | End: 2024-05-28 | Stop reason: HOSPADM

## 2024-05-28 RX ORDER — OXYCODONE HCL 5 MG/5 ML
10 SOLUTION, ORAL ORAL
Status: COMPLETED | OUTPATIENT
Start: 2024-05-28 | End: 2024-05-28

## 2024-05-28 RX ORDER — SODIUM CHLORIDE, SODIUM LACTATE, POTASSIUM CHLORIDE, CALCIUM CHLORIDE 600; 310; 30; 20 MG/100ML; MG/100ML; MG/100ML; MG/100ML
INJECTION, SOLUTION INTRAVENOUS
Status: DISCONTINUED | OUTPATIENT
Start: 2024-05-28 | End: 2024-05-28 | Stop reason: SURG

## 2024-05-28 RX ORDER — METOPROLOL TARTRATE 1 MG/ML
1 INJECTION, SOLUTION INTRAVENOUS
Status: DISCONTINUED | OUTPATIENT
Start: 2024-05-28 | End: 2024-05-28 | Stop reason: HOSPADM

## 2024-05-28 RX ORDER — ONDANSETRON 2 MG/ML
4 INJECTION INTRAMUSCULAR; INTRAVENOUS EVERY 4 HOURS PRN
Status: DISPENSED | OUTPATIENT
Start: 2024-05-28

## 2024-05-28 RX ORDER — ACETAMINOPHEN 500 MG
1000 TABLET ORAL EVERY 6 HOURS
Status: DISCONTINUED | OUTPATIENT
Start: 2024-05-28 | End: 2024-05-28

## 2024-05-28 RX ORDER — LABETALOL HYDROCHLORIDE 5 MG/ML
10 INJECTION, SOLUTION INTRAVENOUS EVERY 4 HOURS PRN
Status: ACTIVE | OUTPATIENT
Start: 2024-05-28

## 2024-05-28 RX ORDER — PANTOPRAZOLE SODIUM 40 MG/1
40 TABLET, DELAYED RELEASE ORAL 2 TIMES DAILY
Status: ON HOLD | COMMUNITY

## 2024-05-28 RX ORDER — HYDROMORPHONE HYDROCHLORIDE 1 MG/ML
0.1 INJECTION, SOLUTION INTRAMUSCULAR; INTRAVENOUS; SUBCUTANEOUS
Status: DISCONTINUED | OUTPATIENT
Start: 2024-05-28 | End: 2024-05-28 | Stop reason: HOSPADM

## 2024-05-28 RX ORDER — ROCURONIUM BROMIDE 10 MG/ML
INJECTION, SOLUTION INTRAVENOUS PRN
Status: DISCONTINUED | OUTPATIENT
Start: 2024-05-28 | End: 2024-05-28 | Stop reason: SURG

## 2024-05-28 RX ORDER — PROCHLORPERAZINE EDISYLATE 5 MG/ML
5-10 INJECTION INTRAMUSCULAR; INTRAVENOUS EVERY 4 HOURS PRN
Status: DISPENSED | OUTPATIENT
Start: 2024-05-28

## 2024-05-28 RX ORDER — PHENYLEPHRINE HCL IN 0.9% NACL 1 MG/10 ML
SYRINGE (ML) INTRAVENOUS PRN
Status: DISCONTINUED | OUTPATIENT
Start: 2024-05-28 | End: 2024-05-28 | Stop reason: SURG

## 2024-05-28 RX ORDER — SUCCINYLCHOLINE CHLORIDE 20 MG/ML
INJECTION INTRAMUSCULAR; INTRAVENOUS PRN
Status: DISCONTINUED | OUTPATIENT
Start: 2024-05-28 | End: 2024-05-28 | Stop reason: SURG

## 2024-05-28 RX ORDER — HYDROMORPHONE HYDROCHLORIDE 1 MG/ML
.5-1 INJECTION, SOLUTION INTRAMUSCULAR; INTRAVENOUS; SUBCUTANEOUS
Status: DISPENSED | OUTPATIENT
Start: 2024-05-28

## 2024-05-28 RX ORDER — BUPIVACAINE HYDROCHLORIDE AND EPINEPHRINE 5; 5 MG/ML; UG/ML
INJECTION, SOLUTION EPIDURAL; INTRACAUDAL; PERINEURAL
Status: DISCONTINUED | OUTPATIENT
Start: 2024-05-28 | End: 2024-05-28 | Stop reason: HOSPADM

## 2024-05-28 RX ORDER — MEPERIDINE HYDROCHLORIDE 25 MG/ML
12.5 INJECTION INTRAMUSCULAR; INTRAVENOUS; SUBCUTANEOUS
Status: DISCONTINUED | OUTPATIENT
Start: 2024-05-28 | End: 2024-05-28 | Stop reason: HOSPADM

## 2024-05-28 RX ORDER — ACETAMINOPHEN 500 MG
1000 TABLET ORAL EVERY 6 HOURS PRN
Status: DISCONTINUED | OUTPATIENT
Start: 2024-06-02 | End: 2024-05-28

## 2024-05-28 RX ORDER — HYDROMORPHONE HYDROCHLORIDE 1 MG/ML
0.2 INJECTION, SOLUTION INTRAMUSCULAR; INTRAVENOUS; SUBCUTANEOUS
Status: DISCONTINUED | OUTPATIENT
Start: 2024-05-28 | End: 2024-05-28 | Stop reason: HOSPADM

## 2024-05-28 RX ORDER — INSULIN LISPRO 100 [IU]/ML
1-6 INJECTION, SOLUTION INTRAVENOUS; SUBCUTANEOUS EVERY 6 HOURS
Status: DISPENSED | OUTPATIENT
Start: 2024-05-28

## 2024-05-28 RX ORDER — LABETALOL HYDROCHLORIDE 5 MG/ML
5 INJECTION, SOLUTION INTRAVENOUS
Status: DISCONTINUED | OUTPATIENT
Start: 2024-05-28 | End: 2024-05-28 | Stop reason: HOSPADM

## 2024-05-28 RX ORDER — SODIUM CHLORIDE, SODIUM LACTATE, POTASSIUM CHLORIDE, CALCIUM CHLORIDE 600; 310; 30; 20 MG/100ML; MG/100ML; MG/100ML; MG/100ML
INJECTION, SOLUTION INTRAVENOUS CONTINUOUS
Status: DISCONTINUED | OUTPATIENT
Start: 2024-05-28 | End: 2024-05-28 | Stop reason: HOSPADM

## 2024-05-28 RX ORDER — PANTOPRAZOLE SODIUM 40 MG/10ML
40 INJECTION, POWDER, LYOPHILIZED, FOR SOLUTION INTRAVENOUS 2 TIMES DAILY
Status: DISPENSED | OUTPATIENT
Start: 2024-05-28

## 2024-05-28 RX ORDER — OXYCODONE HCL 5 MG/5 ML
5 SOLUTION, ORAL ORAL
Status: COMPLETED | OUTPATIENT
Start: 2024-05-28 | End: 2024-05-28

## 2024-05-28 RX ORDER — POTASSIUM CHLORIDE 7.45 MG/ML
10 INJECTION INTRAVENOUS
Status: COMPLETED | OUTPATIENT
Start: 2024-05-28 | End: 2024-05-28

## 2024-05-28 RX ORDER — HYDROMORPHONE HYDROCHLORIDE 1 MG/ML
0.4 INJECTION, SOLUTION INTRAMUSCULAR; INTRAVENOUS; SUBCUTANEOUS
Status: DISCONTINUED | OUTPATIENT
Start: 2024-05-28 | End: 2024-05-28 | Stop reason: HOSPADM

## 2024-05-28 RX ORDER — CEFAZOLIN SODIUM 1 G/3ML
INJECTION, POWDER, FOR SOLUTION INTRAMUSCULAR; INTRAVENOUS PRN
Status: DISCONTINUED | OUTPATIENT
Start: 2024-05-28 | End: 2024-05-28 | Stop reason: SURG

## 2024-05-28 RX ORDER — ONDANSETRON 2 MG/ML
4 INJECTION INTRAMUSCULAR; INTRAVENOUS
Status: DISCONTINUED | OUTPATIENT
Start: 2024-05-28 | End: 2024-05-28 | Stop reason: HOSPADM

## 2024-05-28 RX ORDER — EPHEDRINE SULFATE 50 MG/ML
5 INJECTION, SOLUTION INTRAVENOUS
Status: DISCONTINUED | OUTPATIENT
Start: 2024-05-28 | End: 2024-05-28 | Stop reason: HOSPADM

## 2024-05-28 RX ORDER — LIDOCAINE HYDROCHLORIDE 20 MG/ML
JELLY TOPICAL ONCE
Status: COMPLETED | OUTPATIENT
Start: 2024-05-28 | End: 2024-05-28

## 2024-05-28 RX ORDER — HYDROMORPHONE HYDROCHLORIDE 1 MG/ML
0.25 INJECTION, SOLUTION INTRAMUSCULAR; INTRAVENOUS; SUBCUTANEOUS
Status: DISCONTINUED | OUTPATIENT
Start: 2024-05-28 | End: 2024-05-28

## 2024-05-28 RX ORDER — PROMETHAZINE HYDROCHLORIDE 25 MG/1
12.5-25 SUPPOSITORY RECTAL EVERY 4 HOURS PRN
Status: ACTIVE | OUTPATIENT
Start: 2024-05-28

## 2024-05-28 RX ORDER — OXYCODONE HYDROCHLORIDE 5 MG/1
2.5 TABLET ORAL
Status: DISCONTINUED | OUTPATIENT
Start: 2024-05-28 | End: 2024-05-28

## 2024-05-28 RX ORDER — OXYCODONE HYDROCHLORIDE 5 MG/1
5 TABLET ORAL
Status: DISCONTINUED | OUTPATIENT
Start: 2024-05-28 | End: 2024-05-28

## 2024-05-28 RX ORDER — LIDOCAINE HYDROCHLORIDE 20 MG/ML
INJECTION, SOLUTION EPIDURAL; INFILTRATION; INTRACAUDAL; PERINEURAL PRN
Status: DISCONTINUED | OUTPATIENT
Start: 2024-05-28 | End: 2024-05-28 | Stop reason: SURG

## 2024-05-28 RX ORDER — MIDAZOLAM HYDROCHLORIDE 1 MG/ML
1 INJECTION INTRAMUSCULAR; INTRAVENOUS
Status: DISCONTINUED | OUTPATIENT
Start: 2024-05-28 | End: 2024-05-28 | Stop reason: HOSPADM

## 2024-05-28 RX ORDER — HYDRALAZINE HYDROCHLORIDE 20 MG/ML
5 INJECTION INTRAMUSCULAR; INTRAVENOUS
Status: DISCONTINUED | OUTPATIENT
Start: 2024-05-28 | End: 2024-05-28 | Stop reason: HOSPADM

## 2024-05-28 RX ORDER — HALOPERIDOL 5 MG/ML
1 INJECTION INTRAMUSCULAR
Status: DISCONTINUED | OUTPATIENT
Start: 2024-05-28 | End: 2024-05-28 | Stop reason: HOSPADM

## 2024-05-28 RX ORDER — GABAPENTIN 100 MG/1
100 CAPSULE ORAL 3 TIMES DAILY
Status: DISCONTINUED | OUTPATIENT
Start: 2024-05-28 | End: 2024-05-28

## 2024-05-28 RX ORDER — ONDANSETRON 2 MG/ML
INJECTION INTRAMUSCULAR; INTRAVENOUS PRN
Status: DISCONTINUED | OUTPATIENT
Start: 2024-05-28 | End: 2024-05-28 | Stop reason: SURG

## 2024-05-28 RX ADMIN — PANTOPRAZOLE SODIUM 40 MG: 40 INJECTION, POWDER, FOR SOLUTION INTRAVENOUS at 05:05

## 2024-05-28 RX ADMIN — CEFAZOLIN 2 G: 1 INJECTION, POWDER, FOR SOLUTION INTRAMUSCULAR; INTRAVENOUS at 16:25

## 2024-05-28 RX ADMIN — POTASSIUM CHLORIDE 10 MEQ: 7.46 INJECTION, SOLUTION INTRAVENOUS at 02:31

## 2024-05-28 RX ADMIN — POTASSIUM CHLORIDE 10 MEQ: 7.46 INJECTION, SOLUTION INTRAVENOUS at 03:51

## 2024-05-28 RX ADMIN — METHOCARBAMOL 1000 MG: 100 INJECTION, SOLUTION INTRAMUSCULAR; INTRAVENOUS at 21:38

## 2024-05-28 RX ADMIN — SODIUM CHLORIDE, POTASSIUM CHLORIDE, SODIUM LACTATE AND CALCIUM CHLORIDE: 600; 310; 30; 20 INJECTION, SOLUTION INTRAVENOUS at 17:39

## 2024-05-28 RX ADMIN — ROCURONIUM BROMIDE 20 MG: 50 INJECTION, SOLUTION INTRAVENOUS at 18:25

## 2024-05-28 RX ADMIN — FENTANYL CITRATE 100 MCG: 50 INJECTION, SOLUTION INTRAMUSCULAR; INTRAVENOUS at 16:45

## 2024-05-28 RX ADMIN — POTASSIUM CHLORIDE 10 MEQ: 7.46 INJECTION, SOLUTION INTRAVENOUS at 05:07

## 2024-05-28 RX ADMIN — ROCURONIUM BROMIDE 30 MG: 50 INJECTION, SOLUTION INTRAVENOUS at 17:17

## 2024-05-28 RX ADMIN — FENTANYL CITRATE 50 MCG: 50 INJECTION, SOLUTION INTRAMUSCULAR; INTRAVENOUS at 18:25

## 2024-05-28 RX ADMIN — FENTANYL CITRATE 50 MCG: 50 INJECTION, SOLUTION INTRAMUSCULAR; INTRAVENOUS at 16:47

## 2024-05-28 RX ADMIN — FENTANYL CITRATE 100 MCG: 50 INJECTION, SOLUTION INTRAMUSCULAR; INTRAVENOUS at 16:56

## 2024-05-28 RX ADMIN — LIDOCAINE HYDROCHLORIDE 120 MG: 20 JELLY TOPICAL at 01:19

## 2024-05-28 RX ADMIN — FENTANYL CITRATE 50 MCG: 50 INJECTION, SOLUTION INTRAMUSCULAR; INTRAVENOUS at 19:25

## 2024-05-28 RX ADMIN — OXYCODONE HYDROCHLORIDE 10 MG: 5 SOLUTION ORAL at 19:31

## 2024-05-28 RX ADMIN — HYDROMORPHONE HYDROCHLORIDE 0.4 MG: 1 INJECTION, SOLUTION INTRAMUSCULAR; INTRAVENOUS; SUBCUTANEOUS at 20:07

## 2024-05-28 RX ADMIN — ONDANSETRON 4 MG: 2 INJECTION INTRAMUSCULAR; INTRAVENOUS at 13:44

## 2024-05-28 RX ADMIN — Medication 100 MCG: at 17:25

## 2024-05-28 RX ADMIN — ONDANSETRON 4 MG: 2 INJECTION INTRAMUSCULAR; INTRAVENOUS at 05:43

## 2024-05-28 RX ADMIN — HYDROMORPHONE HYDROCHLORIDE 0.2 MG: 1 INJECTION, SOLUTION INTRAMUSCULAR; INTRAVENOUS; SUBCUTANEOUS at 19:42

## 2024-05-28 RX ADMIN — LIDOCAINE HYDROCHLORIDE 100 MG: 20 INJECTION, SOLUTION EPIDURAL; INFILTRATION; INTRACAUDAL at 16:25

## 2024-05-28 RX ADMIN — KETOROLAC TROMETHAMINE 15 MG: 15 INJECTION, SOLUTION INTRAMUSCULAR; INTRAVENOUS at 18:54

## 2024-05-28 RX ADMIN — IOHEXOL 100 ML: 350 INJECTION, SOLUTION INTRAVENOUS at 00:01

## 2024-05-28 RX ADMIN — FENTANYL CITRATE 100 MCG: 50 INJECTION, SOLUTION INTRAMUSCULAR; INTRAVENOUS at 16:25

## 2024-05-28 RX ADMIN — HYDROMORPHONE HYDROCHLORIDE 0.4 MG: 1 INJECTION, SOLUTION INTRAMUSCULAR; INTRAVENOUS; SUBCUTANEOUS at 19:37

## 2024-05-28 RX ADMIN — POTASSIUM CHLORIDE 10 MEQ: 7.46 INJECTION, SOLUTION INTRAVENOUS at 06:21

## 2024-05-28 RX ADMIN — SODIUM CHLORIDE, POTASSIUM CHLORIDE, SODIUM LACTATE AND CALCIUM CHLORIDE: 600; 310; 30; 20 INJECTION, SOLUTION INTRAVENOUS at 16:15

## 2024-05-28 RX ADMIN — ONDANSETRON 4 MG: 2 INJECTION INTRAMUSCULAR; INTRAVENOUS at 18:54

## 2024-05-28 RX ADMIN — FENTANYL CITRATE 100 MCG: 50 INJECTION, SOLUTION INTRAMUSCULAR; INTRAVENOUS at 19:03

## 2024-05-28 RX ADMIN — SUCCINYLCHOLINE CHLORIDE 180 MG: 20 INJECTION, SOLUTION INTRAMUSCULAR; INTRAVENOUS at 16:25

## 2024-05-28 RX ADMIN — SUGAMMADEX 200 MG: 100 INJECTION, SOLUTION INTRAVENOUS at 19:02

## 2024-05-28 RX ADMIN — HYDROMORPHONE HYDROCHLORIDE 1 MG: 1 INJECTION, SOLUTION INTRAMUSCULAR; INTRAVENOUS; SUBCUTANEOUS at 23:27

## 2024-05-28 RX ADMIN — FENTANYL CITRATE 50 MCG: 50 INJECTION, SOLUTION INTRAMUSCULAR; INTRAVENOUS at 19:23

## 2024-05-28 RX ADMIN — PROPOFOL 200 MG: 10 INJECTION, EMULSION INTRAVENOUS at 16:25

## 2024-05-28 RX ADMIN — ROCURONIUM BROMIDE 20 MG: 50 INJECTION, SOLUTION INTRAVENOUS at 16:35

## 2024-05-28 RX ADMIN — PROCHLORPERAZINE EDISYLATE 10 MG: 5 INJECTION INTRAMUSCULAR; INTRAVENOUS at 02:02

## 2024-05-28 RX ADMIN — HYDROMORPHONE HYDROCHLORIDE 0.4 MG: 1 INJECTION, SOLUTION INTRAMUSCULAR; INTRAVENOUS; SUBCUTANEOUS at 19:32

## 2024-05-28 SDOH — ECONOMIC STABILITY: TRANSPORTATION INSECURITY
IN THE PAST 12 MONTHS, HAS LACK OF RELIABLE TRANSPORTATION KEPT YOU FROM MEDICAL APPOINTMENTS, MEETINGS, WORK OR FROM GETTING THINGS NEEDED FOR DAILY LIVING?: NO

## 2024-05-28 SDOH — ECONOMIC STABILITY: TRANSPORTATION INSECURITY
IN THE PAST 12 MONTHS, HAS THE LACK OF TRANSPORTATION KEPT YOU FROM MEDICAL APPOINTMENTS OR FROM GETTING MEDICATIONS?: NO

## 2024-05-28 ASSESSMENT — SOCIAL DETERMINANTS OF HEALTH (SDOH)
WITHIN THE PAST 12 MONTHS, YOU WORRIED THAT YOUR FOOD WOULD RUN OUT BEFORE YOU GOT THE MONEY TO BUY MORE: NEVER TRUE
IN THE PAST 12 MONTHS, HAS THE ELECTRIC, GAS, OIL, OR WATER COMPANY THREATENED TO SHUT OFF SERVICE IN YOUR HOME?: NO
WITHIN THE LAST YEAR, HAVE YOU BEEN HUMILIATED OR EMOTIONALLY ABUSED IN OTHER WAYS BY YOUR PARTNER OR EX-PARTNER?: NO
WITHIN THE LAST YEAR, HAVE YOU BEEN AFRAID OF YOUR PARTNER OR EX-PARTNER?: NO
WITHIN THE LAST YEAR, HAVE TO BEEN RAPED OR FORCED TO HAVE ANY KIND OF SEXUAL ACTIVITY BY YOUR PARTNER OR EX-PARTNER?: NO
WITHIN THE PAST 12 MONTHS, THE FOOD YOU BOUGHT JUST DIDN'T LAST AND YOU DIDN'T HAVE MONEY TO GET MORE: NEVER TRUE
WITHIN THE LAST YEAR, HAVE YOU BEEN KICKED, HIT, SLAPPED, OR OTHERWISE PHYSICALLY HURT BY YOUR PARTNER OR EX-PARTNER?: NO

## 2024-05-28 ASSESSMENT — PATIENT HEALTH QUESTIONNAIRE - PHQ9
2. FEELING DOWN, DEPRESSED, IRRITABLE, OR HOPELESS: NOT AT ALL
1. LITTLE INTEREST OR PLEASURE IN DOING THINGS: NOT AT ALL
SUM OF ALL RESPONSES TO PHQ9 QUESTIONS 1 AND 2: 0

## 2024-05-28 ASSESSMENT — COGNITIVE AND FUNCTIONAL STATUS - GENERAL
PERSONAL GROOMING: A LITTLE
SUGGESTED CMS G CODE MODIFIER DAILY ACTIVITY: CK
CLIMB 3 TO 5 STEPS WITH RAILING: A LITTLE
MOVING FROM LYING ON BACK TO SITTING ON SIDE OF FLAT BED: A LITTLE
STANDING UP FROM CHAIR USING ARMS: A LITTLE
DRESSING REGULAR UPPER BODY CLOTHING: A LITTLE
TURNING FROM BACK TO SIDE WHILE IN FLAT BAD: A LITTLE
MOBILITY SCORE: 18
DAILY ACTIVITIY SCORE: 19
TOILETING: A LITTLE
WALKING IN HOSPITAL ROOM: A LITTLE
MOVING TO AND FROM BED TO CHAIR: A LITTLE
SUGGESTED CMS G CODE MODIFIER MOBILITY: CK
HELP NEEDED FOR BATHING: A LITTLE
DRESSING REGULAR LOWER BODY CLOTHING: A LITTLE

## 2024-05-28 ASSESSMENT — LIFESTYLE VARIABLES
ALCOHOL_USE: YES
ON A TYPICAL DAY WHEN YOU DRINK ALCOHOL HOW MANY DRINKS DO YOU HAVE: 1
HOW MANY TIMES IN THE PAST YEAR HAVE YOU HAD 5 OR MORE DRINKS IN A DAY: 5
TOTAL SCORE: 1
AVERAGE NUMBER OF DAYS PER WEEK YOU HAVE A DRINK CONTAINING ALCOHOL: 2
EVER HAD A DRINK FIRST THING IN THE MORNING TO STEADY YOUR NERVES TO GET RID OF A HANGOVER: NO
EVER FELT BAD OR GUILTY ABOUT YOUR DRINKING: NO
TOTAL SCORE: 1
DOES PATIENT WANT TO STOP DRINKING: NO
TOTAL SCORE: 1
HAVE PEOPLE ANNOYED YOU BY CRITICIZING YOUR DRINKING: NO
CONSUMPTION TOTAL: POSITIVE
HAVE YOU EVER FELT YOU SHOULD CUT DOWN ON YOUR DRINKING: YES

## 2024-05-28 ASSESSMENT — ENCOUNTER SYMPTOMS
COUGH: 0
DIARRHEA: 0
CHILLS: 0
NAUSEA: 1
ABDOMINAL PAIN: 1
FEVER: 0
VOMITING: 1
SHORTNESS OF BREATH: 0

## 2024-05-28 ASSESSMENT — PAIN DESCRIPTION - PAIN TYPE
TYPE: ACUTE PAIN
TYPE: SURGICAL PAIN
TYPE: ACUTE PAIN;SURGICAL PAIN
TYPE: ACUTE PAIN
TYPE: SURGICAL PAIN
TYPE: SURGICAL PAIN

## 2024-05-28 ASSESSMENT — FIBROSIS 4 INDEX
FIB4 SCORE: 1.08
FIB4 SCORE: 1.08

## 2024-05-28 NOTE — ASSESSMENT & PLAN NOTE
Hold lisinopril strict NPO.  As needed antihypertensives ordered  Vitals:    05/31/24 1221   BP:    Pulse:    Resp: 16   Temp:    SpO2:      Stable

## 2024-05-28 NOTE — ED TRIAGE NOTES
Chief Complaint   Patient presents with    Low Blood Sugar     54 yo M BIB wife from home for above. Per family pt started to feel lethargic and experienced multiple episodes of N/V at 2030, FSBG at home was 45. Hx of DM1, took 40 units of Lantus at 1800. Per wife pt has had increased trouble eating due to esophageal stricture and has been eating less than usual.     Pt to red 7 from the lobby. FSBG 42. Pt lethargic but responsive to voice. ERP paged, DR. Hook at bedside.     PIV palced, blood drawn and sent to lab. D50 administered.     Following D50 admin, level of consciousness improved. Pt answering questions and complaining of 10/10 chest/abdominal pain and nausea.

## 2024-05-28 NOTE — ED NOTES
RN attempted NG tube placement. Pt  gagging/ dry heaving and not tolerating. Pt refused to proceed w/ insertion at this time

## 2024-05-28 NOTE — CONSULTS
Thoracic & General Surgery Consultation      Date of Service  5/28/2024    Referring Physician  Christopher Granger M.D.    Consulting Physician  Chad Huang M.D.    Reason for Consultation  Nausea and vomiting    History of Presenting Illness  Mr. Peña Guerrero is a 53 y.o. male who presented 5/27/2024 with nausea and vomiting.  A CT scan showed a recurrent paraesophageal hernia with the stomach extremely dilated and in his chest.  He states he was in his normal state of health until yesterday evening when he had sudden onset vomiting and retching.  This persisted and he developed severe pain with this.  He thus presented to the emergency room.  No fevers or chills.  No blood in his emesis.    Review of Systems  All other systems reviewed and negative except as noted above    Past Medical History   has a past medical history of AC separation, right, initial encounter (02/17/2020), Acute alcohol intoxication ([facility]) (02/17/2020), CLARA (acute kidney injury) ([facility]) (03/28/2023), Allergy, Closed fracture of distal lateral malleolus of right fibula (02/17/2020), Closed fracture of multiple ribs of both sides (09/03/2020), Compression fracture of T1 vertebra ([facility]) (02/17/2020), Diabetes ([facility]), DKA, type 1, not at goal ([facility]) (03/28/2023), Elevated lactic acid level (03/28/2023), Encephalopathy (03/29/2023), Flail chest, initial encounter for closed fracture (02/17/2020), Heart burn, Hepatic steatosis (03/28/2023), Hiatus hernia syndrome, High cholesterol, Hyperkalemia (03/28/2023), Hypertension, Ileus ([facility]) (02/21/2020), Injury, Intertrochanteric fracture of right femur, closed, initial encounter ([facility]) (09/03/2020), Leukocytosis (03/28/2023), No contraindication to deep vein thrombosis (DVT) prophylaxis (09/03/2020), Pain of left thumb (03/16/2015), Screening examination for infectious disease, Thumb laceration (03/16/2015), Trauma (09/04/2020), and Urinary retention (09/05/2020).    Surgical History   has a past  surgical history that includes pr open fix inter/subtroch fx,implnt (Right, 09/03/2020); other abdominal surgery (1987); pr upper gi endoscopy,diagnosis (04/25/2024); robotic assisted lap hiatal hernia rep (04/25/2024); other orthopedic surgery; pr upper gi endoscopy,diagnosis (N/A, 5/15/2024); pr upper gi endoscopy,diagnosis (N/A, 5/15/2024); and pr upper gi endoscopy,scler inject (N/A, 5/15/2024).    Family History  family history includes No Known Problems in his daughter and son.    Social History   reports that he has never smoked. He has never used smokeless tobacco. He reports that he does not currently use alcohol. He reports current drug use. Drug: Inhaled.    Medications  Current Facility-Administered Medications   Medication Dose Route Frequency Provider Last Rate Last Admin    labetalol (Normodyne/Trandate) injection 10 mg  10 mg Intravenous Q4HRS JUAN JOSÉN Akosua Car M.D.        ondansetron (Zofran) syringe/vial injection 4 mg  4 mg Intravenous Q4HRS JUAN JOSÉN Akosua Car M.D.   4 mg at 05/28/24 0543    promethazine (Phenergan) suppository 12.5-25 mg  12.5-25 mg Rectal Q4HRS PRN Akosua Car M.D.        prochlorperazine (Compazine) injection 5-10 mg  5-10 mg Intravenous Q4HRS JUAN JOSÉN Akosua Car M.D.   10 mg at 05/28/24 0202    insulin GLARGINE (Lantus,Semglee) injection  20 Units Subcutaneous Q EVENING Akosua Car M.D.        And    insulin lispro (HumaLOG,AdmeLOG) injection  1-6 Units Subcutaneous Q6HRS Akosua Car M.D.        And    dextrose 50% (D50W) injection 25 g  25 g Intravenous Q15 MIN PRN Akosua Car M.D.        pantoprazole (Protonix) injection 40 mg  40 mg Intravenous BID Akosua Car M.D.   40 mg at 05/28/24 0505    Pharmacy Consult Request ...Pain Management Review 1 Each  1 Each Other PHARMACY TO DOSE Yolanda Larry A.P.R.NToya        HYDROmorphone (Dilaudid) injection 0.5-1 mg  0.5-1 mg Intravenous Q3HRS PRN JUSTO AsifRGISEL        methocarbamol (Robaxin)  1,000 mg in  mL IVPB  1,000 mg Intravenous Q8HRS Yolanda Larry A.PToyaRToyaN.           Allergies  Allergies   Allergen Reactions    Pcn [Penicillins] Anaphylaxis     Historic reporting from childhood       Physical Exam  Temp:  [36.1 °C (97 °F)-36.6 °C (97.8 °F)] 36.1 °C (97 °F)  Pulse:  [66-83] 83  Resp:  [14-22] 18  BP: (163-195)/() 165/103  SpO2:  [84 %-99 %] 96 %    GEN: Healthy appearing, well developed, no acute distress.  PSYCH: Alert and oriented x3. Normal  memory, mood, and affect.  HEENT     -Head: Normocephalic, atraumatic     -Eyes: PERRL, No discharge or redness;     -Ears: External ears are normal.      -Nose: Normal nares.     -Throat: moist mucus membranes, good dentition    NECK: Supple, trachea midline with no masses.  CV: RRR, radial pulses 2+, brisk cap refill on hands  LUNGS: no labored breathing on room air, no wheezing  ABD: Soft, minimally tender, nondistended, prior incisions well-healed  SKIN: Warm, well perfused. No skin rashes or abnormal lesions.  MSK: strength 5/5 throughout. No deformities  EXT: No clubbing, cyanosis, or edema.  NEURO: No focal deficits      Fluids  Date 05/28/24 0700 - 05/29/24 0659   Shift 4135-5268 8872-3352 2757-4619 24 Hour Total   INTAKE   Shift Total       OUTPUT   Emesis 100   100   Shift Total 100   100   Weight (kg) 86.4 86.4 86.4 86.4       Laboratory  Recent Labs     05/27/24 2215   WBC 13.0*   RBC 5.79   HEMOGLOBIN 18.0   HEMATOCRIT 50.8   MCV 87.7   MCH 31.1   MCHC 35.4   RDW 40.2   PLATELETCT 309   MPV 8.9*     Recent Labs     05/27/24 2215 05/28/24  1157   SODIUM 143  --    POTASSIUM 3.1* 4.0   CHLORIDE 101  --    CO2 26  --    GLUCOSE 42*  --    BUN 10  --    CREATININE 0.93  --    CALCIUM 9.6  --                      Imaging  DX-ABDOMEN FOR TUBE PLACEMENT   Final Result         1.  Nonspecific bowel gas pattern in the upper abdomen.   2.  Nasogastric tube coiled back in the mid esophagus with tip projecting cephalad, recommend  withdrawal and replacement   3.  Left basilar atelectasis and/or infiltrate      CT-ABDOMEN-PELVIS WITH   Final Result         1.  4.5 cm diaphragmatic versus hiatal hernia with near complete herniation of the stomach into the left chest, associated fluid-filled distention of the stomach is seen suggesting gastric obstruction and raising concern for possible incarceration.    Appearance of the stomach likely represents component of gastric volvulus.   2.  Small left pleural effusion      These findings were discussed with the patient's clinician, Eliu Hook, on 5/28/2024 12:10 AM.      DX-CHEST-PORTABLE (1 VIEW)   Final Result         1.  Hazy left midlung and lower lobe infiltrate.          Assessment/Plan  This is a 53 y.o. male who presents with a recurrent paraesophageal hernia.  He previously underwent repair on 4/25/2024.  And subsequently had esophageal dilation on 5/15/2024.  Will plan for OR today for redo paraesophageal hernia repair and upper endoscopy.      Chad Huang MD  Thoracic & General Surgeon  [facility]  [phone]

## 2024-05-28 NOTE — ED PROVIDER NOTES
"ER Provider Note    Scribed for Dr. Eliu Hook MD. by Wolf Goldman. 5/27/2024  10:16 PM    Primary Care Provider: Merlene Esteban M.D.    CHIEF COMPLAINT  Chief Complaint   Patient presents with    Low Blood Sugar     EXTERNAL RECORDS REVIEWED  The patient's records show that they had a hiatal hernia repair on 4/25/24 performed by Dr. Huang. The patient also had an upper GI endoscopy on 5/15/24 with Dr. Huang.     HPI/ROS  LIMITATION TO HISTORY   Select: History was provided by the patient's family member    OUTSIDE HISTORIAN(S):  Family  member was present at bedside to provide history.     Peña Guerrero is a 53 y.o. male with a history of type 1 diabetes who presents to the ED for hypoglycemia onset 8:30 PM this evening. The patient's family member explains that the patient began vomiting \"hard\" tonight and has not been able to keep any solids or liquids down all day which prompted her to check the patient's sugar. She states that the patient's blood sugar was 45 at this time and notes that the patient has associated chest pain, abdominal pain, nausea, and has been eating and drinking less than normal. She denies the patient having blood in his vomit. She adds that the patient had a Hiatal Hernia fixed on April 25th 2024, and explains that \"everything was initially good\" after the surgery. The family member reports that the patient then needed a dilation 2 weeks after the hiatal hernia repair. The wife notes that the patient usually takes 60 units of Lantus insulin for his diabetes, but recently scaled back to 40 due to not eating as much. She adds that the patient did take his insulin this evening prior to arrival. She notes that the patient is scheduled for another dilation on Wednesday of this week.     PAST MEDICAL HISTORY  Past Medical History:   Diagnosis Date    AC separation, right, initial encounter 02/17/2020    Acute alcohol intoxication (HCC) 02/17/2020    CLARA (acute kidney injury) (HCC) " 03/28/2023    Allergy     Closed fracture of distal lateral malleolus of right fibula 02/17/2020    Closed fracture of multiple ribs of both sides 09/03/2020    Compression fracture of T1 vertebra ([facility]) 02/17/2020    Diabetes ([facility])     DKA, type 1, not at goal ([facility]) 03/28/2023    Elevated lactic acid level 03/28/2023    Encephalopathy 03/29/2023    Flail chest, initial encounter for closed fracture 02/17/2020    Heart burn     Hepatic steatosis 03/28/2023    Hiatus hernia syndrome     High cholesterol     Hyperkalemia 03/28/2023    Hypertension     Ileus ([facility]) 02/21/2020 2/21: nausea and emesis may be opiate related.  Abdomen benign.  Flat Plate a few dilated loops in pelvis.  Suppository.      Injury     Intertrochanteric fracture of right femur, closed, initial encounter ([facility]) 09/03/2020    Leukocytosis 03/28/2023    No contraindication to deep vein thrombosis (DVT) prophylaxis 09/03/2020    Pain of left thumb 03/16/2015    Screening examination for infectious disease     Thumb laceration 03/16/2015    Trauma 09/04/2020    Urinary retention 09/05/2020     SURGICAL HISTORY  Past Surgical History:   Procedure Laterality Date    MA UPPER GI ENDOSCOPY,DIAGNOSIS N/A 5/15/2024    Procedure: UPPER GASTROINTESTINAL ENDOSCOPY, WITH BALLOON DILATION OF ESOPHAGUS;  Surgeon: Chad Huang M.D.;  Location: SURGERY SAME DAY [facility];  Service: General    MA UPPER GI ENDOSCOPY,DIAGNOSIS N/A 5/15/2024    Procedure: GASTROSCOPY;  Surgeon: Chad Huang M.D.;  Location: SURGERY SAME DAY [facility];  Service: General    MA UPPER GI ENDOSCOPY,SCLER INJECT N/A 5/15/2024    Procedure: GASTROSCOPY, WITH SCLEROTHERAPY;  Surgeon: Chad Huang M.D.;  Location: SURGERY SAME DAY [facility];  Service: General    MA UPPER GI ENDOSCOPY,DIAGNOSIS  04/25/2024    Procedure: ESOPHAGOGASTRODUODENOSCOPY WITH BALLOON DILATION;  Surgeon: Chad Huang M.D.;  Location: SURGERY [facility];  Service: Gen Robotic    ROBOTIC ASSISTED LAP  "HIATAL HERNIA REP  04/25/2024    Procedure: ROBOTIC HIATAL HERNIA REPAIR;  Surgeon: Chad Huang M.D.;  Location: SURGERY [facility];  Service: Gen Robotic    PB OPEN FIX INTER/SUBTROCH FX,IMPLNT Right 09/03/2020    Procedure: INSERTION, INTRAMEDULLARY LLOYD, FEMUR, PROXIMAL;  Surgeon: Shaka George M.D.;  Location: SURGERY [facility];  Service: Orthopedics    OTHER ABDOMINAL SURGERY  1987    spleen and pancreas    OTHER ORTHOPEDIC SURGERY       FAMILY HISTORY  Family History   Problem Relation Age of Onset    No Known Problems Daughter     No Known Problems Son     Cancer Neg Hx     Heart Disease Neg Hx      SOCIAL HISTORY   reports that he has never smoked. He has never used smokeless tobacco. He reports that he does not currently use alcohol. He reports current drug use. Drug: Inhaled.    CURRENT MEDICATIONS  Current Discharge Medication List        CONTINUE these medications which have NOT CHANGED    Details   pantoprazole (PROTONIX) 40 MG Tablet Delayed Response Take 40 mg by mouth 2 times a day.      insulin glargine (LANTUS SOLOSTAR) 100 UNIT/ML Solution Pen-injector injection Inject 30 Units under the skin every evening. 5 pens per month  Qty: 15 mL, Refills: 5    Associated Diagnoses: Type 2 diabetes mellitus with hyperglycemia, with long-term current use of insulin (HCC)      lisinopril (PRINIVIL) 20 MG Tab Take 1 Tablet by mouth every day.  Qty: 90 Tablet, Refills: 3    Associated Diagnoses: Primary hypertension      Insulin Pen Needle 32G X 6 MM Misc For use with 2-3 daily insulin and or GLP-1 injection.  Qty: 100 Each, Refills: 3    Associated Diagnoses: Type 2 diabetes mellitus with hyperglycemia, with long-term current use of insulin (HCC)           ALLERGIES  Pcn [penicillins]    PHYSICAL EXAM  BP (!) 189/118   Pulse 70   Temp 36.3 °C (97.4 °F) (Temporal)   Resp 14   Ht 1.905 m (6' 3\")   Wt 88.9 kg (196 lb)   SpO2 97%   BMI 24.50 kg/m²     Physical Exam  Vitals and nursing note " reviewed.   Constitutional:       Appearance: He is well-developed. He is not toxic-appearing.   HENT:      Head: Normocephalic.   Cardiovascular:      Rate and Rhythm: Normal rate and regular rhythm.      Heart sounds: No murmur heard.  Pulmonary:      Effort: Pulmonary effort is normal.      Breath sounds: Normal breath sounds. No wheezing or rales.   Abdominal:      Palpations: Abdomen is soft.      Tenderness: There is abdominal tenderness (Severe epigastric). There is no guarding or rebound.   Musculoskeletal:         General: Normal range of motion.      Right lower leg: No edema.      Left lower leg: No edema.   Skin:     General: Skin is warm.   Neurological:      General: No focal deficit present.      Mental Status: He is alert and oriented to person, place, and time.       DIAGNOSTIC STUDIES & PROCEDURES    Labs:   Results for orders placed or performed during the hospital encounter of 05/27/24   CBC WITH DIFFERENTIAL   Result Value Ref Range    WBC 13.0 (H) 4.8 - 10.8 K/uL    RBC 5.79 4.70 - 6.10 M/uL    Hemoglobin 18.0 14.0 - 18.0 g/dL    Hematocrit 50.8 42.0 - 52.0 %    MCV 87.7 81.4 - 97.8 fL    MCH 31.1 27.0 - 33.0 pg    MCHC 35.4 32.3 - 36.5 g/dL    RDW 40.2 35.9 - 50.0 fL    Platelet Count 309 164 - 446 K/uL    MPV 8.9 (L) 9.0 - 12.9 fL    Neutrophils-Polys 78.20 (H) 44.00 - 72.00 %    Lymphocytes 14.50 (L) 22.00 - 41.00 %    Monocytes 4.80 0.00 - 13.40 %    Eosinophils 1.80 0.00 - 6.90 %    Basophils 0.30 0.00 - 1.80 %    Immature Granulocytes 0.40 0.00 - 0.90 %    Nucleated RBC 0.00 0.00 - 0.20 /100 WBC    Neutrophils (Absolute) 10.19 (H) 1.82 - 7.42 K/uL    Lymphs (Absolute) 1.89 1.00 - 4.80 K/uL    Monos (Absolute) 0.62 0.00 - 0.85 K/uL    Eos (Absolute) 0.23 0.00 - 0.51 K/uL    Baso (Absolute) 0.04 0.00 - 0.12 K/uL    Immature Granulocytes (abs) 0.05 0.00 - 0.11 K/uL    NRBC (Absolute) 0.00 K/uL   COMP METABOLIC PANEL   Result Value Ref Range    Sodium 143 135 - 145 mmol/L    Potassium 3.1 (L)  3.6 - 5.5 mmol/L    Chloride 101 96 - 112 mmol/L    Co2 26 20 - 33 mmol/L    Anion Gap 16.0 7.0 - 16.0    Glucose 42 (LL) 65 - 99 mg/dL    Bun 10 8 - 22 mg/dL    Creatinine 0.93 0.50 - 1.40 mg/dL    Calcium 9.6 8.5 - 10.5 mg/dL    Correct Calcium 9.0 8.5 - 10.5 mg/dL    AST(SGOT) 34 12 - 45 U/L    ALT(SGPT) 27 2 - 50 U/L    Alkaline Phosphatase 101 (H) 30 - 99 U/L    Total Bilirubin 0.5 0.1 - 1.5 mg/dL    Albumin 4.7 3.2 - 4.9 g/dL    Total Protein 7.7 6.0 - 8.2 g/dL    Globulin 3.0 1.9 - 3.5 g/dL    A-G Ratio 1.6 g/dL   LACTIC ACID   Result Value Ref Range    Lactic Acid 1.3 0.5 - 2.0 mmol/L   TROPONIN   Result Value Ref Range    Troponin T 10 6 - 19 ng/L   ESTIMATED GFR   Result Value Ref Range    GFR (CKD-EPI) 99 >60 mL/min/1.73 m 2   MAGNESIUM   Result Value Ref Range    Magnesium 2.1 1.5 - 2.5 mg/dL   EKG   Result Value Ref Range    Report       [facility] Emergency Dept.    Test Date:  2024  Pt Name:    AMBER MORALES                  Department: ER  MRN:        [id number]                      Room:       [facility]  Gender:     Male                         Technician: [id number]  :        1970                   Requested By:DIANNA CONNELL  Order #:    [id number]                    Reading MD: Dianna Connell    Measurements  Intervals                                Axis  Rate:       71                           P:          52  ND:         173                          QRS:        41  QRSD:       81                           T:          28  QT:         402  QTc:        437    Interpretive Statements  Sinus rhythm  Anteroseptal infarct, old  Baseline wander in lead(s) I,II,aVR  Compared to ECG 2024 15:30:38  Myocardial infarct finding now present  Electronically Signed On 2024 05:12:51 PDT by Dianna Connell     POCT glucose device results   Result Value Ref Range    POC Glucose, Blood 42 (L) 65 - 99 mg/dL   POCT glucose device results   Result Value Ref Range    POC Glucose, Blood 117 (H) 65  - 99 mg/dL   POCT glucose device results   Result Value Ref Range    POC Glucose, Blood 100 (H) 65 - 99 mg/dL   POCT glucose device results   Result Value Ref Range    POC Glucose, Blood 123 (H) 65 - 99 mg/dL   All labs reviewed by me.    EKG:   I have independently interpreted this EKG  Results for orders placed or performed during the hospital encounter of 24   EKG   Result Value Ref Range    Report       [facility] Emergency Dept.    Test Date:  2024  Pt Name:    AMBER MORALES                  Department: ER  MRN:        [id number]                      Room:       [facility]  Gender:     Male                         Technician: [id number]  :        1970                   Requested By:DIANNA CONNELL  Order #:    [id number]                    Reading MD: Dianna Connell    Measurements  Intervals                                Axis  Rate:       71                           P:          52  HI:         173                          QRS:        41  QRSD:       81                           T:          28  QT:         402  QTc:        437    Interpretive Statements  Sinus rhythm  Anteroseptal infarct, old  Baseline wander in lead(s) I,II,aVR  Compared to ECG 2024 15:30:38  Myocardial infarct finding now present  Electronically Signed On 2024 05:12:51 PDT by Dianna Connell           Radiology:   The attending Emergency Physician has independently interpreted the diagnostic imaging associated with this visit and is awaiting the final reading from the radiologist, which will be displayed below.  Preliminary interpretation is a follows: CT abdomen pelvis notable for severe diaphragmatic hernia of the stomach into the chest cavity.  Radiologist interpretation:  DX-ABDOMEN FOR TUBE PLACEMENT   Final Result         1.  Nonspecific bowel gas pattern in the upper abdomen.   2.  Nasogastric tube coiled back in the mid esophagus with tip projecting cephalad, recommend withdrawal and replacement   3.   Left basilar atelectasis and/or infiltrate      CT-ABDOMEN-PELVIS WITH   Final Result         1.  4.5 cm diaphragmatic versus hiatal hernia with near complete herniation of the stomach into the left chest, associated fluid-filled distention of the stomach is seen suggesting gastric obstruction and raising concern for possible incarceration.    Appearance of the stomach likely represents component of gastric volvulus.   2.  Small left pleural effusion      These findings were discussed with the patient's clinician, Eliu Hook, on 5/28/2024 12:10 AM.      DX-CHEST-PORTABLE (1 VIEW)   Final Result         1.  Hazy left midlung and lower lobe infiltrate.         CARDIAC MONITORING    The cardiac monitor revealed sinus rhythm as interpreted by me. The cardiac monitor was ordered secondary to the patient’s complaint of chest pain and to monitor the patient for dysrhythmia      COURSE & MEDICAL DECISION MAKING    INITIAL ASSESSMENT AND PLAN  Care Narrative:   Hydration: Based on the patient's presentation of Acute Vomiting and Dehydration the patient was given IV fluids. IV Hydration was used because oral hydration was not adequate alone. Upon recheck following hydration, the patient was Improved.      10:16 PM - Patient seen and evaluated at bedside.  History of recent fundoplication for hiatal hernia with also recent multiple esophageal dilations who presents with severe chest and epigastric abdominal pain and intractable vomiting with inability to tolerate any liquids or solids.  Patient uncomfortable appearing on exam intermittently dry heaving significant epigastric abdominal tenderness.  Concern for intra-abdominal pathology given recent surgery also have concern for potential esophageal rupture given recent dilations and patient's symptoms.  Will attempt to obtain esophagram in addition to CT of the abdomen.    10:30 PM - Spoke with Dr. Conklin (Radiology) who reports patient has a large hiatal versus  diaphragmatic hernia with volvulus and obstruction of the stomach.    12:45 AM - Spoke with Dr. Hwang (General Surgery) who recommends NG tube decompression and admission to hospitalist service and Dr. Huang will evaluate in a.m.    1:00 AM - I discussed the patient's case and the above findings with Dr. Car (Hospitalist) who agrees to evaluate the patient for hospitalization.    CRITICAL CARE  The very real possibilty of a deterioration of this patient's condition required the highest level of my preparedness for sudden, emergent intervention.  I provided critical care services, which included medication orders, frequent reevaluations of the patient's condition and response to treatment, ordering and reviewing test results, and discussing the case with various consultants.  The critical care time associated with the care of the patient was 33 minutes. Review chart for interventions. This time is exclusive of any other billable procedures.     ADDITIONAL PROBLEM LIST AND DISPOSITION  Past Medical History:   Diagnosis Date    AC separation, right, initial encounter 02/17/2020    Acute alcohol intoxication (HCC) 02/17/2020    CLARA (acute kidney injury) ([facility]) 03/28/2023    Allergy     Closed fracture of distal lateral malleolus of right fibula 02/17/2020    Closed fracture of multiple ribs of both sides 09/03/2020    Compression fracture of T1 vertebra ([facility]) 02/17/2020    Diabetes ([facility])     DKA, type 1, not at goal ([facility]) 03/28/2023    Elevated lactic acid level 03/28/2023    Encephalopathy 03/29/2023    Flail chest, initial encounter for closed fracture 02/17/2020    Heart burn     Hepatic steatosis 03/28/2023    Hiatus hernia syndrome     High cholesterol     Hyperkalemia 03/28/2023    Hypertension     Ileus ([facility]) 02/21/2020 2/21: nausea and emesis may be opiate related.  Abdomen benign.  Flat Plate a few dilated loops in pelvis.  Suppository.      Injury     Intertrochanteric fracture of right femur, closed,  initial encounter (HCC) 09/03/2020    Leukocytosis 03/28/2023    No contraindication to deep vein thrombosis (DVT) prophylaxis 09/03/2020    Pain of left thumb 03/16/2015    Screening examination for infectious disease     Thumb laceration 03/16/2015    Trauma 09/04/2020    Urinary retention 09/05/2020                  DISPOSITION AND DISCUSSIONS  I have discussed management of the patient with the following physicians and GAETANO's: Dr. Conklin (Radiology), Dr. Hwang (General Surgery), Dr. Car (Hospitalist)    Discussion of management with other hospitals or appropriate source(s): Radiologist.     Escalation of care considered, and ultimately not performed: .    Barriers to care at this time, including but not limited to:  None .     Decision tools and prescription drugs considered including, but not limited to: .    DISPOSITION:  Patient will be hospitalized by Dr. Car (Hospitalist) in critical condition.    FINAL IMPRESSION   1. Hypoglycemia    2. Nausea and vomiting, unspecified vomiting type    3. Acute gastric volvulus    4. CCT     I, Wolf Goldman (Scribe), am scribing for, and in the presence of, Eliu Hook M.D..    Electronically signed by: Wolf Goldman (Scribe), 5/27/2024    IEliu M.D. personally performed the services described in this documentation, as scribed by Wolf Goldman in my presence, and it is both accurate and complete.    The note accurately reflects work and decisions made by me.  Eliu Hook M.D.  5/28/2024  5:17 AM

## 2024-05-28 NOTE — ASSESSMENT & PLAN NOTE
Due to decreased p.o. intake and GI loss.  Potassium 3.1 on presentation  Replete as needed check magnesium, BMP ordered to continue monitoring  'See above. S/p surgery

## 2024-05-28 NOTE — ANESTHESIA PREPROCEDURE EVALUATION
Case: [id number] Date/Time: 05/28/24 5865    Procedure: REPAIR, HERNIA, HIATAL, ROBOT-ASSISTED, LAPAROSCOPIC (Abdomen)    Anesthesia type: General    Location: [place] [address] / SURGERY [facility]    Surgeons: Chad Huang M.D.            Relevant Problems   CARDIAC   (positive) Primary hypertension      GI   (positive) Gastro-esophageal reflux disease without esophagitis   (positive) Paraesophageal hernia      ENDO   (positive) Hypothyroid   (positive) Type 1 diabetes mellitus with hypoglycemia and without coma (HCC)       Physical Exam    Airway   Mallampati: II  TM distance: >3 FB  Neck ROM: full       Cardiovascular - normal exam  Rhythm: regular  Rate: normal  (-) murmur     Dental - normal exam        Facial Hair   Pulmonary - normal exam  Breath sounds clear to auscultation     Abdominal    Neurological - normal exam                   Anesthesia Plan    ASA 2       Plan - general       Airway plan will be ETT          Induction: intravenous    Postoperative Plan: Postoperative administration of opioids is intended.    Pertinent diagnostic labs and testing reviewed    Informed Consent:    Anesthetic plan and risks discussed with patient.    Use of blood products discussed with: patient whom consented to blood products.

## 2024-05-28 NOTE — CARE PLAN
The patient is Watcher - Medium risk of patient condition declining or worsening    Shift Goals  Clinical Goals: Insert NG, monitor BP, and participate compliance to tx  Patient Goals: sleep and rest  Family Goals: none present    Progress made toward(s) clinical / shift goals: Patient refused NGT reinsertion despite education provided. Pat RN (charge nurse) and Dr. Car notified. Patient vomited approx. 150ml, light red emesis, Zofran IV given (see MAR). SBP 160mmHg. Will continue to monitor.       Patient is not progressing towards the following goals:      Problem: Knowledge Deficit - Standard  Goal: Patient and family/care givers will demonstrate understanding of plan of care, disease process/condition, diagnostic tests and medications  Outcome: Not Progressing

## 2024-05-28 NOTE — ANESTHESIA PROCEDURE NOTES
Airway    Date/Time: 5/28/2024 4:26 PM    Performed by: Clarke Caceres M.D.  Authorized by: Clarke Caceres M.D.    Location:  OR  Urgency:  Elective  Indications for Airway Management:  Anesthesia      Spontaneous Ventilation: absent    Sedation Level:  Deep  Preoxygenated: Yes    Patient Position:  Sniffing  Mask Difficulty Assessment:  0 - not attempted  Final Airway Type:  Endotracheal airway  Final Endotracheal Airway:  ETT  Cuffed: Yes    Technique Used for Successful ETT Placement:  Direct laryngoscopy    Insertion Site:  Oral  Blade Type:  Adrienne  Laryngoscope Blade/Videolaryngoscope Blade Size:  3  ETT Size (mm):  7.0  Measured from:  Teeth  ETT to Teeth (cm):  24  Placement Verified by: auscultation and capnometry    Cormack-Lehane Classification:  Grade I - full view of glottis  Number of Attempts at Approach:  1

## 2024-05-28 NOTE — ASSESSMENT & PLAN NOTE
CT imaging reviewed and shows near complete herniation of the stomach  EDP discussed with surgery, Dr. Hwang.  NG tube, keep n.p.o., IV fluids  S/p peg and STEVENSON drain  Esphagram resulted small postluminal fluid collection GES junction? Discussing with Surgery regarding diet plan.   Switch to PO vitamins

## 2024-05-28 NOTE — CONSULTS
DATE OF CONSULTATION:  5/28/2024     REFERRING PHYSICIAN:   SHAHNAZ Hook     CONSULTING PHYSICIAN:  Emile Hwang M.D.     REASON FOR CONSULTATION:  I have been asked by Dr. Hook to see the patient in surgical consultation for evaluation of vomiting and hiatal hernia recurrence.    HISTORY OF PRESENT ILLNESS: 53 year old man who underwent hiatal hernia repair and fundoplication with Dr. Huang on April 25th.  He has had issues with fluctuating constipation and diarrhea since his surgery but course has otherwise been unremarkable.  He also recently underwent EGD and dilation 2 weeks ago with Dr. Huang.  Sometime last night without obvious provocation the patient began profusely vomiting.  He has vomited over 20 times.  Subsequent the onset of vomiting he has also developed diffuse abdominal soreness.  Emesis was initially bilious in character but now is clear thing liquid.  Patient denies any hematemesis.      PAST MEDICAL HISTORY:  has a past medical history of AC separation, right, initial encounter (02/17/2020), Acute alcohol intoxication ([facility]) (02/17/2020), CLARA (acute kidney injury) ([facility]) (03/28/2023), Allergy, Closed fracture of distal lateral malleolus of right fibula (02/17/2020), Closed fracture of multiple ribs of both sides (09/03/2020), Compression fracture of T1 vertebra ([facility]) (02/17/2020), Diabetes ([facility]), DKA, type 1, not at goal ([facility]) (03/28/2023), Elevated lactic acid level (03/28/2023), Encephalopathy (03/29/2023), Flail chest, initial encounter for closed fracture (02/17/2020), Heart burn, Hepatic steatosis (03/28/2023), Hiatus hernia syndrome, High cholesterol, Hyperkalemia (03/28/2023), Hypertension, Ileus ([facility]) (02/21/2020), Injury, Intertrochanteric fracture of right femur, closed, initial encounter ([facility]) (09/03/2020), Leukocytosis (03/28/2023), No contraindication to deep vein thrombosis (DVT) prophylaxis (09/03/2020), Pain of left thumb (03/16/2015), Screening examination for infectious  disease, Thumb laceration (03/16/2015), Trauma (09/04/2020), and Urinary retention (09/05/2020).    PAST SURGICAL HISTORY:  has a past surgical history that includes pr open fix inter/subtroch fx,implnt (Right, 09/03/2020); other abdominal surgery (1987); pr upper gi endoscopy,diagnosis (04/25/2024); robotic assisted lap hiatal hernia rep (04/25/2024); other orthopedic surgery; pr upper gi endoscopy,diagnosis (N/A, 5/15/2024); pr upper gi endoscopy,diagnosis (N/A, 5/15/2024); and pr upper gi endoscopy,scler inject (N/A, 5/15/2024).    ALLERGIES:   Allergies   Allergen Reactions    Pcn [Penicillins] Anaphylaxis     Historic reporting from childhood       CURRENT MEDICATIONS:    Home Medications       Reviewed by Denilson Stephens (Pharmacy Tech) on 05/28/24 at 0112  Med List Status: Complete     Medication Last Dose Status   insulin glargine (LANTUS SOLOSTAR) 100 UNIT/ML Solution Pen-injector injection 5/27/2024 Active   Insulin Pen Needle 32G X 6 MM Misc SUPPLIES Active   lisinopril (PRINIVIL) 20 MG Tab 5/27/2024 Active   pantoprazole (PROTONIX) 40 MG Tablet Delayed Response 5/27/2024 Active                  Audit from Redirected Encounters    **Home medications have not yet been reviewed for this encounter**         FAMILY HISTORY: family history includes No Known Problems in his daughter and son.    SOCIAL HISTORY:  reports that he has never smoked. He has never used smokeless tobacco. He reports that he does not currently use alcohol. He reports current drug use. Drug: Inhaled.      PHYSICAL EXAMINATION:    Physical Exam  Vitals and nursing note reviewed. Exam conducted with a chaperone present.   Constitutional:       General: He is in acute distress.      Appearance: He is ill-appearing.   HENT:      Head: Atraumatic.      Mouth/Throat:      Mouth: Mucous membranes are moist.   Eyes:      General: No scleral icterus.  Cardiovascular:      Rate and Rhythm: Normal rate and regular rhythm.      Pulses: Normal  pulses.   Pulmonary:      Effort: Pulmonary effort is normal. No respiratory distress.      Breath sounds: No stridor. No wheezing.   Abdominal:      General: Abdomen is flat.      Palpations: Abdomen is soft.      Tenderness: There is abdominal tenderness.      Comments: Diffuse mild tenderness   Musculoskeletal:         General: No swelling or tenderness.   Skin:     General: Skin is warm.      Capillary Refill: Capillary refill takes less than 2 seconds.   Neurological:      General: No focal deficit present.      Mental Status: He is alert and oriented to person, place, and time.   Psychiatric:         Mood and Affect: Mood normal.         LABORATORY VALUES:   Recent Labs     05/27/24  2215   WBC 13.0*   RBC 5.79   HEMOGLOBIN 18.0   HEMATOCRIT 50.8   MCV 87.7   MCH 31.1   MCHC 35.4   RDW 40.2   PLATELETCT 309   MPV 8.9*     Recent Labs     05/27/24 2215   SODIUM 143   POTASSIUM 3.1*   CHLORIDE 101   CO2 26   GLUCOSE 42*   BUN 10   CREATININE 0.93   CALCIUM 9.6     Recent Labs     05/27/24 2215   ASTSGOT 34   ALTSGPT 27   TBILIRUBIN 0.5   ALKPHOSPHAT 101*   GLOBULIN 3.0            IMAGING:   CT-ABDOMEN-PELVIS WITH   Final Result         1.  4.5 cm diaphragmatic versus hiatal hernia with near complete herniation of the stomach into the left chest, associated fluid-filled distention of the stomach is seen suggesting gastric obstruction and raising concern for possible incarceration.    Appearance of the stomach likely represents component of gastric volvulus.   2.  Small left pleural effusion      These findings were discussed with the patient's clinician, Eliu Hook, on 5/28/2024 12:10 AM.      DX-CHEST-PORTABLE (1 VIEW)   Final Result         1.  Hazy left midlung and lower lobe infiltrate.          ASSESSMENT AND PLAN:   Hiatal hernia recurrence    - Admission to the medical service here for management of Type 1 diabetes in patient that will likely be NPO for some time  - Strong recommendation to attempt  NGT decompression.  Patient medicated and given intranasal lidocaine  - Will contact Dr. Huang in AM for updated plan.    DISPOSITION: Medical evaluation and admission for diabetes mellitus. [facility] [place] [facility] will follow.     ____________________________________     Emile Hwang M.D.    DD: 5/28/2024  1:50 AM    AAST Grading System for EGS Conditions  ACS NSQIP Surgical Risk Calculator

## 2024-05-28 NOTE — H&P
Hospital Medicine History & Physical Note    Date of Service  5/28/2024    Primary Care Physician  Merlene Esteban M.D.    Consultants  general surgery    Specialist Names: Dr Hwang    Code Status  Full Code    Chief Complaint  Chief Complaint   Patient presents with    Low Blood Sugar       History of Presenting Illness  Peña Guerrero is a 53 y.o. male who presented 5/27/2024 with hypoglycemia.  PMH of DM1, GERD and esophageal stricture.  Patient was hypoglycemic tonight so brought into the ED, he has had significantly decreased p.o. intake and vomiting as of late but continued to take 40 units of glargine in the evening, his usual dose of 60.  He had hiatal hernia repair 04/2024 with Dr. Huang, following surgery he had improved but then 2 weeks later he required esophageal dilation.    In the ED afebrile, BP significantly elevated to 195 systolic.  Labs show glucose in the 40s, potassium 3.1.  CT abdomen shows near complete herniation of the stomach into the chest.    I discussed the plan of care with patient, bedside RN, and edp .    Review of Systems  Review of Systems   Constitutional:  Negative for chills and fever.   Respiratory:  Negative for cough and shortness of breath.    Cardiovascular:  Negative for chest pain.   Gastrointestinal:  Positive for abdominal pain, nausea and vomiting. Negative for diarrhea.   Genitourinary:  Negative for dysuria and urgency.       Past Medical History   has a past medical history of AC separation, right, initial encounter (02/17/2020), Acute alcohol intoxication ([facility]) (02/17/2020), CLARA (acute kidney injury) ([facility]) (03/28/2023), Allergy, Closed fracture of distal lateral malleolus of right fibula (02/17/2020), Closed fracture of multiple ribs of both sides (09/03/2020), Compression fracture of T1 vertebra ([facility]) (02/17/2020), Diabetes ([facility]), DKA, type 1, not at goal ([facility]) (03/28/2023), Elevated lactic acid level (03/28/2023), Encephalopathy (03/29/2023), Flail  chest, initial encounter for closed fracture (02/17/2020), Heart burn, Hepatic steatosis (03/28/2023), Hiatus hernia syndrome, High cholesterol, Hyperkalemia (03/28/2023), Hypertension, Ileus (H[facility]) (02/21/2020), Injury, Intertrochanteric fracture of right femur, closed, initial encounter (H[facility]) (09/03/2020), Leukocytosis (03/28/2023), No contraindication to deep vein thrombosis (DVT) prophylaxis (09/03/2020), Pain of left thumb (03/16/2015), Screening examination for infectious disease, Thumb laceration (03/16/2015), Trauma (09/04/2020), and Urinary retention (09/05/2020).    Surgical History   has a past surgical history that includes pr open fix inter/subtroch fx,implnt (Right, 09/03/2020); other abdominal surgery (1987); pr upper gi endoscopy,diagnosis (04/25/2024); robotic assisted lap hiatal hernia rep (04/25/2024); other orthopedic surgery; pr upper gi endoscopy,diagnosis (N/A, 5/15/2024); pr upper gi endoscopy,diagnosis (N/A, 5/15/2024); and pr upper gi endoscopy,scler inject (N/A, 5/15/2024).     Family History  family history includes No Known Problems in his daughter and son.   Family history reviewed with patient. There is no family history that is pertinent to the chief complaint.     Social History   reports that he has never smoked. He has never used smokeless tobacco. He reports that he does not currently use alcohol. He reports current drug use. Drug: Inhaled.    Allergies  Allergies   Allergen Reactions    Pcn [Penicillins] Anaphylaxis     Historic reporting from childhood       Medications  Prior to Admission Medications   Prescriptions Last Dose Informant Patient Reported? Taking?   Insulin Pen Needle 32G X 6 [facility] SUPPLIES Significant Other No No   Sig: For use with 2-3 daily insulin and or GLP-1 injection.   insulin glargine (LANTUS SOLOSTAR) 100 UNIT/ML Solution Pen-injector injection 5/27/2024 at PM Significant Other No Yes   Sig: Inject 30 Units under the skin every evening. 5  pens per month   Patient taking differently: Inject 40 Units under the skin every evening. 5 pens per month   lisinopril (PRINIVIL) 20 MG Tab 5/27/2024 at AM Significant Other No Yes   Sig: Take 1 Tablet by mouth every day.   pantoprazole (PROTONIX) 40 MG Tablet Delayed Response 5/27/2024 at AM Significant Other Yes Yes   Sig: Take 40 mg by mouth 2 times a day.      Facility-Administered Medications: None       Physical Exam  Temp:  [36.3 °C (97.4 °F)-36.6 °C (97.8 °F)] 36.6 °C (97.8 °F)  Pulse:  [66-78] 78  Resp:  [14-22] 18  BP: (163-195)/() 163/88  SpO2:  [84 %-99 %] 95 %  Blood Pressure: (!) 181/117   Temperature: 36.3 °C (97.4 °F)   Pulse: 73   Respiration: (!) 22   Pulse Oximetry: 96 %       Physical Exam  Constitutional:       Appearance: Normal appearance.   HENT:      Head: Normocephalic and atraumatic.      Mouth/Throat:      Mouth: Mucous membranes are moist.      Pharynx: Oropharynx is clear. No oropharyngeal exudate or posterior oropharyngeal erythema.   Eyes:      General: No scleral icterus.  Cardiovascular:      Rate and Rhythm: Normal rate and regular rhythm.      Pulses: Normal pulses.      Heart sounds: Normal heart sounds. No murmur heard.  Pulmonary:      Effort: Pulmonary effort is normal. No respiratory distress.      Breath sounds: Normal breath sounds. No wheezing.   Abdominal:      Palpations: Abdomen is soft.      Tenderness: There is no abdominal tenderness.   Musculoskeletal:         General: No swelling or tenderness. Normal range of motion.      Cervical back: Normal range of motion.   Skin:     General: Skin is warm and dry.   Neurological:      General: No focal deficit present.      Mental Status: He is alert and oriented to person, place, and time. Mental status is at baseline.   Psychiatric:         Mood and Affect: Mood normal.         Laboratory:  Recent Labs     05/27/24  2215   WBC 13.0*   RBC 5.79   HEMOGLOBIN 18.0   HEMATOCRIT 50.8   MCV 87.7   MCH 31.1   MCHC 35.4  "  RDW 40.2   PLATELETCT 309   MPV 8.9*     Recent Labs     05/27/24 2215   SODIUM 143   POTASSIUM 3.1*   CHLORIDE 101   CO2 26   GLUCOSE 42*   BUN 10   CREATININE 0.93   CALCIUM 9.6     Recent Labs     05/27/24 2215   ALTSGPT 27   ASTSGOT 34   ALKPHOSPHAT 101*   TBILIRUBIN 0.5   GLUCOSE 42*         No results for input(s): \"NTPROBNP\" in the last 72 hours.      Recent Labs     05/27/24 2215   TROPONINT 10       Imaging:  DX-ABDOMEN FOR TUBE PLACEMENT   Final Result         1.  Nonspecific bowel gas pattern in the upper abdomen.   2.  Nasogastric tube coiled back in the mid esophagus with tip projecting cephalad, recommend withdrawal and replacement   3.  Left basilar atelectasis and/or infiltrate      CT-ABDOMEN-PELVIS WITH   Final Result         1.  4.5 cm diaphragmatic versus hiatal hernia with near complete herniation of the stomach into the left chest, associated fluid-filled distention of the stomach is seen suggesting gastric obstruction and raising concern for possible incarceration.    Appearance of the stomach likely represents component of gastric volvulus.   2.  Small left pleural effusion      These findings were discussed with the patient's clinician, Eliu Hook, on 5/28/2024 12:10 AM.      DX-CHEST-PORTABLE (1 VIEW)   Final Result         1.  Hazy left midlung and lower lobe infiltrate.          X-Ray:  I have personally reviewed the images and compared with prior images. and My impression is: opactiy onL    Assessment/Plan:  Justification for Admission Status  I anticipate this patient will require at least two midnights for appropriate medical management, necessitating inpatient admission because gastric volvulus     Type 1 diabetes mellitus with hypoglycemia and without coma (HCC)- (present on admission)  Assessment & Plan  Patient already takes 60 units of glargine per evening, does not take any Premeal or sliding scale insulin  He had decreased his dose to 40 due to decreased p.o. intake and " vomiting  Found to be significantly hypoglycemic on presentation in the 40s.  Improved with dextrose  Since he is a type I diabetic continue glargine, decrease dose to 20 units per evening.  Adjust as needed as he would likely be n.p.o. for a while per surgery    Hypokalemia- (present on admission)  Assessment & Plan  Due to decreased p.o. intake and GI loss.  Potassium 3.1 on presentation  Replete as needed check magnesium, BMP ordered to continue monitoring    Paraesophageal hernia- (present on admission)  Assessment & Plan  CT imaging reviewed and shows near complete herniation of the stomach  EDP discussed with surgery, Dr. Hwang.  NG tube, keep n.p.o., IV fluids    Esophageal stricture- (present on admission)  Assessment & Plan  S/p recent dilation by Dr. Huang    Primary hypertension- (present on admission)  Assessment & Plan  Hold lisinopril strict NPO.  As needed antihypertensives ordered        VTE prophylaxis: pharmacologic prophylaxis contraindicated due to expected procedure

## 2024-05-28 NOTE — ASSESSMENT & PLAN NOTE
Patient already takes 60 units of glargine per evening, does not take any Premeal or sliding scale insulin  He had decreased his dose to 40 due to decreased p.o. intake and vomiting  Found to be significantly hypoglycemic on presentation in the 40s.  Improved with dextrose  Since he is a type I diabetic continue glargine, decrease dose to 20 units per evening.  Adjust as needed as he would likely be n.p.o. for a while per surgery    ALFREDO inuslin back to [facility]  Added basal insulin

## 2024-05-28 NOTE — ED NOTES
Pt back from CT, per tech unable to complete all scans due to pt becoming nauseous. Pt medicated for pain per MAR. Repeat FSBG 100.

## 2024-05-28 NOTE — ED NOTES
Med Rec completed per patient's family   Allergies reviewed  No ORAL antibiotics in last 30 days    Patient is not taking anticoagulants

## 2024-05-28 NOTE — PROGRESS NOTES
Patient refused NGT re-insertion despite education provided. Notified ALEXANDER Stewart (charge nurse).

## 2024-05-28 NOTE — PROGRESS NOTES
4 Eyes Skin Assessment Completed by Ioana RN and ALEXANDER Anderson.    Head WDL  Ears WDL  Nose WDL  Mouth WDL  Neck WDL  Breast/Chest WDL  Shoulder Blades WDL  Spine WDL  (R) Arm/Elbow/Hand WDL  (L) Arm/Elbow/Hand WDL  Abdomen Scar  Groin WDL  Scrotum/Coccyx/Buttocks WDL  (R) Leg WDL  (L) Leg WDL  (R) Heel/Foot/Toe Blanching, refused to remove bandaid on 2nd toe  (L) Heel/Foot/Toe Blanching          Devices In Places Blood Pressure Cuff      Interventions In Place NC W/Ear Foams    Possible Skin Injury No    Pictures Uploaded Into Epic Yes  Wound Consult Placed N/A  RN Wound Prevention Protocol Ordered No

## 2024-05-28 NOTE — ED NOTES
Lab called with critical glucose of 42, ERP aware and informed that sample was drawn prior to intervention. Most recent FSBG 100 and D5 NS infusing.

## 2024-05-28 NOTE — ED NOTES
Bedside report received from off going RN: Svitlana, assumed care of patient.  POC discussed with patient. Call light within reach, all needs addressed at this time.       Fall risk interventions in place: Patient's personal possessions are with in their safe reach, Place socks on patient, and Give patient urinal if applicable (all applicable per [place] Fall risk assessment)   Continuous monitoring: Cardiac Leads, Pulse Ox, or Blood Pressure  IVF/IV medications: Infusion per MAR (List Med(s)) D5  Oxygen: How many liters 2L  Bedside sitter: Not Applicable   Isolation: Not Applicable

## 2024-05-28 NOTE — PROGRESS NOTES
"  DATE: 5/28/2024    Hospital Day 2  Hiatal hernia recurrence .    INTERVAL EVENTS:  Patient having inadequate pain control, multimodal regimen ordered.   aware of patient.  Plan for surgical fixation this evening.  NPO.     PHYSICAL EXAMINATION:  Vital Signs: BP (!) 165/103   Pulse 83   Temp 36.1 °C (97 °F) (Temporal)   Resp 18   Ht 1.905 m (6' 3\")   Wt 86.4 kg (190 lb 7.6 oz)   SpO2 96%     Moderate distress related to pain. Abdomen soft. Epigastric pain.        IMAGING:  DX-ABDOMEN FOR TUBE PLACEMENT   Final Result         1.  Nonspecific bowel gas pattern in the upper abdomen.   2.  Nasogastric tube coiled back in the mid esophagus with tip projecting cephalad, recommend withdrawal and replacement   3.  Left basilar atelectasis and/or infiltrate      CT-ABDOMEN-PELVIS WITH   Final Result         1.  4.5 cm diaphragmatic versus hiatal hernia with near complete herniation of the stomach into the left chest, associated fluid-filled distention of the stomach is seen suggesting gastric obstruction and raising concern for possible incarceration.    Appearance of the stomach likely represents component of gastric volvulus.   2.  Small left pleural effusion      These findings were discussed with the patient's clinician, Eliu Hook, on 5/28/2024 12:10 AM.      DX-CHEST-PORTABLE (1 VIEW)   Final Result         1.  Hazy left midlung and lower lobe infiltrate.          ASSESSMENT AND PLAN:  Recurrence of hiatal hernia repair.  Surgical fixation with  this afternoon.  Multimodal pain regimen.  NPO, strict.       ____________________________________     DANYELLE Asif    DD: 5/28/2024  12:22 PM   "

## 2024-05-28 NOTE — PROGRESS NOTES
Assumed care of Pt from NOC RN. Pt is awake at bedside report. Pt has refused re-placement of NG tube. Call light is within reach and all needs are met. Bed is locked and in low position.

## 2024-05-29 LAB
ALBUMIN SERPL BCP-MCNC: 3.6 G/DL (ref 3.2–4.9)
ANION GAP SERPL CALC-SCNC: 11 MMOL/L (ref 7–16)
BUN SERPL-MCNC: 18 MG/DL (ref 8–22)
CALCIUM ALBUM COR SERPL-MCNC: 8.9 MG/DL (ref 8.5–10.5)
CALCIUM SERPL-MCNC: 8.6 MG/DL (ref 8.5–10.5)
CHLORIDE SERPL-SCNC: 104 MMOL/L (ref 96–112)
CO2 SERPL-SCNC: 29 MMOL/L (ref 20–33)
CREAT SERPL-MCNC: 1.27 MG/DL (ref 0.5–1.4)
ERYTHROCYTE [DISTWIDTH] IN BLOOD BY AUTOMATED COUNT: 41.6 FL (ref 35.9–50)
GFR SERPLBLD CREATININE-BSD FMLA CKD-EPI: 68 ML/MIN/1.73 M 2
GLUCOSE BLD STRIP.AUTO-MCNC: 161 MG/DL (ref 65–99)
GLUCOSE BLD STRIP.AUTO-MCNC: 52 MG/DL (ref 65–99)
GLUCOSE BLD STRIP.AUTO-MCNC: 64 MG/DL (ref 65–99)
GLUCOSE BLD STRIP.AUTO-MCNC: 72 MG/DL (ref 65–99)
GLUCOSE BLD STRIP.AUTO-MCNC: 83 MG/DL (ref 65–99)
GLUCOSE SERPL-MCNC: 110 MG/DL (ref 65–99)
HCT VFR BLD AUTO: 47.5 % (ref 42–52)
HGB BLD-MCNC: 16.7 G/DL (ref 14–18)
MCH RBC QN AUTO: 30.9 PG (ref 27–33)
MCHC RBC AUTO-ENTMCNC: 35.2 G/DL (ref 32.3–36.5)
MCV RBC AUTO: 87.8 FL (ref 81.4–97.8)
PHOSPHATE SERPL-MCNC: 3.3 MG/DL (ref 2.5–4.5)
PLATELET # BLD AUTO: 290 K/UL (ref 164–446)
PMV BLD AUTO: 9.1 FL (ref 9–12.9)
POTASSIUM SERPL-SCNC: 4.1 MMOL/L (ref 3.6–5.5)
RBC # BLD AUTO: 5.41 M/UL (ref 4.7–6.1)
SODIUM SERPL-SCNC: 144 MMOL/L (ref 135–145)
WBC # BLD AUTO: 21.9 K/UL (ref 4.8–10.8)

## 2024-05-29 PROCEDURE — 700105 HCHG RX REV CODE 258: Performed by: REGISTERED NURSE

## 2024-05-29 PROCEDURE — 51798 US URINE CAPACITY MEASURE: CPT

## 2024-05-29 PROCEDURE — 85027 COMPLETE CBC AUTOMATED: CPT

## 2024-05-29 PROCEDURE — 99233 SBSQ HOSP IP/OBS HIGH 50: CPT | Mod: 25 | Performed by: INTERNAL MEDICINE

## 2024-05-29 PROCEDURE — C9113 INJ PANTOPRAZOLE SODIUM, VIA: HCPCS | Performed by: STUDENT IN AN ORGANIZED HEALTH CARE EDUCATION/TRAINING PROGRAM

## 2024-05-29 PROCEDURE — 82962 GLUCOSE BLOOD TEST: CPT

## 2024-05-29 PROCEDURE — 36415 COLL VENOUS BLD VENIPUNCTURE: CPT

## 2024-05-29 PROCEDURE — 700111 HCHG RX REV CODE 636 W/ 250 OVERRIDE (IP): Performed by: STUDENT IN AN ORGANIZED HEALTH CARE EDUCATION/TRAINING PROGRAM

## 2024-05-29 PROCEDURE — 700111 HCHG RX REV CODE 636 W/ 250 OVERRIDE (IP): Performed by: REGISTERED NURSE

## 2024-05-29 PROCEDURE — 99418 PROLNG IP/OBS E/M EA 15 MIN: CPT | Performed by: INTERNAL MEDICINE

## 2024-05-29 PROCEDURE — 700111 HCHG RX REV CODE 636 W/ 250 OVERRIDE (IP): Performed by: INTERNAL MEDICINE

## 2024-05-29 PROCEDURE — 80069 RENAL FUNCTION PANEL: CPT

## 2024-05-29 PROCEDURE — 700101 HCHG RX REV CODE 250: Performed by: STUDENT IN AN ORGANIZED HEALTH CARE EDUCATION/TRAINING PROGRAM

## 2024-05-29 PROCEDURE — 770006 HCHG ROOM/CARE - MED/SURG/GYN SEMI*

## 2024-05-29 PROCEDURE — 700105 HCHG RX REV CODE 258: Performed by: INTERNAL MEDICINE

## 2024-05-29 RX ORDER — DEXTROSE MONOHYDRATE AND SODIUM CHLORIDE 5; .45 G/100ML; G/100ML
INJECTION, SOLUTION INTRAVENOUS CONTINUOUS
Status: DISCONTINUED | OUTPATIENT
Start: 2024-05-29 | End: 2024-05-31

## 2024-05-29 RX ORDER — SODIUM CHLORIDE, SODIUM LACTATE, POTASSIUM CHLORIDE, CALCIUM CHLORIDE 600; 310; 30; 20 MG/100ML; MG/100ML; MG/100ML; MG/100ML
INJECTION, SOLUTION INTRAVENOUS CONTINUOUS
Status: DISCONTINUED | OUTPATIENT
Start: 2024-05-29 | End: 2024-05-29

## 2024-05-29 RX ORDER — THIAMINE HYDROCHLORIDE 100 MG/ML
100 INJECTION, SOLUTION INTRAMUSCULAR; INTRAVENOUS DAILY
Status: COMPLETED | OUTPATIENT
Start: 2024-05-29 | End: 2024-05-31

## 2024-05-29 RX ADMIN — HYDROMORPHONE HYDROCHLORIDE 1 MG: 1 INJECTION, SOLUTION INTRAMUSCULAR; INTRAVENOUS; SUBCUTANEOUS at 02:50

## 2024-05-29 RX ADMIN — HYDROMORPHONE HYDROCHLORIDE 1 MG: 1 INJECTION, SOLUTION INTRAMUSCULAR; INTRAVENOUS; SUBCUTANEOUS at 21:40

## 2024-05-29 RX ADMIN — DEXTROSE MONOHYDRATE 25 G: 25 INJECTION, SOLUTION INTRAVENOUS at 05:19

## 2024-05-29 RX ADMIN — HYDROMORPHONE HYDROCHLORIDE 1 MG: 1 INJECTION, SOLUTION INTRAMUSCULAR; INTRAVENOUS; SUBCUTANEOUS at 08:21

## 2024-05-29 RX ADMIN — METHOCARBAMOL 1000 MG: 100 INJECTION, SOLUTION INTRAMUSCULAR; INTRAVENOUS at 21:47

## 2024-05-29 RX ADMIN — METHOCARBAMOL 1000 MG: 100 INJECTION, SOLUTION INTRAMUSCULAR; INTRAVENOUS at 15:38

## 2024-05-29 RX ADMIN — PANTOPRAZOLE SODIUM 40 MG: 40 INJECTION, POWDER, FOR SOLUTION INTRAVENOUS at 04:43

## 2024-05-29 RX ADMIN — DEXTROSE MONOHYDRATE 25 G: 25 INJECTION, SOLUTION INTRAVENOUS at 12:28

## 2024-05-29 RX ADMIN — HYDROMORPHONE HYDROCHLORIDE 1 MG: 1 INJECTION, SOLUTION INTRAMUSCULAR; INTRAVENOUS; SUBCUTANEOUS at 18:35

## 2024-05-29 RX ADMIN — HYDROMORPHONE HYDROCHLORIDE 1 MG: 1 INJECTION, SOLUTION INTRAMUSCULAR; INTRAVENOUS; SUBCUTANEOUS at 12:17

## 2024-05-29 RX ADMIN — THIAMINE HYDROCHLORIDE 100 MG: 100 INJECTION, SOLUTION INTRAMUSCULAR; INTRAVENOUS at 21:41

## 2024-05-29 RX ADMIN — PANTOPRAZOLE SODIUM 40 MG: 40 INJECTION, POWDER, FOR SOLUTION INTRAVENOUS at 18:36

## 2024-05-29 RX ADMIN — METHOCARBAMOL 1000 MG: 100 INJECTION, SOLUTION INTRAMUSCULAR; INTRAVENOUS at 05:11

## 2024-05-29 RX ADMIN — DEXTROSE AND SODIUM CHLORIDE: 5; 450 INJECTION, SOLUTION INTRAVENOUS at 09:17

## 2024-05-29 RX ADMIN — HYDROMORPHONE HYDROCHLORIDE 1 MG: 1 INJECTION, SOLUTION INTRAMUSCULAR; INTRAVENOUS; SUBCUTANEOUS at 15:31

## 2024-05-29 ASSESSMENT — ENCOUNTER SYMPTOMS
NAUSEA: 1
NERVOUS/ANXIOUS: 0
HEADACHES: 0
TREMORS: 0
SHORTNESS OF BREATH: 0
LOSS OF CONSCIOUSNESS: 0
HEMOPTYSIS: 0
BLOOD IN STOOL: 0
VOMITING: 1
PALPITATIONS: 0
ABDOMINAL PAIN: 1
EYE PAIN: 0
MYALGIAS: 0
WHEEZING: 0
FALLS: 0
INSOMNIA: 0
FOCAL WEAKNESS: 0
DIARRHEA: 0
WEAKNESS: 0
DIZZINESS: 0
FEVER: 0
EYE REDNESS: 0
COUGH: 0
CHILLS: 0
CONSTIPATION: 0
SEIZURES: 0

## 2024-05-29 ASSESSMENT — PAIN DESCRIPTION - PAIN TYPE
TYPE: ACUTE PAIN
TYPE: SURGICAL PAIN
TYPE: ACUTE PAIN
TYPE: ACUTE PAIN

## 2024-05-29 ASSESSMENT — PAIN SCALES - GENERAL: PAIN_LEVEL: 4

## 2024-05-29 NOTE — PROGRESS NOTES
[facility] Medicine Daily Progress Note    Date of Service  5/29/2024    Chief Complaint  Peña Guerrero is a 53 y.o. male admitted 5/27/2024 with Low Blood Sugar     Hospital Course  No notes on file    Interval Problem Update  Patient seen and examine at bedside  I reviewed the chart along with vitals, labs, imaging, test (both pending and resulted) and recommendations from specialists and interdisciplinary team.  52yo M w/ h/o diabetes, GERD, esophageal stricture requiring dilation, hiatal hernia repair 4/2024 w/ Dr. Huang presented 5/27/2024 with nausea, vomiting. At the ED, afebrile, hypertensive. Hypoglycemic with BG in the 40s. CT shows near complete herniation of the stomach into the chest.  Managing for hiatal hernia, hypoglycemia, hypertension. Surgery consulted, Dr. Huang to be notified by Dr. Hwang for plan, currently strict NPO. NG tube decompression recommended.  Dextrose given, insulin reduced    I visited patient twice and explained the situation and paln.   Patient wanted to eat however explained Dr. Huang's plan. I reached out to Dr. Huang regarding feeding plan. CUrrently he is scheduled for esophagram today or tomorrow and if no leak may start liquid diet otherwise should do TPN. I explained and reassured patient. Spoke with Staff. I ordered dextrose and IV fluids, I also added IV thiamine since patient is strict NPO and can;t give vitamin supplements.    Patient is has a high medical complexity, complex decision making and is at high risk for complication, morbidity, and mortality, thus requiring the highest level of my preparedness for sudden, emergent intervention. Medical decision making is therefore complex. I provided  services, which included ordering labs and/or imaging, and discussing the case with various consultants.medication orders, frequent reevaluations of the patient's condition and response to treatment. Time was also devoted to counseling and coordinating care including  review of records, pertinent lab data and studies, as well as discussing diagnostic evaluation and work up, planned therapeutic interventions and future disposition of care. Where indicated, the assessment and plan reflect discussion of patient with consultants, other healthcare providers, family members, and additional research needed to obtain further information in formulating the plan of care for Peña Guerrero. Total time spent was 65 minutes.     I have discussed this patient's plan of care and discharge plan at IDT rounds today with Case Management, Nursing, Nursing leadership, and other members of the IDT team.    Consultants/Specialty  general surgery    Code Status  Full Code    Disposition  The patient is not medically cleared for discharge to home or a post-acute facility.      I have placed the appropriate orders for post-discharge needs.    Review of Systems  Review of Systems   Constitutional:  Negative for chills and fever.   HENT:  Negative for congestion, hearing loss and nosebleeds.    Eyes:  Negative for pain and redness.   Respiratory:  Negative for cough, hemoptysis, shortness of breath and wheezing.    Cardiovascular:  Negative for chest pain and palpitations.   Gastrointestinal:  Positive for abdominal pain, nausea and vomiting. Negative for blood in stool, constipation and diarrhea.   Genitourinary:  Negative for dysuria, frequency and hematuria.   Musculoskeletal:  Negative for falls, joint pain and myalgias.   Skin:  Negative for rash.   Neurological:  Negative for dizziness, tremors, focal weakness, seizures, loss of consciousness, weakness and headaches.   Psychiatric/Behavioral:  The patient is not nervous/anxious and does not have insomnia.    All other systems reviewed and are negative.       Physical Exam  Temp:  [36 °C (96.8 °F)-37 °C (98.6 °F)] 36.6 °C (97.8 °F)  Pulse:  [74-98] 82  Resp:  [16-23] 18  BP: (127-186)/(62-96) 130/62  SpO2:  [89 %-97 %] 94 %    Physical  Exam  Vitals and nursing note reviewed.   HENT:      Head: Normocephalic and atraumatic.      Right Ear: External ear normal.      Left Ear: External ear normal.      Nose: Nose normal.      Mouth/Throat:      Mouth: Mucous membranes are moist.   Eyes:      General: No scleral icterus.     Conjunctiva/sclera: Conjunctivae normal.   Cardiovascular:      Rate and Rhythm: Normal rate and regular rhythm.      Heart sounds: No murmur heard.     No friction rub. No gallop.   Pulmonary:      Effort: Pulmonary effort is normal.      Breath sounds: Normal breath sounds.   Abdominal:      General: Abdomen is flat. Bowel sounds are normal. There is no distension.      Palpations: Abdomen is soft.      Tenderness: There is no abdominal tenderness. There is no guarding.   Musculoskeletal:         General: Normal range of motion.      Cervical back: Normal range of motion and neck supple.   Skin:     General: Skin is warm.   Neurological:      Mental Status: He is alert and oriented to person, place, and time. Mental status is at baseline.   Psychiatric:         Mood and Affect: Mood normal.         Behavior: Behavior normal.         Thought Content: Thought content normal.         Judgment: Judgment normal.      Comments: Irritable         Fluids    Intake/Output Summary (Last 24 hours) at 5/29/2024 1539  Last data filed at 5/29/2024 1000  Gross per 24 hour   Intake 1921 ml   Output 920 ml   Net 1001 ml       Laboratory  Recent Labs     05/27/24 2215 05/29/24 0237   WBC 13.0* 21.9*   RBC 5.79 5.41   HEMOGLOBIN 18.0 16.7   HEMATOCRIT 50.8 47.5   MCV 87.7 87.8   MCH 31.1 30.9   MCHC 35.4 35.2   RDW 40.2 41.6   PLATELETCT 309 290   MPV 8.9* 9.1     Recent Labs     05/27/24 2215 05/28/24  1157 05/29/24  0237   SODIUM 143  --  144   POTASSIUM 3.1* 4.0 4.1   CHLORIDE 101  --  104   CO2 26  --  29   GLUCOSE 42*  --  110*   BUN 10  --  18   CREATININE 0.93  --  1.27   CALCIUM 9.6  --  8.6                   Imaging  DX-ABDOMEN FOR  TUBE PLACEMENT   Final Result         1.  Nonspecific bowel gas pattern in the upper abdomen.   2.  Nasogastric tube coiled back in the mid esophagus with tip projecting cephalad, recommend withdrawal and replacement   3.  Left basilar atelectasis and/or infiltrate      CT-ABDOMEN-PELVIS WITH   Final Result         1.  4.5 cm diaphragmatic versus hiatal hernia with near complete herniation of the stomach into the left chest, associated fluid-filled distention of the stomach is seen suggesting gastric obstruction and raising concern for possible incarceration.    Appearance of the stomach likely represents component of gastric volvulus.   2.  Small left pleural effusion      These findings were discussed with the patient's clinician, Eliu Hook, on 5/28/2024 12:10 AM.      DX-CHEST-PORTABLE (1 VIEW)   Final Result         1.  Hazy left midlung and lower lobe infiltrate.           Assessment/Plan  Hypokalemia- (present on admission)  Assessment & Plan  Due to decreased p.o. intake and GI loss.  Potassium 3.1 on presentation  Replete as needed check magnesium, BMP ordered to continue monitoring    Paraesophageal hernia- (present on admission)  Assessment & Plan  CT imaging reviewed and shows near complete herniation of the stomach  EDP discussed with surgery, Dr. Hwang.  NG tube, keep n.p.o., IV fluids    Esphagram planned  Ordered IV fluids myself with dextrose  Add IV thiamine    Esophageal stricture- (present on admission)  Assessment & Plan  S/p recent dilation by Dr. Huang    Primary hypertension- (present on admission)  Assessment & Plan  Hold lisinopril strict NPO.  As needed antihypertensives ordered  Vitals:    05/29/24 1030   BP: 130/62   Pulse: 82   Resp: 18   Temp: 36.6 °C (97.8 °F)   SpO2: 94%     Stable    Type 1 diabetes mellitus with hypoglycemia and without coma (HCC)- (present on admission)  Assessment & Plan  Patient already takes 60 units of glargine per evening, does not take any Premeal or  sliding scale insulin  He had decreased his dose to 40 due to decreased p.o. intake and vomiting  Found to be significantly hypoglycemic on presentation in the 40s.  Improved with dextrose  Since he is a type I diabetic continue glargine, decrease dose to 20 units per evening.  Adjust as needed as he would likely be n.p.o. for a while per surgery    SS inuslin while NPO         VTE prophylaxis: VTE Selection    I have performed a physical exam and reviewed and updated ROS and Plan today (5/29/2024). In review of yesterday's note (5/28/2024), there are no changes except as documented above.

## 2024-05-29 NOTE — PROGRESS NOTES
Patient arrived via hospital bed, A&O x4, side rails up, bed alarm on, bed wheels locked and in low position. IV on left FA G18 to saline locked flushed wnl, JPx1 with serous drainage, lap sites. open to air, g-tube for drainage only hooked to anderson bag.

## 2024-05-29 NOTE — PROGRESS NOTES
I saw Peña Guerrero 53 y.o. male who presents with Low Blood Sugar   on 5/27/2024   Managing for hypoglycemia but is also planned for hernia revision surgery tomorrow.  Reviwed surgery recs with staff and patient who will be NPO after MN  A/P.  Principal Problem:    Acute gastric volvulus (POA: Yes)  Active Problems:    Type 1 diabetes mellitus with hypoglycemia and without coma (HCC) (POA: Yes)    Primary hypertension (POA: Yes)    Esophageal stricture (POA: Yes)    Paraesophageal hernia (POA: Yes)    Hypokalemia (POA: Yes)

## 2024-05-29 NOTE — PROGRESS NOTES
General surgery postoperative update    The stomach was reduced and the abdomen is much as possible and the stomach was pexied to the abdominal wall with a G-tube that was Manoj to the abdominal wall.  The G-tube balloon was popped during insertion and I did not want to replace it for fear of losing adequate positioning of the G-tube within the pursestring and Manoj.  The G-tube will be to gravity drain only.  No meds or feeds through the G-tube.  Zion drain will be to bulb suction.    Plan to perform an esophagram on Wednesday afternoon.  N.p.o. until then    Chad Huang MD  Thoracic & General Surgeon  [facility]  [phone]

## 2024-05-29 NOTE — CARE PLAN
The patient is Watcher - Medium risk of patient condition declining or worsening    Shift Goals  Clinical Goals: Pain management, Manage emesis  Patient Goals: comfort  Family Goals: none present    Patient continues to have emesis and pain prior to surgery. Pt did not return from surgery prior to shift change.     Progress made toward(s) clinical / shift goals:    Problem: Pain - Standard  Goal: Alleviation of pain or a reduction in pain to the patient’s comfort goal  Outcome: Progressing       Patient is not progressing towards the following goals:

## 2024-05-29 NOTE — ANESTHESIA TIME REPORT
Anesthesia Start and Stop Event Times       Date Time Event    5/28/2024 1558 Ready for Procedure     1615 Anesthesia Start     1913 Anesthesia Stop          Responsible Staff  05/28/24      Name Role Begin End    Clarke Caceres M.D. Anesth 1615 1913          Overtime Reason:  no overtime (within assigned shift)    Comments:

## 2024-05-29 NOTE — PROGRESS NOTES
4 Eyes Skin Assessment Completed by Ioana RN and ALEXANDER Anderson.    Head WDL  Ears WDL  Nose WDL  Mouth WDL  Neck WDL  Breast/Chest WDL  Shoulder Blades WDL  Spine WDL  (R) Arm/Elbow/Hand WDL  (L) Arm/Elbow/Hand Blanching  Abdomen: lap. sites, JPx1, g-tube for drainage only   Groin WDL  Scrotum/Coccyx/Buttocks WDL  (R) Leg WDL  (L) Leg WDL  (R) Heel/Foot/Toe Blanching  (L) Heel/Foot/Toe Blanching          Devices In Places Blood Pressure Cuff and Pulse Ox      Interventions In Place NC W/Ear Foams    Possible Skin Injury Yes    Pictures Uploaded Into Epic Yes  Wound Consult Placed N/A  RN Wound Prevention Protocol Ordered No

## 2024-05-29 NOTE — CARE PLAN
The patient is Watcher - Medium risk of patient condition declining or worsening    Shift Goals  Clinical Goals: At 1am, patient will report tolerable level of pain, monitor STEVENSON and g-tube drain  Patient Goals: pain control  Family Goals: updates given    Progress made toward(s) clinical / shift goals: Dilaudid 1mg IVP given (see MAR), hourly rounding, and provided comfort measures. At 1am, patient seen comfortably resting without any signs of pain/discomfort.    Patient is not progressing towards the following goals: N/A

## 2024-05-29 NOTE — OR NURSING
1911- Pt arrives to PACU from OR on 6L of oxygen via mask. Report received. Dressings CDI. Lap sites CDI.     1924- Pt medicated for pain per MAR.     2031- Report called to Dipesh MUNOZ.     2047- Pt taken to room, alert and comfortable upon leaving PACU.

## 2024-05-29 NOTE — PROGRESS NOTES
BS 204mg/dL, no ongoing fluid and on strict NPO for Esophagram scheduled tomorrow afternoon. Notified HOWARD Lindsay if ok to hold Humalog. Advised to hold Humalog at this time.

## 2024-05-29 NOTE — ANESTHESIA POSTPROCEDURE EVALUATION
Patient: Peña Guerrero    Procedure Summary       Date: 05/28/24 Room / Location: [place] [address] / SURGERY [facility]    Anesthesia Start: 1615 Anesthesia Stop: 1913    Procedure: ROBOTIC PARAESOPHAGEAL HERNIA REPAIR AND UPPER ENDOSCOPY (Abdomen) Diagnosis: (paraesophageal hernia)    Surgeons: Chad Huang M.D. Responsible Provider: Clarke Caceres M.D.    Anesthesia Type: general ASA Status: 2            Final Anesthesia Type: general  Last vitals  BP   Blood Pressure: 127/63    Temp   36.4 °C (97.5 °F)    Pulse   74   Resp   18    SpO2   94 %      Anesthesia Post Evaluation    Patient location during evaluation: PACU  Patient participation: complete - patient participated  Level of consciousness: awake and alert  Pain score: 4    Airway patency: patent  Anesthetic complications: no  Cardiovascular status: hemodynamically stable  Respiratory status: acceptable  Hydration status: euvolemic    PONV: none          No notable events documented.     Nurse Pain Score: 5 (NPRS)

## 2024-05-29 NOTE — PROGRESS NOTES
0700- Assumed care of pt. Alert/oriented. L STEVENSON drain observed draining. Peg tube draining.     0800- Pt upset c/o NPO strict order sated he had not had any water in 3 days. He stated he felt ignored by hospital staff. Voalted hospitalist & surgeon regarding NPO strict order.     0820- Pain medication given for c/o of abd. pain.    0930- Hospitalist stated he can have ice chips but not swallow them. Pt was provided ice chips and separate cup to spit melted ice.    1000- Linens/gown changed. Left STEVENSON drained = 50 cc bright red color drainage. G tube drainage dark brown = 150 cc    1220- Pain medication requested. Ice chips given; per request. BS checked = 64. Dextrose given.

## 2024-05-30 ENCOUNTER — APPOINTMENT (OUTPATIENT)
Dept: RADIOLOGY | Facility: MEDICAL CENTER | Age: 54
DRG: 328 | End: 2024-05-30
Attending: STUDENT IN AN ORGANIZED HEALTH CARE EDUCATION/TRAINING PROGRAM
Payer: MEDICAID

## 2024-05-30 LAB
ANION GAP SERPL CALC-SCNC: 12 MMOL/L (ref 7–16)
BASOPHILS # BLD AUTO: 0.3 % (ref 0–1.8)
BASOPHILS # BLD: 0.04 K/UL (ref 0–0.12)
BUN SERPL-MCNC: 19 MG/DL (ref 8–22)
CALCIUM SERPL-MCNC: 8.6 MG/DL (ref 8.5–10.5)
CHLORIDE SERPL-SCNC: 101 MMOL/L (ref 96–112)
CO2 SERPL-SCNC: 26 MMOL/L (ref 20–33)
CREAT SERPL-MCNC: 0.94 MG/DL (ref 0.5–1.4)
EOSINOPHIL # BLD AUTO: 0.74 K/UL (ref 0–0.51)
EOSINOPHIL NFR BLD: 5.1 % (ref 0–6.9)
ERYTHROCYTE [DISTWIDTH] IN BLOOD BY AUTOMATED COUNT: 40.1 FL (ref 35.9–50)
GFR SERPLBLD CREATININE-BSD FMLA CKD-EPI: 98 ML/MIN/1.73 M 2
GLUCOSE BLD STRIP.AUTO-MCNC: 103 MG/DL (ref 65–99)
GLUCOSE BLD STRIP.AUTO-MCNC: 164 MG/DL (ref 65–99)
GLUCOSE BLD STRIP.AUTO-MCNC: 85 MG/DL (ref 65–99)
GLUCOSE BLD STRIP.AUTO-MCNC: 90 MG/DL (ref 65–99)
GLUCOSE BLD STRIP.AUTO-MCNC: 99 MG/DL (ref 65–99)
GLUCOSE SERPL-MCNC: 135 MG/DL (ref 65–99)
HCT VFR BLD AUTO: 43.6 % (ref 42–52)
HGB BLD-MCNC: 15.7 G/DL (ref 14–18)
IMM GRANULOCYTES # BLD AUTO: 0.05 K/UL (ref 0–0.11)
IMM GRANULOCYTES NFR BLD AUTO: 0.3 % (ref 0–0.9)
LYMPHOCYTES # BLD AUTO: 1.5 K/UL (ref 1–4.8)
LYMPHOCYTES NFR BLD: 10.4 % (ref 22–41)
MCH RBC QN AUTO: 31.4 PG (ref 27–33)
MCHC RBC AUTO-ENTMCNC: 36 G/DL (ref 32.3–36.5)
MCV RBC AUTO: 87.2 FL (ref 81.4–97.8)
MONOCYTES # BLD AUTO: 1.06 K/UL (ref 0–0.85)
MONOCYTES NFR BLD AUTO: 7.3 % (ref 0–13.4)
NEUTROPHILS # BLD AUTO: 11.06 K/UL (ref 1.82–7.42)
NEUTROPHILS NFR BLD: 76.6 % (ref 44–72)
NRBC # BLD AUTO: 0 K/UL
NRBC BLD-RTO: 0 /100 WBC (ref 0–0.2)
PLATELET # BLD AUTO: 229 K/UL (ref 164–446)
PMV BLD AUTO: 8.9 FL (ref 9–12.9)
POTASSIUM SERPL-SCNC: 4.1 MMOL/L (ref 3.6–5.5)
RBC # BLD AUTO: 5 M/UL (ref 4.7–6.1)
SODIUM SERPL-SCNC: 139 MMOL/L (ref 135–145)
WBC # BLD AUTO: 14.5 K/UL (ref 4.8–10.8)

## 2024-05-30 PROCEDURE — 700105 HCHG RX REV CODE 258: Performed by: REGISTERED NURSE

## 2024-05-30 PROCEDURE — 700111 HCHG RX REV CODE 636 W/ 250 OVERRIDE (IP): Performed by: REGISTERED NURSE

## 2024-05-30 PROCEDURE — 74220 X-RAY XM ESOPHAGUS 1CNTRST: CPT

## 2024-05-30 PROCEDURE — 85025 COMPLETE CBC W/AUTO DIFF WBC: CPT

## 2024-05-30 PROCEDURE — C9113 INJ PANTOPRAZOLE SODIUM, VIA: HCPCS | Performed by: STUDENT IN AN ORGANIZED HEALTH CARE EDUCATION/TRAINING PROGRAM

## 2024-05-30 PROCEDURE — 700111 HCHG RX REV CODE 636 W/ 250 OVERRIDE (IP): Performed by: STUDENT IN AN ORGANIZED HEALTH CARE EDUCATION/TRAINING PROGRAM

## 2024-05-30 PROCEDURE — 770006 HCHG ROOM/CARE - MED/SURG/GYN SEMI*

## 2024-05-30 PROCEDURE — 700117 HCHG RX CONTRAST REV CODE 255: Performed by: STUDENT IN AN ORGANIZED HEALTH CARE EDUCATION/TRAINING PROGRAM

## 2024-05-30 PROCEDURE — 80048 BASIC METABOLIC PNL TOTAL CA: CPT

## 2024-05-30 PROCEDURE — 74176 CT ABD & PELVIS W/O CONTRAST: CPT

## 2024-05-30 PROCEDURE — A9270 NON-COVERED ITEM OR SERVICE: HCPCS

## 2024-05-30 PROCEDURE — 700102 HCHG RX REV CODE 250 W/ 637 OVERRIDE(OP)

## 2024-05-30 PROCEDURE — 82962 GLUCOSE BLOOD TEST: CPT | Mod: 91

## 2024-05-30 PROCEDURE — 36415 COLL VENOUS BLD VENIPUNCTURE: CPT

## 2024-05-30 PROCEDURE — 99233 SBSQ HOSP IP/OBS HIGH 50: CPT | Performed by: INTERNAL MEDICINE

## 2024-05-30 PROCEDURE — 700111 HCHG RX REV CODE 636 W/ 250 OVERRIDE (IP): Performed by: INTERNAL MEDICINE

## 2024-05-30 RX ORDER — DIPHENHYDRAMINE HCL 25 MG
25 TABLET ORAL EVERY 6 HOURS PRN
Status: DISPENSED | OUTPATIENT
Start: 2024-05-30

## 2024-05-30 RX ADMIN — HYDROMORPHONE HYDROCHLORIDE 1 MG: 1 INJECTION, SOLUTION INTRAMUSCULAR; INTRAVENOUS; SUBCUTANEOUS at 04:03

## 2024-05-30 RX ADMIN — IOHEXOL 135 ML: 300 INJECTION, SOLUTION INTRAVENOUS at 15:10

## 2024-05-30 RX ADMIN — HYDROMORPHONE HYDROCHLORIDE 1 MG: 1 INJECTION, SOLUTION INTRAMUSCULAR; INTRAVENOUS; SUBCUTANEOUS at 13:54

## 2024-05-30 RX ADMIN — METHOCARBAMOL 1000 MG: 100 INJECTION, SOLUTION INTRAMUSCULAR; INTRAVENOUS at 21:04

## 2024-05-30 RX ADMIN — DIPHENHYDRAMINE HYDROCHLORIDE 25 MG: 25 TABLET ORAL at 22:36

## 2024-05-30 RX ADMIN — HYDROMORPHONE HYDROCHLORIDE 1 MG: 1 INJECTION, SOLUTION INTRAMUSCULAR; INTRAVENOUS; SUBCUTANEOUS at 00:38

## 2024-05-30 RX ADMIN — HYDROMORPHONE HYDROCHLORIDE 1 MG: 1 INJECTION, SOLUTION INTRAMUSCULAR; INTRAVENOUS; SUBCUTANEOUS at 07:18

## 2024-05-30 RX ADMIN — PANTOPRAZOLE SODIUM 40 MG: 40 INJECTION, POWDER, FOR SOLUTION INTRAVENOUS at 04:03

## 2024-05-30 RX ADMIN — HYDROMORPHONE HYDROCHLORIDE 1 MG: 1 INJECTION, SOLUTION INTRAMUSCULAR; INTRAVENOUS; SUBCUTANEOUS at 21:01

## 2024-05-30 RX ADMIN — PANTOPRAZOLE SODIUM 40 MG: 40 INJECTION, POWDER, FOR SOLUTION INTRAVENOUS at 17:46

## 2024-05-30 RX ADMIN — THIAMINE HYDROCHLORIDE 100 MG: 100 INJECTION, SOLUTION INTRAMUSCULAR; INTRAVENOUS at 04:03

## 2024-05-30 RX ADMIN — METHOCARBAMOL 1000 MG: 100 INJECTION, SOLUTION INTRAMUSCULAR; INTRAVENOUS at 15:25

## 2024-05-30 RX ADMIN — HYDROMORPHONE HYDROCHLORIDE 1 MG: 1 INJECTION, SOLUTION INTRAMUSCULAR; INTRAVENOUS; SUBCUTANEOUS at 17:46

## 2024-05-30 RX ADMIN — METHOCARBAMOL 1000 MG: 100 INJECTION, SOLUTION INTRAMUSCULAR; INTRAVENOUS at 06:23

## 2024-05-30 RX ADMIN — HYDROMORPHONE HYDROCHLORIDE 1 MG: 1 INJECTION, SOLUTION INTRAMUSCULAR; INTRAVENOUS; SUBCUTANEOUS at 11:07

## 2024-05-30 ASSESSMENT — COGNITIVE AND FUNCTIONAL STATUS - GENERAL
MOBILITY SCORE: 24
SUGGESTED CMS G CODE MODIFIER DAILY ACTIVITY: CH
SUGGESTED CMS G CODE MODIFIER MOBILITY: CH
DAILY ACTIVITIY SCORE: 24

## 2024-05-30 ASSESSMENT — ENCOUNTER SYMPTOMS
HEADACHES: 0
NERVOUS/ANXIOUS: 0
WHEEZING: 0
EYE REDNESS: 0
INSOMNIA: 0
WEAKNESS: 0
TREMORS: 0
FOCAL WEAKNESS: 0
BLOOD IN STOOL: 0
PALPITATIONS: 0
DIZZINESS: 0
ABDOMINAL PAIN: 1
DIARRHEA: 0
VOMITING: 1
SEIZURES: 0
HEMOPTYSIS: 0
LOSS OF CONSCIOUSNESS: 0
CONSTIPATION: 0
SHORTNESS OF BREATH: 0
NAUSEA: 1
CHILLS: 0
FALLS: 0
COUGH: 0
EYE PAIN: 0
FEVER: 0
MYALGIAS: 0

## 2024-05-30 ASSESSMENT — PAIN DESCRIPTION - PAIN TYPE
TYPE: ACUTE PAIN

## 2024-05-30 NOTE — CARE PLAN
The patient is Stable - Low risk of patient condition declining or worsening    Shift Goals  Clinical Goals: Patient to ambulate around hallway once by end of shift.  Patient Goals: Pain mgt during shift.  Family Goals: LAYNE    Progress made toward(s) clinical / shift goals:  Pt has been ambulating around room and in hallways.

## 2024-05-30 NOTE — PROGRESS NOTES
General surgery update    Will plan for esophagram today.  Once esophagram is completed I will review this with patient and discussed the plan moving forward.    Chad Huang MD  Thoracic & General Surgeon  [facility]  [phone]

## 2024-05-30 NOTE — PROGRESS NOTES
0700- Assumed care of pt. Alert/oriented. No needs at this time. STEVENSON drain and G tube draining freely.     0751- BS = 90. Pt given ice chips. Oral care items were provided. He was also encouraged to ambulate to help prevent pneumonia/DVT.    0756- Sal MCMILLAN note that esophagram planned for today.     1115- Pain meds given. BS checked = 99. PIV continuous fluids started.     Drains emptied:  STEVENSON drain = 40 ml; red blood colored  G tube anderson = 200 ml; greenish/brown    Dressing change completed to STEVENSON site & G tube site. Old dsgs removed on both sites. Both areas cleansed with wound cleanser, dried, and split gauze pad placed w/ tape.   STEVENSON drain area has scant bleeding red blood. Does not appear infected and sutures are intact.  G-tube site no redness or drainage noted. Sutures are intact.    1445- Pt left unit to have esophagram.    1515- Returned to unit.     Drains emptied:  STEVENSON drain = 40 ml; red blood colored  G tube anderson = 200 ml; greenish/brown    1528- ; held as pt does not want his blood sugar to drop. Pt is NPO at this time.    1630- Surgeon voalted via phone asking if pt can eat foods/or drink liquids.    1800- Hospitalist and Surgeon discussing on plan of care and whether to obtain a CT or abd. Xray for pt. Will endorse to night RN.

## 2024-05-30 NOTE — CARE PLAN
The patient is Watcher - Medium risk of patient condition declining or worsening    Shift Goals  Clinical Goals: Pt to remain free from falls throughout the night, and pain management  Patient Goals: Pt to be able to sleep some tonight.  Family Goals: LAYNE    Progress made toward(s) clinical / shift goals:    Problem: Pain - Standard  Goal: Alleviation of pain or a reduction in pain to the patient’s comfort goal  Outcome: Progressing   Pt states that pain is improved for short amount of time with prn pain medication.     Problem: Knowledge Deficit - Standard  Goal: Patient and family/care givers will demonstrate understanding of plan of care, disease process/condition, diagnostic tests and medications  Outcome: Progressing   Pt is understanding of IV fluids and waiting for barium swallow.      Problem: Skin Integrity  Goal: Skin integrity is maintained or improved  Outcome: Progressing       Patient is not progressing towards the following goals:

## 2024-05-30 NOTE — PROGRESS NOTES
Pt A & O x4. Pt ambulated multiple times throughout the night. Pt reports pain gets worse with hiccups and coughing. PRN pain medication and ice helps with pain. Pt FS was 83 and 103 over the night, no insulin give. All needs met at this time.

## 2024-05-30 NOTE — PROGRESS NOTES
"Mr. Peña Guerrero is a 53 y.o. male who presented to the hospital with a recurrent hiatal hernia with stomach in the chest volvulized and obstructed status post emergent paraesophageal hernia repair with gastropexy and G-tube placement on 5/28/2024    S: He is complaining of dry mouth, his preoperative pain has resolved.  Now he has incisional pain postoperatively    O:  /87   Pulse 87   Temp 36.3 °C (97.4 °F) (Temporal)   Resp 18   Ht 1.905 m (6' 3\")   Wt 86.4 kg (190 lb 7.6 oz)   SpO2 94%   BMI 23.81 kg/m²     GEN: awake, alert oriented  CV: RRR, brisk cap refill on hands  RESP: no labored breathing on room air  ABD: soft, nondistended, appropriately tender incisions without erythema or drainage, drain with serosanguineous output, G-tube in left upper quadrant with gastric contents in bag    Recent Labs     05/27/24 2215 05/29/24  0237   WBC 13.0* 21.9*   RBC 5.79 5.41   HEMOGLOBIN 18.0 16.7   HEMATOCRIT 50.8 47.5   MCV 87.7 87.8   MCH 31.1 30.9   RDW 40.2 41.6   PLATELETCT 309 290   MPV 8.9* 9.1   NEUTSPOLYS 78.20*  --    LYMPHOCYTES 14.50*  --    MONOCYTES 4.80  --    EOSINOPHILS 1.80  --    BASOPHILS 0.30  --      Recent Labs     05/27/24 2215 05/28/24  1157 05/29/24  0237   SODIUM 143  --  144   POTASSIUM 3.1* 4.0 4.1   CHLORIDE 101  --  104   CO2 26  --  29   GLUCOSE 42*  --  110*   BUN 10  --  18     INR   Date Value Ref Range Status   09/03/2020 0.91 0.87 - 1.13 Final     Comment:     INR - Non-therapeutic Reference Range: 0.87-1.13  INR - Therapeutic Reference Range: 2.0-4.0         Imaging:    A/P:   Mr. Peña Guerrero is a 53 y.o. male who presented to the hospital with a recurrent hiatal hernia with stomach in the chest volvulized and obstructed status post emergent paraesophageal hernia repair with gastropexy and G-tube placement on 5/28/2024    -NPO  -Esophagram today to evaluate for leak; if no leak cleared for full liquid diet  -Continue G-tube to gravity, if no leak on " esophagram may G-tube and vent intermittently for abdominal pain, nausea, vomiting, hiccups    Chad Huang MD  Thoracic & General Surgeon  [facility]  [phone]

## 2024-05-30 NOTE — CARE PLAN
The patient is Stable - Low risk of patient condition declining or worsening    Shift Goals  Clinical Goals: Pt to remain free from falls during shift.  Patient Goals: Mgt pain and comfort.  Family Goals: LAYNE    Progress made toward(s) clinical / shift goals:  Pt remained free from fall and injury during shift.      Problem: Skin Integrity  Goal: Skin integrity is maintained or improved  Outcome: Not Progressing

## 2024-05-31 VITALS
OXYGEN SATURATION: 96 % | WEIGHT: 190.48 LBS | HEART RATE: 85 BPM | TEMPERATURE: 97.1 F | BODY MASS INDEX: 23.68 KG/M2 | DIASTOLIC BLOOD PRESSURE: 96 MMHG | RESPIRATION RATE: 17 BRPM | SYSTOLIC BLOOD PRESSURE: 176 MMHG | HEIGHT: 75 IN

## 2024-05-31 LAB
ALBUMIN SERPL BCP-MCNC: 3.3 G/DL (ref 3.2–4.9)
ANION GAP SERPL CALC-SCNC: 19 MMOL/L (ref 7–16)
BASOPHILS # BLD AUTO: 0.5 % (ref 0–1.8)
BASOPHILS # BLD: 0.07 K/UL (ref 0–0.12)
BUN SERPL-MCNC: 19 MG/DL (ref 8–22)
CALCIUM ALBUM COR SERPL-MCNC: 8.8 MG/DL (ref 8.5–10.5)
CALCIUM SERPL-MCNC: 8.2 MG/DL (ref 8.5–10.5)
CHLORIDE SERPL-SCNC: 100 MMOL/L (ref 96–112)
CO2 SERPL-SCNC: 19 MMOL/L (ref 20–33)
CREAT SERPL-MCNC: 0.91 MG/DL (ref 0.5–1.4)
EOSINOPHIL # BLD AUTO: 1.01 K/UL (ref 0–0.51)
EOSINOPHIL NFR BLD: 7.5 % (ref 0–6.9)
ERYTHROCYTE [DISTWIDTH] IN BLOOD BY AUTOMATED COUNT: 40.6 FL (ref 35.9–50)
GFR SERPLBLD CREATININE-BSD FMLA CKD-EPI: 102 ML/MIN/1.73 M 2
GLUCOSE BLD STRIP.AUTO-MCNC: 209 MG/DL (ref 65–99)
GLUCOSE BLD STRIP.AUTO-MCNC: 234 MG/DL (ref 65–99)
GLUCOSE BLD STRIP.AUTO-MCNC: 238 MG/DL (ref 65–99)
GLUCOSE BLD STRIP.AUTO-MCNC: 307 MG/DL (ref 65–99)
GLUCOSE SERPL-MCNC: 222 MG/DL (ref 65–99)
HCT VFR BLD AUTO: 45 % (ref 42–52)
HGB BLD-MCNC: 15.7 G/DL (ref 14–18)
IMM GRANULOCYTES # BLD AUTO: 0.07 K/UL (ref 0–0.11)
IMM GRANULOCYTES NFR BLD AUTO: 0.5 % (ref 0–0.9)
LYMPHOCYTES # BLD AUTO: 1.75 K/UL (ref 1–4.8)
LYMPHOCYTES NFR BLD: 13 % (ref 22–41)
MCH RBC QN AUTO: 31 PG (ref 27–33)
MCHC RBC AUTO-ENTMCNC: 34.9 G/DL (ref 32.3–36.5)
MCV RBC AUTO: 88.9 FL (ref 81.4–97.8)
MONOCYTES # BLD AUTO: 1 K/UL (ref 0–0.85)
MONOCYTES NFR BLD AUTO: 7.4 % (ref 0–13.4)
NEUTROPHILS # BLD AUTO: 9.61 K/UL (ref 1.82–7.42)
NEUTROPHILS NFR BLD: 71.1 % (ref 44–72)
NRBC # BLD AUTO: 0 K/UL
NRBC BLD-RTO: 0 /100 WBC (ref 0–0.2)
PHOSPHATE SERPL-MCNC: 2.3 MG/DL (ref 2.5–4.5)
PLATELET # BLD AUTO: 250 K/UL (ref 164–446)
PMV BLD AUTO: 9 FL (ref 9–12.9)
POTASSIUM SERPL-SCNC: 3.9 MMOL/L (ref 3.6–5.5)
RBC # BLD AUTO: 5.06 M/UL (ref 4.7–6.1)
SODIUM SERPL-SCNC: 138 MMOL/L (ref 135–145)
WBC # BLD AUTO: 13.5 K/UL (ref 4.8–10.8)

## 2024-05-31 PROCEDURE — 82962 GLUCOSE BLOOD TEST: CPT | Mod: 91

## 2024-05-31 PROCEDURE — A9270 NON-COVERED ITEM OR SERVICE: HCPCS | Performed by: INTERNAL MEDICINE

## 2024-05-31 PROCEDURE — 770006 HCHG ROOM/CARE - MED/SURG/GYN SEMI*

## 2024-05-31 PROCEDURE — 700102 HCHG RX REV CODE 250 W/ 637 OVERRIDE(OP): Performed by: STUDENT IN AN ORGANIZED HEALTH CARE EDUCATION/TRAINING PROGRAM

## 2024-05-31 PROCEDURE — 700111 HCHG RX REV CODE 636 W/ 250 OVERRIDE (IP): Performed by: INTERNAL MEDICINE

## 2024-05-31 PROCEDURE — 700105 HCHG RX REV CODE 258: Performed by: REGISTERED NURSE

## 2024-05-31 PROCEDURE — 700102 HCHG RX REV CODE 250 W/ 637 OVERRIDE(OP): Performed by: INTERNAL MEDICINE

## 2024-05-31 PROCEDURE — 700111 HCHG RX REV CODE 636 W/ 250 OVERRIDE (IP): Performed by: STUDENT IN AN ORGANIZED HEALTH CARE EDUCATION/TRAINING PROGRAM

## 2024-05-31 PROCEDURE — 85025 COMPLETE CBC W/AUTO DIFF WBC: CPT

## 2024-05-31 PROCEDURE — 700111 HCHG RX REV CODE 636 W/ 250 OVERRIDE (IP): Performed by: REGISTERED NURSE

## 2024-05-31 PROCEDURE — 80069 RENAL FUNCTION PANEL: CPT

## 2024-05-31 PROCEDURE — 700102 HCHG RX REV CODE 250 W/ 637 OVERRIDE(OP)

## 2024-05-31 PROCEDURE — A9270 NON-COVERED ITEM OR SERVICE: HCPCS

## 2024-05-31 PROCEDURE — 99232 SBSQ HOSP IP/OBS MODERATE 35: CPT | Performed by: INTERNAL MEDICINE

## 2024-05-31 PROCEDURE — C9113 INJ PANTOPRAZOLE SODIUM, VIA: HCPCS | Performed by: STUDENT IN AN ORGANIZED HEALTH CARE EDUCATION/TRAINING PROGRAM

## 2024-05-31 RX ORDER — GAUZE BANDAGE 2" X 2"
100 BANDAGE TOPICAL DAILY
Status: DISPENSED | OUTPATIENT
Start: 2024-05-31

## 2024-05-31 RX ORDER — OXYCODONE HYDROCHLORIDE 5 MG/1
5 TABLET ORAL EVERY 4 HOURS PRN
Status: DISPENSED | OUTPATIENT
Start: 2024-05-31

## 2024-05-31 RX ADMIN — INSULIN LISPRO 2 UNITS: 100 INJECTION, SOLUTION INTRAVENOUS; SUBCUTANEOUS at 06:17

## 2024-05-31 RX ADMIN — HYDROMORPHONE HYDROCHLORIDE 1 MG: 1 INJECTION, SOLUTION INTRAMUSCULAR; INTRAVENOUS; SUBCUTANEOUS at 06:03

## 2024-05-31 RX ADMIN — HYDROMORPHONE HYDROCHLORIDE 1 MG: 1 INJECTION, SOLUTION INTRAMUSCULAR; INTRAVENOUS; SUBCUTANEOUS at 23:38

## 2024-05-31 RX ADMIN — INSULIN LISPRO 2 UNITS: 100 INJECTION, SOLUTION INTRAVENOUS; SUBCUTANEOUS at 11:42

## 2024-05-31 RX ADMIN — METHOCARBAMOL 1000 MG: 100 INJECTION, SOLUTION INTRAMUSCULAR; INTRAVENOUS at 06:03

## 2024-05-31 RX ADMIN — HYDROMORPHONE HYDROCHLORIDE 1 MG: 1 INJECTION, SOLUTION INTRAMUSCULAR; INTRAVENOUS; SUBCUTANEOUS at 17:31

## 2024-05-31 RX ADMIN — INSULIN GLARGINE-YFGN 15 UNITS: 100 INJECTION, SOLUTION SUBCUTANEOUS at 12:25

## 2024-05-31 RX ADMIN — HYDROMORPHONE HYDROCHLORIDE 1 MG: 1 INJECTION, SOLUTION INTRAMUSCULAR; INTRAVENOUS; SUBCUTANEOUS at 12:24

## 2024-05-31 RX ADMIN — THERA TABS 1 TABLET: TAB at 11:34

## 2024-05-31 RX ADMIN — PANTOPRAZOLE SODIUM 40 MG: 40 INJECTION, POWDER, FOR SOLUTION INTRAVENOUS at 17:29

## 2024-05-31 RX ADMIN — OXYCODONE HYDROCHLORIDE 5 MG: 5 TABLET ORAL at 13:53

## 2024-05-31 RX ADMIN — HYDROMORPHONE HYDROCHLORIDE 1 MG: 1 INJECTION, SOLUTION INTRAMUSCULAR; INTRAVENOUS; SUBCUTANEOUS at 20:35

## 2024-05-31 RX ADMIN — INSULIN LISPRO 2 UNITS: 100 INJECTION, SOLUTION INTRAVENOUS; SUBCUTANEOUS at 23:49

## 2024-05-31 RX ADMIN — PANTOPRAZOLE SODIUM 40 MG: 40 INJECTION, POWDER, FOR SOLUTION INTRAVENOUS at 06:02

## 2024-05-31 RX ADMIN — DIPHENHYDRAMINE HYDROCHLORIDE 25 MG: 25 TABLET ORAL at 06:19

## 2024-05-31 RX ADMIN — THIAMINE HYDROCHLORIDE 100 MG: 100 INJECTION, SOLUTION INTRAMUSCULAR; INTRAVENOUS at 06:02

## 2024-05-31 RX ADMIN — HYDROMORPHONE HYDROCHLORIDE 1 MG: 1 INJECTION, SOLUTION INTRAMUSCULAR; INTRAVENOUS; SUBCUTANEOUS at 00:06

## 2024-05-31 RX ADMIN — INSULIN LISPRO 4 UNITS: 100 INJECTION, SOLUTION INTRAVENOUS; SUBCUTANEOUS at 18:10

## 2024-05-31 RX ADMIN — Medication 100 MG: at 11:34

## 2024-05-31 RX ADMIN — DIPHENHYDRAMINE HYDROCHLORIDE 25 MG: 25 TABLET ORAL at 21:34

## 2024-05-31 RX ADMIN — HYDROMORPHONE HYDROCHLORIDE 1 MG: 1 INJECTION, SOLUTION INTRAMUSCULAR; INTRAVENOUS; SUBCUTANEOUS at 03:06

## 2024-05-31 RX ADMIN — HYDROMORPHONE HYDROCHLORIDE 1 MG: 1 INJECTION, SOLUTION INTRAMUSCULAR; INTRAVENOUS; SUBCUTANEOUS at 09:24

## 2024-05-31 RX ADMIN — OXYCODONE HYDROCHLORIDE 5 MG: 5 TABLET ORAL at 19:42

## 2024-05-31 ASSESSMENT — ENCOUNTER SYMPTOMS
WEAKNESS: 0
WHEEZING: 0
MYALGIAS: 0
LOSS OF CONSCIOUSNESS: 0
FALLS: 0
EYE PAIN: 0
SEIZURES: 0
CONSTIPATION: 0
COUGH: 0
CHILLS: 0
TREMORS: 0
NERVOUS/ANXIOUS: 0
DIARRHEA: 0
INSOMNIA: 0
NAUSEA: 1
BLOOD IN STOOL: 0
SHORTNESS OF BREATH: 0
FEVER: 0
ABDOMINAL PAIN: 1
FOCAL WEAKNESS: 0
VOMITING: 1
HEMOPTYSIS: 0
HEADACHES: 0
EYE REDNESS: 0
PALPITATIONS: 0
DIZZINESS: 0

## 2024-05-31 ASSESSMENT — PAIN DESCRIPTION - PAIN TYPE
TYPE: ACUTE PAIN
TYPE: ACUTE PAIN

## 2024-05-31 NOTE — PROGRESS NOTES
[facility] Medicine Daily Progress Note    Date of Service  5/30/2024    Chief Complaint  Peña Guerrero is a 53 y.o. male admitted 5/27/2024 with Low Blood Sugar     Hospital Course  No notes on file    Interval Problem Update  Patient seen and examine at bedside  I reviewed the chart along with vitals, labs, imaging, test (both pending and resulted) and recommendations from specialists and interdisciplinary team.  54yo M w/ h/o diabetes, GERD, esophageal stricture requiring dilation, hiatal hernia repair 4/2024 w/ Dr. Huang presented 5/27/2024 with nausea, vomiting. At the ED, afebrile, hypertensive. Hypoglycemic with BG in the 40s. CT shows near complete herniation of the stomach into the chest.  Managing for hiatal hernia, hypoglycemia, hypertension. Surgery consulted, Dr. Huang to be notified by Dr. Hwang for plan, currently strict NPO. NG tube decompression recommended.  Dextrose given, insulin reduced    Less irritable. He is now swishing and spitting water. Explained again the plan.  Esophagram done today. Came back no leak except small extraluminal fluid collection at GES junction. I thus updated Dr. Huang and will decide if patient will have to be on TPN versus trial of clears. Meanwhile on dextrose and thiamine IV.     I spoke with Dr. Huang, CT ordered, if no extravasation, might be able to trial a diet. Staff aware.     I have discussed this patient's plan of care and discharge plan at IDT rounds today with Case Management, Nursing, Nursing leadership, and other members of the IDT team.    Consultants/Specialty  general surgery    Code Status  Full Code    Disposition  Medically Cleared  I have placed the appropriate orders for post-discharge needs.    Review of Systems  Review of Systems   Constitutional:  Negative for chills and fever.   HENT:  Negative for congestion, hearing loss and nosebleeds.    Eyes:  Negative for pain and redness.   Respiratory:  Negative for cough, hemoptysis,  shortness of breath and wheezing.    Cardiovascular:  Negative for chest pain and palpitations.   Gastrointestinal:  Positive for abdominal pain, nausea and vomiting. Negative for blood in stool, constipation and diarrhea.   Genitourinary:  Negative for dysuria, frequency and hematuria.   Musculoskeletal:  Negative for falls, joint pain and myalgias.   Skin:  Negative for rash.   Neurological:  Negative for dizziness, tremors, focal weakness, seizures, loss of consciousness, weakness and headaches.   Psychiatric/Behavioral:  The patient is not nervous/anxious and does not have insomnia.    All other systems reviewed and are negative.       Physical Exam  Temp:  [36.3 °C (97.4 °F)-36.7 °C (98 °F)] 36.3 °C (97.4 °F)  Pulse:  [79-87] 87  Resp:  [17-18] 17  BP: (124-140)/(71-95) 139/95  SpO2:  [94 %-97 %] 95 %    Physical Exam  Vitals and nursing note reviewed.   HENT:      Head: Normocephalic and atraumatic.      Right Ear: External ear normal.      Left Ear: External ear normal.      Nose: Nose normal.      Mouth/Throat:      Mouth: Mucous membranes are moist.   Eyes:      General: No scleral icterus.     Conjunctiva/sclera: Conjunctivae normal.   Cardiovascular:      Rate and Rhythm: Normal rate and regular rhythm.      Heart sounds: No murmur heard.     No friction rub. No gallop.   Pulmonary:      Effort: Pulmonary effort is normal.      Breath sounds: Normal breath sounds.   Abdominal:      General: Abdomen is flat. Bowel sounds are normal. There is no distension.      Palpations: Abdomen is soft.      Tenderness: There is no abdominal tenderness. There is no guarding.   Musculoskeletal:         General: Normal range of motion.      Cervical back: Normal range of motion and neck supple.   Skin:     General: Skin is warm.   Neurological:      Mental Status: He is alert and oriented to person, place, and time. Mental status is at baseline.   Psychiatric:         Mood and Affect: Mood normal.         Behavior:  Behavior normal.         Thought Content: Thought content normal.         Judgment: Judgment normal.      Comments: Irritable         Fluids    Intake/Output Summary (Last 24 hours) at 5/30/2024 1720  Last data filed at 5/30/2024 1515  Gross per 24 hour   Intake --   Output 2570 ml   Net -2570 ml       Laboratory  Recent Labs     05/27/24 2215 05/29/24 0237 05/30/24  1239   WBC 13.0* 21.9* 14.5*   RBC 5.79 5.41 5.00   HEMOGLOBIN 18.0 16.7 15.7   HEMATOCRIT 50.8 47.5 43.6   MCV 87.7 87.8 87.2   MCH 31.1 30.9 31.4   MCHC 35.4 35.2 36.0   RDW 40.2 41.6 40.1   PLATELETCT 309 290 229   MPV 8.9* 9.1 8.9*     Recent Labs     05/27/24 2215 05/28/24  1157 05/29/24  0237 05/30/24  1239   SODIUM 143  --  144 139   POTASSIUM 3.1* 4.0 4.1 4.1   CHLORIDE 101  --  104 101   CO2 26  --  29 26   GLUCOSE 42*  --  110* 135*   BUN 10  --  18 19   CREATININE 0.93  --  1.27 0.94   CALCIUM 9.6  --  8.6 8.6                   Imaging  DX-ESOPHAGUS BARIUM SWALLOW SINGLE CONTRAST   Final Result         1.  Gastric fundus appears to be located in the lower chest.   2.  Small area of contained contrast adjacent to the gastroesophageal junction which may located within the postsurgical stomach or within a small extraluminal collection.   3.  Narrowing of the mid stomach which does not appear to be distensible but does not prevent water-soluble contrast passage into the distal stomach.   4.  Surgical drain in the left upper quadrant.      DX-ABDOMEN FOR TUBE PLACEMENT   Final Result         1.  Nonspecific bowel gas pattern in the upper abdomen.   2.  Nasogastric tube coiled back in the mid esophagus with tip projecting cephalad, recommend withdrawal and replacement   3.  Left basilar atelectasis and/or infiltrate      CT-ABDOMEN-PELVIS WITH   Final Result         1.  4.5 cm diaphragmatic versus hiatal hernia with near complete herniation of the stomach into the left chest, associated fluid-filled distention of the stomach is seen suggesting  gastric obstruction and raising concern for possible incarceration.    Appearance of the stomach likely represents component of gastric volvulus.   2.  Small left pleural effusion      These findings were discussed with the patient's clinician, Eliu Hook, on 5/28/2024 12:10 AM.      DX-CHEST-PORTABLE (1 VIEW)   Final Result         1.  Hazy left midlung and lower lobe infiltrate.           Assessment/Plan  Hypokalemia- (present on admission)  Assessment & Plan  Due to decreased p.o. intake and GI loss.  Potassium 3.1 on presentation  Replete as needed check magnesium, BMP ordered to continue monitoring    Paraesophageal hernia- (present on admission)  Assessment & Plan  CT imaging reviewed and shows near complete herniation of the stomach  EDP discussed with surgery, Dr. Hwang.  NG tube, keep n.p.o., IV fluids    Esphagram resulted small postluminal fluid collection GES junction? Discussing with Surgery regarding diet plan.   Ordered IV fluids myself with dextrose  Add IV thiamine    Esophageal stricture- (present on admission)  Assessment & Plan  S/p recent dilation by Dr. Huang    Primary hypertension- (present on admission)  Assessment & Plan  Hold lisinopril strict NPO.  As needed antihypertensives ordered  Vitals:    05/30/24 1600   BP: (!) 139/95   Pulse: 87   Resp: 17   Temp: 36.3 °C (97.4 °F)   SpO2: 95%     Stable    Type 1 diabetes mellitus with hypoglycemia and without coma (HCC)- (present on admission)  Assessment & Plan  Patient already takes 60 units of glargine per evening, does not take any Premeal or sliding scale insulin  He had decreased his dose to 40 due to decreased p.o. intake and vomiting  Found to be significantly hypoglycemic on presentation in the 40s.  Improved with dextrose  Since he is a type I diabetic continue glargine, decrease dose to 20 units per evening.  Adjust as needed as he would likely be n.p.o. for a while per surgery    SS inuslin while NPO         VTE prophylaxis:  SCDs    I have performed a physical exam and reviewed and updated ROS and Plan today (5/30/2024). In review of yesterday's note (5/29/2024), there are no changes except as documented above.

## 2024-05-31 NOTE — PROGRESS NOTES
Hospital Medicine Daily Progress Note    Date of Service  5/31/2024    Chief Complaint  Peña Guerrero is a 53 y.o. male admitted 5/27/2024 with Low Blood Sugar     Hospital Course  No notes on file    Interval Problem Update  Patient seen and examine at bedside  I reviewed the chart along with vitals, labs, imaging, test (both pending and resulted) and recommendations from specialists and interdisciplinary team.  54yo M w/ h/o diabetes, GERD, esophageal stricture requiring dilation, hiatal hernia repair 4/2024 w/ Dr. Huang presented 5/27/2024 with nausea, vomiting. At the ED, afebrile, hypertensive. Hypoglycemic with BG in the 40s. CT shows near complete herniation of the stomach into the chest.  Managing for hiatal hernia, hypoglycemia, hypertension.NG tube decompression recommended.  Dextrose given, insulin reduced. Dr. Huang performed robotic paraesophageal hernia repair, gastropexy with gastrostomy tube and upper endoscopy.     Esophagram came back no leak except small extraluminal fluid collection at GES junction. CTA showed no extravasation or leak.   I spoke with Dr. Huang. Restarted basal insulin. Diet advanced to fulls. Will cap the PEG. If he remains improved tomorrow, advance diet further and discontinue STEVENSON drain, potential discharge.    He is in good psirits. Tolerating full liquid diet.  Offer Home Health Care for PEG tube care.    I have discussed this patient's plan of care and discharge plan at IDT rounds today with Case Management, Nursing, Nursing leadership, and other members of the IDT team.    Consultants/Specialty  general surgery    Code Status  Full Code    Disposition  Medically Cleared  I have placed the appropriate orders for post-discharge needs.    Review of Systems  Review of Systems   Constitutional:  Negative for chills and fever.   HENT:  Negative for congestion, hearing loss and nosebleeds.    Eyes:  Negative for pain and redness.   Respiratory:  Negative for cough,  hemoptysis, shortness of breath and wheezing.    Cardiovascular:  Negative for chest pain and palpitations.   Gastrointestinal:  Positive for abdominal pain, nausea and vomiting. Negative for blood in stool, constipation and diarrhea.   Genitourinary:  Negative for dysuria, frequency and hematuria.   Musculoskeletal:  Negative for falls, joint pain and myalgias.   Skin:  Negative for rash.   Neurological:  Negative for dizziness, tremors, focal weakness, seizures, loss of consciousness, weakness and headaches.   Psychiatric/Behavioral:  The patient is not nervous/anxious and does not have insomnia.    All other systems reviewed and are negative.       Physical Exam  Temp:  [36.3 °C (97.4 °F)-36.7 °C (98.1 °F)] 36.4 °C (97.6 °F)  Pulse:  [65-87] 65  Resp:  [16-18] 16  BP: (137-153)/(71-95) 137/71  SpO2:  [93 %-95 %] 93 %    Physical Exam  Vitals and nursing note reviewed.   HENT:      Head: Normocephalic and atraumatic.      Right Ear: External ear normal.      Left Ear: External ear normal.      Nose: Nose normal.      Mouth/Throat:      Mouth: Mucous membranes are moist.   Eyes:      General: No scleral icterus.     Conjunctiva/sclera: Conjunctivae normal.   Cardiovascular:      Rate and Rhythm: Normal rate and regular rhythm.      Heart sounds: No murmur heard.     No friction rub. No gallop.   Pulmonary:      Effort: Pulmonary effort is normal.      Breath sounds: Normal breath sounds.   Abdominal:      General: Abdomen is flat. Bowel sounds are normal. There is no distension.      Palpations: Abdomen is soft.      Tenderness: There is no abdominal tenderness. There is no guarding.      Comments: PEG tube  STEVENSON drain    Musculoskeletal:         General: Normal range of motion.      Cervical back: Normal range of motion and neck supple.   Skin:     General: Skin is warm.   Neurological:      Mental Status: He is alert and oriented to person, place, and time. Mental status is at baseline.   Psychiatric:         Mood  and Affect: Mood normal.         Behavior: Behavior normal.         Thought Content: Thought content normal.         Judgment: Judgment normal.      Comments: Irritable         Fluids    Intake/Output Summary (Last 24 hours) at 5/31/2024 1558  Last data filed at 5/31/2024 1100  Gross per 24 hour   Intake 240 ml   Output 2935 ml   Net -2695 ml       Laboratory  Recent Labs     05/29/24  0237 05/30/24  1239 05/31/24  0323   WBC 21.9* 14.5* 13.5*   RBC 5.41 5.00 5.06   HEMOGLOBIN 16.7 15.7 15.7   HEMATOCRIT 47.5 43.6 45.0   MCV 87.8 87.2 88.9   MCH 30.9 31.4 31.0   MCHC 35.2 36.0 34.9   RDW 41.6 40.1 40.6   PLATELETCT 290 229 250   MPV 9.1 8.9* 9.0     Recent Labs     05/29/24  0237 05/30/24  1239 05/31/24  0323   SODIUM 144 139 138   POTASSIUM 4.1 4.1 3.9   CHLORIDE 104 101 100   CO2 29 26 19*   GLUCOSE 110* 135* 222*   BUN 18 19 19   CREATININE 1.27 0.94 0.91   CALCIUM 8.6 8.6 8.2*                   Imaging  CT-ABDOMEN-PELVIS W/O   Final Result         1.  Large hiatal hernia   2.  Gallbladder sludge and/or poorly calcified gallstones   3.  Small left pleural effusion.   4.  Linear densities the bilateral lung bases favor atelectasis, greater on the left      DX-ESOPHAGUS BARIUM SWALLOW SINGLE CONTRAST   Final Result         1.  Gastric fundus appears to be located in the lower chest.   2.  Small area of contained contrast adjacent to the gastroesophageal junction which may located within the postsurgical stomach or within a small extraluminal collection.   3.  Narrowing of the mid stomach which does not appear to be distensible but does not prevent water-soluble contrast passage into the distal stomach.   4.  Surgical drain in the left upper quadrant.      DX-ABDOMEN FOR TUBE PLACEMENT   Final Result         1.  Nonspecific bowel gas pattern in the upper abdomen.   2.  Nasogastric tube coiled back in the mid esophagus with tip projecting cephalad, recommend withdrawal and replacement   3.  Left basilar atelectasis  and/or infiltrate      CT-ABDOMEN-PELVIS WITH   Final Result         1.  4.5 cm diaphragmatic versus hiatal hernia with near complete herniation of the stomach into the left chest, associated fluid-filled distention of the stomach is seen suggesting gastric obstruction and raising concern for possible incarceration.    Appearance of the stomach likely represents component of gastric volvulus.   2.  Small left pleural effusion      These findings were discussed with the patient's clinician, Eliu Hook, on 5/28/2024 12:10 AM.      DX-CHEST-PORTABLE (1 VIEW)   Final Result         1.  Hazy left midlung and lower lobe infiltrate.           Assessment/Plan  Hypokalemia- (present on admission)  Assessment & Plan  Due to decreased p.o. intake and GI loss.  Potassium 3.1 on presentation  Replete as needed check magnesium, BMP ordered to continue monitoring  'See above. S/p surgery    Paraesophageal hernia- (present on admission)  Assessment & Plan  CT imaging reviewed and shows near complete herniation of the stomach  EDP discussed with surgery, Dr. Hwang.  NG tube, keep n.p.o., IV fluids  S/p peg and STEVENSON drain  Esphagram resulted small postluminal fluid collection GES junction? Discussing with Surgery regarding diet plan.   Switch to PO vitamins    Esophageal stricture- (present on admission)  Assessment & Plan  S/p recent dilation by Dr. Huang    Primary hypertension- (present on admission)  Assessment & Plan  Hold lisinopril strict NPO.  As needed antihypertensives ordered  Vitals:    05/31/24 1221   BP:    Pulse:    Resp: 16   Temp:    SpO2:      Stable    Type 1 diabetes mellitus with hypoglycemia and without coma (HCC)- (present on admission)  Assessment & Plan  Patient already takes 60 units of glargine per evening, does not take any Premeal or sliding scale insulin  He had decreased his dose to 40 due to decreased p.o. intake and vomiting  Found to be significantly hypoglycemic on presentation in the 40s.   Improved with dextrose  Since he is a type I diabetic continue glargine, decrease dose to 20 units per evening.  Adjust as needed as he would likely be n.p.o. for a while per surgery    SS inuslin back to [facility]  Added basal insulin         VTE prophylaxis: SCDs    I have performed a physical exam and reviewed and updated ROS and Plan today (5/31/2024). In review of yesterday's note (5/30/2024), there are no changes except as documented above.

## 2024-05-31 NOTE — CARE PLAN
The patient is Stable - Low risk of patient condition declining or worsening    Shift Goals  Clinical Goals: Pt pain management and to advance diet to full liquid throguhout the night.  Patient Goals: Pain management throughout the shift.  Family Goals: LAYNE    Progress made toward(s) clinical / shift goals:    Problem: Pain - Standard  Goal: Alleviation of pain or a reduction in pain to the patient’s comfort goal  Outcome: Progressing   Pt able to get some rest throughout the night with PRN pain medication. He also notes that hiccups are better after medication and only start to get bad when pain increases. Pt did report itchy skin after dose of pain medication at night.     Problem: Knowledge Deficit - Standard  Goal: Patient and family/care givers will demonstrate understanding of plan of care, disease process/condition, diagnostic tests and medications  Outcome: Progressing  Pt educated on new diet orders of full liquid diet.      Patient is not progressing towards the following goals:

## 2024-05-31 NOTE — CARE PLAN
The patient is Stable - Low risk of patient condition declining or worsening    Shift Goals  Clinical Goals: pt. pain from 8 to 3, monitor drain output,      Progress made toward(s) clinical / shift goals:  PRN dilaudid on board for complaints of pain. G tube clamped today per order, pt. Tolerating diet, no n/v. Pain is from 8 to 4. Blood sugar monitoring done. Pt. STEVENSON drain leaking serosanguinous drain updated Dr. Huang, per MD ok to change dressing and will see pt. Shortly.       Problem: Pain - Standard  Goal: Alleviation of pain or a reduction in pain to the patient’s comfort goal  Outcome: Progressing     Problem: Knowledge Deficit - Standard  Goal: Patient and family/care givers will demonstrate understanding of plan of care, disease process/condition, diagnostic tests and medications  Outcome: Progressing     Problem: Skin Integrity  Goal: Skin integrity is maintained or improved  Outcome: Progressing       Patient is not progressing towards the following goals:

## 2024-05-31 NOTE — PROGRESS NOTES
Pt A &O x4.Pt taken to CT around 1935, pt back on floor around 2015. Pt given prn pain medication for pain 8 out of 10. Pt reported feeling itchy after pain medication given. Neftali LAWRENCE notified of pts itchiness. Pt given benadryl and stated that it relieved the itchiness. Pt able to rest in bed. Bed in lowest and locked position with call light within reach. All needs met at this time.

## 2024-06-01 ENCOUNTER — PHARMACY VISIT (OUTPATIENT)
Dept: PHARMACY | Facility: MEDICAL CENTER | Age: 54
End: 2024-06-01
Payer: COMMERCIAL

## 2024-06-01 VITALS
DIASTOLIC BLOOD PRESSURE: 90 MMHG | BODY MASS INDEX: 23.68 KG/M2 | RESPIRATION RATE: 17 BRPM | TEMPERATURE: 96.8 F | OXYGEN SATURATION: 95 % | SYSTOLIC BLOOD PRESSURE: 157 MMHG | WEIGHT: 190.48 LBS | HEIGHT: 75 IN | HEART RATE: 60 BPM

## 2024-06-01 LAB
ALBUMIN SERPL BCP-MCNC: 3.3 G/DL (ref 3.2–4.9)
ANION GAP SERPL CALC-SCNC: 13 MMOL/L (ref 7–16)
BASOPHILS # BLD AUTO: 0.4 % (ref 0–1.8)
BASOPHILS # BLD: 0.04 K/UL (ref 0–0.12)
BUN SERPL-MCNC: 16 MG/DL (ref 8–22)
CALCIUM ALBUM COR SERPL-MCNC: 8.9 MG/DL (ref 8.5–10.5)
CALCIUM SERPL-MCNC: 8.3 MG/DL (ref 8.5–10.5)
CHLORIDE SERPL-SCNC: 100 MMOL/L (ref 96–112)
CO2 SERPL-SCNC: 25 MMOL/L (ref 20–33)
CREAT SERPL-MCNC: 0.73 MG/DL (ref 0.5–1.4)
EOSINOPHIL # BLD AUTO: 1.23 K/UL (ref 0–0.51)
EOSINOPHIL NFR BLD: 11.5 % (ref 0–6.9)
ERYTHROCYTE [DISTWIDTH] IN BLOOD BY AUTOMATED COUNT: 37.6 FL (ref 35.9–50)
GFR SERPLBLD CREATININE-BSD FMLA CKD-EPI: 110 ML/MIN/1.73 M 2
GLUCOSE BLD STRIP.AUTO-MCNC: 199 MG/DL (ref 65–99)
GLUCOSE BLD STRIP.AUTO-MCNC: 229 MG/DL (ref 65–99)
GLUCOSE SERPL-MCNC: 149 MG/DL (ref 65–99)
HCT VFR BLD AUTO: 41.8 % (ref 42–52)
HGB BLD-MCNC: 14.7 G/DL (ref 14–18)
IMM GRANULOCYTES # BLD AUTO: 0.04 K/UL (ref 0–0.11)
IMM GRANULOCYTES NFR BLD AUTO: 0.4 % (ref 0–0.9)
LYMPHOCYTES # BLD AUTO: 2.08 K/UL (ref 1–4.8)
LYMPHOCYTES NFR BLD: 19.5 % (ref 22–41)
MCH RBC QN AUTO: 30.4 PG (ref 27–33)
MCHC RBC AUTO-ENTMCNC: 35.2 G/DL (ref 32.3–36.5)
MCV RBC AUTO: 86.4 FL (ref 81.4–97.8)
MONOCYTES # BLD AUTO: 0.88 K/UL (ref 0–0.85)
MONOCYTES NFR BLD AUTO: 8.2 % (ref 0–13.4)
NEUTROPHILS # BLD AUTO: 6.4 K/UL (ref 1.82–7.42)
NEUTROPHILS NFR BLD: 60 % (ref 44–72)
NRBC # BLD AUTO: 0 K/UL
NRBC BLD-RTO: 0 /100 WBC (ref 0–0.2)
PHOSPHATE SERPL-MCNC: 2.3 MG/DL (ref 2.5–4.5)
PLATELET # BLD AUTO: 269 K/UL (ref 164–446)
PMV BLD AUTO: 8.8 FL (ref 9–12.9)
POTASSIUM SERPL-SCNC: 3.6 MMOL/L (ref 3.6–5.5)
RBC # BLD AUTO: 4.84 M/UL (ref 4.7–6.1)
SODIUM SERPL-SCNC: 138 MMOL/L (ref 135–145)
WBC # BLD AUTO: 10.7 K/UL (ref 4.8–10.8)

## 2024-06-01 PROCEDURE — 80069 RENAL FUNCTION PANEL: CPT

## 2024-06-01 PROCEDURE — C9113 INJ PANTOPRAZOLE SODIUM, VIA: HCPCS | Performed by: STUDENT IN AN ORGANIZED HEALTH CARE EDUCATION/TRAINING PROGRAM

## 2024-06-01 PROCEDURE — 700111 HCHG RX REV CODE 636 W/ 250 OVERRIDE (IP): Performed by: REGISTERED NURSE

## 2024-06-01 PROCEDURE — RXMED WILLOW AMBULATORY MEDICATION CHARGE: Performed by: INTERNAL MEDICINE

## 2024-06-01 PROCEDURE — A9270 NON-COVERED ITEM OR SERVICE: HCPCS | Performed by: INTERNAL MEDICINE

## 2024-06-01 PROCEDURE — 700102 HCHG RX REV CODE 250 W/ 637 OVERRIDE(OP): Performed by: INTERNAL MEDICINE

## 2024-06-01 PROCEDURE — 82962 GLUCOSE BLOOD TEST: CPT

## 2024-06-01 PROCEDURE — 99239 HOSP IP/OBS DSCHRG MGMT >30: CPT | Performed by: INTERNAL MEDICINE

## 2024-06-01 PROCEDURE — 85025 COMPLETE CBC W/AUTO DIFF WBC: CPT

## 2024-06-01 PROCEDURE — 700102 HCHG RX REV CODE 250 W/ 637 OVERRIDE(OP)

## 2024-06-01 PROCEDURE — 700111 HCHG RX REV CODE 636 W/ 250 OVERRIDE (IP): Performed by: STUDENT IN AN ORGANIZED HEALTH CARE EDUCATION/TRAINING PROGRAM

## 2024-06-01 PROCEDURE — 700111 HCHG RX REV CODE 636 W/ 250 OVERRIDE (IP)

## 2024-06-01 PROCEDURE — A9270 NON-COVERED ITEM OR SERVICE: HCPCS

## 2024-06-01 RX ORDER — PROCHLORPERAZINE MALEATE 10 MG
10 TABLET ORAL EVERY 8 HOURS PRN
Qty: 15 TABLET | Refills: 0 | Status: SHIPPED | OUTPATIENT
Start: 2024-06-01

## 2024-06-01 RX ORDER — OXYCODONE HYDROCHLORIDE AND ACETAMINOPHEN 5; 325 MG/1; MG/1
1 TABLET ORAL EVERY 6 HOURS PRN
Qty: 12 TABLET | Refills: 0 | Status: SHIPPED | OUTPATIENT
Start: 2024-06-01 | End: 2024-06-04

## 2024-06-01 RX ORDER — INSULIN GLARGINE 100 [IU]/ML
30 INJECTION, SOLUTION SUBCUTANEOUS EVERY EVENING
Qty: 15 ML | Refills: 5 | Status: ACTIVE | OUTPATIENT
Start: 2024-06-01

## 2024-06-01 RX ORDER — HYDROMORPHONE HYDROCHLORIDE 1 MG/ML
1 INJECTION, SOLUTION INTRAMUSCULAR; INTRAVENOUS; SUBCUTANEOUS ONCE
Status: COMPLETED | OUTPATIENT
Start: 2024-06-01 | End: 2024-06-01

## 2024-06-01 RX ORDER — METHOCARBAMOL 750 MG/1
750 TABLET, FILM COATED ORAL DAILY
Qty: 30 TABLET | Refills: 0 | Status: SHIPPED | OUTPATIENT
Start: 2024-06-01

## 2024-06-01 RX ORDER — DIPHENHYDRAMINE HCL 25 MG
25 TABLET ORAL EVERY 6 HOURS PRN
COMMUNITY
Start: 2024-06-01

## 2024-06-01 RX ADMIN — OXYCODONE HYDROCHLORIDE 5 MG: 5 TABLET ORAL at 05:11

## 2024-06-01 RX ADMIN — OXYCODONE HYDROCHLORIDE 5 MG: 5 TABLET ORAL at 09:19

## 2024-06-01 RX ADMIN — Medication 100 MG: at 05:11

## 2024-06-01 RX ADMIN — DIPHENHYDRAMINE HYDROCHLORIDE 25 MG: 25 TABLET ORAL at 06:58

## 2024-06-01 RX ADMIN — HYDROMORPHONE HYDROCHLORIDE 1 MG: 1 INJECTION, SOLUTION INTRAMUSCULAR; INTRAVENOUS; SUBCUTANEOUS at 02:38

## 2024-06-01 RX ADMIN — HYDROMORPHONE HYDROCHLORIDE 1 MG: 1 INJECTION, SOLUTION INTRAMUSCULAR; INTRAVENOUS; SUBCUTANEOUS at 04:23

## 2024-06-01 RX ADMIN — THERA TABS 1 TABLET: TAB at 05:11

## 2024-06-01 RX ADMIN — OXYCODONE HYDROCHLORIDE 5 MG: 5 TABLET ORAL at 00:38

## 2024-06-01 RX ADMIN — HYDROMORPHONE HYDROCHLORIDE 1 MG: 1 INJECTION, SOLUTION INTRAMUSCULAR; INTRAVENOUS; SUBCUTANEOUS at 06:11

## 2024-06-01 RX ADMIN — INSULIN GLARGINE-YFGN 15 UNITS: 100 INJECTION, SOLUTION SUBCUTANEOUS at 06:16

## 2024-06-01 RX ADMIN — PANTOPRAZOLE SODIUM 40 MG: 40 INJECTION, POWDER, FOR SOLUTION INTRAVENOUS at 05:11

## 2024-06-01 NOTE — PROGRESS NOTES
Late entry    1040 updated Dr. Granger of additional med suggestion on Dr. Huang notes, MD ordered meds to be added for pt. To take at home.     1052 pt. Came out to the hallway, updated that new meds were added and that [facility] ordered for him. Pt. Wanting to get off the floor already and leave as per him, his ride has an appointment to go to. Pt. Walked to [facility]. Endorsed to Ezequiel MUNOZ. All belongings sent with the pt. Instructions on how to take care of the G tube reiterated to pt. Provided additional anderson bag to go home with if he needs to vent his G tube per instructions.     Informed pt. As well that there is a HH order for him to help with his G tube at home. Pt. Did not want to wait for CM to talk to him he said he will go home and he will take care of it. Ruth from  updated.

## 2024-06-01 NOTE — DISCHARGE INSTRUCTIONS
Gastrostomy Tube Home Guide, Adult  A gastrostomy tube, or G-tube, is a tube that is inserted through the abdomen into the stomach. The tube is used to give feedings and medicines when a person cannot eat and drink enough on his or her own or take medicines by mouth.  How to care for the insertion site    Supplies needed:  Saline solution or clean, warm water and soap. Saline solution is made of salt and water.  Cotton swab or gauze.  Pre-cut gauze bandage (dressing) and tape, if needed.  Instructions  Follow these steps daily to clean the insertion site:  Wash your hands with soap and water for at least 20 seconds.  Remove the dressing (if there is one) that is between the person's skin and the tube.  Check the area where the tube enters the skin. Check daily for problems such as:  Redness, rash, or irritation.  Swelling.  Pus-like drainage.  Extra skin growth.  Moisten the cotton swab or gauze with the saline solution or with a soap-and-water mixture. Gently clean around the insertion site. Remove any drainage or crusted material.  When the G-tube is first put in, a normal saline solution or water can be used to clean the skin.  After the skin around the tube has healed, mild soap and water may be used.  Apply a dressing (if there should be one) between the person's skin and the tube.  How to flush a G-tube  Flush the G-tube regularly to keep it from clogging. Flush it before and after feedings and as often as told by the health care provider.  Supplies needed:  Purified or germ-free (sterile) water, warmed.  Container with lid for boiling water, if needed.  60 cc G-tube syringe.  Instructions  Before you begin, decide whether to use sterile water or purified drinking water.  Use only sterile water if:  The person has a weak disease-fighting (immune) system.  The person has trouble fighting off infections (is immunocompromised).  You are unsure about the amount of chemical contaminants in purified or drinking  water.  Use purified drinking water in all other cases. To purify drinking water by boiling:  Boil water for at least 1 minute. Keep lid over water while it boils.  Let water cool to room temperature before using.  Follow these steps to flush the G-tube:  Wash your hands with soap and water for at least 20 seconds.  Bring out (draw up) 30 mL of warm water in a syringe.  Connect the syringe to the tube.  Slowly and gently push the water into the tube.  General tips  If the tube comes out:  Cover the opening with a clean dressing and tape.  Get help right away.  If there is skin or scar tissue growing where the tube enters the skin:  Keep the area clean and dry.  Secure the tube with tape so that the tube does not move around too much.  If the tube gets clogged:  Slowly push warm water into the tube with a large syringe.  Do not force the fluid into the tube or push an object into the tube.  Get help right away if you cannot unclog the tube.  Follow these instructions at home:  Feedings  Give feedings at room temperature.  If feedings are continuous:  Do not put more than 4 hours' worth of feedings in the feeding bag.  Stop the feedings when you need to give medicine or flush the tube. Be sure to restart the feedings.  Make sure the person's head is above his or her stomach (upright position). This will prevent choking and discomfort.  Make sure the person is in the right position during and after feedings.  During feedings, have the person in the upright position.  After a non-continuous feeding (bolus feeding), have the person stay in the upright position for 1 hour.  Cover and place unused feedings in the refrigerator.  Replace feeding bags and syringes as told.  Good hygiene  Make sure the person takes good care of his or her mouth and teeth (oral hygiene), such as by brushing his or her teeth.  Keep the area where the tube enters the skin clean and dry.  General instructions  Do not pull or put tension on the  tube.  Before you remove the tube cap or disconnect a syringe, close the tube by using a clamp (clamping) or bending (kinking) the tube.  Measure the length of the G-tube every day from the insertion site to the end of the tube.  If the person's G-tube has a balloon, check the fluid in the balloon every week. Check the 's specifications to find the amount of fluid that should be in the balloon.  Remove excess air from the G-tube as told. This is called venting.  Do not push feedings, medicines, or flushes fast.  Use the feeding tube equipment, such as syringes and connectors, only as told by your health care provider.  Contact a health care provider if:  The person with the tube has constipation or a fever.  A large amount of fluid or mucus-like liquid is leaking from the tube.  Skin or scar tissue appears to be growing where the tube enters the skin.  The length of tube from the insertion site to the G-tube gets longer.  Get help right away if:  The person with the tube has any of these problems:  Severe pain, tenderness, or bloating in the abdomen.  Nausea or vomiting.  Trouble breathing or shortness of breath.  Any of these problems happen in the area where the tube enters the skin:  Redness, irritation, swelling, or soreness.  Pus-like discharge.  A bad smell.  The tube is clogged and cannot be flushed.  The tube comes out. The tube will need to be put back in within 4 hours.  Summary  A gastrostomy tube, or G-tube, is a tube that is inserted through the abdomen into the stomach. The tube is used to give feedings and medicines when a person cannot eat and drink enough on his or her own or cannot take medicine by mouth.  Check and clean the insertion site daily as told by the person's health care provider.  Flush the G-tube regularly to keep it from clogging. Flush it before and after feedings and as often as told.  Keep the area where the tube enters the skin clean and dry.  This information is not  intended to replace advice given to you by your health care provider. Make sure you discuss any questions you have with your health care provider.  Document Revised: 12/08/2021 Document Reviewed: 05/06/2021  Elsevier Patient Education © 2023 [employer]

## 2024-06-01 NOTE — CARE PLAN
The patient is Stable - Low risk of patient condition declining or worsening    Shift Goals  Clinical Goals: pt pain to be controlled and to monitor hiccups throughout the night.  Patient Goals: Pain managment, advance diet and to rest comfortably tonight.  Family Goals: rosario    Progress made toward(s) clinical / shift goals:    Problem: Knowledge Deficit - Standard  Goal: Patient and family/care givers will demonstrate understanding of plan of care, disease process/condition, diagnostic tests and medications  Outcome: Progressing   Pt aware of current care plan with drains.     Problem: Skin Integrity  Goal: Skin integrity is maintained or improved  Outcome: Progressing  Pt dressings changed multiple time to keep area clean. Pt showered tonight.      Patient is not progressing towards the following goals:      Problem: Pain - Standard  Goal: Alleviation of pain or a reduction in pain to the patient’s comfort goal  Outcome: Not Progressing   Pt given prn pain medication and pain consistently stayed at 7 out of 10 throughout the shift.

## 2024-06-01 NOTE — DISCHARGE SUMMARY
Discharge Summary    CHIEF COMPLAINT ON ADMISSION  Chief Complaint   Patient presents with    Low Blood Sugar       Reason for Admission  Low BG     Admission Date  5/27/2024    CODE STATUS  Prior    HPI & HOSPITAL COURSE  This is a 53 y.o. male here with Low Blood Sugar  Please review Dr. Akosua Car M.D. notes for further details of history of present illness, past medical/social/family histories, allergies and medications. Please review Dr. Huang, Surgery consultation notes.  54yo M w/ h/o diabetes, GERD, esophageal stricture requiring dilation, hiatal hernia repair 4/2024 w/ Dr. Huang presented 5/27/2024 with nausea, vomiting. At the ED, afebrile, hypertensive. Hypoglycemic with BG in the 40s. CT shows near complete herniation of the stomach into the chest.  Managing for hiatal hernia, hypoglycemia, hypertension.NG tube decompression recommended.  Dextrose given, insulin reduced. Dr. Huang performed robotic paraesophageal hernia repair, gastropexy with gastrostomy tube and upper endoscopy on 5.28/2024.   Esophagram came back no leak except small extraluminal fluid collection at GES junction. CTA showed no extravasation or leak.   I spoke with Dr. Huang. Restarted basal insulin. Diet advanced to fulls. Will cap the PEG. Discontinued STEVENSON drain. He tolerated diet. [facility] ordered.    His blood sugars improved when his diet started. He will return to his usual diabetic regimen.   .  At discharge date, Peña Guerrero afebrile and hemodynamically stable.  Peña Guerrero wanted to be discharged today.      Imaging  CT-ABDOMEN-PELVIS W/O   Final Result         1.  Large hiatal hernia   2.  Gallbladder sludge and/or poorly calcified gallstones   3.  Small left pleural effusion.   4.  Linear densities the bilateral lung bases favor atelectasis, greater on the left      DX-ESOPHAGUS BARIUM SWALLOW SINGLE CONTRAST   Final Result         1.  Gastric fundus appears to be located in the lower chest.    2.  Small area of contained contrast adjacent to the gastroesophageal junction which may located within the postsurgical stomach or within a small extraluminal collection.   3.  Narrowing of the mid stomach which does not appear to be distensible but does not prevent water-soluble contrast passage into the distal stomach.   4.  Surgical drain in the left upper quadrant.      DX-ABDOMEN FOR TUBE PLACEMENT   Final Result         1.  Nonspecific bowel gas pattern in the upper abdomen.   2.  Nasogastric tube coiled back in the mid esophagus with tip projecting cephalad, recommend withdrawal and replacement   3.  Left basilar atelectasis and/or infiltrate      CT-ABDOMEN-PELVIS WITH   Final Result         1.  4.5 cm diaphragmatic versus hiatal hernia with near complete herniation of the stomach into the left chest, associated fluid-filled distention of the stomach is seen suggesting gastric obstruction and raising concern for possible incarceration.    Appearance of the stomach likely represents component of gastric volvulus.   2.  Small left pleural effusion      These findings were discussed with the patient's clinician, Eliu Hook, on 5/28/2024 12:10 AM.      DX-CHEST-PORTABLE (1 VIEW)   Final Result         1.  Hazy left midlung and lower lobe infiltrate.              Therefore, he is discharged in fair and stable condition to home with organized home healthcare and close outpatient follow-up.    The patient met 2-midnight criteria for an inpatient stay at the time of discharge.    Discharge Date  6/1/2024    FOLLOW UP ITEMS POST DISCHARGE      DISCHARGE DIAGNOSES  Principal Problem:    Acute gastric volvulus (POA: Yes)  Active Problems:    Type 1 diabetes mellitus with hypoglycemia and without coma (HCC) (POA: Yes)    Primary hypertension (POA: Yes)    Esophageal stricture (POA: Yes)    Paraesophageal hernia (POA: Yes)    Hypokalemia (POA: Yes)  Resolved Problems:    * No resolved hospital problems.  *      FOLLOW UP  No future appointments.  Chad Huang M.D.  [address]  [address]  [facility] [id number] [phone]    Schedule an appointment as soon as possible for a visit in 1 week(s)      Merlene Esteban M.D.  [address]  [address]  [facility] [address]  [phone]    Schedule an appointment as soon as possible for a visit in 1 week(s)    Follow up with Merlene Esteban M.D. 1 week Follow up with Dr. Huang, Surgery for postop visit in 1 week NURSING provide resources/pamphlets for Postoperative instructions from surgeon, PEG tube care, GI diet (small but frequent meals, chew food very well avoid big chunks or eating too fast, avoid dense food, avoid greasy or fatty foods, eat healthy and plenty of vegetables), diabetes (Advise Peña Guerrero to check blood glucoses at home and have a log for primary provider to review. Follow diabetic diet. Ask for and review [facility] Diabetic handbook and pamphlets.), hypertension (Check and record blood pressures at home at least twice a day for primary provider to review)     MEDICATIONS ON DISCHARGE     Medication List        START taking these medications        Instructions   diphenhydrAMINE 25 MG Tabs  Commonly known as: Benadryl   Take 1 Tablet by mouth every 6 hours as needed for Itching.  Dose: 25 mg     methocarbamol 750 MG Tabs  Commonly known as: Robaxin   Take 1 Tablet by mouth every day.  Dose: 750 mg     multivitamin Tabs  Start taking on: June 2, 2024   Take 1 Tablet by mouth every day.  Dose: 1 Tablet     oxyCODONE-acetaminophen 5-325 MG Tabs  Commonly known as: Percocet   Take 1 Tablet by mouth every 6 hours as needed for Severe Pain for up to 3 days.  Dose: 1 Tablet     prochlorperazine 10 MG Tabs  Commonly known as: Compazine   Take 1 Tablet by mouth every 8 hours as needed for Nausea/Vomiting (also for hiccups).  Dose: 10 mg            CHANGE how you take these medications        Instructions   Lantus SoloStar 100 UNIT/ML Sopn  injection  What changed: how much to take  Generic drug: insulin glargine   Inject 30 Units under the skin every evening. 5 pens per month  Dose: 30 Units            CONTINUE taking these medications        Instructions   Insulin Pen Needle 32G X 6 MM Misc   For use with 2-3 daily insulin and or GLP-1 injection.     lisinopril 20 MG Tabs  Commonly known as: Prinivil   Take 1 Tablet by mouth every day.  Dose: 20 mg     pantoprazole 40 MG Tbec  Commonly known as: Protonix   Take 40 mg by mouth 2 times a day.  Dose: 40 mg              Allergies  Allergies   Allergen Reactions    Pcn [Penicillins] Anaphylaxis     Historic reporting from childhood       DIET  No orders of the defined types were placed in this encounter.      ACTIVITY  As per [facility]    CONSULTATIONS  Surgery    PROCEDURES  See Dr. Huang's OP NOTE    LABORATORY  Lab Results   Component Value Date    SODIUM 138 06/01/2024    POTASSIUM 3.6 06/01/2024    CHLORIDE 100 06/01/2024    CO2 25 06/01/2024    GLUCOSE 149 (H) 06/01/2024    BUN 16 06/01/2024    CREATININE 0.73 06/01/2024        Lab Results   Component Value Date    WBC 10.7 06/01/2024    HEMOGLOBIN 14.7 06/01/2024    HEMATOCRIT 41.8 (L) 06/01/2024    PLATELETCT 269 06/01/2024        Total time of the discharge process exceeds 45 minutes.

## 2024-06-01 NOTE — PROGRESS NOTES
Pt A&O x4. Pt restless throughout the night. Pt reported increase pain throughout the day. Pt given prn pain medication. Pt reported a little improvement. Pt reported being itchy, given benadryl and pt took shower. Dressing around both drains changed multiple times to keep area cleaned. Pt tolerating advanced diet from dinner. All needs met at this time.

## 2024-06-01 NOTE — DISCHARGE PLANNING
Note:    Late Entry:  HH order is in. Pt's insurance is [employer]. Pt was agreeable with HH. Referral sent to healthy living at home, declined due to medicaid census is full. Referral sent to  [facility], pending.     Rn CM received a communication from bedside nurse that Pt cannot wait for HH acceptance and wants to discharge home. Bedside nurse said that Pt stated he can manage his G-tube and will just go to ER if there will be any issues. Dr. Granger aware

## 2024-06-01 NOTE — PROGRESS NOTES
DC instructions reviewed w/ pt , verbalized understanding. PIV dc'd, armband removed. Meds to bed pending. Pt will come back to pick meds.

## 2024-06-01 NOTE — PROGRESS NOTES
"Mr. Peña Guerrero is a 53 y.o. male who presented to the hospital with a recurrent hiatal hernia with stomach in the chest volvulized and obstructed status post emergent paraesophageal hernia repair with gastropexy and G-tube placement on 5/28/2024    S: He tolerated GI soft diet.  He did have an episode of hiccuping and some drainage around his left-sided STEVENSON drain    O:  BP (!) 157/90 Comment: rN AWARE  Pulse 60   Temp 36 °C (96.8 °F) (Temporal)   Resp 17   Ht 1.905 m (6' 3\")   Wt 86.4 kg (190 lb 7.6 oz)   SpO2 95%   BMI 23.81 kg/m²     GEN: awake, alert oriented  CV: RRR, brisk cap refill on hands  RESP: no labored breathing on room air  ABD: soft, nondistended, appropriately tender incisions without erythema or drainage, drain with serosanguineous output, G tube in place left upper quadrant  Recent Labs     05/30/24  1239 05/31/24  0323 06/01/24  0314   WBC 14.5* 13.5* 10.7   RBC 5.00 5.06 4.84   HEMOGLOBIN 15.7 15.7 14.7   HEMATOCRIT 43.6 45.0 41.8*   MCV 87.2 88.9 86.4   MCH 31.4 31.0 30.4   RDW 40.1 40.6 37.6   PLATELETCT 229 250 269   MPV 8.9* 9.0 8.8*   NEUTSPOLYS 76.60* 71.10 60.00   LYMPHOCYTES 10.40* 13.00* 19.50*   MONOCYTES 7.30 7.40 8.20   EOSINOPHILS 5.10 7.50* 11.50*   BASOPHILS 0.30 0.50 0.40     Recent Labs     05/30/24  1239 05/31/24  0323 06/01/24  0314   SODIUM 139 138 138   POTASSIUM 4.1 3.9 3.6   CHLORIDE 101 100 100   CO2 26 19* 25   GLUCOSE 135* 222* 149*   BUN 19 19 16     INR   Date Value Ref Range Status   09/03/2020 0.91 0.87 - 1.13 Final     Comment:     INR - Non-therapeutic Reference Range: 0.87-1.13  INR - Therapeutic Reference Range: 2.0-4.0         Imaging:    A/P:   Mr. Peña Guerrero is a 53 y.o. male who presented to the hospital with a recurrent hiatal hernia with stomach in the chest volvulized and obstructed status post emergent paraesophageal hernia repair with gastropexy and G-tube placement on 5/28/2024    -Okay for GI soft diet  -G-tube can be capped and " then placed to gravity bag if he is nauseated, having abdominal pain or having severe hiccups  -He should be sent home with a anderson bag to vent his G tube if needed  -recommend percocet, robaxin and compazine on discharge for pain and hiccups  -He will see me in clinic in two weeks    Chad Huang MD  Thoracic & General Surgeon  [facility]  [phone]

## 2024-06-01 NOTE — PROGRESS NOTES
"Mr. Peña Guerrero is a 53 y.o. male who presented to the hospital with a recurrent hiatal hernia with stomach in the chest volvulized and obstructed status post emergent paraesophageal hernia repair with gastropexy and G-tube placement on 5/28/2024    S: He tolerated liquid diet today.  He did have an episode of hiccuping and some drainage around his left-sided STEVENSON drain    O:  BP (!) 161/78 Comment: Rn  aware  Pulse 72   Temp 36.4 °C (97.6 °F) (Temporal)   Resp 17   Ht 1.905 m (6' 3\")   Wt 86.4 kg (190 lb 7.6 oz)   SpO2 97%   BMI 23.81 kg/m²     GEN: awake, alert oriented  CV: RRR, brisk cap refill on hands  RESP: no labored breathing on room air  ABD: soft, nondistended, appropriately tender incisions without erythema or drainage, drain with serosanguineous output, G tube in place left upper quadrant  Recent Labs     05/29/24  0237 05/30/24  1239 05/31/24  0323   WBC 21.9* 14.5* 13.5*   RBC 5.41 5.00 5.06   HEMOGLOBIN 16.7 15.7 15.7   HEMATOCRIT 47.5 43.6 45.0   MCV 87.8 87.2 88.9   MCH 30.9 31.4 31.0   RDW 41.6 40.1 40.6   PLATELETCT 290 229 250   MPV 9.1 8.9* 9.0   NEUTSPOLYS  --  76.60* 71.10   LYMPHOCYTES  --  10.40* 13.00*   MONOCYTES  --  7.30 7.40   EOSINOPHILS  --  5.10 7.50*   BASOPHILS  --  0.30 0.50     Recent Labs     05/29/24  0237 05/30/24  1239 05/31/24  0323   SODIUM 144 139 138   POTASSIUM 4.1 4.1 3.9   CHLORIDE 104 101 100   CO2 29 26 19*   GLUCOSE 110* 135* 222*   BUN 18 19 19     INR   Date Value Ref Range Status   09/03/2020 0.91 0.87 - 1.13 Final     Comment:     INR - Non-therapeutic Reference Range: 0.87-1.13  INR - Therapeutic Reference Range: 2.0-4.0         Imaging:    A/P:   Mr. Peña Guerrero is a 53 y.o. male who presented to the hospital with a recurrent hiatal hernia with stomach in the chest volvulized and obstructed status post emergent paraesophageal hernia repair with gastropexy and G-tube placement on 5/28/2024    -Okay for GI soft diet  -If labs look normal " tomorrow and STEVENSON drain is clear, I will remove his STEVENSON drain and he will be cleared for discharge on a GI soft diet  -G-tube can be capped and then placed to gravity bag if he is nauseated, having abdominal pain or having severe hiccups    Chad Huang MD  Thoracic & General Surgeon  [facility]  [phone]

## 2024-06-07 ENCOUNTER — HOSPITAL ENCOUNTER (OUTPATIENT)
Facility: MEDICAL CENTER | Age: 54
End: 2024-06-09
Attending: STUDENT IN AN ORGANIZED HEALTH CARE EDUCATION/TRAINING PROGRAM | Admitting: STUDENT IN AN ORGANIZED HEALTH CARE EDUCATION/TRAINING PROGRAM
Payer: MEDICAID

## 2024-06-07 DIAGNOSIS — K94.23 GASTROSTOMY TUBE DYSFUNCTION (HCC): ICD-10-CM

## 2024-06-07 DIAGNOSIS — R73.9 HYPERGLYCEMIA: ICD-10-CM

## 2024-06-07 DIAGNOSIS — E86.0 DEHYDRATION: ICD-10-CM

## 2024-06-07 DIAGNOSIS — K94.23 MALFUNCTION OF GASTROSTOMY TUBE (HCC): ICD-10-CM

## 2024-06-07 PROCEDURE — 84100 ASSAY OF PHOSPHORUS: CPT

## 2024-06-07 PROCEDURE — 83930 ASSAY OF BLOOD OSMOLALITY: CPT

## 2024-06-07 PROCEDURE — 700111 HCHG RX REV CODE 636 W/ 250 OVERRIDE (IP): Mod: JZ,UD | Performed by: STUDENT IN AN ORGANIZED HEALTH CARE EDUCATION/TRAINING PROGRAM

## 2024-06-07 PROCEDURE — 85025 COMPLETE CBC W/AUTO DIFF WBC: CPT

## 2024-06-07 PROCEDURE — 99242 OFF/OP CONSLTJ NEW/EST SF 20: CPT | Performed by: SURGERY

## 2024-06-07 PROCEDURE — 99285 EMERGENCY DEPT VISIT HI MDM: CPT

## 2024-06-07 PROCEDURE — 36415 COLL VENOUS BLD VENIPUNCTURE: CPT

## 2024-06-07 PROCEDURE — 85730 THROMBOPLASTIN TIME PARTIAL: CPT

## 2024-06-07 PROCEDURE — 96374 THER/PROPH/DIAG INJ IV PUSH: CPT

## 2024-06-07 PROCEDURE — 85610 PROTHROMBIN TIME: CPT

## 2024-06-07 PROCEDURE — 80053 COMPREHEN METABOLIC PANEL: CPT

## 2024-06-07 PROCEDURE — 83735 ASSAY OF MAGNESIUM: CPT

## 2024-06-07 RX ADMIN — FENTANYL CITRATE 50 MCG: 50 INJECTION, SOLUTION INTRAMUSCULAR; INTRAVENOUS at 23:53

## 2024-06-07 ASSESSMENT — FIBROSIS 4 INDEX: FIB4 SCORE: 1.24

## 2024-06-08 ENCOUNTER — APPOINTMENT (OUTPATIENT)
Dept: RADIOLOGY | Facility: MEDICAL CENTER | Age: 54
End: 2024-06-08
Attending: STUDENT IN AN ORGANIZED HEALTH CARE EDUCATION/TRAINING PROGRAM
Payer: MEDICAID

## 2024-06-08 ENCOUNTER — HOSPITAL ENCOUNTER (OUTPATIENT)
Dept: RADIOLOGY | Facility: MEDICAL CENTER | Age: 54
End: 2024-06-08

## 2024-06-08 PROBLEM — K94.23 GASTROSTOMY TUBE DYSFUNCTION (HCC): Status: ACTIVE | Noted: 2024-06-08

## 2024-06-08 LAB
ALBUMIN SERPL BCP-MCNC: 3.6 G/DL (ref 3.2–4.9)
ALBUMIN/GLOB SERPL: 1.2 G/DL
ALP SERPL-CCNC: 114 U/L (ref 30–99)
ALT SERPL-CCNC: 27 U/L (ref 2–50)
ANION GAP SERPL CALC-SCNC: 15 MMOL/L (ref 7–16)
APPEARANCE UR: CLEAR
APTT PPP: 33 SEC (ref 24.7–36)
AST SERPL-CCNC: 18 U/L (ref 12–45)
BASOPHILS # BLD AUTO: 0.7 % (ref 0–1.8)
BASOPHILS # BLD: 0.06 K/UL (ref 0–0.12)
BILIRUB SERPL-MCNC: 0.6 MG/DL (ref 0.1–1.5)
BILIRUB UR QL STRIP.AUTO: NEGATIVE
BUN SERPL-MCNC: 24 MG/DL (ref 8–22)
CALCIUM ALBUM COR SERPL-MCNC: 9.1 MG/DL (ref 8.5–10.5)
CALCIUM SERPL-MCNC: 8.8 MG/DL (ref 8.5–10.5)
CHLORIDE SERPL-SCNC: 97 MMOL/L (ref 96–112)
CO2 SERPL-SCNC: 22 MMOL/L (ref 20–33)
COLOR UR: YELLOW
CREAT SERPL-MCNC: 0.93 MG/DL (ref 0.5–1.4)
EOSINOPHIL # BLD AUTO: 0.37 K/UL (ref 0–0.51)
EOSINOPHIL NFR BLD: 4.2 % (ref 0–6.9)
ERYTHROCYTE [DISTWIDTH] IN BLOOD BY AUTOMATED COUNT: 36.5 FL (ref 35.9–50)
FLUAV RNA SPEC QL NAA+PROBE: NEGATIVE
FLUBV RNA SPEC QL NAA+PROBE: NEGATIVE
GFR SERPLBLD CREATININE-BSD FMLA CKD-EPI: 99 ML/MIN/1.73 M 2
GLOBULIN SER CALC-MCNC: 2.9 G/DL (ref 1.9–3.5)
GLUCOSE BLD STRIP.AUTO-MCNC: 170 MG/DL (ref 65–99)
GLUCOSE BLD STRIP.AUTO-MCNC: 243 MG/DL (ref 65–99)
GLUCOSE BLD STRIP.AUTO-MCNC: 316 MG/DL (ref 65–99)
GLUCOSE BLD STRIP.AUTO-MCNC: 347 MG/DL (ref 65–99)
GLUCOSE BLD STRIP.AUTO-MCNC: 53 MG/DL (ref 65–99)
GLUCOSE BLD STRIP.AUTO-MCNC: 62 MG/DL (ref 65–99)
GLUCOSE BLD STRIP.AUTO-MCNC: 69 MG/DL (ref 65–99)
GLUCOSE SERPL-MCNC: 446 MG/DL (ref 65–99)
GLUCOSE UR STRIP.AUTO-MCNC: >=1000 MG/DL
HCT VFR BLD AUTO: 41 % (ref 42–52)
HGB BLD-MCNC: 14.8 G/DL (ref 14–18)
IMM GRANULOCYTES # BLD AUTO: 0.03 K/UL (ref 0–0.11)
IMM GRANULOCYTES NFR BLD AUTO: 0.3 % (ref 0–0.9)
INR PPP: 1.09 (ref 0.87–1.13)
KETONES UR STRIP.AUTO-MCNC: ABNORMAL MG/DL
LEUKOCYTE ESTERASE UR QL STRIP.AUTO: NEGATIVE
LYMPHOCYTES # BLD AUTO: 2.15 K/UL (ref 1–4.8)
LYMPHOCYTES NFR BLD: 24.2 % (ref 22–41)
MAGNESIUM SERPL-MCNC: 2.1 MG/DL (ref 1.5–2.5)
MCH RBC QN AUTO: 30.8 PG (ref 27–33)
MCHC RBC AUTO-ENTMCNC: 36.1 G/DL (ref 32.3–36.5)
MCV RBC AUTO: 85.2 FL (ref 81.4–97.8)
MICRO URNS: ABNORMAL
MONOCYTES # BLD AUTO: 0.6 K/UL (ref 0–0.85)
MONOCYTES NFR BLD AUTO: 6.8 % (ref 0–13.4)
NEUTROPHILS # BLD AUTO: 5.67 K/UL (ref 1.82–7.42)
NEUTROPHILS NFR BLD: 63.8 % (ref 44–72)
NITRITE UR QL STRIP.AUTO: NEGATIVE
NRBC # BLD AUTO: 0 K/UL
NRBC BLD-RTO: 0 /100 WBC (ref 0–0.2)
OSMOLALITY SERPL: 307 MOSM/KG H2O (ref 278–298)
OSMOLALITY UR: 705 MOSM/KG H2O (ref 300–900)
PH UR STRIP.AUTO: 5 [PH] (ref 5–8)
PHOSPHATE SERPL-MCNC: 2.7 MG/DL (ref 2.5–4.5)
PLATELET # BLD AUTO: 381 K/UL (ref 164–446)
PMV BLD AUTO: 8.6 FL (ref 9–12.9)
POTASSIUM SERPL-SCNC: 4.2 MMOL/L (ref 3.6–5.5)
PROT SERPL-MCNC: 6.5 G/DL (ref 6–8.2)
PROT UR QL STRIP: NEGATIVE MG/DL
PROTHROMBIN TIME: 14.2 SEC (ref 12–14.6)
RBC # BLD AUTO: 4.81 M/UL (ref 4.7–6.1)
RBC UR QL AUTO: NEGATIVE
RSV RNA SPEC QL NAA+PROBE: NEGATIVE
SARS-COV-2 RNA RESP QL NAA+PROBE: NOTDETECTED
SODIUM SERPL-SCNC: 134 MMOL/L (ref 135–145)
SODIUM UR-SCNC: 41 MMOL/L
SP GR UR STRIP.AUTO: 1.04
UROBILINOGEN UR STRIP.AUTO-MCNC: 0.2 MG/DL
WBC # BLD AUTO: 8.9 K/UL (ref 4.8–10.8)

## 2024-06-08 PROCEDURE — 96375 TX/PRO/DX INJ NEW DRUG ADDON: CPT | Mod: XU

## 2024-06-08 PROCEDURE — 0241U HCHG SARS-COV-2 COVID-19 NFCT DS RESP RNA 4 TRGT ED POC: CPT

## 2024-06-08 PROCEDURE — 700102 HCHG RX REV CODE 250 W/ 637 OVERRIDE(OP): Mod: UD | Performed by: STUDENT IN AN ORGANIZED HEALTH CARE EDUCATION/TRAINING PROGRAM

## 2024-06-08 PROCEDURE — 82962 GLUCOSE BLOOD TEST: CPT

## 2024-06-08 PROCEDURE — 700102 HCHG RX REV CODE 250 W/ 637 OVERRIDE(OP): Performed by: STUDENT IN AN ORGANIZED HEALTH CARE EDUCATION/TRAINING PROGRAM

## 2024-06-08 PROCEDURE — 99232 SBSQ HOSP IP/OBS MODERATE 35: CPT | Performed by: PHYSICIAN ASSISTANT

## 2024-06-08 PROCEDURE — 99223 1ST HOSP IP/OBS HIGH 75: CPT | Performed by: STUDENT IN AN ORGANIZED HEALTH CARE EDUCATION/TRAINING PROGRAM

## 2024-06-08 PROCEDURE — A9270 NON-COVERED ITEM OR SERVICE: HCPCS | Mod: UD | Performed by: STUDENT IN AN ORGANIZED HEALTH CARE EDUCATION/TRAINING PROGRAM

## 2024-06-08 PROCEDURE — G0378 HOSPITAL OBSERVATION PER HR: HCPCS

## 2024-06-08 PROCEDURE — 49465 FLUORO EXAM OF G/COLON TUBE: CPT

## 2024-06-08 PROCEDURE — 81003 URINALYSIS AUTO W/O SCOPE: CPT

## 2024-06-08 PROCEDURE — 700105 HCHG RX REV CODE 258: Mod: UD | Performed by: STUDENT IN AN ORGANIZED HEALTH CARE EDUCATION/TRAINING PROGRAM

## 2024-06-08 PROCEDURE — A9270 NON-COVERED ITEM OR SERVICE: HCPCS | Performed by: STUDENT IN AN ORGANIZED HEALTH CARE EDUCATION/TRAINING PROGRAM

## 2024-06-08 PROCEDURE — 96372 THER/PROPH/DIAG INJ SC/IM: CPT | Mod: XU

## 2024-06-08 PROCEDURE — 700111 HCHG RX REV CODE 636 W/ 250 OVERRIDE (IP): Mod: UD | Performed by: STUDENT IN AN ORGANIZED HEALTH CARE EDUCATION/TRAINING PROGRAM

## 2024-06-08 PROCEDURE — 700117 HCHG RX CONTRAST REV CODE 255: Mod: UD | Performed by: STUDENT IN AN ORGANIZED HEALTH CARE EDUCATION/TRAINING PROGRAM

## 2024-06-08 PROCEDURE — 84300 ASSAY OF URINE SODIUM: CPT

## 2024-06-08 PROCEDURE — 96376 TX/PRO/DX INJ SAME DRUG ADON: CPT | Mod: XU

## 2024-06-08 PROCEDURE — 83935 ASSAY OF URINE OSMOLALITY: CPT

## 2024-06-08 PROCEDURE — 700101 HCHG RX REV CODE 250: Mod: UD | Performed by: STUDENT IN AN ORGANIZED HEALTH CARE EDUCATION/TRAINING PROGRAM

## 2024-06-08 RX ORDER — OXYCODONE HYDROCHLORIDE 5 MG/1
5 TABLET ORAL
Status: DISCONTINUED | OUTPATIENT
Start: 2024-06-08 | End: 2024-06-09 | Stop reason: HOSPADM

## 2024-06-08 RX ORDER — LISINOPRIL 20 MG/1
20 TABLET ORAL DAILY
Status: DISCONTINUED | OUTPATIENT
Start: 2024-06-08 | End: 2024-06-09 | Stop reason: HOSPADM

## 2024-06-08 RX ORDER — SODIUM CHLORIDE, SODIUM LACTATE, POTASSIUM CHLORIDE, CALCIUM CHLORIDE 600; 310; 30; 20 MG/100ML; MG/100ML; MG/100ML; MG/100ML
INJECTION, SOLUTION INTRAVENOUS CONTINUOUS
Status: DISCONTINUED | OUTPATIENT
Start: 2024-06-08 | End: 2024-06-08

## 2024-06-08 RX ORDER — PROCHLORPERAZINE MALEATE 10 MG
10 TABLET ORAL EVERY 8 HOURS PRN
Status: DISCONTINUED | OUTPATIENT
Start: 2024-06-08 | End: 2024-06-08

## 2024-06-08 RX ORDER — SODIUM CHLORIDE, SODIUM LACTATE, POTASSIUM CHLORIDE, CALCIUM CHLORIDE 600; 310; 30; 20 MG/100ML; MG/100ML; MG/100ML; MG/100ML
1000 INJECTION, SOLUTION INTRAVENOUS ONCE
Status: COMPLETED | OUTPATIENT
Start: 2024-06-08 | End: 2024-06-08

## 2024-06-08 RX ORDER — INSULIN LISPRO 100 [IU]/ML
1-6 INJECTION, SOLUTION INTRAVENOUS; SUBCUTANEOUS EVERY 6 HOURS
Status: DISCONTINUED | OUTPATIENT
Start: 2024-06-08 | End: 2024-06-09 | Stop reason: HOSPADM

## 2024-06-08 RX ORDER — ACETAMINOPHEN 500 MG
1000 TABLET ORAL EVERY 6 HOURS PRN
Status: DISCONTINUED | OUTPATIENT
Start: 2024-06-13 | End: 2024-06-09 | Stop reason: HOSPADM

## 2024-06-08 RX ORDER — OXYCODONE HYDROCHLORIDE AND ACETAMINOPHEN 5; 325 MG/1; MG/1
1 TABLET ORAL EVERY 6 HOURS PRN
Status: DISCONTINUED | OUTPATIENT
Start: 2024-06-08 | End: 2024-06-08

## 2024-06-08 RX ORDER — OXYCODONE HYDROCHLORIDE AND ACETAMINOPHEN 5; 325 MG/1; MG/1
1 TABLET ORAL EVERY 6 HOURS PRN
COMMUNITY
Start: 2024-06-05

## 2024-06-08 RX ORDER — HYDROMORPHONE HYDROCHLORIDE 1 MG/ML
0.5 INJECTION, SOLUTION INTRAMUSCULAR; INTRAVENOUS; SUBCUTANEOUS
Status: DISCONTINUED | OUTPATIENT
Start: 2024-06-08 | End: 2024-06-09 | Stop reason: HOSPADM

## 2024-06-08 RX ORDER — DEXTROSE MONOHYDRATE 25 G/50ML
25 INJECTION, SOLUTION INTRAVENOUS
Status: DISCONTINUED | OUTPATIENT
Start: 2024-06-08 | End: 2024-06-08 | Stop reason: RX

## 2024-06-08 RX ORDER — OXYCODONE HYDROCHLORIDE 10 MG/1
10 TABLET ORAL
Status: DISCONTINUED | OUTPATIENT
Start: 2024-06-08 | End: 2024-06-09 | Stop reason: HOSPADM

## 2024-06-08 RX ORDER — DIPHENHYDRAMINE HCL 25 MG
25 TABLET ORAL EVERY 6 HOURS PRN
Status: DISCONTINUED | OUTPATIENT
Start: 2024-06-08 | End: 2024-06-09 | Stop reason: HOSPADM

## 2024-06-08 RX ORDER — LABETALOL HYDROCHLORIDE 5 MG/ML
10 INJECTION, SOLUTION INTRAVENOUS EVERY 4 HOURS PRN
Status: DISCONTINUED | OUTPATIENT
Start: 2024-06-08 | End: 2024-06-09 | Stop reason: HOSPADM

## 2024-06-08 RX ORDER — MORPHINE SULFATE 4 MG/ML
4 INJECTION INTRAVENOUS ONCE
Status: COMPLETED | OUTPATIENT
Start: 2024-06-08 | End: 2024-06-08

## 2024-06-08 RX ORDER — ACETAMINOPHEN 500 MG
1000 TABLET ORAL EVERY 6 HOURS
Status: DISCONTINUED | OUTPATIENT
Start: 2024-06-08 | End: 2024-06-09 | Stop reason: HOSPADM

## 2024-06-08 RX ADMIN — OXYCODONE HYDROCHLORIDE 10 MG: 10 TABLET ORAL at 14:48

## 2024-06-08 RX ADMIN — SODIUM CHLORIDE, POTASSIUM CHLORIDE, SODIUM LACTATE AND CALCIUM CHLORIDE 1000 ML: 600; 310; 30; 20 INJECTION, SOLUTION INTRAVENOUS at 01:20

## 2024-06-08 RX ADMIN — OXYCODONE AND ACETAMINOPHEN 1 TABLET: 5; 325 TABLET ORAL at 02:38

## 2024-06-08 RX ADMIN — INSULIN LISPRO 1 UNITS: 100 INJECTION, SOLUTION INTRAVENOUS; SUBCUTANEOUS at 23:52

## 2024-06-08 RX ADMIN — DEXTROSE MONOHYDRATE 25 G: 100 INJECTION, SOLUTION INTRAVENOUS at 13:35

## 2024-06-08 RX ADMIN — OXYCODONE AND ACETAMINOPHEN 1 TABLET: 5; 325 TABLET ORAL at 12:15

## 2024-06-08 RX ADMIN — IOHEXOL 50 ML: 350 INJECTION, SOLUTION INTRAVENOUS at 01:00

## 2024-06-08 RX ADMIN — OXYCODONE HYDROCHLORIDE 10 MG: 10 TABLET ORAL at 19:38

## 2024-06-08 RX ADMIN — DEXTROSE MONOHYDRATE 25 G: 25 INJECTION, SOLUTION INTRAVENOUS at 06:07

## 2024-06-08 RX ADMIN — INSULIN HUMAN 5 UNITS: 100 INJECTION, SOLUTION PARENTERAL at 01:10

## 2024-06-08 RX ADMIN — HYDROMORPHONE HYDROCHLORIDE 0.5 MG: 1 INJECTION, SOLUTION INTRAMUSCULAR; INTRAVENOUS; SUBCUTANEOUS at 17:08

## 2024-06-08 RX ADMIN — OXYCODONE HYDROCHLORIDE 10 MG: 10 TABLET ORAL at 23:48

## 2024-06-08 RX ADMIN — INSULIN LISPRO 4 UNITS: 100 INJECTION, SOLUTION INTRAVENOUS; SUBCUTANEOUS at 17:01

## 2024-06-08 RX ADMIN — SODIUM CHLORIDE, POTASSIUM CHLORIDE, SODIUM LACTATE AND CALCIUM CHLORIDE: 600; 310; 30; 20 INJECTION, SOLUTION INTRAVENOUS at 02:36

## 2024-06-08 RX ADMIN — DIPHENHYDRAMINE HYDROCHLORIDE 25 MG: 25 TABLET ORAL at 21:01

## 2024-06-08 RX ADMIN — INSULIN GLARGINE-YFGN 30 UNITS: 100 INJECTION, SOLUTION SUBCUTANEOUS at 17:15

## 2024-06-08 RX ADMIN — HYDROMORPHONE HYDROCHLORIDE 0.5 MG: 1 INJECTION, SOLUTION INTRAMUSCULAR; INTRAVENOUS; SUBCUTANEOUS at 20:55

## 2024-06-08 RX ADMIN — MORPHINE SULFATE 4 MG: 4 INJECTION INTRAVENOUS at 01:14

## 2024-06-08 RX ADMIN — DIPHENHYDRAMINE HYDROCHLORIDE 25 MG: 25 TABLET ORAL at 02:34

## 2024-06-08 ASSESSMENT — LIFESTYLE VARIABLES
AVERAGE NUMBER OF DAYS PER WEEK YOU HAVE A DRINK CONTAINING ALCOHOL: 0
TOTAL SCORE: 0
EVER FELT BAD OR GUILTY ABOUT YOUR DRINKING: NO
TOTAL SCORE: 0
TOTAL SCORE: 0
HAVE YOU EVER FELT YOU SHOULD CUT DOWN ON YOUR DRINKING: NO
CONSUMPTION TOTAL: NEGATIVE
HAVE PEOPLE ANNOYED YOU BY CRITICIZING YOUR DRINKING: NO
DOES PATIENT WANT TO STOP DRINKING: NO
HOW MANY TIMES IN THE PAST YEAR HAVE YOU HAD 5 OR MORE DRINKS IN A DAY: 0
EVER HAD A DRINK FIRST THING IN THE MORNING TO STEADY YOUR NERVES TO GET RID OF A HANGOVER: NO
ON A TYPICAL DAY WHEN YOU DRINK ALCOHOL HOW MANY DRINKS DO YOU HAVE: 0
ALCOHOL_USE: NO

## 2024-06-08 ASSESSMENT — ENCOUNTER SYMPTOMS
FEVER: 0
ABDOMINAL PAIN: 0
CHILLS: 0
NAUSEA: 0
COUGH: 0
SHORTNESS OF BREATH: 0
VOMITING: 0
DIARRHEA: 0

## 2024-06-08 ASSESSMENT — PAIN DESCRIPTION - PAIN TYPE
TYPE: ACUTE PAIN
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
## 2024-06-08 NOTE — CARE PLAN
The patient is Stable - Low risk of patient condition declining or worsening    Shift Goals  Clinical Goals: IR today, NPO sips with meds, pain control, comfort  Patient Goals: pain control  Family Goals: n/a    Progress made toward(s) clinical / shift goals:  yes      Problem: Knowledge Deficit - Standard  Goal: Patient and family/care givers will demonstrate understanding of plan of care, disease process/condition, diagnostic tests and medications  Outcome: Progressing     Problem: Pain - Standard  Goal: Alleviation of pain or a reduction in pain to the patient’s comfort goal  Outcome: Progressing     Problem: Self Care  Goal: Patient will have the ability to perform ADLs independently or with assistance (bathe, groom, dress, toilet and feed)  Outcome: Progressing     Problem: Infection - Standard  Goal: Patient will remain free from infection  Outcome: Progressing     Problem: Wound/ / Incision Healing  Goal: Patient's wound/surgical incision will decrease in size and heals properly  Outcome: Progressing

## 2024-06-08 NOTE — DISCHARGE PLANNING
Patient rolled back to observation/outpatient status per attending MD determination () and UR committee MD secondary review (Dr. Lujan).  Condition code 44 completed.

## 2024-06-08 NOTE — DISCHARGE PLANNING
Care Transition Team Assessment    Patient is a 53 year-old male admitted for G tube dysfunction. Please see patient's H&P for prior medical history. Patient was previously admitted on 5/27/24 to 06/01/24 for Acute gastric volvulus. LMSW met with patient at bedside to complete assessment. Patient is A&Ox4 and able to verify the information on the face sheet. Patient lives with a roommate in a single story house with two steps to enter, Monik Looney (p) [phone]; No NOK on file but Monik is his emergency contact. No Advance Directive on file. Prior to admission patient is independent with ADL's and IADL’s. Patient does not use any DME at baseline. Patient reported that roommate is good support for him. Patient reports, he is currently not working and his roommate assists him financially. The patient's PCP is Dr. Merlene Esteban. Patient's preferred pharmacy is [facility] in [facility]. Patient denies a history of SNF/IPR nor HHC use in the past. Pt denies any SA or MH concerns. Patient's confirmed medical coverage via [employer]. Patient has means of transportation when medically cleared and roommate will provide transport once stable for discharge. Patient was previously discharged with [facility] to manage G tube, requested resumption order for HH and obtained choice and faxed to [facility]. Plan for pt to upsize in G tube today, once done plan for pt to discharge home.     Information Source  Orientation Level: Oriented X4  Information Given By: Patient  Who is responsible for making decisions for patient? : Patient    Readmission Evaluation  Is this a readmission?: Yes - unplanned readmission    Elopement Risk  Legal Hold: No  Ambulatory or Self Mobile in Wheelchair: Yes  Disoriented: No  Psychiatric Symptoms: None  History of Wandering: No  Elopement this Admit: No  Vocalizing Wanting to Leave: No  Displays Behaviors, Body Language Wanting to Leave: No-Not at Risk for Elopement  Elopement Risk: Not at Risk for  Elopement    Interdisciplinary Discharge Planning  Lives with - Patient's Self Care Capacity: (P) Significant Other  Patient or legal guardian wants to designate a caregiver: No  Support Systems: (P) Spouse / Significant Other  Housing / Facility: (P) 1 Story House  Patient Prefers to be Discharged to:: (P) Home  Durable Medical Equipment: (P) Not Applicable    Discharge Preparedness  What is your plan after discharge?: Home health care  What are your discharge supports?: Other (comment) (Friend)  Prior Functional Level: Ambulatory  Difficulity with ADLs: None  Difficulity with IADLs: None    Functional Assesment  Prior Functional Level: Ambulatory    Finances  Financial Barriers to Discharge: No  Prescription Coverage: Yes    Vision / Hearing Impairment  Right Eye Vision: Wears Glasses  Left Eye Vision: Wears Glasses         Advance Directive  Advance Directive?: None    Domestic Abuse  Have you ever been the victim of abuse or violence?: No    Psychological Assessment  History of Substance Abuse: None  History of Psychiatric Problems: No  Non-compliant with Treatment: No  Newly Diagnosed Illness: No    Discharge Risks or Barriers  Discharge risks or barriers?: Complex medical needs  Patient risk factors: Complex medical needs    Anticipated Discharge Information  Discharge Disposition: D/T to home under HHA care in anticipation of covered skilled care (06)

## 2024-06-08 NOTE — ED NOTES
Pt c/o of burning pain from contents leaking from G Tube site; asking for something for relief. Also requesting ice chips; RN advised.

## 2024-06-08 NOTE — ASSESSMENT & PLAN NOTE
G-tube in place, imaging shows that it is in the stomach however, it is leaking fluid on examination and there is irritation surrounding it but no cellulitis at this time  Patient does not want the G-tube and he wants it removed altogether, does not want it replaced but agreed to discuss with surgery first  Says has been eating without issue though slowly  EDP discussed with surgery who will evaluate the patient
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
## 2024-06-08 NOTE — DISCHARGE PLANNING
Received choice form @: 1213  Agency/Facility name: [facility]  Sent referral per choice form @: No referral sent.  Pending orders.
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
## 2024-06-08 NOTE — PROGRESS NOTES
4 Eyes Skin Assessment Completed by ALEXANDER Benitez and ALEXANDER Shipley.    Head WDL  Ears WDL  Nose WDL  Mouth WDL  Neck WDL  Breast/Chest WDL  Shoulder Blades WDL  Spine WDL  (R) Arm/Elbow/Hand Redness  (L) Arm/Elbow/Hand Redness  Abdomen Redness and Scar; with G-Tube   Groin WDL  Scrotum/Coccyx/Buttocks WDL  (R) Leg Scar  (L) Leg WDL  (R) Heel/Foot/Toe 2nd toe with plaster - patient refused to remove plaster - LAYNE. Great toe, 3rd toe and 5th toe with cuts in nails.  (L) Heel/Foot/Toe WDL          Devices In Places Blood Pressure Cuff, Pulse Ox, SCD's, and G Tube      Interventions In Place Pillows    Possible Skin Injury Yes    Pictures Uploaded Into Epic Yes  Wound Consult Placed Yes  RN Wound Prevention Protocol Ordered Yes

## 2024-06-08 NOTE — CARE PLAN
The patient is Stable - Low risk of patient condition declining or worsening    Shift Goals  Clinical Goals: Surgery in AM, NPO, pain control, safety and comfort  Patient Goals: pain control  Family Goals: no family at bedside    Progress made toward(s) clinical / shift goals:    Problem: Knowledge Deficit - Standard  Goal: Patient and family/care givers will demonstrate understanding of plan of care, disease process/condition, diagnostic tests and medications  Outcome: Progressing     Problem: Pain - Standard  Goal: Alleviation of pain or a reduction in pain to the patient’s comfort goal  Outcome: Progressing     Patient is alert and oriented, on RA.   Fall protocol in effect. Bed in lowest position. Brakes and bed alarm on.   Maintained a clutter free environment. Skid socks on. Call light and personal belongings within reach.   Patient c/o of pain, treated per MAR. Educated on pain scale.   Patient educated on POC. Encouraged verbalize of feelings.   All questions were answered.      Patient is not progressing towards the following goals:

## 2024-06-08 NOTE — ED NOTES
Med Rec complete per patient   Allergies reviewed  Antibiotics in the past 30 days:no  Anticoagulant in past 14 days:no  Pharmacy patient utilizes:[facility] on [address]    Pt states he took 2 tabs of Percocet yesterday morning (6/7/24)

## 2024-06-08 NOTE — WOUND TEAM
Received wound consult for abdomen (G-tube) and right foot.    Patient pending IR intervention for G-tube.  Attempted to see patient, patient declines assessment of the above areas and interventions at this time.    Wound consult completed, wound team not following.  Re-consult if patient has further advanced wound care needs.

## 2024-06-08 NOTE — PROGRESS NOTES
1220- pt BG 53. Gave 4 orange juices to pt.    1235- Rechecked BG and was 69. Forgot pt was NPO and possibly scheduled for IR today. Notified hospitalist, Dr. Bradford and IR. IR stated pt is on hold for IR and that orange juice was okay to give. Hospitalist stated orange juice was okay to give and working on new diet orders. D50W not stocked in any of the omnicelles. Pharmacy notified to supply D50W ASAP.    1300- Orders updated to 10% due to insufficient stock of D50W.     1335- Bolus started.

## 2024-06-08 NOTE — PROGRESS NOTES
"  DATE: 6/8/2024    Hospital Day 1  surgical consultation for evaluation of leakage adjacent to gastrostomy tube .    INTERVAL EVENTS:  Pain and redness surrounding g-tube site.  Patient is NPO currently.   No leukocytosis, afebrile.     REVIEW OF SYSTEMS:  Comprehensive review of systems is negative with the exception of the aforementioned HPI, PMH, and PSH bullets in accordance with CMS guidelines.    PHYSICAL EXAMINATION:  Vital Signs: /54   Pulse 67   Temp 36.5 °C (97.7 °F) (Temporal)   Resp 16   Ht 1.905 m (6' 3\")   Wt 81.6 kg (180 lb)   SpO2 97%   Physical Exam  Abd: soft, no distension.   G tube in place LUQ with surrounding erythema and tenderness.     LABORATORY VALUES:  Recent Labs     06/07/24  2355   WBC 8.9   RBC 4.81   HEMOGLOBIN 14.8   HEMATOCRIT 41.0*   MCV 85.2   MCH 30.8   MCHC 36.1   RDW 36.5   PLATELETCT 381   MPV 8.6*     Recent Labs     06/07/24  2355   SODIUM 134*   POTASSIUM 4.2   CHLORIDE 97   CO2 22   GLUCOSE 446*   BUN 24*   CREATININE 0.93   CALCIUM 8.8     Recent Labs     06/07/24  2355   ASTSGOT 18   ALTSGPT 27   TBILIRUBIN 0.6   ALKPHOSPHAT 114*   GLOBULIN 2.9   INR 1.09     Recent Labs     06/07/24  2355   APTT 33.0   INR 1.09       IMAGING:  DX-G.I. TUBE INJECTION, ANY TYPE   Final Result         1.  Gastrostomy tube terminating within the stomach.   2.  Mild gaseous distention of bowel, otherwise nonspecific bowel gas pattern.   3.  Radiodensity overlying the right lower quadrant, could represent appendicolith or enteric debris.      OUTSIDE IMAGES-CT ABDOMEN /PELVIS   Final Result      IR-CONSULT AND TREAT    (Results Pending)       ASSESSMENT AND PLAN:  -Pan for IR upsize of G-tube today.   -Okay for regular diet post procedure.   -ACS blue service following.        ____________________________________     Carrie Atkinson P.A.-C.     Discussed with Rickie Tiwari MD     DD: 6/8/2024  8:25 AM   "

## 2024-06-08 NOTE — ASSESSMENT & PLAN NOTE
Recent surgery for by Dr. Huang on 5/28.  Patient was physically stressing soon after the surgery and says he was told he had on done most of the work and he had done during surgery and nothing can be done for another few months  He has been eating without issue

## 2024-06-08 NOTE — ED TRIAGE NOTES
Chief Complaint   Patient presents with    Sent by MD     Sent from [facility] for further G-tube evaluation.       CT at referring facility showed small left pleural effusion, large paraesophageal hernia, and gas surrounding the tube tract.     FSBG 434.

## 2024-06-08 NOTE — PROGRESS NOTES
0222 Voalted Akosua Car M.D. - patient complaints it itching and asking if he can have some Benadryl and take a quick shower.
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
## 2024-06-08 NOTE — CONSULTS
DATE OF CONSULTATION:  6/7/2024     REFERRING PHYSICIAN:   Pranay Morocho D.O.     CONSULTING PHYSICIAN:  Kaleb Glass M.D.     REASON FOR CONSULTATION:  I have been asked by Dr. Morocho to see the patient in surgical consultation for evaluation of leakage adjacent to gastrostomy tube.    HISTORY OF PRESENT ILLNESS: The patient is a 53 year-old White man who recently underwent robotic-assisted redo paraesophageal hernia repair, gastropexy with gastrostomy tube placement, and EGD on 5/28/24 who was transferred from a free-standing ER with concerns regarding his gastrostomy tube. He endorses generalized abdominal discomfort which has been present since his surgery as well as leakage from adjacent to the gastrostomy tube over the past few days. Imaging at the outside facility was initially unavailable, the read indicates the gastrostomy tube is in the stomach but there is air adjacent to it in the tract. His labs were notable for a significantly elevated blood sugar as well as hyponatremia. He denies fevers, chills, nausea, vomiting, diarrhea.     PAST MEDICAL HISTORY:  has a past medical history of AC separation, right, initial encounter (02/17/2020), Acute alcohol intoxication ([facility]) (02/17/2020), CLARA (acute kidney injury) ([facility]) (03/28/2023), Allergy, Closed fracture of distal lateral malleolus of right fibula (02/17/2020), Closed fracture of multiple ribs of both sides (09/03/2020), Compression fracture of T1 vertebra ([facility]) (02/17/2020), Diabetes ([facility]), DKA, type 1, not at goal ([facility]) (03/28/2023), Elevated lactic acid level (03/28/2023), Encephalopathy (03/29/2023), Flail chest, initial encounter for closed fracture (02/17/2020), Heart burn, Hepatic steatosis (03/28/2023), Hiatus hernia syndrome, High cholesterol, Hyperkalemia (03/28/2023), Hypertension, Ileus ([facility]) (02/21/2020), Injury, Intertrochanteric fracture of right femur, closed, initial encounter ([facility]) (09/03/2020), Leukocytosis (03/28/2023), No  contraindication to deep vein thrombosis (DVT) prophylaxis (09/03/2020), Pain of left thumb (03/16/2015), Screening examination for infectious disease, Thumb laceration (03/16/2015), Trauma (09/04/2020), and Urinary retention (09/05/2020).    PAST SURGICAL HISTORY:  has a past surgical history that includes pr open fix inter/subtroch fx,implnt (Right, 09/03/2020); other abdominal surgery (1987); pr upper gi endoscopy,diagnosis (04/25/2024); robotic assisted lap hiatal hernia rep (04/25/2024); other orthopedic surgery; pr upper gi endoscopy,diagnosis (N/A, 5/15/2024); pr upper gi endoscopy,diagnosis (N/A, 5/15/2024); pr upper gi endoscopy,scler inject (N/A, 5/15/2024); and robotic assisted lap hiatal hernia rep (N/A, 5/28/2024).    ALLERGIES:   Allergies   Allergen Reactions    Pcn [Penicillins] Anaphylaxis     Historic reporting from childhood       CURRENT MEDICATIONS:    Home Medications       Reviewed by Denilson Thompson (Pharmacy Tech) on 06/08/24 at 0136  Med List Status: Complete     Medication Last Dose Status   diphenhydrAMINE (BENADRYL) 25 MG Tab NOT TAKING Active   insulin glargine (LANTUS SOLOSTAR) 100 UNIT/ML Solution Pen-injector injection 6/6/2024 Active   Insulin Pen Needle 32G X 6 MM Misc SUPPLY Active   lisinopril (PRINIVIL) 20 MG Tab 6/7/2024 Active   methocarbamol (ROBAXIN) 750 MG Tab 6/7/2024 Active   multivitamin Tab NOT TAKING Active   PERCOCET 5-325 MG Tab 6/7/2024 Active   prochlorperazine (COMPAZINE) 10 MG Tab NOT TAKING Active                  Audit from Redirected Encounters    **Home medications have not yet been reviewed for this encounter**         FAMILY HISTORY: family history includes No Known Problems in his daughter and son.    SOCIAL HISTORY:  reports that he has never smoked. He has never used smokeless tobacco. He reports that he does not currently use alcohol. He reports current drug use. Drug: Inhaled.    REVIEW OF SYSTEMS: Comprehensive review of systems is negative with  the exception of the aforementioned HPI, PMH, and PSH bullets in accordance with CMS guidelines.    PHYSICAL EXAMINATION:    Physical Exam  Vitals and nursing note reviewed.   Constitutional:       General: He is not in acute distress.     Appearance: He is not toxic-appearing.   HENT:      Head: Normocephalic and atraumatic.      Right Ear: External ear normal.      Left Ear: External ear normal.      Nose: Nose normal.      Mouth/Throat:      Mouth: Mucous membranes are moist.      Pharynx: Oropharynx is clear.   Eyes:      General: No scleral icterus.     Pupils: Pupils are equal, round, and reactive to light.   Cardiovascular:      Rate and Rhythm: Normal rate.      Pulses: Normal pulses.   Pulmonary:      Effort: Pulmonary effort is normal. No respiratory distress.      Breath sounds: Normal breath sounds.   Abdominal:      General: There is no distension.      Palpations: Abdomen is soft.      Tenderness: There is no guarding or rebound.      Comments: Well-healed incisional scars. Gastrostomy tube in place in left upper quadrant with adjacent skin excoriation.   Musculoskeletal:         General: No tenderness or deformity. Normal range of motion.      Cervical back: Normal range of motion and neck supple.   Skin:     General: Skin is warm and dry.      Capillary Refill: Capillary refill takes less than 2 seconds.      Coloration: Skin is not jaundiced.   Neurological:      General: No focal deficit present.      Mental Status: He is alert and oriented to person, place, and time.   Psychiatric:         Behavior: Behavior is cooperative.         LABORATORY VALUES:   Recent Labs     06/07/24  2355   WBC 8.9   RBC 4.81   HEMOGLOBIN 14.8   HEMATOCRIT 41.0*   MCV 85.2   MCH 30.8   MCHC 36.1   RDW 36.5   PLATELETCT 381   MPV 8.6*     Recent Labs     06/07/24  2355   SODIUM 134*   POTASSIUM 4.2   CHLORIDE 97   CO2 22   GLUCOSE 446*   BUN 24*   CREATININE 0.93   CALCIUM 8.8     Recent Labs     06/07/24  2355   ASTSGOT  18   ALTSGPT 27   TBILIRUBIN 0.6   ALKPHOSPHAT 114*   GLOBULIN 2.9   INR 1.09     Recent Labs     06/07/24  2355   APTT 33.0   INR 1.09        IMAGING:   DX-G.I. TUBE INJECTION, ANY TYPE   Final Result         1.  Gastrostomy tube terminating within the stomach.   2.  Mild gaseous distention of bowel, otherwise nonspecific bowel gas pattern.   3.  Radiodensity overlying the right lower quadrant, could represent appendicolith or enteric debris.      OUTSIDE IMAGES-CT ABDOMEN /PELVIS   Final Result          ASSESSMENT AND PLAN:     53 year-old man with recent robotic-assisted redo paraesophageal hernia repair and gastropexy with gastrostomy tube placement transferred for leakage adjacent to his gastrostomy tube.  Exam and labs reassuring.  Gastrostomy tube in appropriate position on imaging at [facility] and review of outside images.  On bedside manipulation of his gastrostomy tube I am unable to aspirate saline from the balloon however I am able to instill fluid without resistance raising concern that the balloon has ruptured. The balloon is not visible on the outside CT scan.  Given that he is less than two-weeks from his surgery a mature tract may not have formed yet.  Will discuss imaging-guided exchange of his gastrostomy tube for a new larger tube with IR in the morning.      DISPOSITION: Medical evaluation and admission for diabetes mellitus. [facility] Acute Care Surgery Blue Service will follow.     ____________________________________     Kaleb Glass M.D.    DD: 6/7/2024  11:45 PM    AAST Grading System for EGS Conditions  ACS NSQIP Surgical Risk Calculator

## 2024-06-08 NOTE — ED NOTES
Bedside report to ALEXANDER Brownlee. Plan of care discussed with patient. Bed locked and in lowest position. Call light available and within reach. Appropriate fall precautions in place. No distress noted. This RN removed from care.

## 2024-06-09 VITALS
HEART RATE: 62 BPM | TEMPERATURE: 97.3 F | SYSTOLIC BLOOD PRESSURE: 87 MMHG | OXYGEN SATURATION: 90 % | HEIGHT: 75 IN | RESPIRATION RATE: 15 BRPM | BODY MASS INDEX: 22.38 KG/M2 | DIASTOLIC BLOOD PRESSURE: 51 MMHG | WEIGHT: 180 LBS

## 2024-06-09 LAB
ANION GAP SERPL CALC-SCNC: 10 MMOL/L (ref 7–16)
BUN SERPL-MCNC: 18 MG/DL (ref 8–22)
CALCIUM SERPL-MCNC: 8.4 MG/DL (ref 8.5–10.5)
CHLORIDE SERPL-SCNC: 100 MMOL/L (ref 96–112)
CO2 SERPL-SCNC: 26 MMOL/L (ref 20–33)
CREAT SERPL-MCNC: 0.81 MG/DL (ref 0.5–1.4)
ERYTHROCYTE [DISTWIDTH] IN BLOOD BY AUTOMATED COUNT: 39 FL (ref 35.9–50)
GFR SERPLBLD CREATININE-BSD FMLA CKD-EPI: 106 ML/MIN/1.73 M 2
GLUCOSE BLD STRIP.AUTO-MCNC: 154 MG/DL (ref 65–99)
GLUCOSE BLD STRIP.AUTO-MCNC: 88 MG/DL (ref 65–99)
GLUCOSE SERPL-MCNC: 102 MG/DL (ref 65–99)
HCT VFR BLD AUTO: 42.1 % (ref 42–52)
HGB BLD-MCNC: 14.3 G/DL (ref 14–18)
MCH RBC QN AUTO: 30.7 PG (ref 27–33)
MCHC RBC AUTO-ENTMCNC: 34 G/DL (ref 32.3–36.5)
MCV RBC AUTO: 90.3 FL (ref 81.4–97.8)
PLATELET # BLD AUTO: 353 K/UL (ref 164–446)
PMV BLD AUTO: 8.7 FL (ref 9–12.9)
POTASSIUM SERPL-SCNC: 3.7 MMOL/L (ref 3.6–5.5)
RBC # BLD AUTO: 4.66 M/UL (ref 4.7–6.1)
SODIUM SERPL-SCNC: 136 MMOL/L (ref 135–145)
WBC # BLD AUTO: 12.1 K/UL (ref 4.8–10.8)

## 2024-06-09 PROCEDURE — G0378 HOSPITAL OBSERVATION PER HR: HCPCS

## 2024-06-09 PROCEDURE — 99239 HOSP IP/OBS DSCHRG MGMT >30: CPT | Performed by: STUDENT IN AN ORGANIZED HEALTH CARE EDUCATION/TRAINING PROGRAM

## 2024-06-09 PROCEDURE — 96376 TX/PRO/DX INJ SAME DRUG ADON: CPT

## 2024-06-09 PROCEDURE — 82962 GLUCOSE BLOOD TEST: CPT

## 2024-06-09 PROCEDURE — 85027 COMPLETE CBC AUTOMATED: CPT

## 2024-06-09 PROCEDURE — 36415 COLL VENOUS BLD VENIPUNCTURE: CPT

## 2024-06-09 PROCEDURE — A9270 NON-COVERED ITEM OR SERVICE: HCPCS | Mod: UD | Performed by: STUDENT IN AN ORGANIZED HEALTH CARE EDUCATION/TRAINING PROGRAM

## 2024-06-09 PROCEDURE — 700111 HCHG RX REV CODE 636 W/ 250 OVERRIDE (IP): Mod: UD | Performed by: STUDENT IN AN ORGANIZED HEALTH CARE EDUCATION/TRAINING PROGRAM

## 2024-06-09 PROCEDURE — 700102 HCHG RX REV CODE 250 W/ 637 OVERRIDE(OP): Mod: UD | Performed by: STUDENT IN AN ORGANIZED HEALTH CARE EDUCATION/TRAINING PROGRAM

## 2024-06-09 PROCEDURE — 80048 BASIC METABOLIC PNL TOTAL CA: CPT

## 2024-06-09 RX ADMIN — OXYCODONE HYDROCHLORIDE 10 MG: 10 TABLET ORAL at 12:01

## 2024-06-09 RX ADMIN — LISINOPRIL 20 MG: 20 TABLET ORAL at 05:19

## 2024-06-09 RX ADMIN — OXYCODONE HYDROCHLORIDE 10 MG: 10 TABLET ORAL at 02:58

## 2024-06-09 RX ADMIN — HYDROMORPHONE HYDROCHLORIDE 0.5 MG: 1 INJECTION, SOLUTION INTRAMUSCULAR; INTRAVENOUS; SUBCUTANEOUS at 04:11

## 2024-06-09 RX ADMIN — OXYCODONE HYDROCHLORIDE 10 MG: 10 TABLET ORAL at 07:44

## 2024-06-09 RX ADMIN — DIPHENHYDRAMINE HYDROCHLORIDE 25 MG: 25 TABLET ORAL at 05:20

## 2024-06-09 RX ADMIN — HYDROMORPHONE HYDROCHLORIDE 0.5 MG: 1 INJECTION, SOLUTION INTRAMUSCULAR; INTRAVENOUS; SUBCUTANEOUS at 09:34

## 2024-06-09 RX ADMIN — ACETAMINOPHEN 1000 MG: 500 TABLET, FILM COATED ORAL at 12:01

## 2024-06-09 ASSESSMENT — PAIN DESCRIPTION - PAIN TYPE
TYPE: ACUTE PAIN

## 2024-06-09 NOTE — DISCHARGE PLANNING
Case Management Discharge Planning    Admission Date: 6/7/2024  GMLOS: 3  ALOS: 1    6-Clicks ADL Score:    6-Clicks Mobility Score:        Anticipated Discharge Dispo: Discharge Disposition: D/T to home under [facility] care in anticipation of covered skilled care (06)    DME Needed: No    Action(s) Taken: Pt was on service with [facility] prior.   order in place for JULIO C.  RN CM called [facility] to discuss referral.  Awaiting call back.      1223  RN CM received a call back from Shereen at [facility].  They are able to accept the patient back on with  service.  Patient is agreeable.      Escalations Completed: None    Medically Clear: No    Next Steps: RN CM to continue to follow for DC planning      Barriers to Discharge: Medical clearance

## 2024-06-09 NOTE — PROGRESS NOTES
Discussed with Dr. Hunag over the phone last night.  The patient has been able to eat fine.  He has not used the G-tube since placement.  He has a litany of complaints and issues about it.    G-tube removed at bedside.  Keep covered.  OK to shower while covered.  Will take at least 2 weeks for the hole to seal  Get set up for follow up with Dr. Huang outpatient.    Rickie Tiwari MD, FACS

## 2024-06-09 NOTE — CARE PLAN
The patient is Stable - Low risk of patient condition declining or worsening    Shift Goals  Clinical Goals: pain mgt  Patient Goals: rest and comfort  Family Goals: n/a    Progress made toward(s) clinical / shift goals:      Patient is aox4, denies any SOB/Chest pain/N and V. Able to follow commands, has G tube ADAM quad with minimal discharges,       Problem: Pain - Standard  Goal: Alleviation of pain or a reduction in pain to the patient’s comfort goal  Outcome: Progressing   Denies any numbness or tingling sensation,pain located in the abdomen, able to pass some gas, non-pharmacological and pharmacological intervention in place. Instructed to call RN if pain is gradually increasing.    Patient is not progressing towards the following goals:
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
## 2024-06-09 NOTE — CARE PLAN
The patient is Stable - Low risk of patient condition declining or worsening    Shift Goals  Clinical Goals: pain mgmt  Patient Goals: rest and pain control  Family Goals: not present    Progress made toward(s) clinical / shift goals:    Problem: Knowledge Deficit - Standard  Goal: Patient and family/care givers will demonstrate understanding of plan of care, disease process/condition, diagnostic tests and medications  Outcome: Progressing     POC discussed with patient, pt verbalized understanding    Problem: Pain - Standard  Goal: Alleviation of pain or a reduction in pain to the patient’s comfort goal  Outcome: Progressing     Pt experiencing pain pain in abdomen related to g tube placement and removal. Educated to use 0-10 pain scale to determine medication intervention used    Problem: Self Care  Goal: Patient will have the ability to perform ADLs independently or with assistance (bathe, groom, dress, toilet and feed)  Outcome: Progressing     Problem: Infection - Standard  Goal: Patient will remain free from infection  Outcome: Progressing     Problem: Wound/ / Incision Healing  Goal: Patient's wound/surgical incision will decrease in size and heals properly  Outcome: Progressing       Patient is not progressing towards the following goals:

## 2024-06-09 NOTE — DISCHARGE INSTRUCTIONS
G-tube removed at bedside.  Keep covered.  OK to shower while covered.  Will take at least 2 weeks for the hole to seal  Get set up for follow up with Dr. Sal darling.    [facility]  [id number]  [place] [address]  [phone]     Follow up in 1 week(s)        Merlene Esteban M.D.  [address]  [place] [address]  [phone]     Follow up in 3 day(s)

## 2024-06-24 DIAGNOSIS — E11.65 TYPE 2 DIABETES MELLITUS WITH HYPERGLYCEMIA, WITH LONG-TERM CURRENT USE OF INSULIN (HCC): ICD-10-CM

## 2024-06-24 DIAGNOSIS — I10 PRIMARY HYPERTENSION: Chronic | ICD-10-CM

## 2024-06-24 DIAGNOSIS — Z79.4 TYPE 2 DIABETES MELLITUS WITH HYPERGLYCEMIA, WITH LONG-TERM CURRENT USE OF INSULIN (HCC): ICD-10-CM

## 2024-06-24 RX ORDER — LISINOPRIL 20 MG/1
20 TABLET ORAL DAILY
Qty: 90 TABLET | Refills: 3 | Status: SHIPPED | OUTPATIENT
Start: 2024-06-24

## 2024-06-24 NOTE — TELEPHONE ENCOUNTER
Received request via: Pharmacy    Was the patient seen in the last year in this department? Yes    Does the patient have an active prescription (recently filled or refills available) for medication(s) requested? No    [email or web address]    Does the patient have skilled nursing Plus and need 100 day supply (blood pressure, diabetes and cholesterol meds only)? Patient does not have SCP

## 2024-06-24 NOTE — TELEPHONE ENCOUNTER
Received request via: Patient    Was the patient seen in the last year in this department? Yes    Does the patient have an active prescription (recently filled or refills available) for medication(s) requested? No    Ph[email or web address]    Does the patient have retirement Plus and need 100 day supply (blood pressure, diabetes and cholesterol meds only)? Patient does not have SCP

## 2024-07-19 ENCOUNTER — APPOINTMENT (OUTPATIENT)
Dept: ADMISSIONS | Facility: MEDICAL CENTER | Age: 54
End: 2024-07-19
Attending: STUDENT IN AN ORGANIZED HEALTH CARE EDUCATION/TRAINING PROGRAM
Payer: MEDICAID

## 2024-07-22 DIAGNOSIS — Z79.4 TYPE 2 DIABETES MELLITUS WITH HYPERGLYCEMIA, WITH LONG-TERM CURRENT USE OF INSULIN (HCC): ICD-10-CM

## 2024-07-22 DIAGNOSIS — E11.65 TYPE 2 DIABETES MELLITUS WITH HYPERGLYCEMIA, WITH LONG-TERM CURRENT USE OF INSULIN (HCC): ICD-10-CM

## 2024-07-23 ENCOUNTER — OFFICE VISIT (OUTPATIENT)
Dept: MEDICAL GROUP | Facility: MEDICAL CENTER | Age: 54
End: 2024-07-23
Attending: STUDENT IN AN ORGANIZED HEALTH CARE EDUCATION/TRAINING PROGRAM
Payer: MEDICAID

## 2024-07-23 ENCOUNTER — PRE-ADMISSION TESTING (OUTPATIENT)
Dept: ADMISSIONS | Facility: MEDICAL CENTER | Age: 54
End: 2024-07-23
Attending: STUDENT IN AN ORGANIZED HEALTH CARE EDUCATION/TRAINING PROGRAM
Payer: MEDICAID

## 2024-07-23 VITALS
OXYGEN SATURATION: 97 % | HEIGHT: 75 IN | DIASTOLIC BLOOD PRESSURE: 76 MMHG | SYSTOLIC BLOOD PRESSURE: 124 MMHG | BODY MASS INDEX: 21.88 KG/M2 | HEART RATE: 92 BPM | TEMPERATURE: 98.6 F | WEIGHT: 176 LBS | RESPIRATION RATE: 16 BRPM

## 2024-07-23 DIAGNOSIS — E11.628 DIABETIC FOOT INFECTION (HCC): ICD-10-CM

## 2024-07-23 DIAGNOSIS — L08.9 DIABETIC FOOT INFECTION (HCC): ICD-10-CM

## 2024-07-23 PROCEDURE — 99213 OFFICE O/P EST LOW 20 MIN: CPT | Performed by: FAMILY MEDICINE

## 2024-07-23 PROCEDURE — 99214 OFFICE O/P EST MOD 30 MIN: CPT | Performed by: FAMILY MEDICINE

## 2024-07-23 RX ORDER — SULFAMETHOXAZOLE AND TRIMETHOPRIM 800; 160 MG/1; MG/1
1 TABLET ORAL 2 TIMES DAILY
Qty: 20 TABLET | Refills: 0 | Status: SHIPPED | OUTPATIENT
Start: 2024-07-23 | End: 2024-08-06

## 2024-07-23 ASSESSMENT — FIBROSIS 4 INDEX: FIB4 SCORE: 0.5

## 2024-07-23 NOTE — OR NURSING
Patient denies need for medication instructions to be emailed to him, prior to procedure tomorrow 7/24/24.

## 2024-07-24 ENCOUNTER — HOSPITAL ENCOUNTER (OUTPATIENT)
Facility: MEDICAL CENTER | Age: 54
End: 2024-07-24
Attending: STUDENT IN AN ORGANIZED HEALTH CARE EDUCATION/TRAINING PROGRAM | Admitting: STUDENT IN AN ORGANIZED HEALTH CARE EDUCATION/TRAINING PROGRAM
Payer: MEDICAID

## 2024-07-24 RX ORDER — SODIUM CHLORIDE, SODIUM LACTATE, POTASSIUM CHLORIDE, CALCIUM CHLORIDE 600; 310; 30; 20 MG/100ML; MG/100ML; MG/100ML; MG/100ML
INJECTION, SOLUTION INTRAVENOUS CONTINUOUS
Status: CANCELLED | OUTPATIENT
Start: 2024-07-24 | End: 2024-07-24

## 2024-07-24 NOTE — OR NURSING
Patient requesting to speak to someone in [facility]. Spoke with pt. About his concerns with his infected foot and being diabetic. Stated he saw his doctor yesterday and she prescribed abx but he has not started them yet. He stated he did not feel comfortable proceeding with the procedure today and would call Dr. Allred office to get it rescheduled.

## 2024-07-26 ENCOUNTER — TELEPHONE (OUTPATIENT)
Dept: HEALTH INFORMATION MANAGEMENT | Facility: OTHER | Age: 54
End: 2024-07-26
Payer: MEDICAID

## 2024-10-28 DIAGNOSIS — Z79.4 TYPE 2 DIABETES MELLITUS WITH HYPERGLYCEMIA, WITH LONG-TERM CURRENT USE OF INSULIN (HCC): ICD-10-CM

## 2024-10-28 DIAGNOSIS — E11.65 TYPE 2 DIABETES MELLITUS WITH HYPERGLYCEMIA, WITH LONG-TERM CURRENT USE OF INSULIN (HCC): ICD-10-CM

## 2024-10-28 RX ORDER — INSULIN GLARGINE 100 [IU]/ML
30 INJECTION, SOLUTION SUBCUTANEOUS EVERY EVENING
Qty: 15 ML | Refills: 5 | Status: SHIPPED | OUTPATIENT
Start: 2024-10-28

## (undated) DEVICE — AIRLESS LAP SPRAY TIP VISTASEAL (3EA/BX)

## (undated) DEVICE — SET EXTENSION WITH 2 PORTS (48EA/CA) ***PART #2C8610 IS A SUBSTITUTE*****

## (undated) DEVICE — MASK ANESTHESIA ADULT  - (100/CA)

## (undated) DEVICE — NEEDLE INSFL 120MM 14GA VRRS - (20/BX)

## (undated) DEVICE — GLOVE BIOGEL PI INDICATOR SZ 6.5 SURGICAL PF LF - (50/BX 4BX/CA)

## (undated) DEVICE — SUTURE 3.5-0 ETHIBOND GREEN CT-2 TAPER (36PK/BX)

## (undated) DEVICE — LACTATED RINGERS INJ 1000 ML - (14EA/CA 60CA/PF)

## (undated) DEVICE — WIRE GUIDE 3.2MM 400MM

## (undated) DEVICE — SEALER VESSEL EXTEND FROM DAVINCI ENERGY (6EA/BX)

## (undated) DEVICE — SLEEVE, VASO, THIGH, MED

## (undated) DEVICE — SUTURE 3-0 STRATAFIX SPIRAL PDS PLUS SH 15CM (12EA/BX)

## (undated) DEVICE — BLOCK BITE MAXI DENTAL RETENTION RIM (100EA/BX)

## (undated) DEVICE — DRESSING TRANSPARENT FILM TEGADERM 4 X 4.75" (50EA/BX)"

## (undated) DEVICE — DRAPE U ORTHOPEDIC - (10/BX)

## (undated) DEVICE — GLOVE BIOGEL PI INDICATOR SZ 8.0 SURGICAL PF LF -(50/BX 4BX/CA)

## (undated) DEVICE — SYRINGE ALLIANCE INFLATION (5EA/BX)

## (undated) DEVICE — PROTECTOR ULNA NERVE - (36PR/CA)

## (undated) DEVICE — SUTURE 0 VICRYL PLUS UR-6 - 27 INCH (36/BX)

## (undated) DEVICE — SUTURE2-0 27IN VCRL ANTI VIOL (36PK/BX)

## (undated) DEVICE — SUCTION INSTRUMENT YANKAUER BULBOUS TIP W/O VENT (50EA/CA)

## (undated) DEVICE — CATHERTER CLEAR SINGLE USE INJECTION THERAPY NEEDLE 25GA X 4MM  2.3MM X 240CM (5EA/BX)

## (undated) DEVICE — GLOVE BIOGEL PI ORTHO SZ 7.5 PF LF (40PR/BX)

## (undated) DEVICE — SUTURE 3-0 VICRYL PLUS SH - 27 INCH (36/BX)

## (undated) DEVICE — SYSTEM CLEARIFY VISUALIZATION (10EA/PK)

## (undated) DEVICE — SUTURE 2-0 ETHIBOND SH D/A 36 (36PK/BX)"

## (undated) DEVICE — DRAPE COLUMN  BOX OF 20

## (undated) DEVICE — KIT ANESTHESIA W/CIRCUIT & 3/LT BAG W/FILTER (20EA/CA)

## (undated) DEVICE — CANISTER SUCTION RIGID RED 1500CC (40EA/CA)

## (undated) DEVICE — RELOAD STAPLER SUREFORM 60 WHITE (12EA/BX)

## (undated) DEVICE — SET LEADWIRE 5 LEAD BEDSIDE DISPOSABLE ECG (1SET OF 5/EA)

## (undated) DEVICE — GOWN WARMING STANDARD FLEX - (30/CA)

## (undated) DEVICE — CHLORAPREP 26 ML APPLICATOR - ORANGE TINT(25/CA)

## (undated) DEVICE — BLANKET WARMING UPPER BODY - (10/CA)

## (undated) DEVICE — WATER IRRIGATION STERILE 1000ML (12EA/CA)

## (undated) DEVICE — RESERVOIR SUCTION 100 CC - SILICONE (20EA/CA)

## (undated) DEVICE — PENCIL ELECTSURG 10FT BTN SWH - (50/CA)

## (undated) DEVICE — GLOVE BIOGEL PI INDICATOR SZ 7.0 SURGICAL PF LF - (50/BX 4BX/CA)

## (undated) DEVICE — SYRINGE 30 ML LS (56/BX)

## (undated) DEVICE — CONTAINER, SPECIMEN, STERILE

## (undated) DEVICE — SUTURE 2-0 ETHIBOND CT-2 (12PK/BX)

## (undated) DEVICE — TUBE CONNECTING SUCTION - CLEAR PLASTIC STERILE 72 IN (50EA/CA)

## (undated) DEVICE — TUBING CLEARLINK DUO-VENT - C-FLO (48EA/CA)

## (undated) DEVICE — SUTURE GENERAL

## (undated) DEVICE — SUTURE 2-0 VICRYL PLUS SH - 27 INCH (36/BX)

## (undated) DEVICE — DRAPE ARM  BOX OF 20

## (undated) DEVICE — TUBE NG SALEM SUMP 16FR (50EA/CA)

## (undated) DEVICE — PAD OR TABLE DA VINCI 2IN X 20IN X 72IN - (12EA/CA)

## (undated) DEVICE — Device

## (undated) DEVICE — SUTURE 4-0 VICRYL PLUS PC-3 18 (12PK/BX)"

## (undated) DEVICE — GLOVE BIOGEL INDICATOR SZ 7.5 SURGICAL PF LTX - (50PR/BX 4BX/CA)

## (undated) DEVICE — SODIUM CHL IRRIGATION 0.9% 1000ML (12EA/CA)

## (undated) DEVICE — ELECTRODE DUAL RETURN W/ CORD - (50/PK)

## (undated) DEVICE — COVER TIP ENDOWRIST HOT SHEAR - (10EA/BX) DA VINCI

## (undated) DEVICE — BOVIE BLADE COATED - (50/PK)

## (undated) DEVICE — KIT CUSTOM PROCEDURE SINGLE FOR ENDO  (15/CA)

## (undated) DEVICE — IRRIGATOR SUCTION ENDOWRIST DISPOSABLE OD8 MM (6EA/BX)

## (undated) DEVICE — NEPTUNE 4 PORT MANIFOLD - (20/PK)

## (undated) DEVICE — SUTURE 4-0 MONOCRYL PLUS PS-1 - 27 INCH (36/BX)

## (undated) DEVICE — SOLUTION PREP PVP IODINE 3/4 OZ POUCH PACKET CONTAINER STERILE LATEX FREE

## (undated) DEVICE — CATHETER IV 20 GA X 1-1/4 ---SURG.& SDS ONLY--- (50EA/BX)

## (undated) DEVICE — BIT DRILL THREE FLUTED QC NEEDLE POINT 4.2MM 145MM (1TX2=2)

## (undated) DEVICE — PORT AUXILLARY WATER (50EA/BX)

## (undated) DEVICE — TUBE E-T HI-LO CUFF 8.0MM (10EA/PK)

## (undated) DEVICE — BALLOON CRE WG 10-12MM 240CM 5.5 F G

## (undated) DEVICE — MASK PANORAMIC OXYGEN PRO2 (30EA/CA)

## (undated) DEVICE — SENSOR OXIMETER ADULT SPO2 RD SET (20EA/BX)

## (undated) DEVICE — SENSOR SPO2 NEO LNCS ADHESIVE (20/BX) SEE USER NOTES

## (undated) DEVICE — GLOVE BIOGEL INDICATOR SZ 8 SURGICAL PF LTX - (50/BX 4BX/CA)

## (undated) DEVICE — SLEEVE VASO CALF MED - (10PR/CA)

## (undated) DEVICE — SUTURE 4-0 ETHILON FS-2 18 (36PK/BX)"

## (undated) DEVICE — OBTURATOR BLADELESS STANDARD 8MM (6EA/BX)

## (undated) DEVICE — BLADE SURGICAL #15 - (50/BX 3BX/CA)

## (undated) DEVICE — CANNULA O2 COMFORT SOFT EAR ADULT 7 FT TUBING (50/CA)

## (undated) DEVICE — SUTURE 1 STRATAFIX SYMMETRIC PDS PLUS CT-1 45CM (12EA/BX)

## (undated) DEVICE — DRAPE C ARMOR (12EA/CA)

## (undated) DEVICE — DRAPE LARGE 3 QUARTER - (20/CA)

## (undated) DEVICE — RELOAD STAPLER SUREFORM 60 BLUE (12EA/BX)

## (undated) DEVICE — DRAIN PENROSE STERILE 1/4 X - 18 IN  (25EA/BX)

## (undated) DEVICE — SUTURE 0 LIGATING REEL VICRYL PLUS (12PK/BX)

## (undated) DEVICE — ARMREST CRADLE FOAM - (2PR/PK 12PR/CA)

## (undated) DEVICE — GLOVE SZ 6.5 BIOGEL PI MICRO - PF LF (50PR/BX)

## (undated) DEVICE — GOWN SURGEONS X-LARGE - DISP. (30/CA)

## (undated) DEVICE — SUTURE 2-0 30CM STRATAFIX SPIRAL PDO ***WAS PART #SXPD1B401 *****

## (undated) DEVICE — BAG RETRIEVAL 10ML (10EA/BX)

## (undated) DEVICE — SUTURE 3-0 ETHILON FSLX 30 (36PK/BX)"

## (undated) DEVICE — COVER LIGHT HANDLE ALC PLUS DISP (18EA/BX)

## (undated) DEVICE — TROCAR Z THREAD12MM OPTICAL - NON BLADED (6/BX)

## (undated) DEVICE — DRAPE 36X28IN RAD CARM BND BG - (25/CA) O

## (undated) DEVICE — CANISTER SUCTION 3000ML MECHANICAL FILTER AUTO SHUTOFF MEDI-VAC NONSTERILE LF DISP  (40EA/CA)

## (undated) DEVICE — ROD REAMING STERILE WITH BALL TIP 2.5MM 950MM

## (undated) DEVICE — DRAPE SURG STERI-DRAPE 7X11OD - (40EA/CA)

## (undated) DEVICE — TUBE GASTROSTOMY 20FR 20CC (1/DS)

## (undated) DEVICE — SET SUCTION/IRRIGATION WITH DISPOSABLE TIP (6/CA )PART #0250-070-520 IS A SUB

## (undated) DEVICE — SUTURE 2-0 VICRYL PLUS CT-1 36 (36PK/BX)"

## (undated) DEVICE — SEALANT TISSUE CLOSURE KIT FIBIRIN VISTASEAL 10ML (1EA/BX)

## (undated) DEVICE — SUCTION INSTRUMENT YANKAUER OPEN TIP W/O VENT (50EA/CA)

## (undated) DEVICE — DERMABOND ADVANCED - (12EA/BX)

## (undated) DEVICE — KIT  I.V. START (100EA/CA)

## (undated) DEVICE — SUTURE 0 VICRYL PLUS CT-1 - 36 INCH (36/BX)

## (undated) DEVICE — GLOVE BIOGEL M SZ 8 SURGICAL PF LTX - (50/BX 4BX/CA)

## (undated) DEVICE — ELECTRODE 850 FOAM ADHESIVE - HYDROGEL RADIOTRNSPRNT (50/PK)

## (undated) DEVICE — ELECTRODE 5MM LHK LAPSCP STERILE DISP- MEGADYNE  (5/CA)

## (undated) DEVICE — GLOVE BIOGEL PI ORTHO SZ 7 PF LF (40PR/BX)

## (undated) DEVICE — FILM CASSETTE ENDO

## (undated) DEVICE — STAPLER SKIN DISP - (6/BX 10BX/CA) VISISTAT

## (undated) DEVICE — TUBE CONNECT SUCTION CLEAR 120 X 1/4" (50EA/CA)"

## (undated) DEVICE — STAPLER SUREFORM 60 12 MM (6EA/BX)

## (undated) DEVICE — GLOVE BIOGEL PI ORTHO SZ 6 1/2 SURGICAL PF LF (40PR/BX)

## (undated) DEVICE — TUBING LAPAROSCOPIC PLUME DEVICE (10EA/CA)

## (undated) DEVICE — BUTTON ENDOSCOPY DISPOSABLE

## (undated) DEVICE — GLOVE BIOGEL ECLIPSE PF LATEX SIZE 7.5

## (undated) DEVICE — GLOVE BIOGEL SZ 7 SURGICAL PF LTX - (50PR/BX 4BX/CA)

## (undated) DEVICE — BITE BLOCK, DISP.

## (undated) DEVICE — PACK MAJOR ORTHO - (2EA/CA)

## (undated) DEVICE — HEAD HOLDER JUNIOR/ADULT

## (undated) DEVICE — DRESSING LEUKOMED STERILE 11.75X4IN - (50/CA)

## (undated) DEVICE — MEDICINE CUP STERILE 2 OZ - (100/CA)

## (undated) DEVICE — TROCAR 5X100 NON BLADED Z-TH - READ KII (6/BX)

## (undated) DEVICE — SUTURE 4-0 MONOCRYL PLUS PS-2 - 27 INCH (36/BX)

## (undated) DEVICE — KIT CUSTOM PROCEDURE SINGLE FOR ENDO (15/CA)